# Patient Record
Sex: FEMALE | Race: WHITE | Employment: OTHER | URBAN - METROPOLITAN AREA
[De-identification: names, ages, dates, MRNs, and addresses within clinical notes are randomized per-mention and may not be internally consistent; named-entity substitution may affect disease eponyms.]

---

## 2017-01-09 ENCOUNTER — OFFICE VISIT (OUTPATIENT)
Dept: OTOLARYNGOLOGY | Facility: CLINIC | Age: 72
End: 2017-01-09

## 2017-01-09 VITALS
DIASTOLIC BLOOD PRESSURE: 72 MMHG | HEIGHT: 62 IN | SYSTOLIC BLOOD PRESSURE: 130 MMHG | TEMPERATURE: 98 F | RESPIRATION RATE: 18 BRPM | WEIGHT: 160 LBS | HEART RATE: 68 BPM | BODY MASS INDEX: 29.44 KG/M2

## 2017-01-09 DIAGNOSIS — H69.83 EUSTACHIAN TUBE DYSFUNCTION, BILATERAL: Primary | ICD-10-CM

## 2017-01-09 PROCEDURE — G0463 HOSPITAL OUTPT CLINIC VISIT: HCPCS | Performed by: OTOLARYNGOLOGY

## 2017-01-09 PROCEDURE — 99213 OFFICE O/P EST LOW 20 MIN: CPT | Performed by: OTOLARYNGOLOGY

## 2017-01-09 RX ORDER — ROSUVASTATIN CALCIUM 10 MG/1
10 TABLET, COATED ORAL NIGHTLY
COMMUNITY
Start: 2015-12-17 | End: 2020-07-16

## 2017-01-09 RX ORDER — OLMESARTAN MEDOXOMIL AND HYDROCHLOROTHIAZIDE 40/12.5 40; 12.5 MG/1; MG/1
TABLET ORAL
COMMUNITY
Start: 2015-12-17 | End: 2019-03-09

## 2017-01-09 RX ORDER — BUSPIRONE HYDROCHLORIDE 15 MG/1
TABLET ORAL
COMMUNITY
Start: 2015-12-17 | End: 2019-03-09

## 2017-01-09 RX ORDER — METHYLPREDNISOLONE 4 MG/1
TABLET ORAL
Qty: 1 PACKAGE | Refills: 0 | Status: SHIPPED | OUTPATIENT
Start: 2017-01-09 | End: 2017-04-05 | Stop reason: ALTCHOICE

## 2017-01-09 RX ORDER — FLUOXETINE 20 MG/1
20 TABLET, FILM COATED ORAL DAILY
COMMUNITY
Start: 2015-12-17 | End: 2020-08-17

## 2017-01-09 RX ORDER — LORAZEPAM 0.5 MG/1
0.5 TABLET ORAL 2 TIMES DAILY PRN
COMMUNITY
Start: 2015-08-11 | End: 2020-07-16 | Stop reason: ALTCHOICE

## 2017-01-09 RX ORDER — GABAPENTIN 100 MG/1
200 CAPSULE ORAL EVERY MORNING
Refills: 3 | COMMUNITY
Start: 2017-01-02 | End: 2020-07-16

## 2017-01-09 RX ORDER — GABAPENTIN 100 MG/1
CAPSULE ORAL
COMMUNITY
Start: 2016-05-23 | End: 2017-04-05

## 2017-01-09 NOTE — PROGRESS NOTES
Kristen Gann is a 73year old female.  Patient presents with:  Ear Problem: c/o Ruptured eardrum on R ear, states both ears feel clogged now [id number]        HISTORY OF PRESENT ILLNESS     1/9/2017Here for evaluation of right worse than left ear pressu Negative Blurred vision and vision changes. Respiratory Negative Dyspnea and wheezing. Cardio Negative Chest pain, irregular heartbeat   GI Negative Abdominal pain and diarrhea. Endocrine Negative Cold intolerance and heat intolerance.    Neuro Negati Take 1 tablet (20 mg total) by mouth 2 (two) times daily. , Disp: 60 tablet, Rfl: 1    ASSESSMENT AND PLAN  1. Eustachian tube dysfunction, bilateral  I discussed the pathophysiology of eustachian tube dysfunction with the patient.  Treatment methods were di

## 2017-02-01 ENCOUNTER — OFFICE VISIT (OUTPATIENT)
Dept: AUDIOLOGY | Facility: CLINIC | Age: 72
End: 2017-02-01

## 2017-02-01 ENCOUNTER — OFFICE VISIT (OUTPATIENT)
Dept: OTOLARYNGOLOGY | Facility: CLINIC | Age: 72
End: 2017-02-01

## 2017-02-01 VITALS
WEIGHT: 160 LBS | BODY MASS INDEX: 29.44 KG/M2 | TEMPERATURE: 98 F | HEIGHT: 62 IN | DIASTOLIC BLOOD PRESSURE: 80 MMHG | SYSTOLIC BLOOD PRESSURE: 130 MMHG

## 2017-02-01 DIAGNOSIS — H65.31 CHRONIC MUCOID OTITIS MEDIA OF RIGHT EAR: Primary | ICD-10-CM

## 2017-02-01 DIAGNOSIS — H69.91 EUSTACHIAN TUBE DISORDER, RIGHT: Primary | ICD-10-CM

## 2017-02-01 PROCEDURE — 69433 CREATE EARDRUM OPENING: CPT | Performed by: OTOLARYNGOLOGY

## 2017-02-01 PROCEDURE — 92567 TYMPANOMETRY: CPT | Performed by: AUDIOLOGIST

## 2017-02-01 PROCEDURE — 99213 OFFICE O/P EST LOW 20 MIN: CPT | Performed by: OTOLARYNGOLOGY

## 2017-02-01 PROCEDURE — 92557 COMPREHENSIVE HEARING TEST: CPT | Performed by: AUDIOLOGIST

## 2017-02-01 RX ORDER — FLUOXETINE HYDROCHLORIDE 20 MG/1
CAPSULE ORAL
Refills: 3 | COMMUNITY
Start: 2017-01-20 | End: 2017-04-05

## 2017-02-01 NOTE — PROGRESS NOTES
AUDIOGRAM     Destiney English was referred for testing by Maddi Thorne. 3/15/1945  [id number]        Otoscopic inspection: right ear no cerumen; left ear no cerumen.        Tests/Procedures  Patient was tested via  standard insert earphones and a mike

## 2017-02-01 NOTE — PROGRESS NOTES
Leanna Massey is a 73year old female. Patient presents with:   Follow - Up: 1 month follow up- bilateral eustachian tube dysfunction- per pt is got better and after a while it got worse again at her right ear        HISTORY OF PRESENT ILLNESS     1/9 History    OTHER SURGICAL HISTORY      Comment desensitization         REVIEW OF SYSTEMS    System Neg/Pos Details   Constitutional Negative fever, weight loss. ENMT Negative Headaches vertigo    Eyes Negative Blurred vision and vision changes.    Laurent Andrea •  gabapentin 100 MG Oral Cap, , Disp: , Rfl:   •  LORazepam 0.5 MG Oral Tab, , Disp: , Rfl:   •  gabapentin 100 MG Oral Cap, , Disp: , Rfl: 3  •  famoTIDine 20 MG Oral Tab, Take 1 tablet (20 mg total) by mouth 2 (two) times daily. , Disp: 60 tablet, Rfl:

## 2017-04-05 ENCOUNTER — OFFICE VISIT (OUTPATIENT)
Dept: OTOLARYNGOLOGY | Facility: CLINIC | Age: 72
End: 2017-04-05

## 2017-04-05 VITALS
SYSTOLIC BLOOD PRESSURE: 136 MMHG | TEMPERATURE: 97 F | WEIGHT: 158 LBS | HEIGHT: 62 IN | DIASTOLIC BLOOD PRESSURE: 81 MMHG | BODY MASS INDEX: 29.08 KG/M2

## 2017-04-05 DIAGNOSIS — H92.03 REFERRED OTALGIA, BILATERAL: Primary | ICD-10-CM

## 2017-04-05 PROCEDURE — 99213 OFFICE O/P EST LOW 20 MIN: CPT | Performed by: OTOLARYNGOLOGY

## 2017-04-05 PROCEDURE — G0463 HOSPITAL OUTPT CLINIC VISIT: HCPCS | Performed by: OTOLARYNGOLOGY

## 2017-04-05 RX ORDER — CHLORDIAZEPOXIDE HYDROCHLORIDE AND CLIDINIUM BROMIDE 5; 2.5 MG/1; MG/1
CAPSULE ORAL
Refills: 3 | COMMUNITY
Start: 2017-03-06 | End: 2019-03-09

## 2017-04-05 RX ORDER — FOLIC ACID 1 MG/1
1 TABLET ORAL DAILY
Refills: 3 | COMMUNITY
Start: 2017-03-18 | End: 2020-07-16 | Stop reason: ALTCHOICE

## 2017-04-05 RX ORDER — PROMETHAZINE HYDROCHLORIDE AND CODEINE PHOSPHATE 6.25; 1 MG/5ML; MG/5ML
SYRUP ORAL
Refills: 0 | COMMUNITY
Start: 2017-02-12 | End: 2019-03-09 | Stop reason: ALTCHOICE

## 2017-04-05 RX ORDER — DOXYCYCLINE HYCLATE 100 MG
TABLET ORAL
Refills: 0 | COMMUNITY
Start: 2017-02-08 | End: 2017-04-05 | Stop reason: ALTCHOICE

## 2017-04-05 NOTE — PROGRESS NOTES
Rishi Newton is a 81year old female.  Patient presents with:  Ear Pain: right ear for 1 month, pain comes and goes, congestion in both ears         HISTORY OF PRESENT ILLNESS     1/9/2017Here for evaluation of right worse than left ear pressure and of Onset   • Cancer Father      lung   • Heart Disease Mother      congestive heart failure     Past Medical History   Diagnosis Date   • Breast cancer [facility])    • Migraines    • Essential hypertension    • Vertigo    • Hyperlipidemia          Past Surgical Cap, , Disp: , Rfl: 3  •  folic acid 1 MG Oral Tab, , Disp: , Rfl: 3  •  Olmesartan Medoxomil-HCTZ (BENICAR HCT) 40-12.5 MG Oral Tab, , Disp: , Rfl:   •  BusPIRone HCl 15 MG Oral Tab, , Disp: , Rfl:   •  Rosuvastatin Calcium (CRESTOR) 10 MG Oral Tab, , Dis

## 2017-06-07 ENCOUNTER — HOSPITAL ENCOUNTER (OUTPATIENT)
Age: 72
Discharge: HOME | End: 2017-06-07
Attending: EMERGENCY MEDICINE
Payer: MEDICARE

## 2017-06-07 VITALS
BODY MASS INDEX: 29.63 KG/M2 | DIASTOLIC BLOOD PRESSURE: 72 MMHG | SYSTOLIC BLOOD PRESSURE: 127 MMHG | WEIGHT: 161 LBS | OXYGEN SATURATION: 100 % | TEMPERATURE: 98 F | RESPIRATION RATE: 18 BRPM | HEIGHT: 62 IN | HEART RATE: 70 BPM

## 2017-06-07 DIAGNOSIS — J02.0 ACUTE STREPTOCOCCAL PHARYNGITIS: Primary | ICD-10-CM

## 2017-06-07 DIAGNOSIS — J20.8 ACUTE VIRAL BRONCHITIS: ICD-10-CM

## 2017-06-07 PROCEDURE — 99214 OFFICE O/P EST MOD 30 MIN: CPT

## 2017-06-07 PROCEDURE — 99213 OFFICE O/P EST LOW 20 MIN: CPT

## 2017-06-07 PROCEDURE — 87430 STREP A AG IA: CPT

## 2017-06-07 RX ORDER — ALBUTEROL SULFATE 90 UG/1
2 AEROSOL, METERED RESPIRATORY (INHALATION) EVERY 4 HOURS PRN
Qty: 1 INHALER | Refills: 0 | Status: SHIPPED | OUTPATIENT
Start: 2017-06-07 | End: 2017-07-07

## 2017-06-07 RX ORDER — FLUOXETINE HYDROCHLORIDE 20 MG/1
CAPSULE ORAL
COMMUNITY
Start: 2017-04-18 | End: 2018-02-14

## 2017-06-07 RX ORDER — AZITHROMYCIN 500 MG/1
500 TABLET, FILM COATED ORAL DAILY
Qty: 5 TABLET | Refills: 0 | Status: SHIPPED | OUTPATIENT
Start: 2017-06-07 | End: 2017-06-12

## 2017-06-07 NOTE — ED PROVIDER NOTES
Patient Seen in: [facility]    History   Patient presents with:  Sore Throat  Cough/URI    Stated Complaint: SORE THROAT, COUGH, SOB    HPI    The patient is a 79-year-old female with a history of hypertension who present Standard drinks or equivalent per week       Comment: occasionally      Review of Systems    Positive for stated complaint: SORE THROAT, COUGH, SOB  Other systems are as noted in HPI. Constitutional and vital signs reviewed.       All other systems reviewe Discharge Medication List    START taking these medications    azithromycin 500 MG Oral Tab  Take 1 tablet (500 mg total) by mouth daily.   Qty: 5 tablet Refills: 0    Albuterol Sulfate  (90 Base) MCG/ACT Inhalation Aero Soln  Inhale 2 puffs into the

## 2017-06-07 NOTE — ED INITIAL ASSESSMENT (HPI)
PATIENT STATES SHE HAS BEEN ILL WITH A DRY COUGH AND SORE THROAT X 1 WEEK. PATIENT FEELS AS SHE IS WHEEZING. REPORTS HISTORY OF SEASONAL ASTHMA.  + SWEATS AND BODY ACHES.  + \"LOW GRADE\" TEMPERATURE AT HOME.

## 2018-02-14 ENCOUNTER — OFFICE VISIT (OUTPATIENT)
Dept: OTOLARYNGOLOGY | Facility: CLINIC | Age: 73
End: 2018-02-14

## 2018-02-14 VITALS
SYSTOLIC BLOOD PRESSURE: 120 MMHG | WEIGHT: 154 LBS | BODY MASS INDEX: 28.34 KG/M2 | TEMPERATURE: 98 F | DIASTOLIC BLOOD PRESSURE: 78 MMHG | HEIGHT: 62 IN

## 2018-02-14 DIAGNOSIS — L73.9 NASAL FOLLICULITIS: ICD-10-CM

## 2018-02-14 DIAGNOSIS — H61.23 BILATERAL IMPACTED CERUMEN: Primary | ICD-10-CM

## 2018-02-14 PROCEDURE — 99213 OFFICE O/P EST LOW 20 MIN: CPT | Performed by: OTOLARYNGOLOGY

## 2018-02-14 PROCEDURE — 69210 REMOVE IMPACTED EAR WAX UNI: CPT | Performed by: OTOLARYNGOLOGY

## 2018-02-14 RX ORDER — METHYLPREDNISOLONE 4 MG/1
TABLET ORAL
Qty: 1 PACKAGE | Refills: 0 | Status: SHIPPED | OUTPATIENT
Start: 2018-02-14 | End: 2019-02-27 | Stop reason: ALTCHOICE

## 2018-02-14 RX ORDER — SULFAMETHOXAZOLE AND TRIMETHOPRIM 800; 160 MG/1; MG/1
1 TABLET ORAL 2 TIMES DAILY
Qty: 14 TABLET | Refills: 1 | Status: SHIPPED | OUTPATIENT
Start: 2018-02-14 | End: 2018-02-21

## 2018-02-14 NOTE — PROGRESS NOTES
Renee Bai is a 81year old female.  Patient presents with:  Ear Problem: pt reports right ear has tube, trouble with pain, feels clogged, just getting over a sinus infection  Nose Problem: pt reports has a sinus infection and sore in right nostril Occupational History  None on file     Social History Main Topics   Smoking status: Former Smoker     Smokeless tobacco: Never Used    Alcohol use Yes  0.0 oz/week     Comment: occasionally    Drug use: No    Sexual activity: Not on file     Other Topi mucosa    Neurological Normal Memory - Normal. Cranial nerves - Cranial nerves II through XII grossly intact. Neck Exam Normal  normal to inspection and palpation    Psychiatric Normal   Appropriate mood and affect.    Lymph Detail Normal  no lymphaenopat is oral antibiotics , warm compresses, debridement of the area with peroxide followed by antibiotic ointment bid. If any worse in 24-48 hours follow up. Also added steroids as she is having significant facial pressure and ear pressure.  The risks of steroi

## 2018-03-14 ENCOUNTER — OFFICE VISIT (OUTPATIENT)
Dept: OTOLARYNGOLOGY | Facility: CLINIC | Age: 73
End: 2018-03-14

## 2018-03-14 VITALS
WEIGHT: 157 LBS | BODY MASS INDEX: 28.89 KG/M2 | TEMPERATURE: 98 F | SYSTOLIC BLOOD PRESSURE: 120 MMHG | HEIGHT: 62 IN | DIASTOLIC BLOOD PRESSURE: 70 MMHG

## 2018-03-14 DIAGNOSIS — J01.00 SUBACUTE MAXILLARY SINUSITIS: Primary | ICD-10-CM

## 2018-03-14 DIAGNOSIS — Z91.09 ENVIRONMENTAL ALLERGIES: ICD-10-CM

## 2018-03-14 PROCEDURE — 99214 OFFICE O/P EST MOD 30 MIN: CPT | Performed by: OTOLARYNGOLOGY

## 2018-03-14 PROCEDURE — G0463 HOSPITAL OUTPT CLINIC VISIT: HCPCS | Performed by: OTOLARYNGOLOGY

## 2018-03-14 RX ORDER — DOXYCYCLINE HYCLATE 100 MG
TABLET ORAL
COMMUNITY
Start: 2018-02-08 | End: 2019-03-09

## 2018-03-14 RX ORDER — AZITHROMYCIN 250 MG/1
TABLET, FILM COATED ORAL
Qty: 1 PACKAGE | Refills: 0 | Status: SHIPPED | OUTPATIENT
Start: 2018-03-14 | End: 2018-07-18

## 2018-03-14 RX ORDER — DIETHYLPROPION HYDROCHLORIDE 75 MG/1
TABLET ORAL
Refills: 0 | COMMUNITY
Start: 2018-03-08 | End: 2019-03-09

## 2018-03-14 RX ORDER — CYCLOBENZAPRINE HCL 10 MG
TABLET ORAL
COMMUNITY
Start: 2018-03-08 | End: 2019-03-09

## 2018-03-14 RX ORDER — LOSARTAN POTASSIUM AND HYDROCHLOROTHIAZIDE 12.5; 1 MG/1; MG/1
1 TABLET ORAL DAILY
COMMUNITY
Start: 2018-01-30 | End: 2020-07-16

## 2018-03-14 NOTE — PROGRESS NOTES
Judy Pitts is a 81year old female. Patient presents with:   Follow - Up: 1 month follow up- acute sinusitis- per pt she got better, now c/o throat pain, ear pressure        HISTORY OF PRESENT ILLNESS     1/9/2017Here for evaluation of right worse she is currently not on a daily antihistamines although she notes that she does have history of chronic allergies    Social History  Social History   Marital status:   Spouse name: N/A    Years of education: N/A  Number of children: N/A     Occupati bilaterally    Nasopharynx abNormal External nose - Normal.  . Congested nasal cavities are noted crusting and discolored mucus isnoted in the right nares she has a very inflamed mucosa    Neurological Normal Memory - Normal. Cranial nerves - Cranial nerve recommend qhs generic allergy meds otc   2. Acute sinusitis    Discussed the pathophysiology of and management of sinusitis . I reccomend adequate hydration, inhaled steam or saline, and a course of antibiotics.  If underlying allergies contribute to these

## 2018-05-21 ENCOUNTER — HOSPITAL ENCOUNTER (OUTPATIENT)
Dept: ULTRASOUND IMAGING | Facility: HOSPITAL | Age: 73
Discharge: HOME | End: 2018-05-21
Attending: INTERNAL MEDICINE
Payer: MEDICARE

## 2018-05-21 ENCOUNTER — PRIOR ORIGINAL RECORDS (OUTPATIENT)
Dept: OTHER | Age: 73
End: 2018-05-21

## 2018-05-21 ENCOUNTER — LAB ENCOUNTER (OUTPATIENT)
Dept: LAB | Facility: HOSPITAL | Age: 73
End: 2018-05-21
Attending: INTERNAL MEDICINE
Payer: MEDICARE

## 2018-05-21 DIAGNOSIS — E04.1 NONTOXIC UNINODULAR GOITER: Primary | ICD-10-CM

## 2018-05-21 DIAGNOSIS — E04.1 THYROID NODULE: ICD-10-CM

## 2018-05-21 PROCEDURE — 36415 COLL VENOUS BLD VENIPUNCTURE: CPT

## 2018-05-21 PROCEDURE — 84439 ASSAY OF FREE THYROXINE: CPT

## 2018-05-21 PROCEDURE — 84443 ASSAY THYROID STIM HORMONE: CPT

## 2018-05-21 PROCEDURE — 84481 FREE ASSAY (FT-3): CPT

## 2018-05-21 PROCEDURE — 76536 US EXAM OF HEAD AND NECK: CPT | Performed by: INTERNAL MEDICINE

## 2018-07-18 ENCOUNTER — HOSPITAL ENCOUNTER (OUTPATIENT)
Age: 73
Discharge: HOME | End: 2018-07-18
Attending: FAMILY MEDICINE
Payer: MEDICARE

## 2018-07-18 VITALS
RESPIRATION RATE: 20 BRPM | WEIGHT: 153 LBS | HEART RATE: 68 BPM | TEMPERATURE: 98 F | BODY MASS INDEX: 28.16 KG/M2 | HEIGHT: 62 IN | OXYGEN SATURATION: 99 % | SYSTOLIC BLOOD PRESSURE: 140 MMHG | DIASTOLIC BLOOD PRESSURE: 71 MMHG

## 2018-07-18 DIAGNOSIS — R09.81 HEAD CONGESTION: ICD-10-CM

## 2018-07-18 DIAGNOSIS — R05.9 COUGH: Primary | ICD-10-CM

## 2018-07-18 PROCEDURE — 99214 OFFICE O/P EST MOD 30 MIN: CPT

## 2018-07-18 PROCEDURE — 99213 OFFICE O/P EST LOW 20 MIN: CPT

## 2018-07-18 RX ORDER — AZITHROMYCIN 250 MG/1
TABLET, FILM COATED ORAL
Qty: 1 PACKAGE | Refills: 0 | Status: SHIPPED | OUTPATIENT
Start: 2018-07-18 | End: 2018-07-23

## 2018-07-18 NOTE — ED NOTES
Patient presents with c/o uri symptoms since Saturday. Cough, headache, sinus congestion, chest congestion and bilateral ear ache. Afebrile.

## 2018-07-18 NOTE — ED INITIAL ASSESSMENT (HPI)
Patient present with c/o productive cough, chest congestion, headache and bilateral ear ache since Saturday

## 2018-07-18 NOTE — ED PROVIDER NOTES
Patient presents with:  Cough/URI      HPI:     Isabel Guajardo is a 72year old female who presents with for chief complaint of head congestion, postnasal drip, chest congestion, cough with the intermittent chest tightness with cough. X 2 days.     Pa Fundi benign. Ears: normal TM's and external ear canals both ears  Nose: no discharge, no sinus tenderness. No nasal flaring  Throat: Normal oropharynx  Neck: no adenopathy  RESPIRATORY:   Lungs: clear to auscultation bilaterally.  No chest wall retraction

## 2018-08-30 ENCOUNTER — MYAURORA ACCOUNT LINK (OUTPATIENT)
Dept: OTHER | Age: 73
End: 2018-08-30

## 2018-08-30 ENCOUNTER — PRIOR ORIGINAL RECORDS (OUTPATIENT)
Dept: OTHER | Age: 73
End: 2018-08-30

## 2018-09-13 ENCOUNTER — PRIOR ORIGINAL RECORDS (OUTPATIENT)
Dept: OTHER | Age: 73
End: 2018-09-13

## 2018-09-13 ENCOUNTER — LAB ENCOUNTER (OUTPATIENT)
Dept: LAB | Facility: HOSPITAL | Age: 73
End: 2018-09-13
Attending: INTERNAL MEDICINE
Payer: MEDICARE

## 2018-09-13 DIAGNOSIS — R00.2 PALPITATIONS: Primary | ICD-10-CM

## 2018-09-13 DIAGNOSIS — I10 ESSENTIAL HYPERTENSION, MALIGNANT: ICD-10-CM

## 2018-09-13 DIAGNOSIS — R07.9 CHEST PAIN, UNSPECIFIED: ICD-10-CM

## 2018-09-13 LAB
ALBUMIN SERPL BCP-MCNC: 4 G/DL (ref 3.5–4.8)
ALBUMIN/GLOB SERPL: 1.4 {RATIO} (ref 1–2)
ALP SERPL-CCNC: 65 U/L (ref 32–100)
ALT SERPL-CCNC: 24 U/L (ref 14–54)
ANION GAP SERPL CALC-SCNC: 7 MMOL/L (ref 0–18)
AST SERPL-CCNC: 25 U/L (ref 15–41)
BILIRUB SERPL-MCNC: 0.4 MG/DL (ref 0.3–1.2)
BUN SERPL-MCNC: 17 MG/DL (ref 8–20)
BUN/CREAT SERPL: 27.4 (ref 10–20)
CALCIUM SERPL-MCNC: 9.1 MG/DL (ref 8.5–10.5)
CHLORIDE SERPL-SCNC: 104 MMOL/L (ref 95–110)
CO2 SERPL-SCNC: 27 MMOL/L (ref 22–32)
CREAT SERPL-MCNC: 0.62 MG/DL (ref 0.5–1.5)
ERYTHROCYTE [DISTWIDTH] IN BLOOD BY AUTOMATED COUNT: 13.8 % (ref 11–15)
GLOBULIN PLAS-MCNC: 2.9 G/DL (ref 2.5–3.7)
GLUCOSE SERPL-MCNC: 94 MG/DL (ref 70–99)
HCT VFR BLD AUTO: 39.8 % (ref 35–48)
HGB BLD-MCNC: 13.4 G/DL (ref 12–16)
MCH RBC QN AUTO: 33.5 PG (ref 27–32)
MCHC RBC AUTO-ENTMCNC: 33.6 G/DL (ref 32–37)
MCV RBC AUTO: 99.7 FL (ref 80–100)
OSMOLALITY UR CALC.SUM OF ELEC: 287 MOSM/KG (ref 275–295)
PATIENT FASTING: YES
PLATELET # BLD AUTO: 249 K/UL (ref 140–400)
PMV BLD AUTO: 9.1 FL (ref 7.4–10.3)
POTASSIUM SERPL-SCNC: 3.8 MMOL/L (ref 3.3–5.1)
PROT SERPL-MCNC: 6.9 G/DL (ref 5.9–8.4)
RBC # BLD AUTO: 4 M/UL (ref 3.7–5.4)
SODIUM SERPL-SCNC: 138 MMOL/L (ref 136–144)
WBC # BLD AUTO: 4.6 K/UL (ref 4–11)

## 2018-09-13 PROCEDURE — 36415 COLL VENOUS BLD VENIPUNCTURE: CPT

## 2018-09-13 PROCEDURE — 85027 COMPLETE CBC AUTOMATED: CPT

## 2018-09-13 PROCEDURE — 80053 COMPREHEN METABOLIC PANEL: CPT

## 2018-09-14 ENCOUNTER — HOSPITAL ENCOUNTER (OUTPATIENT)
Dept: CV DIAGNOSTICS | Facility: HOSPITAL | Age: 73
Discharge: HOME | End: 2018-09-14
Attending: INTERNAL MEDICINE
Payer: MEDICARE

## 2018-09-14 DIAGNOSIS — R07.9 CHEST PAIN, UNSPECIFIED: ICD-10-CM

## 2018-09-14 DIAGNOSIS — R00.2 AWARENESS OF HEART BEAT: ICD-10-CM

## 2018-09-14 PROCEDURE — 93018 CV STRESS TEST I&R ONLY: CPT | Performed by: INTERNAL MEDICINE

## 2018-09-14 PROCEDURE — 93350 STRESS TTE ONLY: CPT | Performed by: INTERNAL MEDICINE

## 2018-09-14 PROCEDURE — 93017 CV STRESS TEST TRACING ONLY: CPT | Performed by: INTERNAL MEDICINE

## 2018-09-14 PROCEDURE — 93016 CV STRESS TEST SUPVJ ONLY: CPT | Performed by: INTERNAL MEDICINE

## 2018-09-17 ENCOUNTER — PRIOR ORIGINAL RECORDS (OUTPATIENT)
Dept: OTHER | Age: 73
End: 2018-09-17

## 2018-09-17 LAB
ALBUMIN: 4 G/DL
ALKALINE PHOSPHATATE(ALK PHOS): 65 IU/L
BILIRUBIN TOTAL: 0.4 MG/DL
BUN: 17 MG/DL
CALCIUM: 9.1 MG/DL
CHLORIDE: 104 MEQ/L
CREATININE, SERUM: 0.62 MG/DL
FREE T4: 0.95 MG/DL
GLOBULIN: 2.9 G/DL
GLUCOSE: 94 MG/DL
HEMATOCRIT: 39.8 %
HEMOGLOBIN: 13.4 G/DL
PLATELETS: 249 K/UL
POTASSIUM, SERUM: 3.8 MEQ/L
PROTEIN, TOTAL: 6.9 G/DL
RED BLOOD COUNT: 4 X 10-6/U
SGOT (AST): 25 IU/L
SGPT (ALT): 24 IU/L
SODIUM: 138 MEQ/L
THYROID STIMULATING HORMONE: 0.9 MLU/L
WHITE BLOOD COUNT: 4.6 X 10-3/U

## 2019-01-01 ENCOUNTER — EXTERNAL RECORD (OUTPATIENT)
Dept: CARDIOLOGY | Age: 74
End: 2019-01-01

## 2019-01-11 ENCOUNTER — HOSPITAL ENCOUNTER (OUTPATIENT)
Dept: CT IMAGING | Facility: HOSPITAL | Age: 74
Discharge: HOME | End: 2019-01-11
Attending: ORTHOPAEDIC SURGERY
Payer: MEDICARE

## 2019-01-11 DIAGNOSIS — M17.9 OSTEOARTHRITIS OF KNEE, UNSPECIFIED: ICD-10-CM

## 2019-01-11 PROCEDURE — 73700 CT LOWER EXTREMITY W/O DYE: CPT | Performed by: ORTHOPAEDIC SURGERY

## 2019-02-19 ENCOUNTER — APPOINTMENT (OUTPATIENT)
Dept: LAB | Facility: HOSPITAL | Age: 74
End: 2019-02-19
Attending: ORTHOPAEDIC SURGERY
Payer: MEDICARE

## 2019-02-19 DIAGNOSIS — M25.562 LEFT KNEE PAIN: ICD-10-CM

## 2019-02-19 DIAGNOSIS — Z01.818 PRE-OP TESTING: ICD-10-CM

## 2019-02-19 DIAGNOSIS — M17.9 OSTEOARTHRITIS OF KNEE, UNSPECIFIED: Primary | ICD-10-CM

## 2019-02-19 DIAGNOSIS — M17.9 OSTEOARTHRITIS OF KNEE, UNSPECIFIED: ICD-10-CM

## 2019-02-19 LAB
ALBUMIN SERPL-MCNC: 3.5 G/DL (ref 3.4–5)
ALBUMIN/GLOB SERPL: 1 {RATIO} (ref 1–2)
ALP LIVER SERPL-CCNC: 77 U/L (ref 55–142)
ALT SERPL-CCNC: 24 U/L (ref 13–56)
ANION GAP SERPL CALC-SCNC: 4 MMOL/L (ref 0–18)
ANTIBODY SCREEN: NEGATIVE
APTT PPP: 27.9 SECONDS (ref 23.2–35.3)
AST SERPL-CCNC: 13 U/L (ref 15–37)
BASOPHILS # BLD AUTO: 0.04 X10(3) UL (ref 0–0.2)
BASOPHILS NFR BLD AUTO: 0.9 %
BILIRUB SERPL-MCNC: 0.4 MG/DL (ref 0.1–2)
BILIRUB UR QL: NEGATIVE
BUN BLD-MCNC: 15 MG/DL (ref 7–18)
BUN/CREAT SERPL: 23.1 (ref 10–20)
CALCIUM BLD-MCNC: 8.9 MG/DL (ref 8.5–10.1)
CHLORIDE SERPL-SCNC: 105 MMOL/L (ref 98–107)
CO2 SERPL-SCNC: 30 MMOL/L (ref 21–32)
COLOR UR: YELLOW
CREAT BLD-MCNC: 0.65 MG/DL (ref 0.55–1.02)
CRP SERPL-MCNC: <0.29 MG/DL (ref ?–0.3)
DEPRECATED RDW RBC AUTO: 49.4 FL (ref 35.1–46.3)
EOSINOPHIL # BLD AUTO: 0.2 X10(3) UL (ref 0–0.7)
EOSINOPHIL NFR BLD AUTO: 4.5 %
ERYTHROCYTE [DISTWIDTH] IN BLOOD BY AUTOMATED COUNT: 13.9 % (ref 11–15)
ERYTHROCYTE [SEDIMENTATION RATE] IN BLOOD: 11 MM/HR (ref 0–30)
GLOBULIN PLAS-MCNC: 3.5 G/DL (ref 2.8–4.4)
GLUCOSE BLD-MCNC: 88 MG/DL (ref 70–99)
GLUCOSE UR-MCNC: NEGATIVE MG/DL
HCT VFR BLD AUTO: 37 % (ref 35–48)
HGB BLD-MCNC: 12.2 G/DL (ref 12–16)
HGB UR QL STRIP.AUTO: NEGATIVE
IMM GRANULOCYTES # BLD AUTO: 0.01 X10(3) UL (ref 0–1)
IMM GRANULOCYTES NFR BLD: 0.2 %
INR BLD: 1 (ref 0.9–1.2)
KETONES UR-MCNC: NEGATIVE MG/DL
LEUKOCYTE ESTERASE UR QL STRIP.AUTO: NEGATIVE
LYMPHOCYTES # BLD AUTO: 1.72 X10(3) UL (ref 1–4)
LYMPHOCYTES NFR BLD AUTO: 38.7 %
M PROTEIN MFR SERPL ELPH: 7 G/DL (ref 6.4–8.2)
MCH RBC QN AUTO: 32.2 PG (ref 26–34)
MCHC RBC AUTO-ENTMCNC: 33 G/DL (ref 31–37)
MCV RBC AUTO: 97.6 FL (ref 80–100)
MONOCYTES # BLD AUTO: 0.52 X10(3) UL (ref 0.1–1)
MONOCYTES NFR BLD AUTO: 11.7 %
MRSA DNA SPEC QL NAA+PROBE: NEGATIVE
NEUTROPHILS # BLD AUTO: 1.96 X10 (3) UL (ref 1.5–7.7)
NEUTROPHILS # BLD AUTO: 1.96 X10(3) UL (ref 1.5–7.7)
NEUTROPHILS NFR BLD AUTO: 44 %
NITRITE UR QL STRIP.AUTO: NEGATIVE
OSMOLALITY SERPL CALC.SUM OF ELEC: 288 MOSM/KG (ref 275–295)
PH UR: 6 [PH] (ref 5–8)
PLATELET # BLD AUTO: 234 10(3)UL (ref 150–450)
POTASSIUM SERPL-SCNC: 4 MMOL/L (ref 3.5–5.1)
PROT UR-MCNC: NEGATIVE MG/DL
PROTHROMBIN TIME: 12.9 SECONDS (ref 11.8–14.5)
RBC # BLD AUTO: 3.79 X10(6)UL (ref 3.8–5.3)
RBC #/AREA URNS AUTO: <1 /HPF
RH BLOOD TYPE: POSITIVE
SODIUM SERPL-SCNC: 139 MMOL/L (ref 136–145)
SP GR UR STRIP: 1.01 (ref 1–1.03)
UROBILINOGEN UR STRIP-ACNC: <2
VIT C UR-MCNC: NEGATIVE MG/DL
WBC # BLD AUTO: 4.5 X10(3) UL (ref 4–11)
WBC #/AREA URNS AUTO: <1 /HPF

## 2019-02-19 PROCEDURE — 87086 URINE CULTURE/COLONY COUNT: CPT

## 2019-02-19 PROCEDURE — 93005 ELECTROCARDIOGRAM TRACING: CPT

## 2019-02-19 PROCEDURE — 80053 COMPREHEN METABOLIC PANEL: CPT

## 2019-02-19 PROCEDURE — 85652 RBC SED RATE AUTOMATED: CPT

## 2019-02-19 PROCEDURE — 86901 BLOOD TYPING SEROLOGIC RH(D): CPT

## 2019-02-19 PROCEDURE — 87641 MR-STAPH DNA AMP PROBE: CPT

## 2019-02-19 PROCEDURE — 85610 PROTHROMBIN TIME: CPT

## 2019-02-19 PROCEDURE — 85730 THROMBOPLASTIN TIME PARTIAL: CPT

## 2019-02-19 PROCEDURE — 86850 RBC ANTIBODY SCREEN: CPT

## 2019-02-19 PROCEDURE — 85025 COMPLETE CBC W/AUTO DIFF WBC: CPT

## 2019-02-19 PROCEDURE — 86140 C-REACTIVE PROTEIN: CPT

## 2019-02-19 PROCEDURE — 81003 URINALYSIS AUTO W/O SCOPE: CPT

## 2019-02-19 PROCEDURE — 86900 BLOOD TYPING SEROLOGIC ABO: CPT

## 2019-02-19 PROCEDURE — 93010 ELECTROCARDIOGRAM REPORT: CPT | Performed by: ORTHOPAEDIC SURGERY

## 2019-02-19 PROCEDURE — 36415 COLL VENOUS BLD VENIPUNCTURE: CPT

## 2019-02-27 ENCOUNTER — HOSPITAL ENCOUNTER (OUTPATIENT)
Age: 74
Discharge: HOME | End: 2019-02-27
Attending: EMERGENCY MEDICINE
Payer: MEDICARE

## 2019-02-27 VITALS
BODY MASS INDEX: 29.26 KG/M2 | RESPIRATION RATE: 18 BRPM | DIASTOLIC BLOOD PRESSURE: 68 MMHG | SYSTOLIC BLOOD PRESSURE: 134 MMHG | OXYGEN SATURATION: 98 % | HEIGHT: 62 IN | HEART RATE: 73 BPM | WEIGHT: 159 LBS | TEMPERATURE: 98 F

## 2019-02-27 DIAGNOSIS — J40 BRONCHITIS: Primary | ICD-10-CM

## 2019-02-27 PROCEDURE — 99213 OFFICE O/P EST LOW 20 MIN: CPT

## 2019-02-27 PROCEDURE — 99214 OFFICE O/P EST MOD 30 MIN: CPT

## 2019-02-27 RX ORDER — AZITHROMYCIN 250 MG/1
TABLET, FILM COATED ORAL
Qty: 1 PACKAGE | Refills: 0 | Status: SHIPPED | OUTPATIENT
Start: 2019-02-27 | End: 2019-03-09 | Stop reason: ALTCHOICE

## 2019-02-27 RX ORDER — FLUTICASONE PROPIONATE 50 MCG
2 SPRAY, SUSPENSION (ML) NASAL DAILY
Qty: 16 G | Refills: 0 | Status: SHIPPED | OUTPATIENT
Start: 2019-02-27 | End: 2019-03-09 | Stop reason: ALTCHOICE

## 2019-02-27 RX ORDER — ALBUTEROL SULFATE 90 UG/1
2 AEROSOL, METERED RESPIRATORY (INHALATION) EVERY 4 HOURS PRN
Qty: 1 INHALER | Refills: 0 | Status: SHIPPED | OUTPATIENT
Start: 2019-02-27 | End: 2019-03-29

## 2019-02-27 RX ORDER — BENZONATATE 100 MG/1
100 CAPSULE ORAL 3 TIMES DAILY PRN
Qty: 30 CAPSULE | Refills: 0 | Status: SHIPPED | OUTPATIENT
Start: 2019-02-27 | End: 2019-03-09 | Stop reason: ALTCHOICE

## 2019-02-27 NOTE — ED INITIAL ASSESSMENT (HPI)
PATIENT ARRIVED AMBULATORY TO ROOM C/O SYMPTOMS X4 WEEKS. +NASAL CONGESTION. +BILATERAL EAR PAIN. +SINUS PAIN/PRESSURE. SLIGHT COUGH. NO FEVERS. NO N/V/D. EASY NON LABORED RESPIRATIONS.

## 2019-02-27 NOTE — ED PROVIDER NOTES
Patient Seen in: [facility]    History   Patient presents with:  Sinus Problem    Stated Complaint: Sinus    HPI    Patient here with cough, congestion for 4 weeks. No travel, no known sick contacts.   Patient denies sig History    Tobacco Use      Smoking status: Former Smoker      Smokeless tobacco: Never Used    Alcohol use:  Yes      Alcohol/week: 0.0 oz      Comment: occasionally    Drug use: No      Review of Systems    Positive for stated complaint: Sinus  Other syst 12:34 PM    START taking these medications    Albuterol Sulfate  (90 Base) MCG/ACT Inhalation Aero Soln  Inhale 2 puffs into the lungs every 4 (four) hours as needed for Wheezing., Normal, Disp-1 Inhaler, R-0    azithromycin (ZITHROMAX Z-MICHAEL) 250 MG

## 2019-02-28 VITALS
DIASTOLIC BLOOD PRESSURE: 72 MMHG | SYSTOLIC BLOOD PRESSURE: 136 MMHG | BODY MASS INDEX: 28.71 KG/M2 | HEART RATE: 74 BPM | WEIGHT: 156 LBS | HEIGHT: 62 IN

## 2019-03-08 ENCOUNTER — TELEPHONE (OUTPATIENT)
Dept: CARDIOLOGY | Age: 74
End: 2019-03-08

## 2019-03-09 RX ORDER — METOCLOPRAMIDE 10 MG/1
10 TABLET ORAL ONCE
Status: CANCELLED | OUTPATIENT
Start: 2019-03-09 | End: 2019-03-09

## 2019-03-09 RX ORDER — BUTALBITAL, ASPIRIN, AND CAFFEINE 50; 325; 40 MG/1; MG/1; MG/1
1 CAPSULE ORAL EVERY 6 HOURS PRN
COMMUNITY
End: 2019-08-10

## 2019-03-09 RX ORDER — BUSPIRONE HYDROCHLORIDE 10 MG/1
10 TABLET ORAL 3 TIMES DAILY
COMMUNITY
End: 2020-07-16 | Stop reason: DRUGHIGH

## 2019-03-09 RX ORDER — GABAPENTIN 100 MG/1
300 CAPSULE ORAL NIGHTLY
COMMUNITY
End: 2020-07-16

## 2019-03-09 RX ORDER — GABAPENTIN 100 MG/1
200 CAPSULE ORAL 3 TIMES DAILY
COMMUNITY

## 2019-03-18 ENCOUNTER — HOSPITAL ENCOUNTER (INPATIENT)
Facility: HOSPITAL | Age: 74
LOS: 3 days | Discharge: HOME HEALTH | DRG: 470 | End: 2019-03-21
Attending: ORTHOPAEDIC SURGERY | Admitting: ORTHOPAEDIC SURGERY
Payer: MEDICARE

## 2019-03-18 ENCOUNTER — ANESTHESIA EVENT (OUTPATIENT)
Dept: SURGERY | Facility: HOSPITAL | Age: 74
DRG: 470 | End: 2019-03-18
Payer: MEDICARE

## 2019-03-18 ENCOUNTER — APPOINTMENT (OUTPATIENT)
Dept: GENERAL RADIOLOGY | Facility: HOSPITAL | Age: 74
DRG: 470 | End: 2019-03-18
Attending: ORTHOPAEDIC SURGERY
Payer: MEDICARE

## 2019-03-18 ENCOUNTER — ANESTHESIA (OUTPATIENT)
Dept: SURGERY | Facility: HOSPITAL | Age: 74
DRG: 470 | End: 2019-03-18
Payer: MEDICARE

## 2019-03-18 PROBLEM — M17.10 ARTHRITIS OF KNEE, DEGENERATIVE: Status: ACTIVE | Noted: 2019-03-18

## 2019-03-18 PROBLEM — M17.9 ARTHRITIS OF KNEE, DEGENERATIVE: Status: ACTIVE | Noted: 2019-03-18

## 2019-03-18 PROCEDURE — 0SRD0J9 REPLACEMENT OF LEFT KNEE JOINT WITH SYNTHETIC SUBSTITUTE, CEMENTED, OPEN APPROACH: ICD-10-PCS | Performed by: ORTHOPAEDIC SURGERY

## 2019-03-18 PROCEDURE — 88311 DECALCIFY TISSUE: CPT | Performed by: ORTHOPAEDIC SURGERY

## 2019-03-18 PROCEDURE — 97110 THERAPEUTIC EXERCISES: CPT

## 2019-03-18 PROCEDURE — 97162 PT EVAL MOD COMPLEX 30 MIN: CPT

## 2019-03-18 PROCEDURE — 88305 TISSUE EXAM BY PATHOLOGIST: CPT | Performed by: ORTHOPAEDIC SURGERY

## 2019-03-18 PROCEDURE — 73560 X-RAY EXAM OF KNEE 1 OR 2: CPT | Performed by: ORTHOPAEDIC SURGERY

## 2019-03-18 DEVICE — P.F.C. SIGMA TIBIAL INSERT FIXED BEARING CURVED 3 (CVD) 10MM GVF
Type: IMPLANTABLE DEVICE | Site: KNEE | Status: FUNCTIONAL
Brand: P.F.C. SIGMA

## 2019-03-18 DEVICE — SIGMA LCS COMPLETE TIBIAL TRAY ROTATING PLATFORM M.B.T. KEEL POROCOAT SIZE 2.5 CEMENTLESS
Type: IMPLANTABLE DEVICE | Site: KNEE | Status: FUNCTIONAL
Brand: SIGMA LCS COMPLETE POROCOAT

## 2019-03-18 DEVICE — TRUMATCH PIN GUIDE SET FOR USE WITH: LEFT
Type: IMPLANTABLE DEVICE | Site: KNEE | Status: FUNCTIONAL
Brand: TRUMATCH

## 2019-03-18 DEVICE — P.F.C. SIGMA OVAL DOME PATELLA 3-PEG 38MM CEMENTED
Type: IMPLANTABLE DEVICE | Site: KNEE | Status: FUNCTIONAL
Brand: P.F.C. SIGMA

## 2019-03-18 RX ORDER — ACETAMINOPHEN 500 MG
1000 TABLET ORAL ONCE
Status: DISCONTINUED | OUTPATIENT
Start: 2019-03-18 | End: 2019-03-18

## 2019-03-18 RX ORDER — CYCLOBENZAPRINE HCL 10 MG
10 TABLET ORAL EVERY 8 HOURS PRN
Status: DISCONTINUED | OUTPATIENT
Start: 2019-03-18 | End: 2019-03-21

## 2019-03-18 RX ORDER — DIPHENHYDRAMINE HYDROCHLORIDE 50 MG/ML
12.5 INJECTION INTRAMUSCULAR; INTRAVENOUS EVERY 4 HOURS PRN
Status: DISCONTINUED | OUTPATIENT
Start: 2019-03-18 | End: 2019-03-21

## 2019-03-18 RX ORDER — SCOLOPAMINE TRANSDERMAL SYSTEM 1 MG/1
1 PATCH, EXTENDED RELEASE TRANSDERMAL ONCE
Status: COMPLETED | OUTPATIENT
Start: 2019-03-18 | End: 2019-03-21

## 2019-03-18 RX ORDER — HYDROCODONE BITARTRATE AND ACETAMINOPHEN 5; 325 MG/1; MG/1
2 TABLET ORAL AS NEEDED
Status: DISCONTINUED | OUTPATIENT
Start: 2019-03-18 | End: 2019-03-18 | Stop reason: HOSPADM

## 2019-03-18 RX ORDER — ACETAMINOPHEN 500 MG
1000 TABLET ORAL ONCE
Status: COMPLETED | OUTPATIENT
Start: 2019-03-18 | End: 2019-03-18

## 2019-03-18 RX ORDER — FAMOTIDINE 20 MG/1
20 TABLET ORAL ONCE
Status: DISCONTINUED | OUTPATIENT
Start: 2019-03-18 | End: 2019-03-18 | Stop reason: HOSPADM

## 2019-03-18 RX ORDER — HYDROCODONE BITARTRATE AND ACETAMINOPHEN 7.5; 325 MG/1; MG/1
2 TABLET ORAL EVERY 4 HOURS PRN
Status: DISCONTINUED | OUTPATIENT
Start: 2019-03-18 | End: 2019-03-21

## 2019-03-18 RX ORDER — GABAPENTIN 600 MG/1
600 TABLET ORAL ONCE
Status: COMPLETED | OUTPATIENT
Start: 2019-03-18 | End: 2019-03-18

## 2019-03-18 RX ORDER — LIDOCAINE HYDROCHLORIDE 40 MG/ML
SOLUTION TOPICAL AS NEEDED
Status: DISCONTINUED | OUTPATIENT
Start: 2019-03-18 | End: 2019-03-18 | Stop reason: SURG

## 2019-03-18 RX ORDER — LIDOCAINE HYDROCHLORIDE 10 MG/ML
INJECTION, SOLUTION EPIDURAL; INFILTRATION; INTRACAUDAL; PERINEURAL AS NEEDED
Status: DISCONTINUED | OUTPATIENT
Start: 2019-03-18 | End: 2019-03-18 | Stop reason: SURG

## 2019-03-18 RX ORDER — ALBUTEROL SULFATE 90 UG/1
2 AEROSOL, METERED RESPIRATORY (INHALATION) EVERY 4 HOURS PRN
Status: DISCONTINUED | OUTPATIENT
Start: 2019-03-18 | End: 2019-03-21

## 2019-03-18 RX ORDER — HYDROCODONE BITARTRATE AND ACETAMINOPHEN 7.5; 325 MG/1; MG/1
1 TABLET ORAL EVERY 4 HOURS PRN
Status: DISCONTINUED | OUTPATIENT
Start: 2019-03-18 | End: 2019-03-21

## 2019-03-18 RX ORDER — ECHINACEA PURPUREA EXTRACT 125 MG
1 TABLET ORAL 3 TIMES DAILY
Status: DISCONTINUED | OUTPATIENT
Start: 2019-03-18 | End: 2019-03-21

## 2019-03-18 RX ORDER — ROCURONIUM BROMIDE 10 MG/ML
INJECTION, SOLUTION INTRAVENOUS AS NEEDED
Status: DISCONTINUED | OUTPATIENT
Start: 2019-03-18 | End: 2019-03-18 | Stop reason: SURG

## 2019-03-18 RX ORDER — SODIUM CHLORIDE, SODIUM LACTATE, POTASSIUM CHLORIDE, CALCIUM CHLORIDE 600; 310; 30; 20 MG/100ML; MG/100ML; MG/100ML; MG/100ML
INJECTION, SOLUTION INTRAVENOUS CONTINUOUS
Status: DISCONTINUED | OUTPATIENT
Start: 2019-03-18 | End: 2019-03-21

## 2019-03-18 RX ORDER — FAMOTIDINE 20 MG/1
20 TABLET ORAL 2 TIMES DAILY
Status: DISCONTINUED | OUTPATIENT
Start: 2019-03-18 | End: 2019-03-18

## 2019-03-18 RX ORDER — METOCLOPRAMIDE HYDROCHLORIDE 5 MG/ML
10 INJECTION INTRAMUSCULAR; INTRAVENOUS EVERY 6 HOURS PRN
Status: DISPENSED | OUTPATIENT
Start: 2019-03-18 | End: 2019-03-20

## 2019-03-18 RX ORDER — NEOSTIGMINE METHYLSULFATE 0.5 MG/ML
INJECTION INTRAVENOUS AS NEEDED
Status: DISCONTINUED | OUTPATIENT
Start: 2019-03-18 | End: 2019-03-18 | Stop reason: SURG

## 2019-03-18 RX ORDER — MORPHINE SULFATE 2 MG/ML
2 INJECTION, SOLUTION INTRAMUSCULAR; INTRAVENOUS EVERY 2 HOUR PRN
Status: DISCONTINUED | OUTPATIENT
Start: 2019-03-18 | End: 2019-03-21

## 2019-03-18 RX ORDER — MORPHINE SULFATE 10 MG/ML
6 INJECTION, SOLUTION INTRAMUSCULAR; INTRAVENOUS EVERY 10 MIN PRN
Status: DISCONTINUED | OUTPATIENT
Start: 2019-03-18 | End: 2019-03-18 | Stop reason: HOSPADM

## 2019-03-18 RX ORDER — FAMOTIDINE 10 MG/ML
20 INJECTION, SOLUTION INTRAVENOUS 2 TIMES DAILY
Status: DISCONTINUED | OUTPATIENT
Start: 2019-03-18 | End: 2019-03-18

## 2019-03-18 RX ORDER — MORPHINE SULFATE 4 MG/ML
4 INJECTION, SOLUTION INTRAMUSCULAR; INTRAVENOUS EVERY 2 HOUR PRN
Status: DISCONTINUED | OUTPATIENT
Start: 2019-03-18 | End: 2019-03-21

## 2019-03-18 RX ORDER — ONDANSETRON 2 MG/ML
4 INJECTION INTRAMUSCULAR; INTRAVENOUS ONCE AS NEEDED
Status: DISCONTINUED | OUTPATIENT
Start: 2019-03-18 | End: 2019-03-18 | Stop reason: HOSPADM

## 2019-03-18 RX ORDER — SODIUM CHLORIDE, SODIUM LACTATE, POTASSIUM CHLORIDE, CALCIUM CHLORIDE 600; 310; 30; 20 MG/100ML; MG/100ML; MG/100ML; MG/100ML
INJECTION, SOLUTION INTRAVENOUS CONTINUOUS
Status: DISCONTINUED | OUTPATIENT
Start: 2019-03-18 | End: 2019-03-18 | Stop reason: HOSPADM

## 2019-03-18 RX ORDER — MORPHINE SULFATE 2 MG/ML
1 INJECTION, SOLUTION INTRAMUSCULAR; INTRAVENOUS EVERY 2 HOUR PRN
Status: DISCONTINUED | OUTPATIENT
Start: 2019-03-18 | End: 2019-03-21

## 2019-03-18 RX ORDER — ONDANSETRON 2 MG/ML
4 INJECTION INTRAMUSCULAR; INTRAVENOUS EVERY 4 HOURS PRN
Status: DISCONTINUED | OUTPATIENT
Start: 2019-03-18 | End: 2019-03-21

## 2019-03-18 RX ORDER — EPHEDRINE SULFATE 50 MG/ML
INJECTION, SOLUTION INTRAVENOUS AS NEEDED
Status: DISCONTINUED | OUTPATIENT
Start: 2019-03-18 | End: 2019-03-18 | Stop reason: SURG

## 2019-03-18 RX ORDER — BUSPIRONE HYDROCHLORIDE 5 MG/1
10 TABLET ORAL 3 TIMES DAILY
Status: DISCONTINUED | OUTPATIENT
Start: 2019-03-18 | End: 2019-03-21

## 2019-03-18 RX ORDER — BISACODYL 10 MG
10 SUPPOSITORY, RECTAL RECTAL
Status: DISCONTINUED | OUTPATIENT
Start: 2019-03-18 | End: 2019-03-21

## 2019-03-18 RX ORDER — DOCUSATE SODIUM 100 MG/1
100 CAPSULE, LIQUID FILLED ORAL 2 TIMES DAILY
Status: DISCONTINUED | OUTPATIENT
Start: 2019-03-18 | End: 2019-03-21

## 2019-03-18 RX ORDER — FLUOXETINE 10 MG/1
20 TABLET, FILM COATED ORAL DAILY
Status: DISCONTINUED | OUTPATIENT
Start: 2019-03-18 | End: 2019-03-19

## 2019-03-18 RX ORDER — LORAZEPAM 0.5 MG/1
0.5 TABLET ORAL 2 TIMES DAILY PRN
Status: DISCONTINUED | OUTPATIENT
Start: 2019-03-18 | End: 2019-03-21

## 2019-03-18 RX ORDER — GABAPENTIN 300 MG/1
300 CAPSULE ORAL NIGHTLY
Status: DISCONTINUED | OUTPATIENT
Start: 2019-03-18 | End: 2019-03-21

## 2019-03-18 RX ORDER — PANTOPRAZOLE SODIUM 20 MG/1
20 TABLET, DELAYED RELEASE ORAL EVERY EVENING
Status: DISCONTINUED | OUTPATIENT
Start: 2019-03-18 | End: 2019-03-21

## 2019-03-18 RX ORDER — POLYETHYLENE GLYCOL 3350 17 G/17G
17 POWDER, FOR SOLUTION ORAL DAILY PRN
Status: DISCONTINUED | OUTPATIENT
Start: 2019-03-18 | End: 2019-03-21

## 2019-03-18 RX ORDER — MORPHINE SULFATE 2 MG/ML
2 INJECTION, SOLUTION INTRAMUSCULAR; INTRAVENOUS EVERY 10 MIN PRN
Status: DISCONTINUED | OUTPATIENT
Start: 2019-03-18 | End: 2019-03-18 | Stop reason: HOSPADM

## 2019-03-18 RX ORDER — VANCOMYCIN HYDROCHLORIDE 1 G/20ML
INJECTION, POWDER, LYOPHILIZED, FOR SOLUTION INTRAVENOUS AS NEEDED
Status: DISCONTINUED | OUTPATIENT
Start: 2019-03-18 | End: 2019-03-18 | Stop reason: HOSPADM

## 2019-03-18 RX ORDER — DEXAMETHASONE SODIUM PHOSPHATE 4 MG/ML
VIAL (ML) INJECTION AS NEEDED
Status: DISCONTINUED | OUTPATIENT
Start: 2019-03-18 | End: 2019-03-18 | Stop reason: SURG

## 2019-03-18 RX ORDER — NALOXONE HYDROCHLORIDE 0.4 MG/ML
80 INJECTION, SOLUTION INTRAMUSCULAR; INTRAVENOUS; SUBCUTANEOUS AS NEEDED
Status: DISCONTINUED | OUTPATIENT
Start: 2019-03-18 | End: 2019-03-18 | Stop reason: HOSPADM

## 2019-03-18 RX ORDER — ONDANSETRON 2 MG/ML
INJECTION INTRAMUSCULAR; INTRAVENOUS AS NEEDED
Status: DISCONTINUED | OUTPATIENT
Start: 2019-03-18 | End: 2019-03-18 | Stop reason: SURG

## 2019-03-18 RX ORDER — HYDROCODONE BITARTRATE AND ACETAMINOPHEN 5; 325 MG/1; MG/1
1 TABLET ORAL AS NEEDED
Status: DISCONTINUED | OUTPATIENT
Start: 2019-03-18 | End: 2019-03-18 | Stop reason: HOSPADM

## 2019-03-18 RX ORDER — FOLIC ACID 1 MG/1
1 TABLET ORAL DAILY
Status: DISCONTINUED | OUTPATIENT
Start: 2019-03-18 | End: 2019-03-21

## 2019-03-18 RX ORDER — HALOPERIDOL 5 MG/ML
0.25 INJECTION INTRAMUSCULAR ONCE AS NEEDED
Status: DISCONTINUED | OUTPATIENT
Start: 2019-03-18 | End: 2019-03-18 | Stop reason: HOSPADM

## 2019-03-18 RX ORDER — ACETAMINOPHEN 325 MG/1
650 TABLET ORAL EVERY 4 HOURS PRN
Status: DISCONTINUED | OUTPATIENT
Start: 2019-03-18 | End: 2019-03-21

## 2019-03-18 RX ORDER — BUPIVACAINE HYDROCHLORIDE AND EPINEPHRINE 2.5; 5 MG/ML; UG/ML
INJECTION, SOLUTION INFILTRATION; PERINEURAL AS NEEDED
Status: DISCONTINUED | OUTPATIENT
Start: 2019-03-18 | End: 2019-03-18 | Stop reason: HOSPADM

## 2019-03-18 RX ORDER — MORPHINE SULFATE 4 MG/ML
4 INJECTION, SOLUTION INTRAMUSCULAR; INTRAVENOUS EVERY 10 MIN PRN
Status: DISCONTINUED | OUTPATIENT
Start: 2019-03-18 | End: 2019-03-18 | Stop reason: HOSPADM

## 2019-03-18 RX ORDER — SODIUM PHOSPHATE, DIBASIC AND SODIUM PHOSPHATE, MONOBASIC 7; 19 G/133ML; G/133ML
1 ENEMA RECTAL ONCE AS NEEDED
Status: DISCONTINUED | OUTPATIENT
Start: 2019-03-18 | End: 2019-03-21

## 2019-03-18 RX ORDER — DIPHENHYDRAMINE HYDROCHLORIDE 50 MG/ML
25 INJECTION INTRAMUSCULAR; INTRAVENOUS ONCE AS NEEDED
Status: ACTIVE | OUTPATIENT
Start: 2019-03-18 | End: 2019-03-18

## 2019-03-18 RX ORDER — GABAPENTIN 100 MG/1
200 CAPSULE ORAL EVERY MORNING
Status: DISCONTINUED | OUTPATIENT
Start: 2019-03-18 | End: 2019-03-21

## 2019-03-18 RX ORDER — SODIUM CHLORIDE 9 MG/ML
INJECTION, SOLUTION INTRAVENOUS CONTINUOUS
Status: DISCONTINUED | OUTPATIENT
Start: 2019-03-18 | End: 2019-03-21

## 2019-03-18 RX ORDER — GABAPENTIN 100 MG/1
100 CAPSULE ORAL
Status: DISCONTINUED | OUTPATIENT
Start: 2019-03-18 | End: 2019-03-21

## 2019-03-18 RX ORDER — ROSUVASTATIN CALCIUM 10 MG/1
10 TABLET, COATED ORAL NIGHTLY
Status: DISCONTINUED | OUTPATIENT
Start: 2019-03-18 | End: 2019-03-21

## 2019-03-18 RX ORDER — SODIUM CHLORIDE 0.9 % (FLUSH) 0.9 %
10 SYRINGE (ML) INJECTION AS NEEDED
Status: DISCONTINUED | OUTPATIENT
Start: 2019-03-18 | End: 2019-03-21

## 2019-03-18 RX ORDER — DIPHENHYDRAMINE HCL 25 MG
25 CAPSULE ORAL EVERY 4 HOURS PRN
Status: DISCONTINUED | OUTPATIENT
Start: 2019-03-18 | End: 2019-03-21

## 2019-03-18 RX ORDER — GLYCOPYRROLATE 0.2 MG/ML
INJECTION INTRAMUSCULAR; INTRAVENOUS AS NEEDED
Status: DISCONTINUED | OUTPATIENT
Start: 2019-03-18 | End: 2019-03-18 | Stop reason: SURG

## 2019-03-18 RX ADMIN — NEOSTIGMINE METHYLSULFATE 4 MG: 0.5 INJECTION INTRAVENOUS at 12:22:00

## 2019-03-18 RX ADMIN — LIDOCAINE HYDROCHLORIDE 4 ML: 40 SOLUTION TOPICAL at 11:07:00

## 2019-03-18 RX ADMIN — EPHEDRINE SULFATE 10 MG: 50 INJECTION, SOLUTION INTRAVENOUS at 11:12:00

## 2019-03-18 RX ADMIN — GLYCOPYRROLATE 0.8 MG: 0.2 INJECTION INTRAMUSCULAR; INTRAVENOUS at 12:22:00

## 2019-03-18 RX ADMIN — SODIUM CHLORIDE, SODIUM LACTATE, POTASSIUM CHLORIDE, CALCIUM CHLORIDE: 600; 310; 30; 20 INJECTION, SOLUTION INTRAVENOUS at 12:33:00

## 2019-03-18 RX ADMIN — SODIUM CHLORIDE, SODIUM LACTATE, POTASSIUM CHLORIDE, CALCIUM CHLORIDE: 600; 310; 30; 20 INJECTION, SOLUTION INTRAVENOUS at 10:58:00

## 2019-03-18 RX ADMIN — DEXAMETHASONE SODIUM PHOSPHATE 4 MG: 4 MG/ML VIAL (ML) INJECTION at 11:15:00

## 2019-03-18 RX ADMIN — LIDOCAINE HYDROCHLORIDE 50 MG: 10 INJECTION, SOLUTION EPIDURAL; INFILTRATION; INTRACAUDAL; PERINEURAL at 11:07:00

## 2019-03-18 RX ADMIN — ONDANSETRON 4 MG: 2 INJECTION INTRAMUSCULAR; INTRAVENOUS at 11:15:00

## 2019-03-18 RX ADMIN — ROCURONIUM BROMIDE 30 MG: 10 INJECTION, SOLUTION INTRAVENOUS at 11:07:00

## 2019-03-18 NOTE — PHYSICAL THERAPY NOTE
PHYSICAL THERAPY KNEE EVALUATION - INPATIENT       Room Number: 427/427-A  Evaluation Date: 3/18/2019  Type of Evaluation: Initial  Physician Order: PT Eval and Treat    Presenting Problem: L TKA  Reason for Therapy: Mobility Dysfunction and Discharge Plan admission: Per Dr. Damon Ser operative report:  \"The patient is a 70-year-old female with a history of left knee pain, difficulty walking.  The patient had tricompartmental arthritis, varus deformity, and had difficulty walking.  I discussed with the patient retired from [email or web address] work.     PHYSICAL THERAPY EXAMINATION     OBJECTIVE  Precautions: Limb alert - left  Fall Risk: Standard fall risk    WEIGHT BEARING RESTRICTION  Weight Bearing Restriction: L lower extremity           L Lower Extremity: Weight Bear GOALS    Goals to be met by: 4/1/19  Patient Goal Patient's self-stated goal is: to go home   Goal #1 Patient is able to demonstrate supine - sit EOB @ level: modified independent     Goal #1   Current Status    Goal #2 Patient is able to demonstrate trans

## 2019-03-18 NOTE — ANESTHESIA PREPROCEDURE EVALUATION
Anesthesia PreOp Note    HPI:     Marine Chavarria is a 69year old female who presents for preoperative consultation requested by: Ramonita Figueroa MD    Date of Surgery: 3/18/2019    Procedure(s):  KNEE TOTAL REPLACEMENT  Indication: left knee osteoarthri the lungs every 4 (four) hours as needed for Wheezing. Disp: 1 Inhaler Rfl: 0 Past Week at Unknown time   Saline (AYR NASAL MIST ALLERGY/SINUS) 2.65 % Nasal Solution 1 spray by Nasal route 3 (three) times daily.  Disp: 50 mL Rfl: 0 Past Month at Unknown felisa ITCHING  Penicillins             ANAPHYLAXIS  Meloxicam               OTHER (SEE COMMENTS)    Comment:Stomach ulcer  Procainamide            OTHER (SEE COMMENTS)    Comment:Throat swelling  Other                   ITCHING    Comment:dionte Cloud Asked        Blood Transfusions: Not Asked        Caffeine Concern: Not Asked          coffee        Occupational Exposure: Not Asked        Hobby Hazards: Not Asked        Sleep Concern: Not Asked        Stress Concern: Not Asked        Weight Concern: No plan, major risks and alternatives with the patient and answered all questions. The patient desires to proceed with surgery and anesthesia as planned.    Informed Consent Plan and Risks Discussed With:  Patient  Discussed plan with:  CRNA      I have inform

## 2019-03-18 NOTE — PLAN OF CARE
MUSCULOSKELETAL - ADULT    • Return mobility to safest level of function Progressing    • Maintain proper alignment of affected body part Progressing        Patient Centered Care    • Patient preferences are identified and integrated in the patient's plan

## 2019-03-18 NOTE — PROGRESS NOTES
Pharmacy note: Duplicate Proton Pump Inhibitor with Histamine 2 blocker. Judy Pitts is a 69year old female who has been prescribed both Famotidine and Protonix.   Pepcid was discontinued per P&T approved protocol for duplicate therapy in adult

## 2019-03-18 NOTE — PROGRESS NOTES
Therapeutic interchange from  Esomeprazole 20 mg  to pantoprazole 20mg per P&T approved protocol.      Cyndi Srinivasan, PharmD

## 2019-03-18 NOTE — ANESTHESIA PROCEDURE NOTES
ANESTHESIA INTUBATION  Urgency: elective      General Information and Staff    Patient location during procedure: OR  Anesthesiologist: Adis Pinto DO  Resident/CRNA: Rose Al CRNA  Performed: CRNA     Indications and Patient Condition  Indicatio

## 2019-03-18 NOTE — OPERATIVE REPORT
DATE OF SURGERY: 03/18/2019  Operative Note  Surgeon: Екатерина Garber MD  Anesthesia Type:  General  Pre-Op Diagnosis:     (1) Unilateral primary osteoarthritis, left knee  Post-Op Diagnosis:     (1) Unilateral primary osteoarthritis, left knee     Procedure Pe throughout the knee. We then exposed the distal femur, placed our custom cutting guide, drilled it and pinned it in place and then resected the distal femur, sizing it to a 4.   We then placed the four-in-one cutting guide and made our anterior cut, poster Loss:  25

## 2019-03-18 NOTE — ANESTHESIA POSTPROCEDURE EVALUATION
Patient: Vic Mendoza    Procedure Summary     Date:  03/18/19 Room / Location:  [address] [place] [address] MAIN OR 12 / [address] [place] [address] MAIN OR    Anesthesia Start:  7659 Anesthesia Stop:  5936    Procedure:  KNEE TOTAL REPLACEMENT (Left Knee) Diagnosis:  (left knee osteoarthritis)

## 2019-03-18 NOTE — INTERVAL H&P NOTE
Pre-op Diagnosis: left knee osteoarthritis    The above referenced H&P was reviewed by Mele Hyatt MD on 3/18/2019, the patient was examined and no significant changes have occurred in the patient's condition since the H&P was performed.   I discussed with

## 2019-03-19 LAB
ANION GAP SERPL CALC-SCNC: 8 MMOL/L (ref 0–18)
BUN BLD-MCNC: 13 MG/DL (ref 7–18)
BUN/CREAT SERPL: 23.6 (ref 10–20)
CALCIUM BLD-MCNC: 8 MG/DL (ref 8.5–10.1)
CHLORIDE SERPL-SCNC: 105 MMOL/L (ref 98–107)
CO2 SERPL-SCNC: 24 MMOL/L (ref 21–32)
CREAT BLD-MCNC: 0.55 MG/DL (ref 0.55–1.02)
DEPRECATED RDW RBC AUTO: 48.5 FL (ref 35.1–46.3)
ERYTHROCYTE [DISTWIDTH] IN BLOOD BY AUTOMATED COUNT: 13.7 % (ref 11–15)
GLUCOSE BLD-MCNC: 105 MG/DL (ref 70–99)
HCT VFR BLD AUTO: 31.2 % (ref 35–48)
HGB BLD-MCNC: 10.4 G/DL (ref 12–16)
MCH RBC QN AUTO: 32 PG (ref 26–34)
MCHC RBC AUTO-ENTMCNC: 33.3 G/DL (ref 31–37)
MCV RBC AUTO: 96 FL (ref 80–100)
OSMOLALITY SERPL CALC.SUM OF ELEC: 284 MOSM/KG (ref 275–295)
PLATELET # BLD AUTO: 192 10(3)UL (ref 150–450)
POTASSIUM SERPL-SCNC: 3.8 MMOL/L (ref 3.5–5.1)
RBC # BLD AUTO: 3.25 X10(6)UL (ref 3.8–5.3)
SODIUM SERPL-SCNC: 137 MMOL/L (ref 136–145)
WBC # BLD AUTO: 9.6 X10(3) UL (ref 4–11)

## 2019-03-19 PROCEDURE — 97110 THERAPEUTIC EXERCISES: CPT

## 2019-03-19 PROCEDURE — 97116 GAIT TRAINING THERAPY: CPT

## 2019-03-19 PROCEDURE — 51701 INSERT BLADDER CATHETER: CPT

## 2019-03-19 PROCEDURE — 80048 BASIC METABOLIC PNL TOTAL CA: CPT | Performed by: ORTHOPAEDIC SURGERY

## 2019-03-19 PROCEDURE — 97165 OT EVAL LOW COMPLEX 30 MIN: CPT

## 2019-03-19 PROCEDURE — 85027 COMPLETE CBC AUTOMATED: CPT | Performed by: ORTHOPAEDIC SURGERY

## 2019-03-19 PROCEDURE — 97535 SELF CARE MNGMENT TRAINING: CPT

## 2019-03-19 RX ORDER — FLUOXETINE HYDROCHLORIDE 20 MG/1
20 CAPSULE ORAL DAILY
Status: DISCONTINUED | OUTPATIENT
Start: 2019-03-19 | End: 2019-03-21

## 2019-03-19 NOTE — PHYSICAL THERAPY NOTE
PHYSICAL THERAPY KNEE TREATMENT NOTE - INPATIENT     Room Number: 427/427-A             Presenting Problem: L TKA    Problem List  Active Problems:    Arthritis of knee, degenerative      PHYSICAL THERAPY ASSESSMENT     Patient was see BID today for therap down on and standing up from a chair with arms (e.g., wheelchair, bedside commode, etc.): A Little   -   Moving from lying on back to sitting on the side of the bed?: A Little   How much help from another person does the patient currently need. ..   -   Mov independent    Goal #3   Current Status 100 ft with RW with Min A   Goal #4 Patient will negotiate 6 stairs/one curb w/ assistive device and supervision   Goal #4   Current Status NT   Goal #5  AROM 0 degrees extension to 95 degrees flexion     Goal #5   C

## 2019-03-19 NOTE — OCCUPATIONAL THERAPY NOTE
OCCUPATIONAL THERAPY EVALUATION - INPATIENT      Room Number: 427/427-A  Evaluation Date: 3/19/2019  Type of Evaluation: Initial  Presenting Problem: L TKA, WBAT    Physician Order: IP Consult to Occupational Therapy  Reason for Therapy: ADL/IADL Dysfuncti (postoperative nausea and vomiting)    • Psoriatic arthritis (HCC)    • Vertigo    • Visual impairment     glasses       Past Surgical History  Past Surgical History:   Procedure Laterality Date   • BREAST RECONSTRUC W TISS EXPANDR Left 1995   • DILATION/C grooming such as brushing teeth?: A Little  -   Eating meals?: None    AM-PAC Score:  Score: 21  Approx Degree of Impairment: 32.79%  Standardized Score (AM-PAC Scale): 44.27  CMS Modifier (G-Code): CJ    FUNCTIONAL TRANSFER ASSESSMENT  Supine to Sit : Sup

## 2019-03-19 NOTE — PROGRESS NOTES
[facility]    Progress Note    Cirilo Garcia Patient Status:  Inpatient    3/15/1945 MRN [id number]   Location [facility] 4W/SW/SE Attending Angie Tran MD   Hosp Day # 1 PCP Wendy Diggs MD       Subjective:   Storm Lind 3/18/2019  CONCLUSION: * Total knee arthroplasty is identified. * The prostheses are well seated in the femur, tibia and patella. * No obvious complication. Dictated by (CST): Candi Vazquez MD on 3/18/2019 at 15:47     Approved by (CST):  Nola Irizarry,

## 2019-03-19 NOTE — H&P
[facility]    PATIENT'S NAME: Geraldine Lock   ATTENDING PHYSICIAN: Diane Rodas MD   PATIENT ACCOUNT#:   [id number]    LOCATION:  [address] [place] [address] RECORD #:   [id number]       YOB: 1945  ADMISSION DATE:       03/18 No shortness of breath, no abdominal pain. No nausea, vomiting, or diarrhea. No headaches, no cough, no sore throats, no dysuria, no rashes. PHYSICAL EXAMINATION:    GENERAL:  A 22-year-old white female, alert, in mild discomfort.   VITAL SIGNS:  Blo

## 2019-03-19 NOTE — CM/SW NOTE
SW met with the patient at bedside. The patient lives with her spouse in 2 level home with 2 steps to get in and 6 steps inside . The patient responds being independent prior to admission with all ADL's, ambulation and driving.  Patient denies use of any DM

## 2019-03-19 NOTE — CM/SW NOTE
Per 's office, patient has to be referred to JANES Farrell.     Ref sent to AT [address] [facility]    Isiah Speaker, [address] [place] [address] Ave., [phone]

## 2019-03-19 NOTE — PROGRESS NOTES
[address] [facility] Patient Status:  Inpatient    3/15/1945 MRN [id number]   Location [facility] 4W/SW/SE Attending Lisbet Hernández MD   [facility] Day # 1 PCP Christiano Dodson MD     Subjective:  Post-Operative Day: 1 Status Post left PRN   0.9%  NaCl infusion  Intravenous Continuous   Cyclobenzaprine HCl (cyclobenzaprine) tab 10 mg 10 mg Oral Q8H PRN   docusate sodium (COLACE) cap 100 mg 100 mg Oral BID   PEG 3350 (MIRALAX) powder packet 17 g 17 g Oral Daily PRN   magnesium hydroxide ( planning with return to clinic in two weeks  - Patient has home meds for pain and DVT prophylaxis  - Patient has prescriptions for pain and DVT prophylaxis meds in chart  - WBAT  - PT/OT  - Mobilize  - Norco PRN for pain inpatient and at home/rehab  [facility]

## 2019-03-20 LAB
DEPRECATED RDW RBC AUTO: 46 FL (ref 35.1–46.3)
ERYTHROCYTE [DISTWIDTH] IN BLOOD BY AUTOMATED COUNT: 13.2 % (ref 11–15)
HCT VFR BLD AUTO: 31.2 % (ref 35–48)
HGB BLD-MCNC: 10.5 G/DL (ref 12–16)
MCH RBC QN AUTO: 31.9 PG (ref 26–34)
MCHC RBC AUTO-ENTMCNC: 33.7 G/DL (ref 31–37)
MCV RBC AUTO: 94.8 FL (ref 80–100)
PLATELET # BLD AUTO: 172 10(3)UL (ref 150–450)
RBC # BLD AUTO: 3.29 X10(6)UL (ref 3.8–5.3)
WBC # BLD AUTO: 8.6 X10(3) UL (ref 4–11)

## 2019-03-20 PROCEDURE — 97110 THERAPEUTIC EXERCISES: CPT

## 2019-03-20 PROCEDURE — 97116 GAIT TRAINING THERAPY: CPT

## 2019-03-20 PROCEDURE — 85027 COMPLETE CBC AUTOMATED: CPT | Performed by: ORTHOPAEDIC SURGERY

## 2019-03-20 NOTE — PROGRESS NOTES
[facility]  Progress Note    Vic Mendoza Patient Status:  Inpatient    3/15/1945 MRN [id number]   Location [facility] 4W/SW/SE Attending Nicolás Chester MD   Hosp Day # 2 PCP Juliet Jain MD     Subjective:  Patient seen in follow u cyanosis or edema. Peripheral pulses are 2+. Neurologic: Alert and oriented, normal affect. Skin: Warm and dry. Laboratory/Data:  Diagnostics:     EKG 2/19/19  Sinus Rhythm  - Negative precordial T-waves.   NORMAL    Labs:  Lab Results   Component Va mg Oral QPM   Normal Saline Flush 0.9 % injection 10 mL 10 mL Intravenous PRN   0.9%  NaCl infusion  Intravenous Continuous   Cyclobenzaprine HCl (cyclobenzaprine) tab 10 mg 10 mg Oral Q8H PRN   docusate sodium (COLACE) cap 100 mg 100 mg Oral BID

## 2019-03-20 NOTE — PROGRESS NOTES
[address] [facility] Patient Status:  Inpatient    3/15/1945 MRN [id number]   Location [facility] 4W/SW/SE Attending Sandra Jones MD   Hosp Day # 2 PCP Aidan Poon MD     Subjective:  Post-Operative Day: 2 Status Post left Cyclobenzaprine HCl (cyclobenzaprine) tab 10 mg 10 mg Oral Q8H PRN   docusate sodium (COLACE) cap 100 mg 100 mg Oral BID   PEG 3350 (MIRALAX) powder packet 17 g 17 g Oral Daily PRN   magnesium hydroxide (MILK OF MAGNESIA) 400 MG/5ML suspension 30 mL 30 m return to clinic in two weeks  - Patient has home meds for pain and DVT prophylaxis  - Patient has prescriptions for pain and DVT prophylaxis meds in chart  - WBAT  - PT/OT  - Mobilize  - Norco PRN for pain inpatient and at home/rehab  - Eliquis for inpati

## 2019-03-20 NOTE — PHYSICAL THERAPY NOTE
PHYSICAL THERAPY KNEE TREATMENT NOTE - INPATIENT     Room Number: 427/427-A             Presenting Problem: L TKA    Problem List  Active Problems:    Arthritis of knee, degenerative      PHYSICAL THERAPY ASSESSMENT     Patient was see BID today for therap over in bed (including adjusting bedclothes, sheets and blankets)?: A Little   -   Sitting down on and standing up from a chair with arms (e.g., wheelchair, bedside commode, etc.): A Little   -   Moving from lying on back to sitting on the side of the bed? to ambulate 300 feet with assistive device at assistance level: modified independent    Goal #3   Current Status 250 ft with RW with CGA   Goal #4 Patient will negotiate 6 stairs/one curb w/ assistive device and supervision   Goal #4   Current Status NT

## 2019-03-20 NOTE — PROGRESS NOTES
[facility]    Progress Note    Noemi Mckeon Patient Status:  Inpatient    3/15/1945 MRN [id number]   Location [facility] 4W/SW/SE Attending Antonio Calderon MD   Hosp Day # 2 PCP Suzanne Suggs MD       Subjective:   Raz Qureshi Knee (1 Or 2 Views), Left (cpt=73560)    Result Date: 3/18/2019  CONCLUSION: * Total knee arthroplasty is identified. * The prostheses are well seated in the femur, tibia and patella. * No obvious complication. Dictated by (CST):  Tina Garcia MD on

## 2019-03-21 VITALS
TEMPERATURE: 99 F | HEART RATE: 95 BPM | WEIGHT: 161 LBS | DIASTOLIC BLOOD PRESSURE: 60 MMHG | OXYGEN SATURATION: 94 % | RESPIRATION RATE: 16 BRPM | SYSTOLIC BLOOD PRESSURE: 109 MMHG | BODY MASS INDEX: 29.63 KG/M2 | HEIGHT: 62 IN

## 2019-03-21 LAB
DEPRECATED RDW RBC AUTO: 46.6 FL (ref 35.1–46.3)
ERYTHROCYTE [DISTWIDTH] IN BLOOD BY AUTOMATED COUNT: 13.2 % (ref 11–15)
HCT VFR BLD AUTO: 30.2 % (ref 35–48)
HGB BLD-MCNC: 10.1 G/DL (ref 12–16)
MCH RBC QN AUTO: 32 PG (ref 26–34)
MCHC RBC AUTO-ENTMCNC: 33.4 G/DL (ref 31–37)
MCV RBC AUTO: 95.6 FL (ref 80–100)
PLATELET # BLD AUTO: 169 10(3)UL (ref 150–450)
RBC # BLD AUTO: 3.16 X10(6)UL (ref 3.8–5.3)
WBC # BLD AUTO: 7.6 X10(3) UL (ref 4–11)

## 2019-03-21 PROCEDURE — 97116 GAIT TRAINING THERAPY: CPT

## 2019-03-21 PROCEDURE — 85027 COMPLETE CBC AUTOMATED: CPT | Performed by: ORTHOPAEDIC SURGERY

## 2019-03-21 PROCEDURE — 97110 THERAPEUTIC EXERCISES: CPT

## 2019-03-21 NOTE — PROGRESS NOTES
03/21/19 [facility]   Clinical Encounter Type   Visited With Patient   Routine Visit Introduction   Referral From Nurse   Referral To    Patient Spiritual Encounters   Spiritual Assessment Completed Yes   Spiritual Needs Pt is Lety Tan, has a .

## 2019-03-21 NOTE — CM/SW NOTE
Pt discharged 3.21.19, no longer needs rehab at [facility]. SW confirmed with [facility] that patient is  discharged and that she needs to be seen by [facility]. [facility] orders entered.     [facility] [phone]      Irena Wolf LMSW., [phone]

## 2019-03-21 NOTE — PHYSICAL THERAPY NOTE
PHYSICAL THERAPY KNEE TREATMENT NOTE - INPATIENT     Room Number: 427/427-A             Presenting Problem: L TKA    Problem List  Active Problems:    Arthritis of knee, degenerative      PHYSICAL THERAPY ASSESSMENT     Patient was see BID today for therap INPATIENT SHORT FORM - BASIC MOBILITY  How much difficulty does the patient currently have. ..  -   Turning over in bed (including adjusting bedclothes, sheets and blankets)?: A Little   -   Sitting down on and standing up from a chair with arms (e.g., whee demonstrate transfers Sit to/from Stand at assistance level: modified independent     Goal #2  Current Status CGA   Goal #3 Patient is able to ambulate 300 feet with assistive device at assistance level: modified independent    Goal #3   Current Status 250

## 2019-03-21 NOTE — PROGRESS NOTES
[facility]    Progress Note    Jovanna Cottrell Patient Status:  Inpatient    3/15/1945 MRN [id number]   Location [facility] 4W/SW/SE Attending Yesenia Suarez MD   Hosp Day # 3 PCP Maria Teresa Lind MD       Subjective:   Sage Wolfe 10.1 (L) 03/21/2019    HCT 30.2 (L) 03/21/2019    .0 03/21/2019    CREATSERUM 0.55 03/19/2019    BUN 13 03/19/2019     03/19/2019    K 3.8 03/19/2019     03/19/2019    CO2 24.0 03/19/2019     (H) 03/19/2019    CA 8.0 (L) 03/19/201

## 2019-03-21 NOTE — PROGRESS NOTES
Patient chart reviewed and she is doing well. We will sign off. Please do not hesitate to call with any questions.

## 2019-03-21 NOTE — PROGRESS NOTES
[address] [facility] Patient Status:  Inpatient    3/15/1945 MRN [id number]   Location [facility] 4W/SW/SE Attending Elisha Dakins, MD   Hosp Day # 3 PCP Kaylin Bassett MD     Subjective:  Post-Operative Day: 3 Status Post left 7-19 GM/118ML enema 133 mL 1 enema Rectal Once PRN   ondansetron HCl (ZOFRAN) injection 4 mg 4 mg Intravenous Q4H PRN   diphenhydrAMINE (BENADRYL) cap/tab 25 mg 25 mg Oral Q4H PRN   Or      diphenhydrAMINE HCl (BENADRYL) injection 12.5 mg 12.5 mg Intraveno

## 2019-03-22 NOTE — DISCHARGE SUMMARY
[facility]    PATIENT'S NAME: Madelaine Otoole   ATTENDING PHYSICIAN: Marylu Knight.  Ray Silva MD   PATIENT ACCOUNT#:   [id number]    LOCATION:  [address] [place] [address] RECORD #:   [id number]       YOB: 1945  ADMISSION DATE:       03/18 coronary artery disease and has had a stroke and  of COPD at the age of [de-identified]. Her father  of lung CA, also at the age of [de-identified]. SOCIAL HISTORY:  She quit smoking 20 years ago. She rarely, if ever, drinks.     REVIEW OF SYSTEMS:  She complained of bi moderate pain; Norco 7.5/325 q.6 h. p.r.n. severe pain; and Eliquis 2.5 mg b.i.d. for 3 weeks.     Dictated By Ilana Doll MD  d: 03/21/2019 10:01:42  t: 03/22/2019 10:54:29  [facility] [id number]  HWY/

## 2019-05-20 ENCOUNTER — HOSPITAL ENCOUNTER (OUTPATIENT)
Dept: CT IMAGING | Facility: HOSPITAL | Age: 74
Discharge: HOME | End: 2019-05-20
Attending: ORTHOPAEDIC SURGERY
Payer: MEDICARE

## 2019-05-20 DIAGNOSIS — M25.561 RIGHT KNEE PAIN: ICD-10-CM

## 2019-05-20 PROCEDURE — 73700 CT LOWER EXTREMITY W/O DYE: CPT | Performed by: ORTHOPAEDIC SURGERY

## 2019-07-22 ENCOUNTER — LAB ENCOUNTER (OUTPATIENT)
Dept: LAB | Facility: HOSPITAL | Age: 74
End: 2019-07-22
Attending: ORTHOPAEDIC SURGERY
Payer: MEDICARE

## 2019-07-22 DIAGNOSIS — M25.561 RIGHT KNEE PAIN: Primary | ICD-10-CM

## 2019-07-22 DIAGNOSIS — E03.9 HYPOTHYROID: ICD-10-CM

## 2019-07-22 LAB
ALBUMIN SERPL-MCNC: 3.6 G/DL (ref 3.4–5)
ALBUMIN/GLOB SERPL: 1.1 {RATIO} (ref 1–2)
ALP LIVER SERPL-CCNC: 83 U/L (ref 55–142)
ALT SERPL-CCNC: 21 U/L (ref 13–56)
ANION GAP SERPL CALC-SCNC: 5 MMOL/L (ref 0–18)
ANTIBODY SCREEN: NEGATIVE
AST SERPL-CCNC: 14 U/L (ref 15–37)
BACTERIA UR QL AUTO: NEGATIVE /HPF
BASOPHILS # BLD AUTO: 0.03 X10(3) UL (ref 0–0.2)
BASOPHILS NFR BLD AUTO: 0.7 %
BILIRUB SERPL-MCNC: 0.3 MG/DL (ref 0.1–2)
BILIRUB UR QL: NEGATIVE
BUN BLD-MCNC: 18 MG/DL (ref 7–18)
BUN/CREAT SERPL: 26.1 (ref 10–20)
CALCIUM BLD-MCNC: 9.2 MG/DL (ref 8.5–10.1)
CHLORIDE SERPL-SCNC: 107 MMOL/L (ref 98–112)
CLARITY UR: CLEAR
CO2 SERPL-SCNC: 29 MMOL/L (ref 21–32)
COLOR UR: YELLOW
CREAT BLD-MCNC: 0.69 MG/DL (ref 0.55–1.02)
CRP SERPL-MCNC: <0.29 MG/DL (ref ?–0.3)
DEPRECATED RDW RBC AUTO: 47 FL (ref 35.1–46.3)
EOSINOPHIL # BLD AUTO: 0.14 X10(3) UL (ref 0–0.7)
EOSINOPHIL NFR BLD AUTO: 3.2 %
ERYTHROCYTE [DISTWIDTH] IN BLOOD BY AUTOMATED COUNT: 13.8 % (ref 11–15)
ERYTHROCYTE [SEDIMENTATION RATE] IN BLOOD: 12 MM/HR (ref 0–30)
GLOBULIN PLAS-MCNC: 3.3 G/DL (ref 2.8–4.4)
GLUCOSE BLD-MCNC: 95 MG/DL (ref 70–99)
GLUCOSE UR-MCNC: NEGATIVE MG/DL
HCT VFR BLD AUTO: 36.3 % (ref 35–48)
HGB BLD-MCNC: 12.1 G/DL (ref 12–16)
HGB UR QL STRIP.AUTO: NEGATIVE
IMM GRANULOCYTES # BLD AUTO: 0.01 X10(3) UL (ref 0–1)
IMM GRANULOCYTES NFR BLD: 0.2 %
KETONES UR-MCNC: NEGATIVE MG/DL
LYMPHOCYTES # BLD AUTO: 1.34 X10(3) UL (ref 1–4)
LYMPHOCYTES NFR BLD AUTO: 30.5 %
M PROTEIN MFR SERPL ELPH: 6.9 G/DL (ref 6.4–8.2)
MCH RBC QN AUTO: 30.8 PG (ref 26–34)
MCHC RBC AUTO-ENTMCNC: 33.3 G/DL (ref 31–37)
MCV RBC AUTO: 92.4 FL (ref 80–100)
MONOCYTES # BLD AUTO: 0.42 X10(3) UL (ref 0.1–1)
MONOCYTES NFR BLD AUTO: 9.6 %
MRSA DNA SPEC QL NAA+PROBE: NEGATIVE
NEUTROPHILS # BLD AUTO: 2.45 X10 (3) UL (ref 1.5–7.7)
NEUTROPHILS # BLD AUTO: 2.45 X10(3) UL (ref 1.5–7.7)
NEUTROPHILS NFR BLD AUTO: 55.8 %
NITRITE UR QL STRIP.AUTO: NEGATIVE
OSMOLALITY SERPL CALC.SUM OF ELEC: 294 MOSM/KG (ref 275–295)
PATIENT FASTING: YES
PH UR: 7 [PH] (ref 5–8)
PLATELET # BLD AUTO: 218 10(3)UL (ref 150–450)
POTASSIUM SERPL-SCNC: 4 MMOL/L (ref 3.5–5.1)
PROT UR-MCNC: NEGATIVE MG/DL
RBC # BLD AUTO: 3.93 X10(6)UL (ref 3.8–5.3)
RBC #/AREA URNS AUTO: 1 /HPF
RH BLOOD TYPE: POSITIVE
SODIUM SERPL-SCNC: 141 MMOL/L (ref 136–145)
SP GR UR STRIP: 1.01 (ref 1–1.03)
TSI SER-ACNC: 1.17 MIU/ML (ref 0.36–3.74)
UROBILINOGEN UR STRIP-ACNC: <2
VIT C UR-MCNC: NEGATIVE MG/DL
WBC # BLD AUTO: 4.4 X10(3) UL (ref 4–11)
WBC #/AREA URNS AUTO: 2 /HPF

## 2019-07-22 PROCEDURE — 93010 ELECTROCARDIOGRAM REPORT: CPT | Performed by: ORTHOPAEDIC SURGERY

## 2019-07-22 PROCEDURE — 86901 BLOOD TYPING SEROLOGIC RH(D): CPT

## 2019-07-22 PROCEDURE — 87641 MR-STAPH DNA AMP PROBE: CPT

## 2019-07-22 PROCEDURE — 86140 C-REACTIVE PROTEIN: CPT

## 2019-07-22 PROCEDURE — 84443 ASSAY THYROID STIM HORMONE: CPT

## 2019-07-22 PROCEDURE — 36415 COLL VENOUS BLD VENIPUNCTURE: CPT

## 2019-07-22 PROCEDURE — 86850 RBC ANTIBODY SCREEN: CPT

## 2019-07-22 PROCEDURE — 86900 BLOOD TYPING SEROLOGIC ABO: CPT

## 2019-07-22 PROCEDURE — 81001 URINALYSIS AUTO W/SCOPE: CPT

## 2019-07-22 PROCEDURE — 85652 RBC SED RATE AUTOMATED: CPT

## 2019-07-22 PROCEDURE — 85025 COMPLETE CBC W/AUTO DIFF WBC: CPT

## 2019-07-22 PROCEDURE — 93005 ELECTROCARDIOGRAM TRACING: CPT

## 2019-07-22 PROCEDURE — 80053 COMPREHEN METABOLIC PANEL: CPT

## 2019-08-02 RX ORDER — LOSARTAN POTASSIUM AND HYDROCHLOROTHIAZIDE 12.5; 1 MG/1; MG/1
TABLET ORAL
COMMUNITY
Start: 2018-08-30 | End: 2020-07-06 | Stop reason: SDUPTHER

## 2019-08-02 RX ORDER — ROSUVASTATIN CALCIUM 10 MG/1
10 TABLET, COATED ORAL
COMMUNITY
Start: 2018-08-30 | End: 2020-07-06 | Stop reason: SDUPTHER

## 2019-08-02 RX ORDER — LORAZEPAM 0.5 MG/1
0.5 TABLET ORAL
COMMUNITY
Start: 2018-08-30

## 2019-08-02 RX ORDER — FLUOXETINE HYDROCHLORIDE 20 MG/1
20 CAPSULE ORAL
COMMUNITY
Start: 2018-08-30

## 2019-08-02 RX ORDER — GABAPENTIN 100 MG/1
100 CAPSULE ORAL
COMMUNITY
Start: 2018-08-30 | End: 2019-08-05 | Stop reason: DRUGHIGH

## 2019-08-02 RX ORDER — BUSPIRONE HYDROCHLORIDE 15 MG/1
15 TABLET ORAL
COMMUNITY
Start: 2018-08-30

## 2019-08-02 RX ORDER — FOLIC ACID 1 MG/1
1 TABLET ORAL
COMMUNITY
Start: 2018-08-30

## 2019-08-02 RX ORDER — BUTALBITAL, ASPIRIN, AND CAFFEINE 325; 50; 40 MG/1; MG/1; MG/1
CAPSULE ORAL
COMMUNITY
Start: 2018-08-30

## 2019-08-05 ENCOUNTER — OFFICE VISIT (OUTPATIENT)
Dept: CARDIOLOGY | Age: 74
End: 2019-08-05

## 2019-08-05 VITALS
HEART RATE: 62 BPM | WEIGHT: 162 LBS | DIASTOLIC BLOOD PRESSURE: 62 MMHG | BODY MASS INDEX: 29.81 KG/M2 | SYSTOLIC BLOOD PRESSURE: 124 MMHG | HEIGHT: 62 IN

## 2019-08-05 DIAGNOSIS — I10 ESSENTIAL HYPERTENSION: Primary | ICD-10-CM

## 2019-08-05 DIAGNOSIS — R07.2 PRECORDIAL PAIN: ICD-10-CM

## 2019-08-05 DIAGNOSIS — E78.00 PURE HYPERCHOLESTEROLEMIA: ICD-10-CM

## 2019-08-05 PROCEDURE — 99215 OFFICE O/P EST HI 40 MIN: CPT | Performed by: INTERNAL MEDICINE

## 2019-08-05 RX ORDER — GABAPENTIN 100 MG/1
100 CAPSULE ORAL 3 TIMES DAILY
COMMUNITY

## 2019-08-05 SDOH — HEALTH STABILITY: PHYSICAL HEALTH: ON AVERAGE, HOW MANY DAYS PER WEEK DO YOU ENGAGE IN MODERATE TO STRENUOUS EXERCISE (LIKE A BRISK WALK)?: 7 DAYS

## 2019-08-05 SDOH — HEALTH STABILITY: PHYSICAL HEALTH: ON AVERAGE, HOW MANY MINUTES DO YOU ENGAGE IN EXERCISE AT THIS LEVEL?: 60 MIN

## 2019-08-05 ASSESSMENT — ENCOUNTER SYMPTOMS
ALLERGIC/IMMUNOLOGIC COMMENTS: NO NEW FOOD ALLERGIES
HEMOPTYSIS: 0
WEIGHT LOSS: 0
BRUISES/BLEEDS EASILY: 0
FEVER: 0
HEMATOCHEZIA: 0
CHILLS: 0
SUSPICIOUS LESIONS: 0
COUGH: 0
WEIGHT GAIN: 0

## 2019-08-10 RX ORDER — METOCLOPRAMIDE 10 MG/1
10 TABLET ORAL ONCE
Status: DISCONTINUED | OUTPATIENT
Start: 2019-08-10 | End: 2019-08-10

## 2019-08-19 ENCOUNTER — APPOINTMENT (OUTPATIENT)
Dept: GENERAL RADIOLOGY | Facility: HOSPITAL | Age: 74
DRG: 470 | End: 2019-08-19
Attending: ORTHOPAEDIC SURGERY
Payer: MEDICARE

## 2019-08-19 ENCOUNTER — ANESTHESIA (OUTPATIENT)
Dept: SURGERY | Facility: HOSPITAL | Age: 74
DRG: 470 | End: 2019-08-19
Payer: MEDICARE

## 2019-08-19 ENCOUNTER — ANESTHESIA EVENT (OUTPATIENT)
Dept: SURGERY | Facility: HOSPITAL | Age: 74
DRG: 470 | End: 2019-08-19
Payer: MEDICARE

## 2019-08-19 ENCOUNTER — HOSPITAL ENCOUNTER (INPATIENT)
Facility: HOSPITAL | Age: 74
LOS: 3 days | Discharge: HOME HEALTH | DRG: 470 | End: 2019-08-22
Attending: ORTHOPAEDIC SURGERY | Admitting: ORTHOPAEDIC SURGERY
Payer: MEDICARE

## 2019-08-19 DIAGNOSIS — M17.11 OSTEOARTHRITIS OF RIGHT KNEE: Primary | ICD-10-CM

## 2019-08-19 PROBLEM — I10 ESSENTIAL HYPERTENSION: Chronic | Status: ACTIVE | Noted: 2019-08-19

## 2019-08-19 PROBLEM — M17.9 ARTHRITIS OF KNEE, DEGENERATIVE: Status: RESOLVED | Noted: 2019-03-18 | Resolved: 2019-08-19

## 2019-08-19 PROBLEM — E78.5 HYPERLIPIDEMIA: Chronic | Status: ACTIVE | Noted: 2019-08-19

## 2019-08-19 PROBLEM — M17.10 ARTHRITIS OF KNEE, DEGENERATIVE: Status: RESOLVED | Noted: 2019-03-18 | Resolved: 2019-08-19

## 2019-08-19 PROCEDURE — 0SRC0J9 REPLACEMENT OF RIGHT KNEE JOINT WITH SYNTHETIC SUBSTITUTE, CEMENTED, OPEN APPROACH: ICD-10-PCS | Performed by: ORTHOPAEDIC SURGERY

## 2019-08-19 PROCEDURE — 73560 X-RAY EXAM OF KNEE 1 OR 2: CPT | Performed by: ORTHOPAEDIC SURGERY

## 2019-08-19 PROCEDURE — 99232 SBSQ HOSP IP/OBS MODERATE 35: CPT | Performed by: HOSPITALIST

## 2019-08-19 DEVICE — P.F.C. SIGMA OVAL DOME PATELLA 3-PEG 41MM CEMENTED
Type: IMPLANTABLE DEVICE | Site: KNEE | Status: FUNCTIONAL
Brand: P.F.C. SIGMA

## 2019-08-19 DEVICE — P.F.C. SIGMA TIBIAL INSERT FIXED BEARING CURVED 3 (CVD) 12.5MM GVF
Type: IMPLANTABLE DEVICE | Site: KNEE | Status: FUNCTIONAL
Brand: P.F.C. SIGMA

## 2019-08-19 DEVICE — TOTAL KNEE SIGMA RP POROUS: Type: IMPLANTABLE DEVICE | Status: FUNCTIONAL

## 2019-08-19 DEVICE — SIGMA LCS COMPLETE TIBIAL TRAY ROTATING PLATFORM M.B.T. KEEL POROCOAT SIZE 2 CEMENTLESS
Type: IMPLANTABLE DEVICE | Site: KNEE | Status: FUNCTIONAL
Brand: SIGMA LCS COMPLETE POROCOAT

## 2019-08-19 RX ORDER — SODIUM CHLORIDE, SODIUM LACTATE, POTASSIUM CHLORIDE, CALCIUM CHLORIDE 600; 310; 30; 20 MG/100ML; MG/100ML; MG/100ML; MG/100ML
INJECTION, SOLUTION INTRAVENOUS CONTINUOUS
Status: DISCONTINUED | OUTPATIENT
Start: 2019-08-19 | End: 2019-08-22

## 2019-08-19 RX ORDER — HYDROCODONE BITARTRATE AND ACETAMINOPHEN 7.5; 325 MG/1; MG/1
2 TABLET ORAL EVERY 4 HOURS PRN
Status: DISCONTINUED | OUTPATIENT
Start: 2019-08-19 | End: 2019-08-22

## 2019-08-19 RX ORDER — DEXAMETHASONE SODIUM PHOSPHATE 4 MG/ML
VIAL (ML) INJECTION AS NEEDED
Status: DISCONTINUED | OUTPATIENT
Start: 2019-08-19 | End: 2019-08-19 | Stop reason: SURG

## 2019-08-19 RX ORDER — FAMOTIDINE 10 MG/ML
20 INJECTION, SOLUTION INTRAVENOUS 2 TIMES DAILY
Status: DISCONTINUED | OUTPATIENT
Start: 2019-08-19 | End: 2019-08-19

## 2019-08-19 RX ORDER — BUPIVACAINE HYDROCHLORIDE AND EPINEPHRINE 2.5; 5 MG/ML; UG/ML
INJECTION, SOLUTION INFILTRATION; PERINEURAL AS NEEDED
Status: DISCONTINUED | OUTPATIENT
Start: 2019-08-19 | End: 2019-08-19 | Stop reason: HOSPADM

## 2019-08-19 RX ORDER — ACETAMINOPHEN 325 MG/1
650 TABLET ORAL EVERY 4 HOURS PRN
Status: DISCONTINUED | OUTPATIENT
Start: 2019-08-19 | End: 2019-08-22

## 2019-08-19 RX ORDER — NEOSTIGMINE METHYLSULFATE 0.5 MG/ML
INJECTION INTRAVENOUS AS NEEDED
Status: DISCONTINUED | OUTPATIENT
Start: 2019-08-19 | End: 2019-08-19 | Stop reason: SURG

## 2019-08-19 RX ORDER — GABAPENTIN 100 MG/1
100 CAPSULE ORAL
Status: DISCONTINUED | OUTPATIENT
Start: 2019-08-19 | End: 2019-08-22

## 2019-08-19 RX ORDER — MAGNESIUM HYDROXIDE 1200 MG/15ML
LIQUID ORAL CONTINUOUS PRN
Status: COMPLETED | OUTPATIENT
Start: 2019-08-19 | End: 2019-08-19

## 2019-08-19 RX ORDER — GLYCOPYRROLATE 0.2 MG/ML
INJECTION INTRAMUSCULAR; INTRAVENOUS AS NEEDED
Status: DISCONTINUED | OUTPATIENT
Start: 2019-08-19 | End: 2019-08-19 | Stop reason: SURG

## 2019-08-19 RX ORDER — GABAPENTIN 300 MG/1
300 CAPSULE ORAL NIGHTLY
Status: DISCONTINUED | OUTPATIENT
Start: 2019-08-19 | End: 2019-08-22

## 2019-08-19 RX ORDER — HYDROCODONE BITARTRATE AND ACETAMINOPHEN 5; 325 MG/1; MG/1
2 TABLET ORAL AS NEEDED
Status: DISCONTINUED | OUTPATIENT
Start: 2019-08-19 | End: 2019-08-19 | Stop reason: HOSPADM

## 2019-08-19 RX ORDER — PROCHLORPERAZINE EDISYLATE 5 MG/ML
5 INJECTION INTRAMUSCULAR; INTRAVENOUS ONCE AS NEEDED
Status: DISCONTINUED | OUTPATIENT
Start: 2019-08-19 | End: 2019-08-19 | Stop reason: HOSPADM

## 2019-08-19 RX ORDER — GABAPENTIN 100 MG/1
200 CAPSULE ORAL EVERY MORNING
Status: DISCONTINUED | OUTPATIENT
Start: 2019-08-20 | End: 2019-08-22

## 2019-08-19 RX ORDER — MIDAZOLAM HYDROCHLORIDE 1 MG/ML
INJECTION INTRAMUSCULAR; INTRAVENOUS AS NEEDED
Status: DISCONTINUED | OUTPATIENT
Start: 2019-08-19 | End: 2019-08-19 | Stop reason: SURG

## 2019-08-19 RX ORDER — ACETAMINOPHEN 500 MG
1000 TABLET ORAL ONCE
Status: COMPLETED | OUTPATIENT
Start: 2019-08-19 | End: 2019-08-19

## 2019-08-19 RX ORDER — DIPHENHYDRAMINE HYDROCHLORIDE 50 MG/ML
12.5 INJECTION INTRAMUSCULAR; INTRAVENOUS EVERY 4 HOURS PRN
Status: DISCONTINUED | OUTPATIENT
Start: 2019-08-19 | End: 2019-08-22

## 2019-08-19 RX ORDER — ROCURONIUM BROMIDE 10 MG/ML
INJECTION, SOLUTION INTRAVENOUS AS NEEDED
Status: DISCONTINUED | OUTPATIENT
Start: 2019-08-19 | End: 2019-08-19 | Stop reason: SURG

## 2019-08-19 RX ORDER — ROSUVASTATIN CALCIUM 10 MG/1
10 TABLET, COATED ORAL NIGHTLY
Status: DISCONTINUED | OUTPATIENT
Start: 2019-08-19 | End: 2019-08-22

## 2019-08-19 RX ORDER — SCOLOPAMINE TRANSDERMAL SYSTEM 1 MG/1
1 PATCH, EXTENDED RELEASE TRANSDERMAL ONCE
Status: DISCONTINUED | OUTPATIENT
Start: 2019-08-19 | End: 2019-08-22

## 2019-08-19 RX ORDER — PROCHLORPERAZINE EDISYLATE 5 MG/ML
10 INJECTION INTRAMUSCULAR; INTRAVENOUS EVERY 6 HOURS PRN
Status: ACTIVE | OUTPATIENT
Start: 2019-08-19 | End: 2019-08-21

## 2019-08-19 RX ORDER — ONDANSETRON 2 MG/ML
4 INJECTION INTRAMUSCULAR; INTRAVENOUS ONCE AS NEEDED
Status: DISCONTINUED | OUTPATIENT
Start: 2019-08-19 | End: 2019-08-19 | Stop reason: HOSPADM

## 2019-08-19 RX ORDER — LIDOCAINE HYDROCHLORIDE 10 MG/ML
INJECTION, SOLUTION EPIDURAL; INFILTRATION; INTRACAUDAL; PERINEURAL AS NEEDED
Status: DISCONTINUED | OUTPATIENT
Start: 2019-08-19 | End: 2019-08-19 | Stop reason: SURG

## 2019-08-19 RX ORDER — PANTOPRAZOLE SODIUM 20 MG/1
20 TABLET, DELAYED RELEASE ORAL EVERY EVENING
Status: DISCONTINUED | OUTPATIENT
Start: 2019-08-19 | End: 2019-08-22

## 2019-08-19 RX ORDER — ONDANSETRON 2 MG/ML
INJECTION INTRAMUSCULAR; INTRAVENOUS AS NEEDED
Status: DISCONTINUED | OUTPATIENT
Start: 2019-08-19 | End: 2019-08-19 | Stop reason: SURG

## 2019-08-19 RX ORDER — FAMOTIDINE 20 MG/1
20 TABLET ORAL ONCE
Status: COMPLETED | OUTPATIENT
Start: 2019-08-19 | End: 2019-08-19

## 2019-08-19 RX ORDER — MORPHINE SULFATE 4 MG/ML
4 INJECTION, SOLUTION INTRAMUSCULAR; INTRAVENOUS EVERY 10 MIN PRN
Status: DISCONTINUED | OUTPATIENT
Start: 2019-08-19 | End: 2019-08-19 | Stop reason: HOSPADM

## 2019-08-19 RX ORDER — SODIUM CHLORIDE 0.9 % (FLUSH) 0.9 %
10 SYRINGE (ML) INJECTION AS NEEDED
Status: DISCONTINUED | OUTPATIENT
Start: 2019-08-19 | End: 2019-08-22

## 2019-08-19 RX ORDER — SODIUM CHLORIDE 9 MG/ML
INJECTION, SOLUTION INTRAVENOUS CONTINUOUS
Status: DISCONTINUED | OUTPATIENT
Start: 2019-08-19 | End: 2019-08-22

## 2019-08-19 RX ORDER — HALOPERIDOL 5 MG/ML
0.25 INJECTION INTRAMUSCULAR ONCE AS NEEDED
Status: DISCONTINUED | OUTPATIENT
Start: 2019-08-19 | End: 2019-08-19 | Stop reason: HOSPADM

## 2019-08-19 RX ORDER — ONDANSETRON 2 MG/ML
4 INJECTION INTRAMUSCULAR; INTRAVENOUS EVERY 4 HOURS PRN
Status: DISCONTINUED | OUTPATIENT
Start: 2019-08-19 | End: 2019-08-22

## 2019-08-19 RX ORDER — DOCUSATE SODIUM 100 MG/1
100 CAPSULE, LIQUID FILLED ORAL 2 TIMES DAILY
Status: DISCONTINUED | OUTPATIENT
Start: 2019-08-19 | End: 2019-08-22

## 2019-08-19 RX ORDER — FLUOXETINE HYDROCHLORIDE 20 MG/1
20 CAPSULE ORAL DAILY
Status: DISCONTINUED | OUTPATIENT
Start: 2019-08-20 | End: 2019-08-22

## 2019-08-19 RX ORDER — BUSPIRONE HYDROCHLORIDE 5 MG/1
10 TABLET ORAL 3 TIMES DAILY
Status: DISCONTINUED | OUTPATIENT
Start: 2019-08-19 | End: 2019-08-22

## 2019-08-19 RX ORDER — HYDROCODONE BITARTRATE AND ACETAMINOPHEN 7.5; 325 MG/1; MG/1
1 TABLET ORAL EVERY 4 HOURS PRN
Status: DISCONTINUED | OUTPATIENT
Start: 2019-08-19 | End: 2019-08-22

## 2019-08-19 RX ORDER — GABAPENTIN 600 MG/1
600 TABLET ORAL ONCE
Status: COMPLETED | OUTPATIENT
Start: 2019-08-19 | End: 2019-08-19

## 2019-08-19 RX ORDER — NALOXONE HYDROCHLORIDE 0.4 MG/ML
80 INJECTION, SOLUTION INTRAMUSCULAR; INTRAVENOUS; SUBCUTANEOUS AS NEEDED
Status: DISCONTINUED | OUTPATIENT
Start: 2019-08-19 | End: 2019-08-19 | Stop reason: HOSPADM

## 2019-08-19 RX ORDER — MORPHINE SULFATE 2 MG/ML
2 INJECTION, SOLUTION INTRAMUSCULAR; INTRAVENOUS EVERY 10 MIN PRN
Status: DISCONTINUED | OUTPATIENT
Start: 2019-08-19 | End: 2019-08-19 | Stop reason: HOSPADM

## 2019-08-19 RX ORDER — DIPHENHYDRAMINE HCL 25 MG
25 CAPSULE ORAL EVERY 4 HOURS PRN
Status: DISCONTINUED | OUTPATIENT
Start: 2019-08-19 | End: 2019-08-22

## 2019-08-19 RX ORDER — HYDROMORPHONE HYDROCHLORIDE 1 MG/ML
0.2 INJECTION, SOLUTION INTRAMUSCULAR; INTRAVENOUS; SUBCUTANEOUS EVERY 5 MIN PRN
Status: DISCONTINUED | OUTPATIENT
Start: 2019-08-19 | End: 2019-08-19 | Stop reason: HOSPADM

## 2019-08-19 RX ORDER — FOLIC ACID 1 MG/1
1 TABLET ORAL DAILY
Status: DISCONTINUED | OUTPATIENT
Start: 2019-08-20 | End: 2019-08-22

## 2019-08-19 RX ORDER — SODIUM CHLORIDE, SODIUM LACTATE, POTASSIUM CHLORIDE, CALCIUM CHLORIDE 600; 310; 30; 20 MG/100ML; MG/100ML; MG/100ML; MG/100ML
INJECTION, SOLUTION INTRAVENOUS CONTINUOUS
Status: DISCONTINUED | OUTPATIENT
Start: 2019-08-19 | End: 2019-08-19 | Stop reason: HOSPADM

## 2019-08-19 RX ORDER — DIPHENHYDRAMINE HYDROCHLORIDE 50 MG/ML
25 INJECTION INTRAMUSCULAR; INTRAVENOUS ONCE AS NEEDED
Status: ACTIVE | OUTPATIENT
Start: 2019-08-19 | End: 2019-08-19

## 2019-08-19 RX ORDER — HYDROMORPHONE HYDROCHLORIDE 1 MG/ML
0.6 INJECTION, SOLUTION INTRAMUSCULAR; INTRAVENOUS; SUBCUTANEOUS EVERY 5 MIN PRN
Status: DISCONTINUED | OUTPATIENT
Start: 2019-08-19 | End: 2019-08-19 | Stop reason: HOSPADM

## 2019-08-19 RX ORDER — HYDROCODONE BITARTRATE AND ACETAMINOPHEN 5; 325 MG/1; MG/1
1 TABLET ORAL AS NEEDED
Status: DISCONTINUED | OUTPATIENT
Start: 2019-08-19 | End: 2019-08-19 | Stop reason: HOSPADM

## 2019-08-19 RX ORDER — BISACODYL 10 MG
10 SUPPOSITORY, RECTAL RECTAL
Status: DISCONTINUED | OUTPATIENT
Start: 2019-08-19 | End: 2019-08-22

## 2019-08-19 RX ORDER — MORPHINE SULFATE 2 MG/ML
2 INJECTION, SOLUTION INTRAMUSCULAR; INTRAVENOUS EVERY 2 HOUR PRN
Status: DISCONTINUED | OUTPATIENT
Start: 2019-08-19 | End: 2019-08-22

## 2019-08-19 RX ORDER — MORPHINE SULFATE 2 MG/ML
1 INJECTION, SOLUTION INTRAMUSCULAR; INTRAVENOUS EVERY 2 HOUR PRN
Status: DISCONTINUED | OUTPATIENT
Start: 2019-08-19 | End: 2019-08-22

## 2019-08-19 RX ORDER — MORPHINE SULFATE 10 MG/ML
6 INJECTION, SOLUTION INTRAMUSCULAR; INTRAVENOUS EVERY 10 MIN PRN
Status: DISCONTINUED | OUTPATIENT
Start: 2019-08-19 | End: 2019-08-19 | Stop reason: HOSPADM

## 2019-08-19 RX ORDER — CYCLOBENZAPRINE HCL 10 MG
10 TABLET ORAL EVERY 8 HOURS PRN
Status: DISCONTINUED | OUTPATIENT
Start: 2019-08-19 | End: 2019-08-22

## 2019-08-19 RX ORDER — FAMOTIDINE 20 MG/1
20 TABLET ORAL 2 TIMES DAILY
Status: DISCONTINUED | OUTPATIENT
Start: 2019-08-19 | End: 2019-08-19

## 2019-08-19 RX ORDER — LORAZEPAM 0.5 MG/1
0.5 TABLET ORAL 2 TIMES DAILY PRN
Status: DISCONTINUED | OUTPATIENT
Start: 2019-08-19 | End: 2019-08-22

## 2019-08-19 RX ORDER — POLYETHYLENE GLYCOL 3350 17 G/17G
17 POWDER, FOR SOLUTION ORAL DAILY PRN
Status: DISCONTINUED | OUTPATIENT
Start: 2019-08-19 | End: 2019-08-22

## 2019-08-19 RX ORDER — HYDROMORPHONE HYDROCHLORIDE 1 MG/ML
0.4 INJECTION, SOLUTION INTRAMUSCULAR; INTRAVENOUS; SUBCUTANEOUS EVERY 5 MIN PRN
Status: DISCONTINUED | OUTPATIENT
Start: 2019-08-19 | End: 2019-08-19 | Stop reason: HOSPADM

## 2019-08-19 RX ORDER — MORPHINE SULFATE 4 MG/ML
4 INJECTION, SOLUTION INTRAMUSCULAR; INTRAVENOUS EVERY 2 HOUR PRN
Status: DISCONTINUED | OUTPATIENT
Start: 2019-08-19 | End: 2019-08-22

## 2019-08-19 RX ORDER — ACETAMINOPHEN 500 MG
1000 TABLET ORAL ONCE
Status: DISCONTINUED | OUTPATIENT
Start: 2019-08-19 | End: 2019-08-19 | Stop reason: HOSPADM

## 2019-08-19 RX ORDER — VANCOMYCIN HYDROCHLORIDE 1 G/20ML
INJECTION, POWDER, LYOPHILIZED, FOR SOLUTION INTRAVENOUS AS NEEDED
Status: DISCONTINUED | OUTPATIENT
Start: 2019-08-19 | End: 2019-08-19 | Stop reason: HOSPADM

## 2019-08-19 RX ORDER — SODIUM PHOSPHATE, DIBASIC AND SODIUM PHOSPHATE, MONOBASIC 7; 19 G/133ML; G/133ML
1 ENEMA RECTAL ONCE AS NEEDED
Status: DISCONTINUED | OUTPATIENT
Start: 2019-08-19 | End: 2019-08-22

## 2019-08-19 RX ORDER — METOCLOPRAMIDE HYDROCHLORIDE 5 MG/ML
10 INJECTION INTRAMUSCULAR; INTRAVENOUS EVERY 6 HOURS PRN
Status: ACTIVE | OUTPATIENT
Start: 2019-08-19 | End: 2019-08-21

## 2019-08-19 RX ADMIN — SODIUM CHLORIDE, SODIUM LACTATE, POTASSIUM CHLORIDE, CALCIUM CHLORIDE: 600; 310; 30; 20 INJECTION, SOLUTION INTRAVENOUS at 09:07:00

## 2019-08-19 RX ADMIN — ROCURONIUM BROMIDE 30 MG: 10 INJECTION, SOLUTION INTRAVENOUS at 09:14:00

## 2019-08-19 RX ADMIN — DEXAMETHASONE SODIUM PHOSPHATE 4 MG: 4 MG/ML VIAL (ML) INJECTION at 09:26:00

## 2019-08-19 RX ADMIN — NEOSTIGMINE METHYLSULFATE 4 MG: 0.5 INJECTION INTRAVENOUS at 10:30:00

## 2019-08-19 RX ADMIN — GLYCOPYRROLATE 0.2 MG: 0.2 INJECTION INTRAMUSCULAR; INTRAVENOUS at 09:40:00

## 2019-08-19 RX ADMIN — ONDANSETRON 4 MG: 2 INJECTION INTRAMUSCULAR; INTRAVENOUS at 09:26:00

## 2019-08-19 RX ADMIN — LIDOCAINE HYDROCHLORIDE 50 MG: 10 INJECTION, SOLUTION EPIDURAL; INFILTRATION; INTRACAUDAL; PERINEURAL at 09:14:00

## 2019-08-19 RX ADMIN — MIDAZOLAM HYDROCHLORIDE 2 MG: 1 INJECTION INTRAMUSCULAR; INTRAVENOUS at 09:07:00

## 2019-08-19 RX ADMIN — SODIUM CHLORIDE, SODIUM LACTATE, POTASSIUM CHLORIDE, CALCIUM CHLORIDE: 600; 310; 30; 20 INJECTION, SOLUTION INTRAVENOUS at 10:50:00

## 2019-08-19 RX ADMIN — GLYCOPYRROLATE 0.8 MG: 0.2 INJECTION INTRAMUSCULAR; INTRAVENOUS at 10:30:00

## 2019-08-19 NOTE — ANESTHESIA PREPROCEDURE EVALUATION
Anesthesia PreOp Note    HPI:     Shannon Leung is a 69year old female who presents for preoperative consultation requested by: Claudio Vyas MD    Date of Surgery: 8/19/2019    Procedure(s):  KNEE TOTAL REPLACEMENT  Indication: right knee osteoarthr 8/18/2019 at Unknown time   Losartan Potassium-HCTZ 100-12.5 MG Oral Tab Take 1 tablet by mouth daily. Disp:  Rfl:  8/18/2019 at Unknown time   folic acid 1 MG Oral Tab Take 1 mg by mouth daily.    Disp:  Rfl: 3 8/18/2019 at Unknown time   Rosuvastatin Ca (Other) Mother         COPD     Social History    Socioeconomic History      Marital status:       Spouse name: Not on file      Number of children: Not on file      Years of education: Not on file      Highest education level: Not on file    Brijesh Not Asked        Self-Exams: Not Asked    Social History Narrative      Not on file      Available pre-op labs reviewed.   Lab Results   Component Value Date    WBC 4.4 07/22/2019    RBC 3.93 07/22/2019    HGB 12.1 07/22/2019    HCT 36.3 07/22/2019    MCV 9 and any alternative forms of anesthetic management. All of the patient's questions were answered to the best of my ability. The patient desires the anesthetic management as planned.   SEBASTIAN TAPIA  8/19/2019 8:54 AM

## 2019-08-19 NOTE — INTERVAL H&P NOTE
Pre-op Diagnosis: right knee osteoarthritis    The above referenced H&P was reviewed by Noris Matthews MD on 8/19/2019, the patient was examined and no significant changes have occurred in the patient's condition since the H&P was performed.   I discussed with

## 2019-08-19 NOTE — PHYSICAL THERAPY NOTE
PHYSICAL THERAPY KNEE EVALUATION - INPATIENT       Room Number: 405/405-A  Evaluation Date: 8/19/2019  Type of Evaluation: Initial  Physician Order: PT Eval and Treat    Presenting Problem: right knee OA , pt is s/p right TKA .  pt is WBAT right LE Post sx increasing symptoms, poor tolerance and requesting need to lay down after side step activity . Pt with min assist for sit to stand . Pt able to take 2-3 side steps with RW min assist with mild buckle of right knee noted due to weakness post sx .  Pt does benefit from continued IP PT services to address these deficits in preparation for discharge. DISCHARGE RECOMMENDATIONS  PT Discharge Recommendations: 24 hour care/supervision;Home;Home with home health PT    PLAN  PT Treatment Plan: Bed mobility; Body m Performed by Zachary Ba MD at [address] [place] [address] MAIN OR   • MASTECTOMY LEFT Left 1995   • TOTAL KNEE REPLACEMENT      left   • TOTAL KNEE REPLACEMENT Right 08/19/2019       HOME SITUATION  Type of Home: House   Home Layout: Two level  Stairs to Enter : 2(2 stoop steps sats 98 %    Sitting at EOB with symptoms BP  122/83   End of session with symptoms supine in bed  /68 O2 sats 92-93 % returned to O2  HR 70              AM-PAC '6-Clicks' INPATIENT SHORT FORM - BASIC MOBILITY  How much difficulty does the patient cu Goal #4   Current Status    Goal #5  AROM 0 degrees extension to 95 degrees flexion     Goal #5   Current Status    Goal #6 Patient independently performs home exercise program for ROM/strengthening per the instructions provided in preparation for discha

## 2019-08-19 NOTE — PROGRESS NOTES
[facility] Progress Note:  Automatic Substitution    The order for Nexium 20 mg QPM has been changed to Protonix 20 mg QPM per the automatic substitution policy approved by the P&T committee.   Thank you,  Robert Julio, PharmD

## 2019-08-19 NOTE — PROGRESS NOTES
[facility]    Progress Note    Renee Bhumika Patient Status:  Surgery Admit Vernon Dillon    3/15/1945 MRN [id number]   Location [address] UNIT Attending Betsy Levy MD   [facility] Day # 0 PCP Emy Villatoro MD Results:     Lab Results   Component Value Date    WBC 4.4 07/22/2019    HGB 12.1 07/22/2019    HCT 36.3 07/22/2019    .0 07/22/2019    CREATSERUM 0.69 07/22/2019    BUN 18 07/22/2019     07/22/2019    K 4.0 07/22/2019     07/22/2019

## 2019-08-19 NOTE — ANESTHESIA POSTPROCEDURE EVALUATION
Patient: Pb Sanders    Procedure Summary     Date:  08/19/19 Room / Location:  [address] [place] [address] MAIN OR 11 / [address] [facility] MAIN OR    Anesthesia Start:  1507 Anesthesia Stop:  5726    Procedure:  KNEE TOTAL REPLACEMENT (Right Knee) Diagnosis:  (RIGHT KNEE OSTEOARTHRITIS

## 2019-08-19 NOTE — PROGRESS NOTES
Pharmacy note: Duplicate Proton Pump Inhibitor with Histamine 2 blocker. Jovanna Cottrell is a 69year old female who has been prescribed both Famotidine and Protonix.   Pepcid was discontinued per P&T approved protocol for duplicate therapy in adult

## 2019-08-19 NOTE — CONSULTS
[facility]    Report of Consultation    Pb Sanders Patient Status:  Inpatient    3/15/1945 MRN [id number]   Location [facility] 4W/SW/SE Attending Patrick Tinajero MD   Hosp Day # 0 PCP Paula Velasquez MD     Date of Admission Used    Alcohol use: Not Currently      Alcohol/week: 0.0 standard drinks    Drug use: No           Current Medications:    Current Facility-Administered Medications:  lactated ringers infusion  Intravenous Continuous   Normal Saline Flush 0.9 % injection mg 10 mg Oral TID   [START ON 8/20/2019] FLUoxetine HCl (PROZAC) cap 20 mg 20 mg Oral Daily   [START ON 8/55/6864] folic acid (FOLVITE) tab 1 mg 1 mg Oral Daily   [START ON 8/20/2019] gabapentin (NEURONTIN) cap 200 mg 200 mg Oral QAM   gabapentin (Becky Rodriguezs 157.5 cm (5' 2\"), weight 161 lb (73 kg), SpO2 99 %. Intake/Output:   Last 3 shifts: No intake/output data recorded.    This shift: I/O this shift:  In: 1345 [I.V.:1225; IV PIGGYBACK:120]  Out: 20 [Blood:20]     Vent Settings:      Hemodynamic parameters ( 02/19/2019    T4F 0.95 05/21/2018    TSH 1.170 07/22/2019    ESRML 12 07/22/2019    CRP <0.29 07/22/2019         Imaging:  Xr Knee (1 Or 2 Views), Right (cpt=73560)    Result Date: 8/19/2019  CONCLUSION: Right knee arthroplasty     Dictated by (CST): Allie Owens,

## 2019-08-19 NOTE — INTERVAL H&P NOTE
Pre-op Diagnosis: right knee osteoarthritis    The above referenced H&P was reviewed by Lea Crum MD on 8/19/2019, the patient was examined and no significant changes have occurred in the patient's condition since the H&P was performed.   I discussed with

## 2019-08-19 NOTE — OPERATIVE REPORT
DATE OF SURGERY: 08/19/2019  Operative Note  Surgeon: Noris Matthews MD  Anesthesia Type:  General  Pre-Op Diagnosis:     (1) Unilateral primary osteoarthritis, right knee  Post-Op Diagnosis:     (1) Unilateral primary osteoarthritis, right knee     Procedure We opened up the right knee and noted severe arthritis throughout the knee. We then exposed the distal femur, placed our custom cutting guide, drilled it and pinned it in place and then resected the distal femur, sizing it to a 3.   We then placed the fou

## 2019-08-19 NOTE — PLAN OF CARE
Problem: PAIN - ADULT  Goal: Verbalizes/displays adequate comfort level or patient's stated pain goal  Description  INTERVENTIONS:  - Encourage pt to monitor pain and request assistance  - Assess pain using appropriate pain scale  - Administer analgesics w/pt and caregiver  - Include patient/family/discharge partner in discharge planning  - Arrange for needed discharge resources and transportation as appropriate  - Identify discharge learning needs (meds, wound care, etc)  - Arrange for interpreters to ass

## 2019-08-20 PROBLEM — M17.11 OSTEOARTHRITIS OF RIGHT KNEE: Status: ACTIVE | Noted: 2019-03-18

## 2019-08-20 LAB
ANION GAP SERPL CALC-SCNC: 6 MMOL/L (ref 0–18)
BUN BLD-MCNC: 12 MG/DL (ref 7–18)
BUN/CREAT SERPL: 18.8 (ref 10–20)
CALCIUM BLD-MCNC: 7.9 MG/DL (ref 8.5–10.1)
CHLORIDE SERPL-SCNC: 107 MMOL/L (ref 98–112)
CO2 SERPL-SCNC: 25 MMOL/L (ref 21–32)
CREAT BLD-MCNC: 0.64 MG/DL (ref 0.55–1.02)
DEPRECATED RDW RBC AUTO: 49.6 FL (ref 35.1–46.3)
ERYTHROCYTE [DISTWIDTH] IN BLOOD BY AUTOMATED COUNT: 14.6 % (ref 11–15)
GLUCOSE BLD-MCNC: 121 MG/DL (ref 70–99)
HCT VFR BLD AUTO: 28.9 % (ref 35–48)
HGB BLD-MCNC: 9.5 G/DL (ref 12–16)
MCH RBC QN AUTO: 30.9 PG (ref 26–34)
MCHC RBC AUTO-ENTMCNC: 32.9 G/DL (ref 31–37)
MCV RBC AUTO: 94.1 FL (ref 80–100)
OSMOLALITY SERPL CALC.SUM OF ELEC: 287 MOSM/KG (ref 275–295)
PLATELET # BLD AUTO: 182 10(3)UL (ref 150–450)
POTASSIUM SERPL-SCNC: 3.9 MMOL/L (ref 3.5–5.1)
RBC # BLD AUTO: 3.07 X10(6)UL (ref 3.8–5.3)
SODIUM SERPL-SCNC: 138 MMOL/L (ref 136–145)
WBC # BLD AUTO: 10.1 X10(3) UL (ref 4–11)

## 2019-08-20 PROCEDURE — 99232 SBSQ HOSP IP/OBS MODERATE 35: CPT | Performed by: HOSPITALIST

## 2019-08-20 RX ORDER — ASPIRIN 325 MG
325 TABLET, DELAYED RELEASE (ENTERIC COATED) ORAL 2 TIMES DAILY
Qty: 70 TABLET | Refills: 0 | Status: SHIPPED | COMMUNITY
Start: 2019-08-20 | End: 2020-07-16

## 2019-08-20 RX ORDER — HYDROCODONE BITARTRATE AND ACETAMINOPHEN 10; 325 MG/1; MG/1
1 TABLET ORAL EVERY 6 HOURS PRN
Refills: 0 | Status: SHIPPED | COMMUNITY
Start: 2019-08-20 | End: 2020-07-16

## 2019-08-20 NOTE — CM/SW NOTE
SW met with the patient at bedside. The patient lives with spouse in 2 level home with 2 steps to enter and 8 inside. The patient responds being independent prior to admission with all ADL's, ambulation and driving. Patient denies use of any DME.      Adam

## 2019-08-20 NOTE — PROGRESS NOTES
Cardiology progress note    24 h VS stable.     Current Medications:    Current Facility-Administered Medications:  lactated ringers infusion  Intravenous Continuous   Normal Saline Flush 0.9 % injection 10 mL 10 mL Intravenous PRN   sodium chloride 0.9% IV 300 mg 300 mg Oral Nightly   LORazepam (ATIVAN) tab 0.5 mg 0.5 mg Oral BID PRN   losartan (COZAAR) 100 mg, hydrochlorothiazide (MICROZIDE) 12.5 mg for Hyzaar 100/12.5 ([facility] only)  Oral Daily   Rosuvastatin Calcium (CRESTOR) tab 10 mg 10 mg Oral Nightly   Pa 08/20/2019    .0 08/20/2019    CREATSERUM 0.64 08/20/2019    BUN 12 08/20/2019     08/20/2019    K 3.9 08/20/2019     08/20/2019    CO2 25.0 08/20/2019     (H) 08/20/2019    CA 7.9 (L) 08/20/2019    ALB 3.6 07/22/2019    ALKPHO 83

## 2019-08-20 NOTE — PROGRESS NOTES
Patient would like a referral for ob/gyn. Please sign the pending order.    [facility]  Hospitalist Progress Note     [facility] Patient Status:  Inpatient    3/15/1945  69year old [facility] [id number]   Location 405/405-A Attending Artemio Polanco MD   Hosp Day # 1 PCP Jimmy Paiz MD     ASSESSMENT/PLAN last 168 hours.     • docusate sodium  100 mg Oral BID   • apixaban  2.5 mg Oral BID   • busPIRone HCl  10 mg Oral TID   • FLUoxetine HCl  20 mg Oral Daily   • folic acid  1 mg Oral Daily   • gabapentin  200 mg Oral QAM   • gabapentin  100 mg Oral After [facility]

## 2019-08-20 NOTE — OCCUPATIONAL THERAPY NOTE
OCCUPATIONAL THERAPY EVALUATION - INPATIENT      Room Number: 405/405-A  Evaluation Date: 8/20/2019  Type of Evaluation: Initial  Presenting Problem: s/p R TKA    Physician Order: IP Consult to Occupational Therapy  Reason for Therapy: ADL/IADL Dysfunction home.    DISCHARGE RECOMMENDATIONS  OT Discharge Recommendations: 24 hour care/supervision;Home with home health PT/OT(vs SHLOMO pending progress with therapy)  OT Device Recommendations: Raised toilet seat; Shower chair;Reacher(patient owns all recommended eq Right  Drives: Yes  Patient Regularly Uses: None    Stairs in Home: 2 CARLOZ, 8 stairs to bedroom  Assistive Device(s) Used: Palomo Fairchild     Prior Level of Pierce: Prior to admission, patient was independent with all ADL and functional mobility at home.  She ha CGA  Shower Transfer: Not tested  Chair Transfer: CGA    Bedroom Mobility: CGA    BALANCE ASSESSMENT  Static Sitting: Good  Dynamic Sitting: Good  Static Standing: Fair Minus  Dynamic Standing: Poor +    FUNCTIONAL ADL ASSESSMENT  Grooming: CGA  Feeding: I

## 2019-08-20 NOTE — PHYSICAL THERAPY NOTE
PHYSICAL THERAPY KNEE TREATMENT NOTE - INPATIENT     Room Number: 405/405-A             Presenting Problem: right knee OA , pt is s/p right TKA . pt is WBAT right LE Post sx . pt with left knee TKA march of 2019 .       Problem List  Principal Problem:    O MOBILITY  How much difficulty does the patient currently have. ..  -   Turning over in bed (including adjusting bedclothes, sheets and blankets)?: A Lot   -   Sitting down on and standing up from a chair with arms (e.g., wheelchair, bedside commode, etc.): demonstrate transfers Sit to/from Stand at assistance level: modified independent     Goal #2  Current Status Min A   Goal #3 Assess ambulation pending tolerance for activity :    Patient is able to ambulate 300 feet with assistive device at assistance lev

## 2019-08-20 NOTE — PLAN OF CARE
Comments: Pt is alert, calls appropriately for assistance. VSS. Sats well on R/A. Eliquis to begin in AM. Had to straight cath x1 for urinary retention- will continue to monitor. OOBx1/A to R/C, WBAT. McIntyre 7.5 for pain management.  Zofran admin x1 for naus patient's stated pain goal  Description  INTERVENTIONS:  - Encourage pt to monitor pain and request assistance  - Assess pain using appropriate pain scale  - Administer analgesics based on type and severity of pain and evaluate response  - Implement non-ph Arrange for interpreters to assist at discharge as needed  - Consider post-discharge preferences of patient/family/discharge partner  - Complete POLST form as appropriate  - Assess patient's ability to be responsible for managing their own health  - Refer

## 2019-08-20 NOTE — PROGRESS NOTES
[address] Patient Status:  Inpatient    3/15/1945 MRN [id number]   Location [facility] 4W/SW/SE Attending Brooke Carson MD   [facility] Day # 1 PCP Laura Reyes MD     Subjective:  Post-Operative Day: 1 Status Post right Or      HYDROcodone-acetaminophen (NORCO) 7.5-325 MG per tab 1 tablet 1 tablet Oral Q4H PRN   Or      HYDROcodone-acetaminophen (NORCO) 7.5-325 MG per tab 2 tablet 2 tablet Oral Q4H PRN   morphINE sulfate (PF) 2 MG/ML injection 1 mg 1 mg Intravenous Q2H It must remain dry until seen in clinic in two weeks       LOS: 1 day

## 2019-08-20 NOTE — PLAN OF CARE
Problem: Patient Centered Care  Goal: Patient preferences are identified and integrated in the patient's plan of care  Description  Interventions:  - What would you like us to know as we care for you? None particularly.  I live with my  in our own pain scale  - Administer analgesics based on type and severity of pain and evaluate response  - Implement non-pharmacological measures as appropriate and evaluate response  - Consider cultural and social influences on pain and pain management  - Manage/all POLST form as appropriate  - Assess patient's ability to be responsible for managing their own health  - Refer to Case Management Department for coordinating discharge planning if the patient needs post-hospital services based on physician/LIP order or com safe patient handling equipment as needed  - Ensure adequate protection for wounds/incisions during mobilization  - Obtain PT/OT consults as needed  - Advance activity as appropriate  - Communicate ordered activity level and limitations with patient/family

## 2019-08-21 LAB
BACTERIA UR QL AUTO: NEGATIVE /HPF
BILIRUB UR QL: NEGATIVE
CLARITY UR: CLEAR
COLOR UR: YELLOW
DEPRECATED RDW RBC AUTO: 48.5 FL (ref 35.1–46.3)
ERYTHROCYTE [DISTWIDTH] IN BLOOD BY AUTOMATED COUNT: 14.4 % (ref 11–15)
GLUCOSE UR-MCNC: NEGATIVE MG/DL
HCT VFR BLD AUTO: 27.9 % (ref 35–48)
HGB BLD-MCNC: 9.3 G/DL (ref 12–16)
KETONES UR-MCNC: NEGATIVE MG/DL
LEUKOCYTE ESTERASE UR QL STRIP.AUTO: NEGATIVE
MCH RBC QN AUTO: 31 PG (ref 26–34)
MCHC RBC AUTO-ENTMCNC: 33.3 G/DL (ref 31–37)
MCV RBC AUTO: 93 FL (ref 80–100)
NITRITE UR QL STRIP.AUTO: NEGATIVE
PH UR: 6 [PH] (ref 5–8)
PLATELET # BLD AUTO: 168 10(3)UL (ref 150–450)
PROT UR-MCNC: NEGATIVE MG/DL
RBC # BLD AUTO: 3 X10(6)UL (ref 3.8–5.3)
RBC #/AREA URNS AUTO: 16 /HPF
SP GR UR STRIP: 1.01 (ref 1–1.03)
UROBILINOGEN UR STRIP-ACNC: <2
VIT C UR-MCNC: NEGATIVE MG/DL
WBC # BLD AUTO: 7.5 X10(3) UL (ref 4–11)
WBC #/AREA URNS AUTO: 1 /HPF

## 2019-08-21 PROCEDURE — 99232 SBSQ HOSP IP/OBS MODERATE 35: CPT | Performed by: HOSPITALIST

## 2019-08-21 NOTE — OCCUPATIONAL THERAPY NOTE
OCCUPATIONAL THERAPY TREATMENT NOTE - INPATIENT    Room Number: 405/405-A         Presenting Problem: s/p R TKA     Problem List  Principal Problem:    Osteoarthritis of right knee  Active Problems:    Essential hypertension    Hyperlipidemia      OCCUPATI promotion     ACTIVITY TOLERANCE                         O2 SATURATIONS                ACTIVITIES OF DAILY LIVING ASSESSMENT  AM-PAC ‘6-Clicks’ Inpatient Daily Activity Short Form  How much help from another person does the patient currently need…  -   Put 8/27/19  Frequency: 3-5x/week

## 2019-08-21 NOTE — PHYSICAL THERAPY NOTE
PHYSICAL THERAPY KNEE TREATMENT NOTE - INPATIENT     Room Number: 405/405-A             Presenting Problem: right knee OA , pt is s/p right TKA . pt is WBAT right LE Post sx . pt with left knee TKA march of 2019 .       Problem List  Principal Problem:    O Sitting down on and standing up from a chair with arms (e.g., wheelchair, bedside commode, etc.): A Little   -   Moving from lying on back to sitting on the side of the bed?: A Little   How much help from another person does the patient currently need. .. activity :    Patient is able to ambulate 300 feet with assistive device at assistance level: modified independent    Goal #3   Current Status 250 ft with RW with cGA   Goal #4 Assess stairs when appropriate : P atient will negotiate  8  stairs/one curb w/

## 2019-08-21 NOTE — PROGRESS NOTES
[address] [facility] Patient Status:  Inpatient    3/15/1945 MRN [id number]   Location [facility] 4W/SW/SE Attending Sandra Jones MD   [facility] Day # 2 PCP Aidan Poon MD     Subjective:  Post-Operative Day: 2 Status Post right tablet 1 tablet Oral Q4H PRN   Or      HYDROcodone-acetaminophen (NORCO) 7.5-325 MG per tab 2 tablet 2 tablet Oral Q4H PRN   morphINE sulfate (PF) 2 MG/ML injection 1 mg 1 mg Intravenous Q2H PRN   Or      morphINE sulfate (PF) 2 MG/ML injection 2 mg 2 mg I

## 2019-08-22 VITALS
TEMPERATURE: 99 F | HEART RATE: 65 BPM | RESPIRATION RATE: 16 BRPM | HEIGHT: 62 IN | BODY MASS INDEX: 29.63 KG/M2 | OXYGEN SATURATION: 95 % | DIASTOLIC BLOOD PRESSURE: 63 MMHG | WEIGHT: 161 LBS | SYSTOLIC BLOOD PRESSURE: 132 MMHG

## 2019-08-22 LAB
DEPRECATED RDW RBC AUTO: 47.7 FL (ref 35.1–46.3)
ERYTHROCYTE [DISTWIDTH] IN BLOOD BY AUTOMATED COUNT: 14 % (ref 11–15)
HCT VFR BLD AUTO: 27.7 % (ref 35–48)
HGB BLD-MCNC: 9.4 G/DL (ref 12–16)
MCH RBC QN AUTO: 31.9 PG (ref 26–34)
MCHC RBC AUTO-ENTMCNC: 33.9 G/DL (ref 31–37)
MCV RBC AUTO: 93.9 FL (ref 80–100)
PLATELET # BLD AUTO: 166 10(3)UL (ref 150–450)
RBC # BLD AUTO: 2.95 X10(6)UL (ref 3.8–5.3)
WBC # BLD AUTO: 7.8 X10(3) UL (ref 4–11)

## 2019-08-22 PROCEDURE — 99238 HOSP IP/OBS DSCHRG MGMT 30/<: CPT | Performed by: HOSPITALIST

## 2019-08-22 NOTE — PHYSICAL THERAPY NOTE
PHYSICAL THERAPY KNEE TREATMENT NOTE - INPATIENT     Room Number: 405/405-A             Presenting Problem: right knee OA , pt is s/p right TKA . pt is WBAT right LE Post sx . pt with left knee TKA march of 2019 .       Problem List  Principal Problem:    O bedclothes, sheets and blankets)?: A Little   -   Sitting down on and standing up from a chair with arms (e.g., wheelchair, bedside commode, etc.): A Little   -   Moving from lying on back to sitting on the side of the bed?: A Little   How much help from a tolerance for activity :    Patient is able to ambulate 300 feet with assistive device at assistance level: modified independent    Goal #3   Current Status 200 ft with RW with SBA   Goal #4 Assess stairs when appropriate : ANNALISE saavedra will negotiate  8  stai

## 2019-08-22 NOTE — CM/SW NOTE
EDDY confirmed with [facility] that patient is getting discharged today, 8.22.19. [facility].     [address]      [place], [address] [facility]

## 2019-08-22 NOTE — PLAN OF CARE
Problem: PAIN - ADULT  Goal: Verbalizes/displays adequate comfort level or patient's stated pain goal  Description  INTERVENTIONS:  - Encourage pt to monitor pain and request assistance  - Assess pain using appropriate pain scale  - Administer analgesics appropriate  - Identify discharge learning needs (meds, wound care, etc)  - Arrange for interpreters to assist at discharge as needed  - Consider post-discharge preferences of patient/family/discharge partner  - Complete POLST form as appropriate  - Assess safest level of function  Description  INTERVENTIONS:  - Assess patient stability and activity tolerance for standing, transferring and ambulating w/ or w/o assistive devices  - Assist with transfers and ambulation using safe patient handling equipment as

## 2019-08-22 NOTE — PLAN OF CARE
Problem: PAIN - ADULT  Goal: Verbalizes/displays adequate comfort level or patient's stated pain goal  Description  INTERVENTIONS:  - Encourage pt to monitor pain and request assistance  - Assess pain using appropriate pain scale  - Administer analgesics resources and transportation as appropriate  - Identify discharge learning needs (meds, wound care, etc)  - Arrange for interpreters to assist at discharge as needed  - Consider post-discharge preferences of patient/family/discharge partner  - Complete WENCESLAO Discharge     Problem: MUSCULOSKELETAL - ADULT  Goal: Return mobility to safest level of function  Description  INTERVENTIONS:  - Assess patient stability and activity tolerance for standing, transferring and ambulating w/ or w/o assistive devices  - [employer]

## 2019-08-22 NOTE — PROGRESS NOTES
[facility]  Hospitalist Progress Note     Noemi Mckeon Patient Status:  Inpatient    3/15/1945  69year old [facility] [id number]   Location 405/405-A Attending Nickolas Smith MD   Hosp Day # 2 PCP Suzanne Suggs MD     ASSESSMENT/PLAN Recent Labs   Lab 08/20/19  0507   *   BUN 12   CREATSERUM 0.64   GFRAA 103   GFRNAA 89   CA 7.9*      K 3.9      CO2 25.0     No results for input(s): PT, INR, PTT in the last 168 hours.     • docusate sodium  100 mg Oral BID   • api

## 2019-10-01 NOTE — DISCHARGE SUMMARY
[facility] HOSPITALIST  DISCHARGE SUMMARY     Cirilo Garcia Patient Status:  Inpatient    3/15/1945 MRN [id number]   Location [facility] 4W/SW/SE Attending No att. providers found   [facility] Day # 3 PCP Wendy Diggs MD     Date of Admission:  Take 100 mg by mouth After lunch. Refills:  0     gabapentin 100 MG Caps  Commonly known as:  NEURONTIN      Take 300 mg by mouth nightly. Refills:  0     gabapentin 100 MG Caps  Commonly known as:  NEURONTIN      Take 200 mg by mouth every morning.

## 2019-12-27 ENCOUNTER — OFFICE VISIT (OUTPATIENT)
Dept: OTOLARYNGOLOGY | Facility: CLINIC | Age: 74
End: 2019-12-27
Payer: MEDICARE

## 2019-12-27 VITALS
BODY MASS INDEX: 27.42 KG/M2 | HEIGHT: 62 IN | WEIGHT: 149 LBS | TEMPERATURE: 97 F | SYSTOLIC BLOOD PRESSURE: 138 MMHG | DIASTOLIC BLOOD PRESSURE: 78 MMHG

## 2019-12-27 DIAGNOSIS — J01.91 ACUTE RECURRENT SINUSITIS, UNSPECIFIED LOCATION: Primary | ICD-10-CM

## 2019-12-27 PROCEDURE — 99213 OFFICE O/P EST LOW 20 MIN: CPT | Performed by: OTOLARYNGOLOGY

## 2019-12-27 PROCEDURE — G0463 HOSPITAL OUTPT CLINIC VISIT: HCPCS | Performed by: OTOLARYNGOLOGY

## 2019-12-27 RX ORDER — PREDNISONE 20 MG/1
TABLET ORAL
Qty: 9 TABLET | Refills: 0 | Status: SHIPPED | OUTPATIENT
Start: 2019-12-27 | End: 2020-07-16

## 2019-12-27 RX ORDER — CLINDAMYCIN HYDROCHLORIDE 300 MG/1
300 CAPSULE ORAL 3 TIMES DAILY
Qty: 21 CAPSULE | Refills: 0 | Status: SHIPPED | OUTPATIENT
Start: 2019-12-27 | End: 2020-01-03

## 2019-12-27 NOTE — PROGRESS NOTES
Gris Payton is a 69year old female.  Patient presents with:  Sinus Problem: pt presents with sinus congestion, headaches, sore throat for 1 month   Ear Problem: bilateral ear pain, clogged        HISTORY OF PRESENT ILLNESS     1/9/2017Here for eval because of all the phlegm she is currently not on a daily antihistamines although she notes that she does have history of chronic allergies  12/27/2019  1 month history of very plugged ears a lot of drainage facial pressure pain congestion  Social History Not Asked        Stress Concern: Not Asked        Weight Concern: Not Asked        Special Diet: Not Asked        Back Care: Not Asked        Exercise: Not Asked        Bike Helmet: Not Asked        Seat Belt: Not Asked        Self-Exams: Not Asked    Soci Negative Frequent skin infections, pigment change and rash. Hema/Lymph Negative Easy bleeding and easy bruising.      PHYSICAL EXAM    /78 (BP Location: Left arm, Patient Position: Sitting, Cuff Size: adult)   Temp 97 °F (36.1 °C) (Tympanic)   Ht 5' 10 MG Oral Tab, Take 10 mg by mouth 3 (three) times daily. , Disp: , Rfl:   •  Esomeprazole Magnesium (NEXIUM 24HR OR), Take 20 mg by mouth every evening., Disp: , Rfl:   •  Losartan Potassium-HCTZ 100-12.5 MG Oral Tab, Take 1 tablet by mouth daily.   , Disp

## 2020-01-20 ENCOUNTER — APPOINTMENT (OUTPATIENT)
Dept: LAB | Age: 75
End: 2020-01-20
Attending: INTERNAL MEDICINE
Payer: MEDICARE

## 2020-01-20 PROCEDURE — 87493 C DIFF AMPLIFIED PROBE: CPT | Performed by: INTERNAL MEDICINE

## 2020-01-21 ENCOUNTER — APPOINTMENT (OUTPATIENT)
Dept: LAB | Age: 75
End: 2020-01-21
Attending: INTERNAL MEDICINE
Payer: MEDICARE

## 2020-01-21 DIAGNOSIS — R19.7 DIARRHEA, UNSPECIFIED TYPE: ICD-10-CM

## 2020-01-21 PROCEDURE — 87507 IADNA-DNA/RNA PROBE TQ 12-25: CPT

## 2020-01-22 LAB
ADENOVIRUS F 40/41 PCR: NEGATIVE
ASTROVIRUS PCR: NEGATIVE
C CAYETANENSIS DNA SPEC QL NAA+PROBE: NEGATIVE
CAMPY SP DNA.DIARRHEA STL QL NAA+PROBE: NEGATIVE
CRYPTOSP DNA SPEC QL NAA+PROBE: NEGATIVE
EAEC PAA PLAS AGGR+AATA ST NAA+NON-PRB: NEGATIVE
EC STX1+STX2 + H7 FLIC SPEC NAA+PROBE: NEGATIVE
ENTAMOEBA HISTOLYTICA PCR: NEGATIVE
EPEC EAE GENE STL QL NAA+NON-PROBE: NEGATIVE
ETEC LTA+ST1A+ST1B TOX ST NAA+NON-PROBE: NEGATIVE
GIARDIA LAMBLIA PCR: NEGATIVE
NOROVIRUS GI/GII PCR: NEGATIVE
P SHIGELLOIDES DNA STL QL NAA+PROBE: NEGATIVE
ROTAVIRUS A PCR: NEGATIVE
SALMONELLA DNA SPEC QL NAA+PROBE: NEGATIVE
SAPOVIRUS PCR: NEGATIVE
SHIGELLA SP+EIEC IPAH ST NAA+NON-PROBE: NEGATIVE
V CHOLERAE DNA SPEC QL NAA+PROBE: NEGATIVE
VIBRIO DNA SPEC NAA+PROBE: NEGATIVE
YERSINIA DNA SPEC NAA+PROBE: NEGATIVE

## 2020-02-03 PROBLEM — A04.72 C. DIFFICILE DIARRHEA: Status: ACTIVE | Noted: 2020-02-03

## 2020-02-15 ENCOUNTER — LAB ENCOUNTER (OUTPATIENT)
Dept: LAB | Age: 75
End: 2020-02-15
Attending: INTERNAL MEDICINE
Payer: MEDICARE

## 2020-02-15 DIAGNOSIS — Z86.19 HISTORY OF CLOSTRIDIOIDES DIFFICILE INFECTION: ICD-10-CM

## 2020-02-15 DIAGNOSIS — R19.7 DIARRHEA OF PRESUMED INFECTIOUS ORIGIN: ICD-10-CM

## 2020-02-15 PROCEDURE — 87493 C DIFF AMPLIFIED PROBE: CPT

## 2020-02-16 LAB — C DIFF TOX B STL QL: POSITIVE

## 2020-02-16 NOTE — PROGRESS NOTES
C.Diff PCR again positive. Given good response to Vancomycin last month will trial again as this would be first reoccurrence and guidelines suggest repeat therapy. Instructed to follow up with Dr. Sylvie Gaytan in the next few weeks.   If has second reoccurre

## 2020-07-06 ENCOUNTER — TELEPHONE (OUTPATIENT)
Dept: CARDIOLOGY | Age: 75
End: 2020-07-06

## 2020-07-06 RX ORDER — HYDROCHLOROTHIAZIDE 12.5 MG/1
12.5 TABLET ORAL DAILY
Qty: 30 TABLET | Refills: 0 | Status: SHIPPED | OUTPATIENT
Start: 2020-07-06

## 2020-07-06 RX ORDER — ROSUVASTATIN CALCIUM 10 MG/1
10 TABLET, COATED ORAL DAILY
Qty: 30 TABLET | Refills: 0 | Status: SHIPPED | OUTPATIENT
Start: 2020-07-06 | End: 2020-07-06 | Stop reason: SDUPTHER

## 2020-07-06 RX ORDER — ROSUVASTATIN CALCIUM 10 MG/1
10 TABLET, COATED ORAL DAILY
Qty: 30 TABLET | Refills: 0 | Status: SHIPPED | OUTPATIENT
Start: 2020-07-06

## 2020-07-06 RX ORDER — LOSARTAN POTASSIUM 100 MG/1
100 TABLET ORAL DAILY
Qty: 30 TABLET | Refills: 0 | Status: SHIPPED | OUTPATIENT
Start: 2020-07-06

## 2020-07-06 RX ORDER — LOSARTAN POTASSIUM AND HYDROCHLOROTHIAZIDE 12.5; 1 MG/1; MG/1
1 TABLET ORAL DAILY
Qty: 30 TABLET | Refills: 0 | Status: SHIPPED | OUTPATIENT
Start: 2020-07-06 | End: 2020-07-06 | Stop reason: DRUGHIGH

## 2020-07-16 ENCOUNTER — OFFICE VISIT (OUTPATIENT)
Dept: FAMILY MEDICINE CLINIC | Facility: CLINIC | Age: 75
End: 2020-07-16
Payer: MEDICARE

## 2020-07-16 VITALS
BODY MASS INDEX: 29.63 KG/M2 | SYSTOLIC BLOOD PRESSURE: 115 MMHG | HEART RATE: 71 BPM | DIASTOLIC BLOOD PRESSURE: 79 MMHG | TEMPERATURE: 98 F | HEIGHT: 62 IN | WEIGHT: 161 LBS

## 2020-07-16 DIAGNOSIS — E78.5 HYPERLIPIDEMIA, UNSPECIFIED HYPERLIPIDEMIA TYPE: ICD-10-CM

## 2020-07-16 DIAGNOSIS — A04.72 C. DIFFICILE DIARRHEA: ICD-10-CM

## 2020-07-16 DIAGNOSIS — Z96.643 HISTORY OF BILATERAL HIP REPLACEMENTS: ICD-10-CM

## 2020-07-16 DIAGNOSIS — I10 ESSENTIAL HYPERTENSION: Primary | ICD-10-CM

## 2020-07-16 DIAGNOSIS — M06.9 RHEUMATOID ARTHRITIS INVOLVING MULTIPLE SITES, UNSPECIFIED RHEUMATOID FACTOR PRESENCE: ICD-10-CM

## 2020-07-16 DIAGNOSIS — Z85.3 HISTORY OF LEFT BREAST CANCER: ICD-10-CM

## 2020-07-16 DIAGNOSIS — H65.92 MIDDLE EAR EFFUSION, LEFT: ICD-10-CM

## 2020-07-16 DIAGNOSIS — M15.9 PRIMARY OSTEOARTHRITIS INVOLVING MULTIPLE JOINTS: ICD-10-CM

## 2020-07-16 PROBLEM — K57.30 DIVERTICULOSIS OF COLON: Status: ACTIVE | Noted: 2020-07-16

## 2020-07-16 PROBLEM — M15.0 PRIMARY OSTEOARTHRITIS INVOLVING MULTIPLE JOINTS: Status: ACTIVE | Noted: 2020-07-16

## 2020-07-16 PROCEDURE — G0463 HOSPITAL OUTPT CLINIC VISIT: HCPCS | Performed by: FAMILY MEDICINE

## 2020-07-16 PROCEDURE — 99203 OFFICE O/P NEW LOW 30 MIN: CPT | Performed by: FAMILY MEDICINE

## 2020-07-16 RX ORDER — ROSUVASTATIN CALCIUM 10 MG/1
10 TABLET, COATED ORAL NIGHTLY
Qty: 90 TABLET | Refills: 0 | Status: SHIPPED | OUTPATIENT
Start: 2020-07-16 | End: 2021-01-03

## 2020-07-16 RX ORDER — HYDROCHLOROTHIAZIDE 12.5 MG/1
TABLET ORAL
COMMUNITY
Start: 2020-07-06 | End: 2020-07-16

## 2020-07-16 RX ORDER — LOSARTAN POTASSIUM 100 MG/1
100 TABLET ORAL DAILY
Qty: 90 TABLET | Refills: 0 | Status: SHIPPED | OUTPATIENT
Start: 2020-07-16 | End: 2020-11-01

## 2020-07-16 RX ORDER — BUSPIRONE HYDROCHLORIDE 30 MG/1
TABLET ORAL
COMMUNITY
Start: 2020-05-13 | End: 2020-08-17

## 2020-07-16 RX ORDER — LOSARTAN POTASSIUM 100 MG/1
TABLET ORAL
COMMUNITY
Start: 2020-07-06 | End: 2020-07-16

## 2020-07-16 RX ORDER — HYDROCHLOROTHIAZIDE 12.5 MG/1
12.5 TABLET ORAL EVERY MORNING
Qty: 90 TABLET | Refills: 0 | Status: SHIPPED | OUTPATIENT
Start: 2020-07-16 | End: 2020-11-01

## 2020-07-16 RX ORDER — FLUTICASONE PROPIONATE 50 MCG
2 SPRAY, SUSPENSION (ML) NASAL NIGHTLY
Qty: 1 BOTTLE | Refills: 1 | Status: SHIPPED | OUTPATIENT
Start: 2020-07-16 | End: 2021-07-11

## 2020-07-16 RX ORDER — LORATADINE 10 MG/1
10 TABLET ORAL DAILY
Qty: 30 TABLET | Refills: 0 | Status: SHIPPED | OUTPATIENT
Start: 2020-07-16 | End: 2020-08-11

## 2020-07-16 NOTE — PROGRESS NOTES
HPI:    Patient ID: Luisana Dillard is a 70year old female. HPI  Patient presents with: Other: feels ears clogged X 2 weeks     Patient in the clinic and establishing care. She brings in all her medication bottles.   She states she has been having nightly. 90 tablet 0   • losartan 100 MG Oral Tab Take 1 tablet (100 mg total) by mouth daily. 90 tablet 0   • hydrochlorothiazide 12.5 MG Oral Tab Take 1 tablet (12.5 mg total) by mouth every morning.  90 tablet 0   • loratadine 10 MG Oral Tab Take 1 table involving multiple sites, unspecified rheumatoid factor presence (hcc)  Middle ear effusion, left  Primary osteoarthritis involving multiple joints  History of bilateral hip replacements  History of left breast cancer    1.  Essential hypertension  Continue MG Oral Tab 90 tablet 0     Sig: Take 1 tablet (12.5 mg total) by mouth every morning. • loratadine 10 MG Oral Tab 30 tablet 0     Sig: Take 1 tablet (10 mg total) by mouth daily.    • Fluticasone Propionate 50 MCG/ACT Nasal Suspension 1 Bottle 1     Sig:

## 2020-07-20 ENCOUNTER — LAB ENCOUNTER (OUTPATIENT)
Dept: LAB | Age: 75
End: 2020-07-20
Attending: FAMILY MEDICINE
Payer: MEDICARE

## 2020-07-20 DIAGNOSIS — E78.5 HYPERLIPIDEMIA, UNSPECIFIED HYPERLIPIDEMIA TYPE: ICD-10-CM

## 2020-07-20 DIAGNOSIS — I10 ESSENTIAL HYPERTENSION: ICD-10-CM

## 2020-07-20 DIAGNOSIS — M06.9 RHEUMATOID ARTHRITIS INVOLVING MULTIPLE SITES, UNSPECIFIED RHEUMATOID FACTOR PRESENCE: ICD-10-CM

## 2020-07-20 LAB
ALBUMIN SERPL-MCNC: 3.8 G/DL (ref 3.4–5)
ALBUMIN/GLOB SERPL: 1.1 {RATIO} (ref 1–2)
ALP LIVER SERPL-CCNC: 90 U/L (ref 55–142)
ALT SERPL-CCNC: 29 U/L (ref 13–56)
ANION GAP SERPL CALC-SCNC: 4 MMOL/L (ref 0–18)
AST SERPL-CCNC: 20 U/L (ref 15–37)
BASOPHILS # BLD AUTO: 0.04 X10(3) UL (ref 0–0.2)
BASOPHILS NFR BLD AUTO: 0.8 %
BILIRUB SERPL-MCNC: 0.4 MG/DL (ref 0.1–2)
BUN BLD-MCNC: 15 MG/DL (ref 7–18)
BUN/CREAT SERPL: 19.5 (ref 10–20)
CALCIUM BLD-MCNC: 9.3 MG/DL (ref 8.5–10.1)
CHLORIDE SERPL-SCNC: 106 MMOL/L (ref 98–112)
CHOLEST SMN-MCNC: 174 MG/DL (ref ?–200)
CO2 SERPL-SCNC: 31 MMOL/L (ref 21–32)
CREAT BLD-MCNC: 0.77 MG/DL (ref 0.55–1.02)
CRP SERPL-MCNC: <0.29 MG/DL (ref ?–0.3)
DEPRECATED RDW RBC AUTO: 49.4 FL (ref 35.1–46.3)
EOSINOPHIL # BLD AUTO: 0.18 X10(3) UL (ref 0–0.7)
EOSINOPHIL NFR BLD AUTO: 3.7 %
ERYTHROCYTE [DISTWIDTH] IN BLOOD BY AUTOMATED COUNT: 13.9 % (ref 11–15)
GLOBULIN PLAS-MCNC: 3.4 G/DL (ref 2.8–4.4)
GLUCOSE BLD-MCNC: 83 MG/DL (ref 70–99)
HCT VFR BLD AUTO: 38.5 % (ref 35–48)
HDLC SERPL-MCNC: 78 MG/DL (ref 40–59)
HGB BLD-MCNC: 13.1 G/DL (ref 12–16)
IMM GRANULOCYTES # BLD AUTO: 0.01 X10(3) UL (ref 0–1)
IMM GRANULOCYTES NFR BLD: 0.2 %
LDLC SERPL CALC-MCNC: 70 MG/DL (ref ?–100)
LYMPHOCYTES # BLD AUTO: 2.06 X10(3) UL (ref 1–4)
LYMPHOCYTES NFR BLD AUTO: 42.4 %
M PROTEIN MFR SERPL ELPH: 7.2 G/DL (ref 6.4–8.2)
MCH RBC QN AUTO: 32.8 PG (ref 26–34)
MCHC RBC AUTO-ENTMCNC: 34 G/DL (ref 31–37)
MCV RBC AUTO: 96.5 FL (ref 80–100)
MONOCYTES # BLD AUTO: 0.39 X10(3) UL (ref 0.1–1)
MONOCYTES NFR BLD AUTO: 8 %
NEUTROPHILS # BLD AUTO: 2.18 X10 (3) UL (ref 1.5–7.7)
NEUTROPHILS # BLD AUTO: 2.18 X10(3) UL (ref 1.5–7.7)
NEUTROPHILS NFR BLD AUTO: 44.9 %
NONHDLC SERPL-MCNC: 96 MG/DL (ref ?–130)
OSMOLALITY SERPL CALC.SUM OF ELEC: 292 MOSM/KG (ref 275–295)
PATIENT FASTING Y/N/NP: YES
PATIENT FASTING Y/N/NP: YES
PLATELET # BLD AUTO: 214 10(3)UL (ref 150–450)
POTASSIUM SERPL-SCNC: 4.5 MMOL/L (ref 3.5–5.1)
RBC # BLD AUTO: 3.99 X10(6)UL (ref 3.8–5.3)
SODIUM SERPL-SCNC: 141 MMOL/L (ref 136–145)
TRIGL SERPL-MCNC: 132 MG/DL (ref 30–149)
VLDLC SERPL CALC-MCNC: 26 MG/DL (ref 0–30)
WBC # BLD AUTO: 4.9 X10(3) UL (ref 4–11)

## 2020-07-20 PROCEDURE — 85025 COMPLETE CBC W/AUTO DIFF WBC: CPT

## 2020-07-20 PROCEDURE — 80061 LIPID PANEL: CPT

## 2020-07-20 PROCEDURE — 36415 COLL VENOUS BLD VENIPUNCTURE: CPT

## 2020-07-20 PROCEDURE — 86140 C-REACTIVE PROTEIN: CPT

## 2020-07-20 PROCEDURE — 80053 COMPREHEN METABOLIC PANEL: CPT

## 2020-08-11 DIAGNOSIS — H65.92 MIDDLE EAR EFFUSION, LEFT: ICD-10-CM

## 2020-08-11 RX ORDER — LORATADINE 10 MG/1
TABLET ORAL
Qty: 30 TABLET | Refills: 0 | Status: SHIPPED | OUTPATIENT
Start: 2020-08-11 | End: 2020-08-17

## 2020-08-17 ENCOUNTER — TELEPHONE (OUTPATIENT)
Dept: FAMILY MEDICINE CLINIC | Facility: CLINIC | Age: 75
End: 2020-08-17

## 2020-08-17 NOTE — PROGRESS NOTES
HPI:    Patient ID: Owen Guillory is a 70year old female. HPI  Patient presents with: Follow - Up: 1 month follow up    Patient seen a month ago for fluid in left ear. She was prescribed loratadine and Flonase and told to follow-up in 1 month. (three) times daily. • Esomeprazole Magnesium (NEXIUM 24HR OR) Take 20 mg by mouth every evening.        Allergies:  Blueberry               ANAPHYLAXIS  Cephalexin              HIVES, ITCHING  Latex                   ITCHING  Penicillins loratadine 10 MG Oral Tab; Take 1 tablet (10 mg total) by mouth daily. Dispense: 30 tablet; Refill: 3    6. Hyperlipidemia, unspecified hyperlipidemia type      7.  Essential hypertension  Stable   Continue present management       No orders of the defined

## 2020-08-17 NOTE — TELEPHONE ENCOUNTER
Pharmacy requesting to know if this is the correct form Dr. Freya Rob meant to send to the pharmacy. States insurance only covers capsule form and patient has been taking medication on a capsule form in the past. Pharmacy requesting clarification.      FLUoxetin

## 2020-10-03 ENCOUNTER — HOSPITAL ENCOUNTER (OUTPATIENT)
Age: 75
Discharge: HOME | End: 2020-10-03
Attending: EMERGENCY MEDICINE
Payer: MEDICARE

## 2020-10-03 VITALS
OXYGEN SATURATION: 99 % | BODY MASS INDEX: 29.81 KG/M2 | DIASTOLIC BLOOD PRESSURE: 82 MMHG | TEMPERATURE: 98 F | SYSTOLIC BLOOD PRESSURE: 152 MMHG | HEART RATE: 99 BPM | WEIGHT: 162 LBS | RESPIRATION RATE: 18 BRPM | HEIGHT: 62 IN

## 2020-10-03 DIAGNOSIS — Z20.822 ENCOUNTER FOR SCREENING LABORATORY TESTING FOR COVID-19 VIRUS: Primary | ICD-10-CM

## 2020-10-03 PROCEDURE — 99213 OFFICE O/P EST LOW 20 MIN: CPT | Performed by: EMERGENCY MEDICINE

## 2020-10-03 PROCEDURE — 87880 STREP A ASSAY W/OPTIC: CPT | Performed by: EMERGENCY MEDICINE

## 2020-10-03 RX ORDER — ECHINACEA PURPUREA EXTRACT 125 MG
2 TABLET ORAL AS NEEDED
Qty: 60 ML | Refills: 0 | Status: SHIPPED | OUTPATIENT
Start: 2020-10-03 | End: 2020-10-08

## 2020-10-03 NOTE — ED INITIAL ASSESSMENT (HPI)
Pt c/o sore throat x1 week. States hx of allergies. No cough. No fever. States taking allergy meds prescribed by pmd.  States  with fever, requesting covid test.  Awake/alert. Breathing easy and even without distress. Speech clear.  Skin warm, d

## 2020-10-03 NOTE — ED PROVIDER NOTES
Patient Seen in: [facility] Immediate Care In [address] [place] [address]    History   Patient presents with:  Testing  Sore Throat    Stated Complaint: COVID testing, sore throat and nasal congestion    HPI    Patient here with cough, congestion for 3 days.   No tra puffs into the lungs every 4 (four) hours as needed for Wheezing.   loratadine 10 MG Oral Tab,  Take 1 tablet (10 mg total) by mouth daily. Rosuvastatin Calcium (CRESTOR) 10 MG Oral Tab,  Take 1 tablet (10 mg total) by mouth nightly.    losartan 100 MG Or normocephalic, atraumatic  EYES: sclera non icteric bilateral, conjunctiva clear  EARS:tm's clear bilateral  NOSE: nasal turbinates boggy  NECK: supple, no adenopathy, no thyromegaly  THROAT: pnd noted, post phaynx injected,  LUNGS: no accessory use, incre

## 2020-10-31 DIAGNOSIS — I10 ESSENTIAL HYPERTENSION: ICD-10-CM

## 2020-11-01 RX ORDER — HYDROCHLOROTHIAZIDE 12.5 MG/1
12.5 TABLET ORAL DAILY
Qty: 90 TABLET | Refills: 1 | Status: SHIPPED | OUTPATIENT
Start: 2020-11-01 | End: 2021-02-16

## 2020-11-01 RX ORDER — LOSARTAN POTASSIUM 100 MG/1
100 TABLET ORAL DAILY
Qty: 90 TABLET | Refills: 1 | Status: SHIPPED | OUTPATIENT
Start: 2020-11-01 | End: 2021-02-16

## 2020-12-08 DIAGNOSIS — H65.92 MIDDLE EAR EFFUSION, LEFT: ICD-10-CM

## 2020-12-08 RX ORDER — LORATADINE 10 MG/1
10 TABLET ORAL DAILY
Qty: 30 TABLET | Refills: 3 | Status: SHIPPED | OUTPATIENT
Start: 2020-12-08 | End: 2021-03-31

## 2021-01-03 DIAGNOSIS — E78.5 HYPERLIPIDEMIA, UNSPECIFIED HYPERLIPIDEMIA TYPE: ICD-10-CM

## 2021-01-03 RX ORDER — ROSUVASTATIN CALCIUM 10 MG/1
10 TABLET, COATED ORAL NIGHTLY
Qty: 90 TABLET | Refills: 2 | Status: SHIPPED | OUTPATIENT
Start: 2021-01-03 | End: 2022-01-31

## 2021-02-16 ENCOUNTER — OFFICE VISIT (OUTPATIENT)
Dept: FAMILY MEDICINE CLINIC | Facility: CLINIC | Age: 76
End: 2021-02-16
Payer: MEDICARE

## 2021-02-16 VITALS
HEIGHT: 62 IN | HEART RATE: 72 BPM | TEMPERATURE: 97 F | BODY MASS INDEX: 31.83 KG/M2 | DIASTOLIC BLOOD PRESSURE: 82 MMHG | OXYGEN SATURATION: 98 % | SYSTOLIC BLOOD PRESSURE: 134 MMHG | WEIGHT: 173 LBS

## 2021-02-16 DIAGNOSIS — Z23 NEED FOR VACCINATION: ICD-10-CM

## 2021-02-16 DIAGNOSIS — E78.5 HYPERLIPIDEMIA, UNSPECIFIED HYPERLIPIDEMIA TYPE: Chronic | ICD-10-CM

## 2021-02-16 DIAGNOSIS — M79.10 MYALGIA: ICD-10-CM

## 2021-02-16 DIAGNOSIS — Z78.0 POSTMENOPAUSAL: ICD-10-CM

## 2021-02-16 DIAGNOSIS — M06.9 RHEUMATOID ARTHRITIS INVOLVING MULTIPLE SITES, UNSPECIFIED WHETHER RHEUMATOID FACTOR PRESENT (HCC): ICD-10-CM

## 2021-02-16 DIAGNOSIS — R52 GENERALIZED BODY ACHES: ICD-10-CM

## 2021-02-16 DIAGNOSIS — K57.30 DIVERTICULOSIS OF COLON: ICD-10-CM

## 2021-02-16 DIAGNOSIS — I10 ESSENTIAL HYPERTENSION: Chronic | ICD-10-CM

## 2021-02-16 DIAGNOSIS — Z00.00 ENCOUNTER FOR ANNUAL HEALTH EXAMINATION: Primary | ICD-10-CM

## 2021-02-16 DIAGNOSIS — F41.1 GENERALIZED ANXIETY DISORDER: ICD-10-CM

## 2021-02-16 DIAGNOSIS — Z96.643 HISTORY OF BILATERAL HIP REPLACEMENTS: ICD-10-CM

## 2021-02-16 DIAGNOSIS — Z11.59 NEED FOR HEPATITIS C SCREENING TEST: ICD-10-CM

## 2021-02-16 DIAGNOSIS — Z12.31 ENCOUNTER FOR SCREENING MAMMOGRAM FOR BREAST CANCER: ICD-10-CM

## 2021-02-16 DIAGNOSIS — M15.9 PRIMARY OSTEOARTHRITIS INVOLVING MULTIPLE JOINTS: ICD-10-CM

## 2021-02-16 DIAGNOSIS — Z85.3 HISTORY OF LEFT BREAST CANCER: ICD-10-CM

## 2021-02-16 PROBLEM — H65.92 MIDDLE EAR EFFUSION, LEFT: Status: RESOLVED | Noted: 2020-07-16 | Resolved: 2021-02-16

## 2021-02-16 PROBLEM — M17.11 OSTEOARTHRITIS OF RIGHT KNEE: Status: RESOLVED | Noted: 2019-03-18 | Resolved: 2021-02-16

## 2021-02-16 PROBLEM — D12.6 BENIGN NEOPLASM OF COLON: Status: ACTIVE | Noted: 2021-02-16

## 2021-02-16 PROBLEM — A04.72 C. DIFFICILE DIARRHEA: Status: RESOLVED | Noted: 2020-02-03 | Resolved: 2021-02-16

## 2021-02-16 PROCEDURE — G0439 PPPS, SUBSEQ VISIT: HCPCS | Performed by: FAMILY MEDICINE

## 2021-02-16 PROCEDURE — 90670 PCV13 VACCINE IM: CPT | Performed by: FAMILY MEDICINE

## 2021-02-16 PROCEDURE — G0009 ADMIN PNEUMOCOCCAL VACCINE: HCPCS | Performed by: FAMILY MEDICINE

## 2021-02-16 RX ORDER — FLUOXETINE HYDROCHLORIDE 20 MG/1
20 CAPSULE ORAL DAILY
COMMUNITY
Start: 2021-02-07 | End: 2021-02-16

## 2021-02-16 RX ORDER — HYDROCHLOROTHIAZIDE 12.5 MG/1
12.5 TABLET ORAL DAILY
Qty: 90 TABLET | Refills: 1 | Status: SHIPPED | OUTPATIENT
Start: 2021-02-16 | End: 2021-10-08

## 2021-02-16 RX ORDER — LOSARTAN POTASSIUM 100 MG/1
100 TABLET ORAL DAILY
Qty: 90 TABLET | Refills: 1 | Status: SHIPPED | OUTPATIENT
Start: 2021-02-16 | End: 2021-10-08

## 2021-02-16 NOTE — PROGRESS NOTES
HPI:   Owen Guillory is a 70year old female who presents for a Medicare Subsequent Annual Wellness visit (Pt already had Initial Annual Wellness).     Doing well except sister in hospice  Sees gyne, scheduled appointment but needs mammo  Was seeing Care Team: Patient Care Team:  Kristine Penn MD as PCP - General (Family Medicine)  Reggie Arthur MD (Internal Medicine)  Alice Gr MD (GASTROENTEROLOGY)    Patient Active Problem List:     Essential hypertension     Hyperlipidemia     Rheumatoid needed for Wheezing. •  Fluticasone Propionate 50 MCG/ACT Nasal Suspension, 2 sprays by Each Nare route nightly. •  gabapentin 100 MG Oral Cap, Take 200 mg by mouth 3 (three) times daily.       •  Esomeprazole Magnesium (NEXIUM 24HR OR), Take 20 mg by over the telephone: Sometimes I have trouble following the conversations when two or more people are talking at the same time: No   I have trouble understanding things on the TV: No I have to strain to understand conversations: No   I have to worry about m no curvature, ROM normal, no CVA tenderness   Lungs:   Clear to auscultation bilaterally, respirations unlabored   Heart:  Regular rate and rhythm, S1 and S2 normal, no murmur, rub, or gallop   Abdomen:   Soft, non-tender, bowel sounds active all four quad bilateral hip replacements    History of left breast cancer  -     [facility] 2D+3D SCREENING RIGHT (CPT=[id number]); Future    Diverticulosis of colon    Essential hypertension  -     losartan 100 MG Oral Tab;  Take 1 tablet (100 mg total) by mouth daily indicates understanding of these issues and agrees to the plan. Reinforced healthy diet, lifestyle, and exercise. Prescription medication ordered. Imaging studies ordered. Lab work ordered. Consult ordered. Continue with current treatment plan.     Re Every 3 yrs age 21-68 or Pap+HPV every 5 yrs age 33-67, age 72 and older at high risk There are no preventive care reminders to display for this patient.  Update Health Maintenance if applicable    Chlamydia  Annually if high risk No results found for: [facility] found.                 Template: RIGOBERTO VIVEROS MEDICARE ANNUAL ASSESSMENT FEMALE [95222]

## 2021-02-16 NOTE — PATIENT INSTRUCTIONS
Destiney English's SCREENING SCHEDULE   Tests on this list are recommended by your physician but may not be covered, or covered at this frequency, by your insurer. Please check with your insurance carrier before scheduling to verify coverage.    PREVEN often if abnormal Colonoscopy due on 01/12/2021 Update [facility] if applicable    Flex Sigmoidoscopy Screen  Covered every 5 years No results found for this or any previous visit. No flowsheet data found.      Fecal Occult Blood   Covered Annually (Pneumovax)  Covered Once after 65 No orders found for this or any previous visit. Please get once after your 65th birthday    Hepatitis B for Moderate/High Risk       No orders found for this or any previous visit.  Medium/high risk factors:   End-stage re

## 2021-02-22 ENCOUNTER — HOSPITAL ENCOUNTER (OUTPATIENT)
Dept: BONE DENSITY | Facility: HOSPITAL | Age: 76
Discharge: HOME | End: 2021-02-22
Attending: FAMILY MEDICINE
Payer: MEDICARE

## 2021-02-22 DIAGNOSIS — Z78.0 POSTMENOPAUSAL: ICD-10-CM

## 2021-02-22 PROCEDURE — 77080 DXA BONE DENSITY AXIAL: CPT | Performed by: FAMILY MEDICINE

## 2021-02-23 ENCOUNTER — LAB ENCOUNTER (OUTPATIENT)
Dept: LAB | Age: 76
End: 2021-02-23
Attending: FAMILY MEDICINE
Payer: MEDICARE

## 2021-02-23 DIAGNOSIS — Z11.59 NEED FOR HEPATITIS C SCREENING TEST: ICD-10-CM

## 2021-02-23 DIAGNOSIS — M79.10 MYALGIA: ICD-10-CM

## 2021-02-23 DIAGNOSIS — E78.5 HYPERLIPIDEMIA, UNSPECIFIED HYPERLIPIDEMIA TYPE: Chronic | ICD-10-CM

## 2021-02-23 LAB
ALBUMIN SERPL-MCNC: 3.9 G/DL (ref 3.4–5)
ALBUMIN/GLOB SERPL: 1.2 {RATIO} (ref 1–2)
ALP LIVER SERPL-CCNC: 95 U/L
ALT SERPL-CCNC: 26 U/L
ANION GAP SERPL CALC-SCNC: 8 MMOL/L (ref 0–18)
AST SERPL-CCNC: 19 U/L (ref 15–37)
BASOPHILS # BLD AUTO: 0.07 X10(3) UL (ref 0–0.2)
BASOPHILS NFR BLD AUTO: 1.3 %
BILIRUB SERPL-MCNC: 0.4 MG/DL (ref 0.1–2)
BUN BLD-MCNC: 18 MG/DL (ref 7–18)
BUN/CREAT SERPL: 24.3 (ref 10–20)
CALCIUM BLD-MCNC: 9.6 MG/DL (ref 8.5–10.1)
CHLORIDE SERPL-SCNC: 106 MMOL/L (ref 98–112)
CHOLEST SMN-MCNC: 195 MG/DL (ref ?–200)
CO2 SERPL-SCNC: 26 MMOL/L (ref 21–32)
CREAT BLD-MCNC: 0.74 MG/DL
DEPRECATED RDW RBC AUTO: 49.3 FL (ref 35.1–46.3)
EOSINOPHIL # BLD AUTO: 0.25 X10(3) UL (ref 0–0.7)
EOSINOPHIL NFR BLD AUTO: 4.7 %
ERYTHROCYTE [DISTWIDTH] IN BLOOD BY AUTOMATED COUNT: 13.3 % (ref 11–15)
GLOBULIN PLAS-MCNC: 3.3 G/DL (ref 2.8–4.4)
GLUCOSE BLD-MCNC: 94 MG/DL (ref 70–99)
HCT VFR BLD AUTO: 39.8 %
HCV AB SERPL QL IA: NONREACTIVE
HDLC SERPL-MCNC: 71 MG/DL (ref 40–59)
HGB BLD-MCNC: 13 G/DL
IMM GRANULOCYTES # BLD AUTO: 0.01 X10(3) UL (ref 0–1)
IMM GRANULOCYTES NFR BLD: 0.2 %
LDLC SERPL CALC-MCNC: 92 MG/DL (ref ?–100)
LYMPHOCYTES # BLD AUTO: 1.74 X10(3) UL (ref 1–4)
LYMPHOCYTES NFR BLD AUTO: 33 %
M PROTEIN MFR SERPL ELPH: 7.2 G/DL (ref 6.4–8.2)
MCH RBC QN AUTO: 32.7 PG (ref 26–34)
MCHC RBC AUTO-ENTMCNC: 32.7 G/DL (ref 31–37)
MCV RBC AUTO: 100 FL
MONOCYTES # BLD AUTO: 0.38 X10(3) UL (ref 0.1–1)
MONOCYTES NFR BLD AUTO: 7.2 %
NEUTROPHILS # BLD AUTO: 2.82 X10 (3) UL (ref 1.5–7.7)
NEUTROPHILS # BLD AUTO: 2.82 X10(3) UL (ref 1.5–7.7)
NEUTROPHILS NFR BLD AUTO: 53.6 %
NONHDLC SERPL-MCNC: 124 MG/DL (ref ?–130)
OSMOLALITY SERPL CALC.SUM OF ELEC: 292 MOSM/KG (ref 275–295)
PATIENT FASTING Y/N/NP: YES
PATIENT FASTING Y/N/NP: YES
PLATELET # BLD AUTO: 278 10(3)UL (ref 150–450)
POTASSIUM SERPL-SCNC: 4.1 MMOL/L (ref 3.5–5.1)
RBC # BLD AUTO: 3.98 X10(6)UL
SODIUM SERPL-SCNC: 140 MMOL/L (ref 136–145)
TRIGL SERPL-MCNC: 158 MG/DL (ref 30–149)
TSI SER-ACNC: 1.25 MIU/ML (ref 0.36–3.74)
VIT B12 SERPL-MCNC: >2000 PG/ML (ref 193–986)
VLDLC SERPL CALC-MCNC: 32 MG/DL (ref 0–30)
WBC # BLD AUTO: 5.3 X10(3) UL (ref 4–11)

## 2021-02-23 PROCEDURE — 82607 VITAMIN B-12: CPT

## 2021-02-23 PROCEDURE — 36415 COLL VENOUS BLD VENIPUNCTURE: CPT

## 2021-02-23 PROCEDURE — 80061 LIPID PANEL: CPT

## 2021-02-23 PROCEDURE — 80053 COMPREHEN METABOLIC PANEL: CPT

## 2021-02-23 PROCEDURE — 86803 HEPATITIS C AB TEST: CPT

## 2021-02-23 PROCEDURE — 85025 COMPLETE CBC W/AUTO DIFF WBC: CPT

## 2021-02-23 PROCEDURE — 84443 ASSAY THYROID STIM HORMONE: CPT

## 2021-03-12 DIAGNOSIS — Z23 NEED FOR VACCINATION: ICD-10-CM

## 2021-03-15 ENCOUNTER — IMMUNIZATION (OUTPATIENT)
Dept: LAB | Age: 76
End: 2021-03-15
Attending: HOSPITALIST
Payer: MEDICARE

## 2021-03-15 DIAGNOSIS — Z23 NEED FOR VACCINATION: Primary | ICD-10-CM

## 2021-03-15 PROCEDURE — 0001A SARSCOV2 VAC 30MCG/0.3ML IM: CPT

## 2021-03-28 DIAGNOSIS — H65.92 MIDDLE EAR EFFUSION, LEFT: ICD-10-CM

## 2021-03-31 RX ORDER — LORATADINE 10 MG/1
10 TABLET ORAL DAILY
Qty: 30 TABLET | Refills: 0 | Status: SHIPPED | OUTPATIENT
Start: 2021-03-31

## 2021-04-05 ENCOUNTER — IMMUNIZATION (OUTPATIENT)
Dept: LAB | Age: 76
End: 2021-04-05
Attending: HOSPITALIST
Payer: MEDICARE

## 2021-04-05 DIAGNOSIS — Z23 NEED FOR VACCINATION: Primary | ICD-10-CM

## 2021-04-05 PROCEDURE — 0002A SARSCOV2 VAC 30MCG/0.3ML IM: CPT

## 2021-04-07 ENCOUNTER — HOSPITAL ENCOUNTER (OUTPATIENT)
Dept: MAMMOGRAPHY | Facility: HOSPITAL | Age: 76
Discharge: HOME | End: 2021-04-07
Attending: FAMILY MEDICINE
Payer: MEDICARE

## 2021-04-07 DIAGNOSIS — Z85.3 HISTORY OF LEFT BREAST CANCER: ICD-10-CM

## 2021-04-07 DIAGNOSIS — Z12.31 ENCOUNTER FOR SCREENING MAMMOGRAM FOR BREAST CANCER: ICD-10-CM

## 2021-04-07 PROCEDURE — 77067 SCR MAMMO BI INCL CAD: CPT | Performed by: FAMILY MEDICINE

## 2021-04-07 PROCEDURE — 77063 BREAST TOMOSYNTHESIS BI: CPT | Performed by: FAMILY MEDICINE

## 2021-06-10 ENCOUNTER — TELEPHONE (OUTPATIENT)
Dept: FAMILY MEDICINE CLINIC | Facility: CLINIC | Age: 76
End: 2021-06-10

## 2021-06-10 NOTE — TELEPHONE ENCOUNTER
Spoke with Tim from Dr. Shy Chance she stated pt is scheduled for a a infectious consult for next week. I did advise her last labs are from February 2021 I do not see recent cultures.

## 2021-06-10 NOTE — TELEPHONE ENCOUNTER
Tim from Dr. Edil Ward office is requesting office notes and and lab and culture results faxed over. Call with any questions.     Fax # [phone]

## 2021-07-20 DIAGNOSIS — Z71.89 GRIEF COUNSELING: ICD-10-CM

## 2021-07-21 RX ORDER — DIAZEPAM 2 MG/1
2 TABLET ORAL NIGHTLY PRN
Qty: 10 TABLET | Refills: 0 | OUTPATIENT
Start: 2021-07-21

## 2021-08-01 DIAGNOSIS — R23.2 HOT FLASHES: ICD-10-CM

## 2021-08-01 DIAGNOSIS — F41.1 GENERALIZED ANXIETY DISORDER: ICD-10-CM

## 2021-08-02 RX ORDER — FLUOXETINE HYDROCHLORIDE 20 MG/1
CAPSULE ORAL
Qty: 90 CAPSULE | Refills: 0 | Status: SHIPPED | OUTPATIENT
Start: 2021-08-02 | End: 2021-10-26

## 2021-08-02 RX ORDER — BUSPIRONE HYDROCHLORIDE 30 MG/1
TABLET ORAL
Qty: 90 TABLET | Refills: 0 | Status: SHIPPED | OUTPATIENT
Start: 2021-08-02 | End: 2021-11-17

## 2021-08-19 ENCOUNTER — LAB ENCOUNTER (OUTPATIENT)
Dept: LAB | Age: 76
End: 2021-08-19
Attending: FAMILY MEDICINE
Payer: MEDICARE

## 2021-08-19 ENCOUNTER — OFFICE VISIT (OUTPATIENT)
Dept: FAMILY MEDICINE CLINIC | Facility: CLINIC | Age: 76
End: 2021-08-19
Payer: MEDICARE

## 2021-08-19 VITALS
DIASTOLIC BLOOD PRESSURE: 74 MMHG | SYSTOLIC BLOOD PRESSURE: 108 MMHG | BODY MASS INDEX: 30.36 KG/M2 | TEMPERATURE: 96 F | HEART RATE: 73 BPM | WEIGHT: 165 LBS | HEIGHT: 62 IN

## 2021-08-19 DIAGNOSIS — R51.9 LEFT-SIDED HEADACHE: Primary | ICD-10-CM

## 2021-08-19 DIAGNOSIS — R42 DIZZINESS: ICD-10-CM

## 2021-08-19 DIAGNOSIS — R51.9 LEFT-SIDED HEADACHE: ICD-10-CM

## 2021-08-19 DIAGNOSIS — Z86.69 HISTORY OF MIGRAINE HEADACHES: ICD-10-CM

## 2021-08-19 DIAGNOSIS — I10 ESSENTIAL HYPERTENSION: ICD-10-CM

## 2021-08-19 LAB
ALBUMIN SERPL-MCNC: 4.1 G/DL (ref 3.4–5)
ALBUMIN/GLOB SERPL: 1.1 {RATIO} (ref 1–2)
ALP LIVER SERPL-CCNC: 95 U/L
ALT SERPL-CCNC: 34 U/L
ANION GAP SERPL CALC-SCNC: 6 MMOL/L (ref 0–18)
AST SERPL-CCNC: 26 U/L (ref 15–37)
BASOPHILS # BLD AUTO: 0.04 X10(3) UL (ref 0–0.2)
BASOPHILS NFR BLD AUTO: 0.6 %
BILIRUB SERPL-MCNC: 0.5 MG/DL (ref 0.1–2)
BUN BLD-MCNC: 21 MG/DL (ref 7–18)
BUN/CREAT SERPL: 26.3 (ref 10–20)
CALCIUM BLD-MCNC: 9.3 MG/DL (ref 8.5–10.1)
CHLORIDE SERPL-SCNC: 103 MMOL/L (ref 98–112)
CO2 SERPL-SCNC: 27 MMOL/L (ref 21–32)
CREAT BLD-MCNC: 0.8 MG/DL
DEPRECATED RDW RBC AUTO: 48.3 FL (ref 35.1–46.3)
EOSINOPHIL # BLD AUTO: 0.08 X10(3) UL (ref 0–0.7)
EOSINOPHIL NFR BLD AUTO: 1.2 %
ERYTHROCYTE [DISTWIDTH] IN BLOOD BY AUTOMATED COUNT: 13.5 % (ref 11–15)
ERYTHROCYTE [SEDIMENTATION RATE] IN BLOOD: 9 MM/HR
GLOBULIN PLAS-MCNC: 3.7 G/DL (ref 2.8–4.4)
GLUCOSE BLD-MCNC: 93 MG/DL (ref 70–99)
HCT VFR BLD AUTO: 38.9 %
HGB BLD-MCNC: 13.1 G/DL
IMM GRANULOCYTES # BLD AUTO: 0.01 X10(3) UL (ref 0–1)
IMM GRANULOCYTES NFR BLD: 0.2 %
LYMPHOCYTES # BLD AUTO: 1.73 X10(3) UL (ref 1–4)
LYMPHOCYTES NFR BLD AUTO: 27 %
M PROTEIN MFR SERPL ELPH: 7.8 G/DL (ref 6.4–8.2)
MCH RBC QN AUTO: 32.3 PG (ref 26–34)
MCHC RBC AUTO-ENTMCNC: 33.7 G/DL (ref 31–37)
MCV RBC AUTO: 96 FL
MONOCYTES # BLD AUTO: 0.5 X10(3) UL (ref 0.1–1)
MONOCYTES NFR BLD AUTO: 7.8 %
NEUTROPHILS # BLD AUTO: 4.05 X10 (3) UL (ref 1.5–7.7)
NEUTROPHILS # BLD AUTO: 4.05 X10(3) UL (ref 1.5–7.7)
NEUTROPHILS NFR BLD AUTO: 63.2 %
OSMOLALITY SERPL CALC.SUM OF ELEC: 285 MOSM/KG (ref 275–295)
PATIENT FASTING Y/N/NP: NO
PLATELET # BLD AUTO: 269 10(3)UL (ref 150–450)
POTASSIUM SERPL-SCNC: 4.5 MMOL/L (ref 3.5–5.1)
RBC # BLD AUTO: 4.05 X10(6)UL
SODIUM SERPL-SCNC: 136 MMOL/L (ref 136–145)
WBC # BLD AUTO: 6.4 X10(3) UL (ref 4–11)

## 2021-08-19 PROCEDURE — 85652 RBC SED RATE AUTOMATED: CPT

## 2021-08-19 PROCEDURE — 93005 ELECTROCARDIOGRAM TRACING: CPT

## 2021-08-19 PROCEDURE — 80053 COMPREHEN METABOLIC PANEL: CPT

## 2021-08-19 PROCEDURE — 36415 COLL VENOUS BLD VENIPUNCTURE: CPT

## 2021-08-19 PROCEDURE — 99214 OFFICE O/P EST MOD 30 MIN: CPT | Performed by: FAMILY MEDICINE

## 2021-08-19 PROCEDURE — 93010 ELECTROCARDIOGRAM REPORT: CPT | Performed by: FAMILY MEDICINE

## 2021-08-19 PROCEDURE — 85025 COMPLETE CBC W/AUTO DIFF WBC: CPT

## 2021-08-19 RX ORDER — SUMATRIPTAN 25 MG/1
25 TABLET, FILM COATED ORAL EVERY 2 HOUR PRN
Qty: 9 TABLET | Refills: 1 | Status: SHIPPED | OUTPATIENT
Start: 2021-08-19 | End: 2022-02-07

## 2021-08-19 NOTE — PROGRESS NOTES
HPI:    Patient ID: Raysa Morejon is a 78year old female.     HPI  Patient presents with:  Headache: has a family hystory of migraines headaches are getting worst feels like vomitting when she's on the light X 2 months    Dizziness: X 1 month would l sister passed away in May       /74   Pulse 73   Temp (!) 95.6 °F (35.3 °C) (Temporal)   Ht 5' 2\" (1.575 m)   Wt 165 lb (74.8 kg)   BMI 30.18 kg/m²          Current Outpatient Medications   Medication Sig Dispense Refill   • SUMAtriptan Succinate (I vomitting when she's on the light X 2 months    Dizziness: X 1 month would like to have ears checked      Physical Exam  Constitutional:       Appearance: She is well-developed. HENT:      Head: Normocephalic and atraumatic.       Right Ear: Tympanic memb fluids  'trial on imitrex  Follow up 2 weeks or sooner if headaches worsening  Check labs   - CBC WITH DIFFERENTIAL WITH PLATELET; Future  - COMP METABOLIC PANEL (14); Future  - SED RATE, MELISSA (AUTOMATED);  Future  - SUMAtriptan Succinate (IMITREX) 25

## 2021-09-02 ENCOUNTER — OFFICE VISIT (OUTPATIENT)
Dept: FAMILY MEDICINE CLINIC | Facility: CLINIC | Age: 76
End: 2021-09-02
Payer: MEDICARE

## 2021-09-02 VITALS
BODY MASS INDEX: 32.2 KG/M2 | DIASTOLIC BLOOD PRESSURE: 75 MMHG | SYSTOLIC BLOOD PRESSURE: 122 MMHG | WEIGHT: 175 LBS | HEART RATE: 66 BPM | TEMPERATURE: 97 F | HEIGHT: 62 IN

## 2021-09-02 DIAGNOSIS — L74.9 SWEATING ABNORMALITY: ICD-10-CM

## 2021-09-02 DIAGNOSIS — F41.1 GENERALIZED ANXIETY DISORDER: ICD-10-CM

## 2021-09-02 DIAGNOSIS — R42 DIZZINESS: Primary | ICD-10-CM

## 2021-09-02 PROCEDURE — 99214 OFFICE O/P EST MOD 30 MIN: CPT | Performed by: FAMILY MEDICINE

## 2021-09-02 NOTE — PROGRESS NOTES
HPI:    Patient ID: Cirilo Garcia is a 78year old female. HPI  Patient presents with:   Follow - Up: on dizziness and headaches   Anxiety    Seen 8/19/2021 for visit     Component      Latest Ref Rng & Units 8/19/2021   WBC      4.0 - 11.0 x10(3) 1.1   Patient Fasting?        No   SED RATE      0 - 30 mm/Hr 9     Patient feels much better  Anxiety better  No headaches  imitrex helped  Took it once  No visual changes    Has history of ongoing sweating for years  Has seen endocrinology for it, no diag total) by mouth daily. 90 tablet 1   • Rosuvastatin Calcium 10 MG Oral Tab Take 1 tablet (10 mg total) by mouth nightly.  90 tablet 2   • Albuterol Sulfate HFA (VENTOLIN HFA) 108 (90 Base) MCG/ACT Inhalation Aero Soln Inhale 2 puffs into the lungs every 4 ( delusional. Thought content does not include homicidal or suicidal ideation. Thought content does not include homicidal or suicidal plan. Cognition and Memory: Memory is not impaired.  She does not exhibit impaired recent memory or impaired remote m

## 2021-09-28 ENCOUNTER — TELEPHONE (OUTPATIENT)
Dept: HEMATOLOGY/ONCOLOGY | Facility: HOSPITAL | Age: 76
End: 2021-09-28

## 2021-09-28 NOTE — TELEPHONE ENCOUNTER
Patient was referred by her PCP Dr Sergey Valentino for profuse sweating and overall not feeling well. Patient had breast cancer in back in 1995. Reached out to Giovanna Garcia, Dr Silvia Sellers needs the pathology report from her prior doctor. Spoke with patient and she doesn't remember doctor's name but will research and call back with doctor's name. Patient will also work on getting pathology report sent over.  Patient expressed concern over waiting for an appointment, reached back out to Karthikeyan Shannon and ok to schedule an appointment with Dr Silvia Sellers, need to find out what hospital she was treated at and Karthikeyan Shannon will have our Cancer Registry try to get her information

## 2021-09-28 NOTE — TELEPHONE ENCOUNTER
Patient called back her surgeon was Dr Nima Gonzalez was in [facility] in [address] now is located in [facility], I called Dr Rosana Becker office at [phone] and they do not have any medical records from her surgery, they only keep records back 7-10 years

## 2021-10-08 DIAGNOSIS — I10 ESSENTIAL HYPERTENSION: Chronic | ICD-10-CM

## 2021-10-08 RX ORDER — HYDROCHLOROTHIAZIDE 12.5 MG/1
12.5 TABLET ORAL DAILY
Qty: 90 TABLET | Refills: 1 | Status: SHIPPED | OUTPATIENT
Start: 2021-10-08 | End: 2022-02-23

## 2021-10-08 RX ORDER — LOSARTAN POTASSIUM 100 MG/1
100 TABLET ORAL DAILY
Qty: 90 TABLET | Refills: 1 | Status: SHIPPED | OUTPATIENT
Start: 2021-10-08 | End: 2022-02-23

## 2021-10-08 NOTE — TELEPHONE ENCOUNTER
Refill passed per Jessi Mendieta protocol. Requested Prescriptions   Pending Prescriptions Disp Refills    LOSARTAN 100 MG Oral Tab [Pharmacy Med Name: Losartan Potassium 100 Mg Tab Camb] 90 tablet 0     Sig: TAKE ONE TABLET BY MOUTH ONE TIME DAILY        Hypertensive Medications Protocol Passed - 10/8/2021  1:33 AM        Passed - CMP or BMP in past 12 months        Passed - Appointment in past 6 or next 3 months        Passed - GFR Non- > 50     Lab Results   Component Value Date    GFRNAA 73 08/19/2021                    HYDROCHLOROTHIAZIDE 12.5 MG Oral Tab [Pharmacy Med Name: Hydrochlorothiazide 12.5 Mg Tab Acco] 90 tablet 0     Sig: TAKE ONE TABLET BY MOUTH ONE TIME DAILY        Hypertensive Medications Protocol Passed - 10/8/2021  1:33 AM        Passed - CMP or BMP in past 12 months        Passed - Appointment in past 6 or next 3 months        Passed - GFR Non- > 50     Lab Results   Component Value Date    [facility]                       Future Appointments         Provider Department Appt Notes    In 2 weeks Celina Oneil 19 Hematology Oncology Consult, referred by Dr Pearl Waggoner, had breast cancer back in 1995, patient [facility] to [facility] in [facility], patient's surgeon was Dr Milan Saleh [phone], spoke with office to try to get medical records but they no longer have her medical records, they only keep 7-10 years. Reconstruction surgeon was Dr Fabian Sampson in [facility] [phone]. Patient is currently having profuse sweating and overall not feeling well.  Cancer Registry is working on obtaining records also            Recent Outpatient Visits              1 month ago Brooklyn Mancera MD    Office Visit    1 month ago Left-sided headache    Rebecca Jimenez MD    Office Visit    7 months ago Encounter for annual health examination Jenni Gates MD    Office Visit    1 year ago Generalized anxiety disorder    Jenni Gates MD    Office Visit    1 year ago Essential hypertension    Carmel Beltran MD    Office Visit

## 2021-10-22 ENCOUNTER — APPOINTMENT (OUTPATIENT)
Dept: HEMATOLOGY/ONCOLOGY | Facility: HOSPITAL | Age: 76
End: 2021-10-22
Attending: INTERNAL MEDICINE
Payer: MEDICARE

## 2021-10-22 VITALS
HEART RATE: 79 BPM | HEIGHT: 62 IN | BODY MASS INDEX: 31.65 KG/M2 | RESPIRATION RATE: 16 BRPM | DIASTOLIC BLOOD PRESSURE: 75 MMHG | SYSTOLIC BLOOD PRESSURE: 149 MMHG | TEMPERATURE: 98 F | OXYGEN SATURATION: 98 % | WEIGHT: 172 LBS

## 2021-10-22 DIAGNOSIS — N95.1 HOT FLASHES DUE TO MENOPAUSE: ICD-10-CM

## 2021-10-22 DIAGNOSIS — Z86.000 HISTORY OF DUCTAL CARCINOMA IN SITU (DCIS) OF BREAST: Primary | ICD-10-CM

## 2021-10-22 PROCEDURE — 99204 OFFICE O/P NEW MOD 45 MIN: CPT | Performed by: INTERNAL MEDICINE

## 2021-10-22 NOTE — CONSULTS
KRISSY Garcia is a 78year old female here at the request of Carolina Rebolledo MD for evaluation of History of ductal carcinoma in situ (dcis) of breast  (primary encounter diagnosis)    Patient has a prior history of diagnosis of extensive DCI worked but was taken off due to Wuiper". States sweating worse since these were stopped a year ago. She takes prime ines oil and black cohash. States that she is having issues with sleep due to the sweating.        She is on gabapentin for art tablet (10 mg total) by mouth nightly. 90 tablet 2   • Albuterol Sulfate HFA (VENTOLIN HFA) 108 (90 Base) MCG/ACT Inhalation Aero Soln Inhale 2 puffs into the lungs every 4 (four) hours as needed for Wheezing.  1 Inhaler 0   • gabapentin 100 MG Oral Cap Amandeep Friend Years since quittin.6      Smokeless tobacco: Never Used    Vaping Use      Vaping Use: Never used    Alcohol use: Not Currently      Alcohol/week: 0.0 standard drinks    Drug use: No      Family History   Problem Relation Age of Onset   • Cancer [de-identified] PCP.    Patient has hot flashes which are causing her to have diaphoresis. She does not have alissa night sweats.   Discussed with the patient that given her previous diagnosis of DCIS, she is not a candidate for hormone replacement therapy to treat the hot 0. 6 1.3   Immature Granulocyte %      % 0.2 0.2   Glucose      70 - 99 mg/dL 93 94   Sodium      136 - 145 mmol/L 136 140   Potassium      3.5 - 5.1 mmol/L 4.5 4.1   Chloride      98 - 112 mmol/L 103 106   Carbon Dioxide, Total      21.0 - 32.0 mmol/L 27. 0 screening mammogram is recommended. BI-RADS CATEGORY:     DIAGNOSTIC CATEGORY 2--BENIGN FINDING:       RECOMMENDATIONS:   ROUTINE MAMMOGRAM AND CLINICAL EVALUATION IN 12 MONTHS.                       Your patient's answers to the health and family hist

## 2021-10-26 DIAGNOSIS — F41.1 GENERALIZED ANXIETY DISORDER: ICD-10-CM

## 2021-10-26 DIAGNOSIS — R23.2 HOT FLASHES: ICD-10-CM

## 2021-10-26 RX ORDER — FLUOXETINE HYDROCHLORIDE 20 MG/1
40 CAPSULE ORAL DAILY
Qty: 180 CAPSULE | Refills: 1 | Status: SHIPPED | OUTPATIENT
Start: 2021-10-26 | End: 2021-11-18 | Stop reason: ALTCHOICE

## 2021-10-26 NOTE — TELEPHONE ENCOUNTER
Patient calling back,states that she is taking Fluoxetine 20 mg -2 capsules a day (total 40 mg ). She is ok if provider will order fluoxetine  40 mg for 1 capsule OR  20 mg and she will just take 2 capsules , whichever provider prefers.        DR Rebolledo=see

## 2021-10-26 NOTE — TELEPHONE ENCOUNTER
Left message to call back for Dr. Nugent Schools question below--sent [employer] message of same      Patient had appt 10/22/2021 with Dr. Adams Julien documented patient takes BID    10/22/21 1059 Taking [address], [place]    10/26/21 1057 Reorder Kesha, Asm

## 2021-10-26 NOTE — TELEPHONE ENCOUNTER
Refill passed per [facility]3], [facility] protocol, but notes show patient taking BID    Please send, with refills, if appropriate    Requested Prescriptions   Pending Prescriptions Disp Refills    FLUOXETINE 20 MG Oral Cap [Pharmacy Med Name: Fluoxetine Hydrochlo

## 2021-11-17 DIAGNOSIS — F41.1 GENERALIZED ANXIETY DISORDER: ICD-10-CM

## 2021-11-17 RX ORDER — BUSPIRONE HYDROCHLORIDE 30 MG/1
TABLET ORAL
Qty: 90 TABLET | Refills: 1 | Status: SHIPPED | OUTPATIENT
Start: 2021-11-17

## 2021-11-18 ENCOUNTER — OFFICE VISIT (OUTPATIENT)
Dept: FAMILY MEDICINE CLINIC | Facility: CLINIC | Age: 76
End: 2021-11-18
Payer: MEDICARE

## 2021-11-18 VITALS
DIASTOLIC BLOOD PRESSURE: 81 MMHG | SYSTOLIC BLOOD PRESSURE: 124 MMHG | TEMPERATURE: 97 F | WEIGHT: 178 LBS | BODY MASS INDEX: 32.76 KG/M2 | HEIGHT: 62 IN | HEART RATE: 72 BPM

## 2021-11-18 DIAGNOSIS — R23.2 HOT FLASHES: Primary | ICD-10-CM

## 2021-11-18 DIAGNOSIS — R61 SWEATING PROFUSELY: ICD-10-CM

## 2021-11-18 DIAGNOSIS — F41.1 GENERALIZED ANXIETY DISORDER: ICD-10-CM

## 2021-11-18 DIAGNOSIS — E78.5 HYPERLIPIDEMIA, UNSPECIFIED HYPERLIPIDEMIA TYPE: ICD-10-CM

## 2021-11-18 DIAGNOSIS — Z86.000 HISTORY OF DUCTAL CARCINOMA IN SITU (DCIS) OF BREAST: ICD-10-CM

## 2021-11-18 DIAGNOSIS — I10 ESSENTIAL HYPERTENSION: ICD-10-CM

## 2021-11-18 PROCEDURE — 99214 OFFICE O/P EST MOD 30 MIN: CPT | Performed by: FAMILY MEDICINE

## 2021-11-18 RX ORDER — VENLAFAXINE HYDROCHLORIDE 150 MG/1
150 CAPSULE, EXTENDED RELEASE ORAL DAILY
Qty: 90 CAPSULE | Refills: 0 | Status: SHIPPED | OUTPATIENT
Start: 2021-11-18 | End: 2022-11-13

## 2021-11-18 NOTE — PROGRESS NOTES
HPI:    Patient ID: Vic Mendoza is a 78year old female. HPI  Patient presents with:  Medication Follow-Up    Patient has seen oncology recently. She has a history of ductal carcinoma. Patient had complained of excessive sweating.   She stated daily. 90 tablet 1   • hydrochlorothiazide 12.5 MG Oral Tab Take 1 tablet (12.5 mg total) by mouth daily. 90 tablet 1   • SUMAtriptan Succinate (IMITREX) 25 MG Oral Tab Take 1 tablet (25 mg total) by mouth every 2 (two) hours as needed for Migraine.  Use at rash.   Neurological:      Mental Status: She is alert and oriented to person, place, and time. Psychiatric:         Mood and Affect: Mood is anxious.                 ASSESSMENT/PLAN:   Hot flashes  (primary encounter diagnosis)  Sweating profusely  Histo

## 2021-11-22 ENCOUNTER — PATIENT OUTREACH (OUTPATIENT)
Dept: CASE MANAGEMENT | Age: 76
End: 2021-11-22

## 2021-11-22 NOTE — PROGRESS NOTES
Left message for patient regarding Chronic Care Management program.     [address] Management   [facility]   Phone: [phone]

## 2021-11-23 ENCOUNTER — LAB ENCOUNTER (OUTPATIENT)
Dept: LAB | Age: 76
End: 2021-11-23
Attending: FAMILY MEDICINE
Payer: MEDICARE

## 2021-11-23 DIAGNOSIS — E78.5 HYPERLIPIDEMIA, UNSPECIFIED HYPERLIPIDEMIA TYPE: ICD-10-CM

## 2021-11-23 PROCEDURE — 80053 COMPREHEN METABOLIC PANEL: CPT

## 2021-11-23 PROCEDURE — 80061 LIPID PANEL: CPT

## 2021-11-23 PROCEDURE — 36415 COLL VENOUS BLD VENIPUNCTURE: CPT

## 2021-12-01 ENCOUNTER — PATIENT OUTREACH (OUTPATIENT)
Dept: CASE MANAGEMENT | Age: 76
End: 2021-12-01

## 2021-12-01 DIAGNOSIS — M06.9 RHEUMATOID ARTHRITIS INVOLVING MULTIPLE SITES, UNSPECIFIED WHETHER RHEUMATOID FACTOR PRESENT (HCC): ICD-10-CM

## 2021-12-01 DIAGNOSIS — I10 ESSENTIAL HYPERTENSION: Chronic | ICD-10-CM

## 2021-12-01 DIAGNOSIS — F41.1 GENERALIZED ANXIETY DISORDER: ICD-10-CM

## 2021-12-01 DIAGNOSIS — E78.5 HYPERLIPIDEMIA, UNSPECIFIED HYPERLIPIDEMIA TYPE: Chronic | ICD-10-CM

## 2021-12-01 DIAGNOSIS — M15.9 PRIMARY OSTEOARTHRITIS INVOLVING MULTIPLE JOINTS: ICD-10-CM

## 2021-12-01 NOTE — PROGRESS NOTES
Assessment:    I want to know a little about you. • What do you do for fun/hobbies? 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 Any day or time. Next month I will follow up and we will discuss your health, health habits and goals to set for moving forward. Follow up:   You can follow up with me any time

## 2021-12-31 PROCEDURE — 99490 CHRNC CARE MGMT STAFF 1ST 20: CPT

## 2022-01-03 ENCOUNTER — PATIENT OUTREACH (OUTPATIENT)
Dept: CASE MANAGEMENT | Age: 77
End: 2022-01-03

## 2022-01-03 ENCOUNTER — TELEPHONE (OUTPATIENT)
Dept: FAMILY MEDICINE CLINIC | Facility: CLINIC | Age: 77
End: 2022-01-03

## 2022-01-03 DIAGNOSIS — I10 ESSENTIAL HYPERTENSION: Primary | ICD-10-CM

## 2022-01-03 DIAGNOSIS — E78.5 HYPERLIPIDEMIA, UNSPECIFIED HYPERLIPIDEMIA TYPE: Chronic | ICD-10-CM

## 2022-01-03 DIAGNOSIS — F41.1 GENERALIZED ANXIETY DISORDER: ICD-10-CM

## 2022-01-03 DIAGNOSIS — E78.5 HYPERLIPIDEMIA, UNSPECIFIED HYPERLIPIDEMIA TYPE: ICD-10-CM

## 2022-01-03 DIAGNOSIS — Z86.69 HISTORY OF MIGRAINE HEADACHES: ICD-10-CM

## 2022-01-03 DIAGNOSIS — M06.9 RHEUMATOID ARTHRITIS INVOLVING MULTIPLE SITES, UNSPECIFIED WHETHER RHEUMATOID FACTOR PRESENT (HCC): ICD-10-CM

## 2022-01-03 DIAGNOSIS — I10 ESSENTIAL HYPERTENSION: Chronic | ICD-10-CM

## 2022-01-03 NOTE — PROGRESS NOTES
1/3/2022  Spoke to Rachel for CCM.       Updates to patient care team/ comments: UTD  Patient reported changes in medications: None  Med Adherence  Comment: Taking as directed    Health Maintenance: Reviewed with patient  Zoster Vaccines(1 of 2) Never don Feb 2022. Assited patient with scheduling an appointment, message routed to PCP Dr. Jose Weston asking if patient will be due for any blood test.    Reinforced healthy diet, lifestyle, and exercise.     Future Appointments   Date Time Provider Evangelina Chambers

## 2022-01-18 ENCOUNTER — OFFICE VISIT (OUTPATIENT)
Dept: FAMILY MEDICINE CLINIC | Facility: CLINIC | Age: 77
End: 2022-01-18
Payer: MEDICARE

## 2022-01-18 VITALS
SYSTOLIC BLOOD PRESSURE: 128 MMHG | BODY MASS INDEX: 30.91 KG/M2 | WEIGHT: 168 LBS | HEIGHT: 62 IN | TEMPERATURE: 96 F | HEART RATE: 69 BPM | DIASTOLIC BLOOD PRESSURE: 83 MMHG

## 2022-01-18 DIAGNOSIS — R10.84 GENERALIZED ABDOMINAL PAIN: Primary | ICD-10-CM

## 2022-01-18 DIAGNOSIS — R12 HEARTBURN: ICD-10-CM

## 2022-01-18 PROCEDURE — 99214 OFFICE O/P EST MOD 30 MIN: CPT | Performed by: FAMILY MEDICINE

## 2022-01-18 RX ORDER — FAMOTIDINE 20 MG/1
20 TABLET ORAL NIGHTLY PRN
Qty: 30 TABLET | Refills: 0 | Status: SHIPPED | OUTPATIENT
Start: 2022-01-18

## 2022-01-18 NOTE — PROGRESS NOTES
HPI:    Patient ID: Mague Murry is a 78year old female.     HPI  Patient presents with:  Stomach Pain: feels stomach cramps in lower stomach and heart burns X 2 weeks also had vommitt but not every day and diarrhea for one day      Patient mention SUMAtriptan Succinate (IMITREX) 25 MG Oral Tab Take 1 tablet (25 mg total) by mouth every 2 (two) hours as needed for Migraine.  Use at onset; repeat once after 2 HRS-ONLY 2 IN 24 HR MAX 9 tablet 1   • LORATADINE 10 MG Oral Tab Take 1 tablet (10 mg total) b urinary symptoms. If any worsening to follow-up. Consider labs and imaging at that time. 2. Heartburn  Can add Pepcid once daily. Continue Nexium. If worsening may need to see GI. Reviewed diet. - famotidine (PEPCID) 20 MG Oral Tab;  Take 1 tablet

## 2022-01-31 DIAGNOSIS — E78.5 HYPERLIPIDEMIA, UNSPECIFIED HYPERLIPIDEMIA TYPE: ICD-10-CM

## 2022-01-31 PROCEDURE — 99490 CHRNC CARE MGMT STAFF 1ST 20: CPT

## 2022-01-31 RX ORDER — ROSUVASTATIN CALCIUM 10 MG/1
10 TABLET, COATED ORAL NIGHTLY
Qty: 90 TABLET | Refills: 1 | Status: SHIPPED | OUTPATIENT
Start: 2022-01-31 | End: 2022-07-18

## 2022-02-01 NOTE — TELEPHONE ENCOUNTER
Refill passed per [employer] protocol. Requested Prescriptions   Pending Prescriptions Disp Refills    ROSUVASTATIN 10 MG Oral Tab [Pharmacy Med Name: Rosuvastatin Calcium 10 Mg Tab Nort] 90 tablet 0     Sig: Take 1 tablet (10 mg total) by mouth nightly.         Cholesterol Medication Protocol Passed - 1/31/2022  7:49 PM        Passed - ALT in past 12 months        Passed - LDL in past 12 months        Passed - Last ALT < 80       Lab Results   Component Value Date    ALT 28 11/23/2021             Passed - Last LDL < 130     Lab Results   Component Value Date    LDL 90 11/23/2021               Passed - Appointment in past 15 or next 3 months               Future Appointments         Provider Department Appt Notes    In 3 weeks Chen Rebolledo MD [employer], [place], [address] [facility]             Recent Outpatient Visits              1 week ago Generalized abdominal pain    Solomon Torres MD    Office Visit    2 months ago [employer], [address] [place], Chen Chandra MD    Office Visit    3 months ago History of ductal carcinoma in situ (DCIS) of breast    [facility] Hematology Oncology Ann Moody MD    Office Visit    5 months ago Teri Robins MD    Office Visit    5 months ago Left-sided headache    Solomon Torres MD    Office Visit

## 2022-02-03 ENCOUNTER — PATIENT OUTREACH (OUTPATIENT)
Dept: CASE MANAGEMENT | Age: 77
End: 2022-02-03

## 2022-02-03 NOTE — PROGRESS NOTES
Contacting patient to follow up on CCM for the month. Left message to call back.      Total time - 2 min  Total Monthly time- 2 min

## 2022-02-07 RX ORDER — SUMATRIPTAN 25 MG/1
25 TABLET, FILM COATED ORAL EVERY 2 HOUR PRN
Qty: 9 TABLET | Refills: 0 | Status: SHIPPED | OUTPATIENT
Start: 2022-02-07

## 2022-02-08 RX ORDER — VENLAFAXINE HYDROCHLORIDE 150 MG/1
CAPSULE, EXTENDED RELEASE ORAL
Qty: 90 CAPSULE | Refills: 0 | Status: SHIPPED | OUTPATIENT
Start: 2022-02-08 | End: 2022-02-23

## 2022-02-08 NOTE — TELEPHONE ENCOUNTER
Please review. Protocol failed / No protocol. Requested Prescriptions   Pending Prescriptions Disp Refills    VENLAFAXINE 150 MG Oral Capsule SR 24 Hr [Pharmacy Med Name: Venlafaxine Hydrochloride Er 24hr 150 Mg Cap Nort] 90 capsule 0     Sig: TAKE ONE CAPSULE BY MOUTH ONE TIME DAILY        Psychiatric Non-Scheduled (Anti-Anxiety) Passed - 2/7/2022  4:44 PM        Passed - Appointment in last 6 or next 3 months          Signed Prescriptions Disp Refills    SUMATRIPTAN 25 MG Oral Tab 9 tablet 0     Sig: Take 1 tablet (25 mg total) by mouth every 2 (two) hours as needed for Migraine.  Use at onset; repeat once after 2 HRS-ONLY 2 IN 24 HR MAX        Neurology Medications Passed - 2/7/2022  4:44 PM        Passed - Appointment in the past 6 or next 3 months             Recent Outpatient Visits              2 weeks ago Generalized abdominal pain    Audelia Davis MD    Office Visit    2 months ago [employer], [address], Deana Graf MD    Office Visit    3 months ago History of ductal carcinoma in situ (DCIS) of breast    [facility] Hematology Oncology Maricel Rivera MD    Office Visit    5 months ago Giovani Self MD    Office Visit    5 months ago Left-sided headache    Audelia Davis MD    Office Visit           Future Appointments         Provider Department Appt Notes    In 2 weeks Nikos Booth MD [place] [email or web address] [place], [facility], [facility] [address]

## 2022-02-08 NOTE — TELEPHONE ENCOUNTER
Refill passed per [facility] protocol. Requested Prescriptions   Pending Prescriptions Disp Refills    SUMATRIPTAN 25 MG Oral Tab [Pharmacy Med Name: Sumatriptan Succinate 25 Mg Tab Nort] 9 tablet 0     Sig: Take 1 tablet (25 mg total) by mouth every 2 (two) hours as needed for Migraine.  Use at onset; repeat once after 2 HRS-ONLY 2 IN 24 HR MAX        Neurology Medications Passed - 2/7/2022  4:44 PM        Passed - Appointment in the past 6 or next 3 months           VENLAFAXINE 150 MG Oral Capsule SR 24 Hr [Pharmacy Med Name: Venlafaxine Hydrochloride Er 24hr 150 Mg Cap Nort] 90 capsule 0     Sig: TAKE ONE CAPSULE BY MOUTH ONE TIME DAILY        Psychiatric Non-Scheduled (Anti-Anxiety) Passed - 2/7/2022  4:44 PM        Passed - Appointment in last 6 or next 3 months             Recent Outpatient Visits              2 weeks ago Generalized abdominal pain    Toro hCi MD    Office Visit    2 months ago [employer], [address], Leyla Maria MD    Office Visit    3 months ago History of ductal carcinoma in situ (DCIS) of breast    [facility] Hematology Oncology Eduin Kearney MD    Office Visit    5 months ago Minor Saavedra MD    Office Visit    5 months ago Left-sided headache    Toro Chi MD    Office Visit           Future Appointments         Provider Department Appt Notes    In 2 weeks Jo Mckeon MD [employer], [facility], [address] [facility]

## 2022-02-23 ENCOUNTER — LAB ENCOUNTER (OUTPATIENT)
Dept: LAB | Age: 77
End: 2022-02-23
Attending: FAMILY MEDICINE
Payer: MEDICARE

## 2022-02-23 ENCOUNTER — OFFICE VISIT (OUTPATIENT)
Dept: FAMILY MEDICINE CLINIC | Facility: CLINIC | Age: 77
End: 2022-02-23
Payer: MEDICARE

## 2022-02-23 VITALS
TEMPERATURE: 97 F | DIASTOLIC BLOOD PRESSURE: 77 MMHG | WEIGHT: 167 LBS | BODY MASS INDEX: 30.73 KG/M2 | SYSTOLIC BLOOD PRESSURE: 119 MMHG | HEART RATE: 71 BPM | HEIGHT: 62 IN

## 2022-02-23 DIAGNOSIS — M15.9 PRIMARY OSTEOARTHRITIS INVOLVING MULTIPLE JOINTS: ICD-10-CM

## 2022-02-23 DIAGNOSIS — K57.30 DIVERTICULOSIS OF COLON: ICD-10-CM

## 2022-02-23 DIAGNOSIS — R73.9 HYPERGLYCEMIA: ICD-10-CM

## 2022-02-23 DIAGNOSIS — Z86.19 HISTORY OF CLOSTRIDIOIDES DIFFICILE COLITIS: ICD-10-CM

## 2022-02-23 DIAGNOSIS — Z86.69 HISTORY OF MIGRAINE HEADACHES: ICD-10-CM

## 2022-02-23 DIAGNOSIS — Z00.00 ENCOUNTER FOR ANNUAL HEALTH EXAMINATION: Primary | ICD-10-CM

## 2022-02-23 DIAGNOSIS — Z86.000 HISTORY OF DUCTAL CARCINOMA IN SITU (DCIS) OF BREAST: ICD-10-CM

## 2022-02-23 DIAGNOSIS — I10 ESSENTIAL HYPERTENSION: ICD-10-CM

## 2022-02-23 DIAGNOSIS — F41.1 GENERALIZED ANXIETY DISORDER: ICD-10-CM

## 2022-02-23 DIAGNOSIS — E04.2 MULTINODULAR GOITER: ICD-10-CM

## 2022-02-23 DIAGNOSIS — Z23 NEED FOR SHINGLES VACCINE: ICD-10-CM

## 2022-02-23 DIAGNOSIS — Z96.643 HISTORY OF BILATERAL HIP REPLACEMENTS: ICD-10-CM

## 2022-02-23 DIAGNOSIS — R23.3 PETECHIAL RASH: ICD-10-CM

## 2022-02-23 DIAGNOSIS — R23.2 HOT FLASHES: ICD-10-CM

## 2022-02-23 DIAGNOSIS — Z01.419 ENCOUNTER FOR GYNECOLOGICAL EXAMINATION: ICD-10-CM

## 2022-02-23 DIAGNOSIS — E78.5 HYPERLIPIDEMIA, UNSPECIFIED HYPERLIPIDEMIA TYPE: ICD-10-CM

## 2022-02-23 DIAGNOSIS — M06.9 RHEUMATOID ARTHRITIS INVOLVING MULTIPLE SITES, UNSPECIFIED WHETHER RHEUMATOID FACTOR PRESENT (HCC): ICD-10-CM

## 2022-02-23 PROBLEM — D12.6 BENIGN NEOPLASM OF COLON: Status: RESOLVED | Noted: 2021-02-16 | Resolved: 2022-02-23

## 2022-02-23 LAB
ALBUMIN SERPL-MCNC: 4.2 G/DL (ref 3.4–5)
ALBUMIN/GLOB SERPL: 1.4 {RATIO} (ref 1–2)
ALP LIVER SERPL-CCNC: 84 U/L
ALT SERPL-CCNC: 30 U/L
ANION GAP SERPL CALC-SCNC: 6 MMOL/L (ref 0–18)
AST SERPL-CCNC: 24 U/L (ref 15–37)
BASOPHILS # BLD AUTO: 0.05 X10(3) UL (ref 0–0.2)
BASOPHILS NFR BLD AUTO: 0.8 %
BILIRUB SERPL-MCNC: 0.3 MG/DL (ref 0.1–2)
BUN BLD-MCNC: 20 MG/DL (ref 7–18)
BUN/CREAT SERPL: 23.8 (ref 10–20)
CALCIUM BLD-MCNC: 9.7 MG/DL (ref 8.5–10.1)
CHLORIDE SERPL-SCNC: 100 MMOL/L (ref 98–112)
CO2 SERPL-SCNC: 29 MMOL/L (ref 21–32)
CREAT BLD-MCNC: 0.84 MG/DL
DEPRECATED RDW RBC AUTO: 51 FL (ref 35.1–46.3)
EOSINOPHIL # BLD AUTO: 0.14 X10(3) UL (ref 0–0.7)
EOSINOPHIL NFR BLD AUTO: 2.2 %
ERYTHROCYTE [DISTWIDTH] IN BLOOD BY AUTOMATED COUNT: 13.8 % (ref 11–15)
EST. AVERAGE GLUCOSE BLD GHB EST-MCNC: 108 MG/DL (ref 68–126)
FASTING STATUS PATIENT QL REPORTED: NO
GLOBULIN PLAS-MCNC: 3 G/DL (ref 2.8–4.4)
GLUCOSE BLD-MCNC: 92 MG/DL (ref 70–99)
HBA1C MFR BLD: 5.4 % (ref ?–5.7)
HCT VFR BLD AUTO: 39.2 %
HGB BLD-MCNC: 13.1 G/DL
IMM GRANULOCYTES # BLD AUTO: 0.02 X10(3) UL (ref 0–1)
IMM GRANULOCYTES NFR BLD: 0.3 %
LYMPHOCYTES # BLD AUTO: 1.39 X10(3) UL (ref 1–4)
LYMPHOCYTES NFR BLD AUTO: 22.1 %
MCH RBC QN AUTO: 33.3 PG (ref 26–34)
MCHC RBC AUTO-ENTMCNC: 33.4 G/DL (ref 31–37)
MONOCYTES # BLD AUTO: 0.5 X10(3) UL (ref 0.1–1)
MONOCYTES NFR BLD AUTO: 8 %
NEUTROPHILS # BLD AUTO: 4.18 X10 (3) UL (ref 1.5–7.7)
NEUTROPHILS # BLD AUTO: 4.18 X10(3) UL (ref 1.5–7.7)
NEUTROPHILS NFR BLD AUTO: 66.6 %
OSMOLALITY SERPL CALC.SUM OF ELEC: 282 MOSM/KG (ref 275–295)
PLATELET # BLD AUTO: 265 10(3)UL (ref 150–450)
POTASSIUM SERPL-SCNC: 4.4 MMOL/L (ref 3.5–5.1)
RBC # BLD AUTO: 3.93 X10(6)UL
SODIUM SERPL-SCNC: 135 MMOL/L (ref 136–145)
WBC # BLD AUTO: 6.3 X10(3) UL (ref 4–11)

## 2022-02-23 PROCEDURE — 80053 COMPREHEN METABOLIC PANEL: CPT

## 2022-02-23 PROCEDURE — 85025 COMPLETE CBC W/AUTO DIFF WBC: CPT

## 2022-02-23 PROCEDURE — 36415 COLL VENOUS BLD VENIPUNCTURE: CPT

## 2022-02-23 PROCEDURE — G0439 PPPS, SUBSEQ VISIT: HCPCS | Performed by: FAMILY MEDICINE

## 2022-02-23 PROCEDURE — 83036 HEMOGLOBIN GLYCOSYLATED A1C: CPT

## 2022-02-23 RX ORDER — LOSARTAN POTASSIUM 100 MG/1
100 TABLET ORAL DAILY
Qty: 90 TABLET | Refills: 3 | Status: SHIPPED | OUTPATIENT
Start: 2022-02-23

## 2022-02-23 RX ORDER — VENLAFAXINE HYDROCHLORIDE 150 MG/1
150 CAPSULE, EXTENDED RELEASE ORAL DAILY
Qty: 90 CAPSULE | Refills: 3 | Status: SHIPPED | OUTPATIENT
Start: 2022-02-23

## 2022-02-23 RX ORDER — HYDROCHLOROTHIAZIDE 12.5 MG/1
12.5 TABLET ORAL DAILY
Qty: 90 TABLET | Refills: 3 | Status: SHIPPED | OUTPATIENT
Start: 2022-02-23

## 2022-02-28 PROCEDURE — 99490 CHRNC CARE MGMT STAFF 1ST 20: CPT

## 2022-03-02 ENCOUNTER — PATIENT OUTREACH (OUTPATIENT)
Dept: CASE MANAGEMENT | Age: 77
End: 2022-03-02

## 2022-03-23 ENCOUNTER — OFFICE VISIT (OUTPATIENT)
Dept: RHEUMATOLOGY | Facility: CLINIC | Age: 77
End: 2022-03-23
Payer: MEDICARE

## 2022-03-23 VITALS
SYSTOLIC BLOOD PRESSURE: 119 MMHG | DIASTOLIC BLOOD PRESSURE: 77 MMHG | HEIGHT: 62 IN | RESPIRATION RATE: 16 BRPM | BODY MASS INDEX: 30.55 KG/M2 | HEART RATE: 65 BPM | WEIGHT: 166 LBS

## 2022-03-23 DIAGNOSIS — M41.9 SCOLIOSIS OF THORACOLUMBAR SPINE, UNSPECIFIED SCOLIOSIS TYPE: ICD-10-CM

## 2022-03-23 DIAGNOSIS — M15.9 PRIMARY OSTEOARTHRITIS INVOLVING MULTIPLE JOINTS: Primary | ICD-10-CM

## 2022-03-23 PROCEDURE — 99204 OFFICE O/P NEW MOD 45 MIN: CPT | Performed by: INTERNAL MEDICINE

## 2022-03-23 RX ORDER — BACLOFEN 10 MG/1
TABLET ORAL
Qty: 60 TABLET | Refills: 1 | Status: SHIPPED | OUTPATIENT
Start: 2022-03-23

## 2022-03-23 RX ORDER — SENNOSIDES 8.6 MG
650 CAPSULE ORAL EVERY 8 HOURS PRN
COMMUNITY

## 2022-03-23 NOTE — PATIENT INSTRUCTIONS
1. Cont. Tylenol as needed   2. Try baclofen 10mg 20mg at night for muscle cramps - watch for drowsiness   3. cont. gabapentin 200mg in am , 100mg in afternoon, and 300mg in pm   4. Cont. Stretches and exercises  5. Cont. Massages   6. Return to clinic in 6 months.

## 2022-03-29 ENCOUNTER — TELEPHONE (OUTPATIENT)
Dept: RHEUMATOLOGY | Facility: CLINIC | Age: 77
End: 2022-03-29

## 2022-03-29 NOTE — TELEPHONE ENCOUNTER
Fartun Salas from [facility] rheumatology specialist confirming if office received the  Release form that was [facility] yesterday. Requesting call back. Please advise.

## 2022-03-31 PROCEDURE — 99490 CHRNC CARE MGMT STAFF 1ST 20: CPT

## 2022-04-04 ENCOUNTER — TELEPHONE (OUTPATIENT)
Dept: FAMILY MEDICINE CLINIC | Facility: CLINIC | Age: 77
End: 2022-04-04

## 2022-04-04 ENCOUNTER — PATIENT OUTREACH (OUTPATIENT)
Dept: CASE MANAGEMENT | Age: 77
End: 2022-04-04

## 2022-04-27 ENCOUNTER — HOSPITAL ENCOUNTER (OUTPATIENT)
Dept: MAMMOGRAPHY | Facility: HOSPITAL | Age: 77
Discharge: HOME | End: 2022-04-27
Attending: FAMILY MEDICINE
Payer: MEDICARE

## 2022-04-27 DIAGNOSIS — Z12.31 BREAST CANCER SCREENING BY MAMMOGRAM: ICD-10-CM

## 2022-04-27 PROCEDURE — 77063 BREAST TOMOSYNTHESIS BI: CPT | Performed by: FAMILY MEDICINE

## 2022-04-27 PROCEDURE — 77067 SCR MAMMO BI INCL CAD: CPT | Performed by: FAMILY MEDICINE

## 2022-05-02 ENCOUNTER — PATIENT OUTREACH (OUTPATIENT)
Dept: CASE MANAGEMENT | Age: 77
End: 2022-05-02

## 2022-05-12 ENCOUNTER — OFFICE VISIT (OUTPATIENT)
Dept: OBGYN CLINIC | Facility: CLINIC | Age: 77
End: 2022-05-12
Payer: MEDICARE

## 2022-05-12 VITALS — DIASTOLIC BLOOD PRESSURE: 86 MMHG | SYSTOLIC BLOOD PRESSURE: 143 MMHG | HEART RATE: 72 BPM

## 2022-05-12 DIAGNOSIS — Z01.419 ENCOUNTER FOR GYNECOLOGICAL EXAMINATION WITHOUT ABNORMAL FINDING: Primary | ICD-10-CM

## 2022-05-12 PROCEDURE — G0101 CA SCREEN;PELVIC/BREAST EXAM: HCPCS | Performed by: OBSTETRICS & GYNECOLOGY

## 2022-05-12 RX ORDER — BUSPIRONE HYDROCHLORIDE 30 MG/1
TABLET ORAL
Qty: 90 TABLET | Refills: 0 | Status: SHIPPED | OUTPATIENT
Start: 2022-05-12

## 2022-05-12 NOTE — TELEPHONE ENCOUNTER
Please review. Protocol failed / No protocol.    Requested Prescriptions   Pending Prescriptions Disp Refills    BUSPIRONE HCL 30 MG Oral Tab [Pharmacy Med Name: Buspirone Hydrochloride 30 Mg Tab Adva] 90 tablet 0     Sig: TAKE ONE TABLET BY MOUTH ONE TIME DAILY        There is no refill protocol information for this order          Recent Outpatient Visits              Today     [facility] Braeden Ayers [facility]    Office Visit    1 month ago Primary osteoarthritis involving multiple joints    [facility] Kathy Horan [facility]    Office Visit    2 months ago Encounter for [facility], [address] He[address] Dare, MD    Office Visit    3 months ago Generalized abdominal pain    Alejandro Ruth MD    Office Visit    5 months ago [employer], [address] Heber Dare, MD    Office Visit

## 2022-06-01 ENCOUNTER — PATIENT OUTREACH (OUTPATIENT)
Dept: CASE MANAGEMENT | Age: 77
End: 2022-06-01

## 2022-06-01 DIAGNOSIS — M06.9 RHEUMATOID ARTHRITIS INVOLVING MULTIPLE SITES, UNSPECIFIED WHETHER RHEUMATOID FACTOR PRESENT (HCC): ICD-10-CM

## 2022-06-01 DIAGNOSIS — I10 ESSENTIAL HYPERTENSION: Primary | ICD-10-CM

## 2022-06-01 DIAGNOSIS — E78.5 HYPERLIPIDEMIA, UNSPECIFIED HYPERLIPIDEMIA TYPE: ICD-10-CM

## 2022-06-01 DIAGNOSIS — M15.9 PRIMARY OSTEOARTHRITIS INVOLVING MULTIPLE JOINTS: ICD-10-CM

## 2022-06-01 DIAGNOSIS — F41.1 GENERALIZED ANXIETY DISORDER: ICD-10-CM

## 2022-06-15 DIAGNOSIS — Z86.69 HISTORY OF MIGRAINE HEADACHES: ICD-10-CM

## 2022-06-15 DIAGNOSIS — R51.9 LEFT-SIDED HEADACHE: ICD-10-CM

## 2022-06-15 RX ORDER — SUMATRIPTAN 25 MG/1
TABLET, FILM COATED ORAL
Qty: 9 TABLET | Refills: 0 | Status: SHIPPED | OUTPATIENT
Start: 2022-06-15

## 2022-06-18 DIAGNOSIS — R06.2 WHEEZING: ICD-10-CM

## 2022-06-20 RX ORDER — ALBUTEROL SULFATE 90 UG/1
2 AEROSOL, METERED RESPIRATORY (INHALATION) EVERY 4 HOURS PRN
Qty: 1 EACH | Refills: 0 | Status: SHIPPED | OUTPATIENT
Start: 2022-06-20 | End: 2023-08-23

## 2022-07-06 ENCOUNTER — PATIENT OUTREACH (OUTPATIENT)
Dept: CASE MANAGEMENT | Age: 77
End: 2022-07-06

## 2022-07-06 DIAGNOSIS — M15.9 PRIMARY OSTEOARTHRITIS INVOLVING MULTIPLE JOINTS: ICD-10-CM

## 2022-07-06 DIAGNOSIS — I10 ESSENTIAL HYPERTENSION: Primary | ICD-10-CM

## 2022-07-06 DIAGNOSIS — F41.1 GENERALIZED ANXIETY DISORDER: ICD-10-CM

## 2022-07-06 DIAGNOSIS — M06.9 RHEUMATOID ARTHRITIS INVOLVING MULTIPLE SITES, UNSPECIFIED WHETHER RHEUMATOID FACTOR PRESENT (HCC): ICD-10-CM

## 2022-07-06 DIAGNOSIS — E78.5 HYPERLIPIDEMIA, UNSPECIFIED HYPERLIPIDEMIA TYPE: ICD-10-CM

## 2022-07-17 DIAGNOSIS — E78.5 HYPERLIPIDEMIA, UNSPECIFIED HYPERLIPIDEMIA TYPE: ICD-10-CM

## 2022-07-18 RX ORDER — ROSUVASTATIN CALCIUM 10 MG/1
10 TABLET, COATED ORAL NIGHTLY
Qty: 90 TABLET | Refills: 0 | Status: SHIPPED | OUTPATIENT
Start: 2022-07-18

## 2022-08-07 DIAGNOSIS — Z86.69 HISTORY OF MIGRAINE HEADACHES: ICD-10-CM

## 2022-08-07 DIAGNOSIS — R51.9 LEFT-SIDED HEADACHE: ICD-10-CM

## 2022-08-08 RX ORDER — SUMATRIPTAN 25 MG/1
TABLET, FILM COATED ORAL
Qty: 9 TABLET | Refills: 0 | Status: SHIPPED | OUTPATIENT
Start: 2022-08-08

## 2022-08-09 ENCOUNTER — PATIENT OUTREACH (OUTPATIENT)
Dept: CASE MANAGEMENT | Age: 77
End: 2022-08-09

## 2022-08-09 DIAGNOSIS — I10 ESSENTIAL HYPERTENSION: Primary | ICD-10-CM

## 2022-08-09 DIAGNOSIS — F41.1 GENERALIZED ANXIETY DISORDER: ICD-10-CM

## 2022-08-09 DIAGNOSIS — M06.9 RHEUMATOID ARTHRITIS INVOLVING MULTIPLE SITES, UNSPECIFIED WHETHER RHEUMATOID FACTOR PRESENT (HCC): ICD-10-CM

## 2022-08-09 DIAGNOSIS — M15.9 PRIMARY OSTEOARTHRITIS INVOLVING MULTIPLE JOINTS: ICD-10-CM

## 2022-08-09 DIAGNOSIS — E78.5 HYPERLIPIDEMIA, UNSPECIFIED HYPERLIPIDEMIA TYPE: ICD-10-CM

## 2022-08-09 DIAGNOSIS — Z86.69 HISTORY OF MIGRAINE HEADACHES: ICD-10-CM

## 2022-08-09 RX ORDER — BUSPIRONE HYDROCHLORIDE 30 MG/1
30 TABLET ORAL DAILY
Qty: 90 TABLET | Refills: 0 | Status: SHIPPED | OUTPATIENT
Start: 2022-08-09

## 2022-08-30 DIAGNOSIS — H65.92 MIDDLE EAR EFFUSION, LEFT: ICD-10-CM

## 2022-08-30 RX ORDER — LORATADINE 10 MG/1
10 TABLET ORAL DAILY
Qty: 90 TABLET | Refills: 1 | Status: SHIPPED | OUTPATIENT
Start: 2022-08-30

## 2022-08-30 NOTE — TELEPHONE ENCOUNTER
Refill passed per [place] [employer] [place], [facility] protocol.      Requested Prescriptions   Pending Prescriptions Disp Refills    LORATADINE 10 MG Oral Tab [Pharmacy Med Name: Loratadine 10 Mg Tab Nort] 30 tablet 0     Sig: Take 1 tablet (10 mg total) by mouth daily        Allergy Medication Protocol Passed - 8/30/2022  9:26 AM        Passed - In person appointment or virtual visit in the past 12 mos or appointment in next 3 mos       Recent Outpatient Visits              3 months ago Encounter for gynecological examination without abnormal finding    [facility] for Shabbir Wesley MD    Office Visit    5 months ago Primary osteoarthritis involving multiple joints    [facility] for Christina Salinas MD    Office Visit    6 months ago Encounter for [facility] [place], Kevin Cookey, MD    Office Visit    7 months ago Generalized abdominal pain    Sesar Lerner MD    Office Visit    9 months ago [employer], [address] Kevin Cookey, MD    Office Visit                             Recent Outpatient Visits              3 months ago Encounter for gynecological examination without abnormal finding    [facility] for Shabbir Wesley MD    Office Visit    5 months ago Primary osteoarthritis involving multiple joints    [facility] for Christina Salinas MD    Office Visit    6 months ago Encounter for [facility] [place], Kevin Cookey, MD    Office Visit    7 months ago Generalized abdominal pain    Sesar Lerner MD    Office Visit    9 months ago [employer], [address] Kevin Cookey, MD    Office Visit

## 2022-09-14 ENCOUNTER — PATIENT OUTREACH (OUTPATIENT)
Dept: CASE MANAGEMENT | Age: 77
End: 2022-09-14

## 2022-09-14 DIAGNOSIS — E78.5 HYPERLIPIDEMIA, UNSPECIFIED HYPERLIPIDEMIA TYPE: ICD-10-CM

## 2022-09-14 DIAGNOSIS — I10 ESSENTIAL HYPERTENSION: Primary | ICD-10-CM

## 2022-09-14 DIAGNOSIS — M06.9 RHEUMATOID ARTHRITIS INVOLVING MULTIPLE SITES, UNSPECIFIED WHETHER RHEUMATOID FACTOR PRESENT (HCC): ICD-10-CM

## 2022-09-14 DIAGNOSIS — M15.9 PRIMARY OSTEOARTHRITIS INVOLVING MULTIPLE JOINTS: ICD-10-CM

## 2022-09-14 DIAGNOSIS — F41.1 GENERALIZED ANXIETY DISORDER: ICD-10-CM

## 2022-10-10 ENCOUNTER — PATIENT OUTREACH (OUTPATIENT)
Dept: CASE MANAGEMENT | Age: 77
End: 2022-10-10

## 2022-10-10 DIAGNOSIS — F41.1 GENERALIZED ANXIETY DISORDER: ICD-10-CM

## 2022-10-10 DIAGNOSIS — M15.9 PRIMARY OSTEOARTHRITIS INVOLVING MULTIPLE JOINTS: ICD-10-CM

## 2022-10-10 DIAGNOSIS — E78.5 HYPERLIPIDEMIA, UNSPECIFIED HYPERLIPIDEMIA TYPE: ICD-10-CM

## 2022-10-10 DIAGNOSIS — I10 ESSENTIAL HYPERTENSION: Primary | ICD-10-CM

## 2022-10-10 DIAGNOSIS — M06.9 RHEUMATOID ARTHRITIS INVOLVING MULTIPLE SITES, UNSPECIFIED WHETHER RHEUMATOID FACTOR PRESENT (HCC): ICD-10-CM

## 2022-11-08 ENCOUNTER — PATIENT OUTREACH (OUTPATIENT)
Dept: CASE MANAGEMENT | Age: 77
End: 2022-11-08

## 2022-11-09 DIAGNOSIS — R51.9 LEFT-SIDED HEADACHE: ICD-10-CM

## 2022-11-09 DIAGNOSIS — Z86.69 HISTORY OF MIGRAINE HEADACHES: ICD-10-CM

## 2022-11-10 RX ORDER — SUMATRIPTAN 25 MG/1
TABLET, FILM COATED ORAL
Qty: 9 TABLET | Refills: 0 | Status: SHIPPED | OUTPATIENT
Start: 2022-11-10

## 2022-11-10 NOTE — TELEPHONE ENCOUNTER
Please review; protocol failed/no protocol. Requested Prescriptions   Pending Prescriptions Disp Refills    SUMATRIPTAN 25 MG Oral Tab [Pharmacy Med Name: Sumatriptan Succinate 25 Mg Tab Nort] 9 tablet 0     Sig: Take 1 tablet (25 mg total) by mouth every 2 (two) hours as needed for Migraine. Use at onset; repeat once after 2 Hours -ONLY 2 IN 24 HR MAX.        Neurology Medications Failed - 11/9/2022  8:55 PM        Failed - In person appointment or virtual visit in the past 6 mos or appointment in next 3 mos     Recent Outpatient Visits              6 months ago Encounter for gynecological examination without abnormal finding    [facility], [address], Bernadette Gomez MD    Office Visit    7 months ago Primary osteoarthritis involving multiple joints    [facility] for Michelle Gregg MD    Office Visit    8 months ago Encounter for [facility], Archie Lopez Lavena Shi, MD    Office Visit    9 months ago Generalized abdominal pain    Mitzi Long MD    Office Visit    11 months ago [employer], Archie Lopez Lavena Shi, MD    Office Visit                            Recent Outpatient Visits              6 months ago Encounter for gynecological examination without abnormal finding    [facility] for Osmel Meier MD    Office Visit    7 months ago Primary osteoarthritis involving multiple joints    [facility] Michelle Gregg [facility]    Office Visit    8 months ago Encounter for [facility], Archie Lopez Lavena Shi, MD    Office Visit    9 months ago Generalized abdominal pain    Mitzi Long MD    Office Visit    11 months ago [employer] flashes    Arian Ovalle MD    Office Visit

## 2022-11-16 ENCOUNTER — PATIENT OUTREACH (OUTPATIENT)
Dept: CASE MANAGEMENT | Age: 77
End: 2022-11-16

## 2022-11-16 DIAGNOSIS — F41.1 GENERALIZED ANXIETY DISORDER: ICD-10-CM

## 2022-11-16 DIAGNOSIS — E78.5 HYPERLIPIDEMIA, UNSPECIFIED HYPERLIPIDEMIA TYPE: ICD-10-CM

## 2022-11-16 DIAGNOSIS — M06.9 RHEUMATOID ARTHRITIS INVOLVING MULTIPLE SITES, UNSPECIFIED WHETHER RHEUMATOID FACTOR PRESENT (HCC): ICD-10-CM

## 2022-11-16 DIAGNOSIS — I10 ESSENTIAL HYPERTENSION: Primary | ICD-10-CM

## 2022-11-16 DIAGNOSIS — M15.9 PRIMARY OSTEOARTHRITIS INVOLVING MULTIPLE JOINTS: ICD-10-CM

## 2022-11-16 NOTE — PROGRESS NOTES
Contacting patient to follow up on CCM for the month. Left message to call back.      Total time - 2 min  Total Monthly time- 4 min

## 2022-11-17 ENCOUNTER — TELEPHONE (OUTPATIENT)
Dept: FAMILY MEDICINE CLINIC | Facility: CLINIC | Age: 77
End: 2022-11-17

## 2022-11-17 DIAGNOSIS — E78.5 HYPERLIPIDEMIA, UNSPECIFIED HYPERLIPIDEMIA TYPE: ICD-10-CM

## 2022-11-17 DIAGNOSIS — I10 ESSENTIAL HYPERTENSION: Primary | ICD-10-CM

## 2022-11-17 NOTE — TELEPHONE ENCOUNTER
Dr. Zafar Sauer, patient is schedule for an appointment on 11/29/2022. Patient is requesting blood test order for appointment. Please advise.

## 2022-11-20 DIAGNOSIS — F41.1 GENERALIZED ANXIETY DISORDER: ICD-10-CM

## 2022-11-21 RX ORDER — BUSPIRONE HYDROCHLORIDE 30 MG/1
TABLET ORAL
Qty: 90 TABLET | Refills: 0 | Status: SHIPPED | OUTPATIENT
Start: 2022-11-21

## 2022-11-21 NOTE — TELEPHONE ENCOUNTER
Please review. Protocol failed / No Protocol.     Requested Prescriptions   Pending Prescriptions Disp Refills    BUSPIRONE HCL 30 MG Oral Tab [Pharmacy Med Name: Buspirone Hydrochloride 30 Mg Tab Adva] 90 tablet 0     Sig: TAKE ONE TABLET BY MOUTH ONE TIME DAILY       There is no refill protocol information for this order

## 2022-11-23 ENCOUNTER — LAB ENCOUNTER (OUTPATIENT)
Dept: LAB | Age: 77
End: 2022-11-23
Attending: FAMILY MEDICINE
Payer: MEDICARE

## 2022-11-23 DIAGNOSIS — I10 ESSENTIAL HYPERTENSION: ICD-10-CM

## 2022-11-23 DIAGNOSIS — E78.5 HYPERLIPIDEMIA, UNSPECIFIED HYPERLIPIDEMIA TYPE: ICD-10-CM

## 2022-11-23 LAB
ALBUMIN SERPL-MCNC: 4 G/DL (ref 3.4–5)
ALBUMIN/GLOB SERPL: 1.3 {RATIO} (ref 1–2)
ALP LIVER SERPL-CCNC: 110 U/L
ALT SERPL-CCNC: 25 U/L
ANION GAP SERPL CALC-SCNC: 6 MMOL/L (ref 0–18)
AST SERPL-CCNC: 22 U/L (ref 15–37)
BILIRUB SERPL-MCNC: 0.3 MG/DL (ref 0.1–2)
BUN BLD-MCNC: 21 MG/DL (ref 7–18)
BUN/CREAT SERPL: 27.3 (ref 10–20)
CALCIUM BLD-MCNC: 10 MG/DL (ref 8.5–10.1)
CHLORIDE SERPL-SCNC: 104 MMOL/L (ref 98–112)
CHOLEST SERPL-MCNC: 193 MG/DL (ref ?–200)
CO2 SERPL-SCNC: 28 MMOL/L (ref 21–32)
CREAT BLD-MCNC: 0.77 MG/DL
FASTING PATIENT LIPID ANSWER: YES
FASTING STATUS PATIENT QL REPORTED: YES
GFR SERPLBLD BASED ON 1.73 SQ M-ARVRAT: 80 ML/MIN/1.73M2 (ref 60–?)
GLOBULIN PLAS-MCNC: 3.2 G/DL (ref 2.8–4.4)
GLUCOSE BLD-MCNC: 89 MG/DL (ref 70–99)
HDLC SERPL-MCNC: 72 MG/DL (ref 40–59)
LDLC SERPL CALC-MCNC: 86 MG/DL (ref ?–100)
NONHDLC SERPL-MCNC: 121 MG/DL (ref ?–130)
OSMOLALITY SERPL CALC.SUM OF ELEC: 288 MOSM/KG (ref 275–295)
POTASSIUM SERPL-SCNC: 4.4 MMOL/L (ref 3.5–5.1)
PROT SERPL-MCNC: 7.2 G/DL (ref 6.4–8.2)
SODIUM SERPL-SCNC: 138 MMOL/L (ref 136–145)
TRIGL SERPL-MCNC: 214 MG/DL (ref 30–149)
VLDLC SERPL CALC-MCNC: 34 MG/DL (ref 0–30)

## 2022-11-23 PROCEDURE — 80053 COMPREHEN METABOLIC PANEL: CPT

## 2022-11-23 PROCEDURE — 80061 LIPID PANEL: CPT

## 2022-11-23 PROCEDURE — 36415 COLL VENOUS BLD VENIPUNCTURE: CPT

## 2022-11-29 ENCOUNTER — OFFICE VISIT (OUTPATIENT)
Dept: FAMILY MEDICINE CLINIC | Facility: CLINIC | Age: 77
End: 2022-11-29
Payer: MEDICARE

## 2022-11-29 VITALS
HEIGHT: 62 IN | HEART RATE: 72 BPM | SYSTOLIC BLOOD PRESSURE: 125 MMHG | WEIGHT: 173 LBS | TEMPERATURE: 98 F | BODY MASS INDEX: 31.83 KG/M2 | DIASTOLIC BLOOD PRESSURE: 78 MMHG

## 2022-11-29 DIAGNOSIS — R09.81 NASAL CONGESTION: Primary | ICD-10-CM

## 2022-11-29 DIAGNOSIS — E78.00 HYPERCHOLESTEREMIA: ICD-10-CM

## 2022-11-29 DIAGNOSIS — J34.89 SINUS PRESSURE: ICD-10-CM

## 2022-11-29 DIAGNOSIS — H65.01 RIGHT ACUTE SEROUS OTITIS MEDIA, RECURRENCE NOT SPECIFIED: ICD-10-CM

## 2022-11-29 DIAGNOSIS — R42 DIZZINESS: ICD-10-CM

## 2022-11-29 DIAGNOSIS — Z86.19 HISTORY OF CLOSTRIDIOIDES DIFFICILE COLITIS: ICD-10-CM

## 2022-11-29 PROCEDURE — 99214 OFFICE O/P EST MOD 30 MIN: CPT | Performed by: FAMILY MEDICINE

## 2022-11-29 PROCEDURE — 1125F AMNT PAIN NOTED PAIN PRSNT: CPT | Performed by: FAMILY MEDICINE

## 2022-11-29 RX ORDER — VANCOMYCIN HYDROCHLORIDE 125 MG/1
125 CAPSULE ORAL 4 TIMES DAILY
Qty: 40 CAPSULE | Refills: 0 | Status: SHIPPED | OUTPATIENT
Start: 2022-11-29 | End: 2022-12-09

## 2022-11-29 RX ORDER — AZITHROMYCIN 250 MG/1
TABLET, FILM COATED ORAL
Qty: 6 TABLET | Refills: 0 | Status: SHIPPED | OUTPATIENT
Start: 2022-11-29 | End: 2022-12-04

## 2022-12-15 ENCOUNTER — PATIENT OUTREACH (OUTPATIENT)
Dept: CASE MANAGEMENT | Age: 77
End: 2022-12-15

## 2022-12-19 ENCOUNTER — HOSPITAL ENCOUNTER (OUTPATIENT)
Dept: CT IMAGING | Age: 77
Discharge: HOME | End: 2022-12-19
Attending: FAMILY MEDICINE
Payer: MEDICARE

## 2022-12-19 ENCOUNTER — OFFICE VISIT (OUTPATIENT)
Dept: FAMILY MEDICINE CLINIC | Facility: CLINIC | Age: 77
End: 2022-12-19
Payer: MEDICARE

## 2022-12-19 VITALS
OXYGEN SATURATION: 98 % | SYSTOLIC BLOOD PRESSURE: 123 MMHG | DIASTOLIC BLOOD PRESSURE: 80 MMHG | WEIGHT: 175 LBS | BODY MASS INDEX: 32.2 KG/M2 | HEIGHT: 62 IN | HEART RATE: 75 BPM

## 2022-12-19 DIAGNOSIS — H53.2 DOUBLE VISION: ICD-10-CM

## 2022-12-19 DIAGNOSIS — S09.90XD TRAUMATIC INJURY OF HEAD, SUBSEQUENT ENCOUNTER: ICD-10-CM

## 2022-12-19 DIAGNOSIS — R51.9 PRESSURE IN HEAD: ICD-10-CM

## 2022-12-19 DIAGNOSIS — S09.90XD TRAUMATIC INJURY OF HEAD, SUBSEQUENT ENCOUNTER: Primary | ICD-10-CM

## 2022-12-19 PROCEDURE — 1126F AMNT PAIN NOTED NONE PRSNT: CPT | Performed by: FAMILY MEDICINE

## 2022-12-19 PROCEDURE — 99214 OFFICE O/P EST MOD 30 MIN: CPT | Performed by: FAMILY MEDICINE

## 2022-12-19 PROCEDURE — 70450 CT HEAD/BRAIN W/O DYE: CPT | Performed by: FAMILY MEDICINE

## 2022-12-21 ENCOUNTER — PATIENT OUTREACH (OUTPATIENT)
Dept: CASE MANAGEMENT | Age: 77
End: 2022-12-21

## 2022-12-21 DIAGNOSIS — F41.1 GENERALIZED ANXIETY DISORDER: ICD-10-CM

## 2022-12-21 DIAGNOSIS — M15.9 PRIMARY OSTEOARTHRITIS INVOLVING MULTIPLE JOINTS: ICD-10-CM

## 2022-12-21 DIAGNOSIS — E78.5 HYPERLIPIDEMIA, UNSPECIFIED HYPERLIPIDEMIA TYPE: ICD-10-CM

## 2022-12-21 DIAGNOSIS — I10 ESSENTIAL HYPERTENSION: Primary | ICD-10-CM

## 2022-12-21 DIAGNOSIS — Z86.19 HISTORY OF CLOSTRIDIOIDES DIFFICILE COLITIS: ICD-10-CM

## 2022-12-21 DIAGNOSIS — M06.9 RHEUMATOID ARTHRITIS INVOLVING MULTIPLE SITES, UNSPECIFIED WHETHER RHEUMATOID FACTOR PRESENT (HCC): ICD-10-CM

## 2022-12-28 DIAGNOSIS — I77.9 CAROTID ARTERY DISORDER (HCC): Primary | ICD-10-CM

## 2022-12-30 DIAGNOSIS — Z86.69 HISTORY OF MIGRAINE HEADACHES: ICD-10-CM

## 2022-12-30 DIAGNOSIS — R51.9 LEFT-SIDED HEADACHE: ICD-10-CM

## 2022-12-31 RX ORDER — SUMATRIPTAN 25 MG/1
25 TABLET, FILM COATED ORAL EVERY 2 HOUR PRN
Qty: 9 TABLET | Refills: 0 | Status: SHIPPED | OUTPATIENT
Start: 2022-12-31

## 2022-12-31 NOTE — TELEPHONE ENCOUNTER
Refill passed per [address] protocol. Requested Prescriptions   Pending Prescriptions Disp Refills    SUMATRIPTAN 25 MG Oral Tab [Pharmacy Med Name: Sumatriptan Succinate 25 Mg Tab Nort] 9 tablet 0     Sig: Take 1 tablet (25 mg total) by mouth every 2 (two) hours as needed for Migraine. Use at onset; repeat once after 2 Hours -ONLY 2 IN 24 HR MAX.        Neurology Medications Passed - 12/30/2022  7:26 PM        Passed - In person appointment or virtual visit in the past 6 mos or appointment in next 3 mos     Recent Outpatient Visits              1 week ago Traumatic injury of head, subsequent encounter    Ara Dao MD    Office Visit    1 month ago Nasal congestion    Ara Dao MD    Office Visit    7 months ago Encounter for gynecological examination without abnormal finding    [facility] for Clive Lombardo MD    Office Visit    9 months ago Primary osteoarthritis involving multiple joints    [facility] for Jcarlos Osborne MD    Office Visit    10 months ago Encounter for [facility] [address] [place] [address] Archie Brwon [facility] MD [place]    Office Visit          Future Appointments         Provider Department Appt Notes    In 2 months Lisha Bunch MD [address] [place] Blakely Visits              1 week ago Traumatic injury of head, subsequent encounter    Ara Dao MD    Office Visit    1 month ago Nasal congestion    Ara Dao MD    Office Visit    7 months ago Encounter for gynecological examination without abnormal finding    [facility] for Clive Lombardo MD    Office Visit    9 months ago Primary osteoarthritis involving multiple joints    [facility] for Anastasia Raphael MD    Office Visit    10 months ago Encounter for [facility] [place], Colbert Horn, MD    Office Visit          Future Appointments         Provider Department Appt Notes    In 2 months Magda Hernandez MD [address] by Dr[address] Tacho Suárez

## 2023-01-16 RX ORDER — BACLOFEN 10 MG/1
TABLET ORAL
Qty: 60 TABLET | Refills: 2 | Status: SHIPPED | OUTPATIENT
Start: 2023-01-16

## 2023-01-16 NOTE — TELEPHONE ENCOUNTER
Disp Refills Start End    baclofen 10 MG Oral Tab 60 tablet 1 3/23/2022       LOV: 3/23/22  Future Appointments   Date Time Provider Evangelina Zuletai   3/2/2023  9:00 AM MD Renee Sanches [facility]     Instructions    1. Cont. Tylenol as needed   2. Try baclofen 10mg 20mg at night for muscle cramps - watch for drowsiness   3. cont. gabapentin 200mg in am , 100mg in afternoon, and 300mg in pm   4. Cont. Stretches and exercises  5. Cont. Massages   6. Return to clinic in 6 months.

## 2023-01-23 ENCOUNTER — PATIENT OUTREACH (OUTPATIENT)
Dept: CASE MANAGEMENT | Age: 78
End: 2023-01-23

## 2023-01-23 DIAGNOSIS — F41.1 GENERALIZED ANXIETY DISORDER: ICD-10-CM

## 2023-01-23 DIAGNOSIS — E78.5 HYPERLIPIDEMIA, UNSPECIFIED HYPERLIPIDEMIA TYPE: ICD-10-CM

## 2023-01-23 DIAGNOSIS — M15.9 PRIMARY OSTEOARTHRITIS INVOLVING MULTIPLE JOINTS: ICD-10-CM

## 2023-01-23 DIAGNOSIS — I10 ESSENTIAL HYPERTENSION: Primary | ICD-10-CM

## 2023-01-23 DIAGNOSIS — M06.9 RHEUMATOID ARTHRITIS INVOLVING MULTIPLE SITES, UNSPECIFIED WHETHER RHEUMATOID FACTOR PRESENT (HCC): ICD-10-CM

## 2023-01-26 ENCOUNTER — OFFICE VISIT (OUTPATIENT)
Dept: SURGERY | Facility: CLINIC | Age: 78
End: 2023-01-26
Payer: MEDICARE

## 2023-01-26 ENCOUNTER — TELEPHONE (OUTPATIENT)
Dept: SURGERY | Facility: CLINIC | Age: 78
End: 2023-01-26

## 2023-01-26 VITALS
HEART RATE: 60 BPM | SYSTOLIC BLOOD PRESSURE: 112 MMHG | DIASTOLIC BLOOD PRESSURE: 70 MMHG | HEIGHT: 62 IN | BODY MASS INDEX: 31.65 KG/M2 | WEIGHT: 172 LBS

## 2023-01-26 DIAGNOSIS — I67.1 INTRACRANIAL ANEURYSM: Primary | ICD-10-CM

## 2023-01-26 PROCEDURE — 99204 OFFICE O/P NEW MOD 45 MIN: CPT | Performed by: RADIOLOGY

## 2023-01-26 RX ORDER — CHOLECALCIFEROL (VITAMIN D3) 10(400)/ML
DROPS ORAL
COMMUNITY

## 2023-01-26 NOTE — PROGRESS NOTES
Patient here for evaluation and office visit with Dr. Jodi Gregorio after having been referred by vascular surgery. Patient informed she has ICA. Patient taking losartan and rosuvastatin. Patient completed CTA in 1/12/23.       Review of Systems:    Hand Dominance: right  General: no symptoms reported-some fatigue  Neuro: no symptoms reported   Head: headache-2 or more per month, food allergies  Musculoskeletal: joint pain-hands, scoliosis  Cardiovascular: no symptoms reported  Gastrointestinal: no symptoms reported  Genitourinary: no symptoms reported  Respiratory: no symptoms reported  Eyes: no symptoms reported  Skin: no symptoms reported  Mouth & throat: no symptoms reported  Neck: stiffness, and shoulder pain  Nose: no symptoms reported  Psychiatric: anxiety

## 2023-01-27 ENCOUNTER — TELEPHONE (OUTPATIENT)
Dept: SURGERY | Facility: CLINIC | Age: 78
End: 2023-01-27

## 2023-02-01 ENCOUNTER — OFFICE VISIT (OUTPATIENT)
Dept: RHEUMATOLOGY | Facility: CLINIC | Age: 78
End: 2023-02-01

## 2023-02-01 VITALS
BODY MASS INDEX: 31.65 KG/M2 | RESPIRATION RATE: 16 BRPM | HEIGHT: 62 IN | SYSTOLIC BLOOD PRESSURE: 143 MMHG | HEART RATE: 72 BPM | WEIGHT: 172 LBS | DIASTOLIC BLOOD PRESSURE: 74 MMHG

## 2023-02-01 DIAGNOSIS — M65.341 TRIGGER RING FINGER OF RIGHT HAND: ICD-10-CM

## 2023-02-01 DIAGNOSIS — M65.312 TRIGGER FINGER OF LEFT THUMB: ICD-10-CM

## 2023-02-01 DIAGNOSIS — M15.9 PRIMARY OSTEOARTHRITIS INVOLVING MULTIPLE JOINTS: Primary | ICD-10-CM

## 2023-02-01 DIAGNOSIS — M41.9 SCOLIOSIS OF THORACOLUMBAR SPINE, UNSPECIFIED SCOLIOSIS TYPE: ICD-10-CM

## 2023-02-01 DIAGNOSIS — M65.322 TRIGGER INDEX FINGER OF LEFT HAND: ICD-10-CM

## 2023-02-01 PROCEDURE — 99214 OFFICE O/P EST MOD 30 MIN: CPT | Performed by: INTERNAL MEDICINE

## 2023-02-01 PROCEDURE — 20550 NJX 1 TENDON SHEATH/LIGAMENT: CPT | Performed by: INTERNAL MEDICINE

## 2023-02-01 PROCEDURE — 1125F AMNT PAIN NOTED PAIN PRSNT: CPT | Performed by: INTERNAL MEDICINE

## 2023-02-01 RX ORDER — METHYLPREDNISOLONE ACETATE 80 MG/ML
20 INJECTION, SUSPENSION INTRA-ARTICULAR; INTRALESIONAL; INTRAMUSCULAR; SOFT TISSUE ONCE
Status: COMPLETED | OUTPATIENT
Start: 2023-02-01 | End: 2023-02-01

## 2023-02-01 RX ORDER — METHYLPREDNISOLONE ACETATE 80 MG/ML
20 INJECTION, SUSPENSION INTRA-ARTICULAR; INTRALESIONAL; INTRAMUSCULAR; SOFT TISSUE
Status: COMPLETED | OUTPATIENT
Start: 2023-02-01 | End: 2023-02-01

## 2023-02-01 RX ADMIN — METHYLPREDNISOLONE ACETATE 20 MG: 80 INJECTION, SUSPENSION INTRA-ARTICULAR; INTRALESIONAL; INTRAMUSCULAR; SOFT TISSUE at 10:50:00

## 2023-02-01 RX ADMIN — METHYLPREDNISOLONE ACETATE 20 MG: 80 INJECTION, SUSPENSION INTRA-ARTICULAR; INTRALESIONAL; INTRAMUSCULAR; SOFT TISSUE at 10:45:00

## 2023-02-01 RX ADMIN — METHYLPREDNISOLONE ACETATE 20 MG: 80 INJECTION, SUSPENSION INTRA-ARTICULAR; INTRALESIONAL; INTRAMUSCULAR; SOFT TISSUE at 10:40:00

## 2023-02-01 NOTE — PATIENT INSTRUCTIONS
1. Cont. Tylenol as needed   2. Cont. baclofen 10mg at night as needed. for muscle cramps - w  3. cont. gabapentin 200mg in am , 100mg in afternoon, and 300mg in pm   4. Rest left thumb , left 2nd finger and right 4th finger x 3 days   5. Cont. Massages   6.  Return to clinic in FEb/ 20 at 9 am

## 2023-02-01 NOTE — PROCEDURES
With  consent, I injected the patient's left 1st ,left 2nd and right 4th  Finger each  with 0.25ml lidocaine  1% and 0.25ml depo 80. It was under sterile technique using iodine and alcohol swabs and ethyl chloride was used as an anaesthetic spray. Pt.  tolerated it well.

## 2023-02-10 ENCOUNTER — TELEPHONE (OUTPATIENT)
Dept: FAMILY MEDICINE CLINIC | Facility: CLINIC | Age: 78
End: 2023-02-10

## 2023-02-10 NOTE — TELEPHONE ENCOUNTER
Patient scheduled her pre-op visit on 3-13-23 with Dr Angel Lees at 8:30am.  Just making sure this is early enough before 3-27-23 surgery. This was first available appt. Patient is thankful for Dr Angel Lees, said she saved her life finding this aneurysm.

## 2023-02-16 ENCOUNTER — PATIENT OUTREACH (OUTPATIENT)
Dept: CASE MANAGEMENT | Age: 78
End: 2023-02-16

## 2023-02-16 DIAGNOSIS — M15.9 PRIMARY OSTEOARTHRITIS INVOLVING MULTIPLE JOINTS: ICD-10-CM

## 2023-02-16 DIAGNOSIS — E78.5 HYPERLIPIDEMIA, UNSPECIFIED HYPERLIPIDEMIA TYPE: ICD-10-CM

## 2023-02-16 DIAGNOSIS — F41.1 GENERALIZED ANXIETY DISORDER: ICD-10-CM

## 2023-02-16 DIAGNOSIS — I10 ESSENTIAL HYPERTENSION: Primary | ICD-10-CM

## 2023-02-16 DIAGNOSIS — M06.9 RHEUMATOID ARTHRITIS INVOLVING MULTIPLE SITES, UNSPECIFIED WHETHER RHEUMATOID FACTOR PRESENT (HCC): ICD-10-CM

## 2023-02-16 DIAGNOSIS — Z86.69 HISTORY OF MIGRAINE HEADACHES: ICD-10-CM

## 2023-02-17 DIAGNOSIS — F41.1 GENERALIZED ANXIETY DISORDER: ICD-10-CM

## 2023-02-19 NOTE — TELEPHONE ENCOUNTER
Please review. Protocol failed / No Protocol.     Requested Prescriptions   Pending Prescriptions Disp Refills    BUSPIRONE HCL 30 MG Oral Tab [Pharmacy Med Name: Buspirone Hydrochloride 30 Mg Tab Epic] 90 tablet 0     Sig: TAKE ONE TABLET BY MOUTH ONE TIME DAILY       There is no refill protocol information for this order

## 2023-02-20 ENCOUNTER — OFFICE VISIT (OUTPATIENT)
Dept: RHEUMATOLOGY | Facility: CLINIC | Age: 78
End: 2023-02-20

## 2023-02-20 VITALS
DIASTOLIC BLOOD PRESSURE: 93 MMHG | WEIGHT: 172 LBS | BODY MASS INDEX: 31.65 KG/M2 | RESPIRATION RATE: 16 BRPM | HEART RATE: 73 BPM | HEIGHT: 62 IN | SYSTOLIC BLOOD PRESSURE: 144 MMHG

## 2023-02-20 DIAGNOSIS — M19.011 PRIMARY OSTEOARTHRITIS OF BOTH SHOULDERS: Primary | ICD-10-CM

## 2023-02-20 DIAGNOSIS — M41.9 SCOLIOSIS OF THORACOLUMBAR SPINE, UNSPECIFIED SCOLIOSIS TYPE: ICD-10-CM

## 2023-02-20 DIAGNOSIS — M15.9 PRIMARY OSTEOARTHRITIS INVOLVING MULTIPLE JOINTS: ICD-10-CM

## 2023-02-20 DIAGNOSIS — M79.10 MYALGIA: ICD-10-CM

## 2023-02-20 DIAGNOSIS — M19.012 PRIMARY OSTEOARTHRITIS OF BOTH SHOULDERS: Primary | ICD-10-CM

## 2023-02-20 PROCEDURE — 20610 DRAIN/INJ JOINT/BURSA W/O US: CPT | Performed by: INTERNAL MEDICINE

## 2023-02-20 PROCEDURE — 1125F AMNT PAIN NOTED PAIN PRSNT: CPT | Performed by: INTERNAL MEDICINE

## 2023-02-20 PROCEDURE — 99214 OFFICE O/P EST MOD 30 MIN: CPT | Performed by: INTERNAL MEDICINE

## 2023-02-20 RX ORDER — TRIAMCINOLONE ACETONIDE 40 MG/ML
40 INJECTION, SUSPENSION INTRA-ARTICULAR; INTRAMUSCULAR
Status: COMPLETED | OUTPATIENT
Start: 2023-02-20 | End: 2023-02-20

## 2023-02-20 RX ORDER — GABAPENTIN 100 MG/1
CAPSULE ORAL
Qty: 540 CAPSULE | Refills: 3 | Status: SHIPPED | OUTPATIENT
Start: 2023-02-20

## 2023-02-20 RX ADMIN — TRIAMCINOLONE ACETONIDE 40 MG: 40 INJECTION, SUSPENSION INTRA-ARTICULAR; INTRAMUSCULAR at 09:24:00

## 2023-02-20 RX ADMIN — TRIAMCINOLONE ACETONIDE 40 MG: 40 INJECTION, SUSPENSION INTRA-ARTICULAR; INTRAMUSCULAR at 09:18:00

## 2023-02-20 NOTE — PATIENT INSTRUCTIONS
1. Cont. Tylenol as needed   2. Cont. baclofen 10mg at night as needed. for muscle cramps - w  3. cont. gabapentin 200mg in am , 100mg in afternoon, and 300mg in pm   4. Rest both shoulders x 3 days each  5. Cont. Massages when appropriate after carotid surgery   6.  Return to clinic in 6-12 months

## 2023-02-20 NOTE — PROCEDURES
With paitent's consent, I injected pt's bilat knees eachwith 1ml lidocaine 1 % and 1 ml kenalog 40. It was done under sterile technique using iodine and alcohol swabs and ethyl chloride was used as an anaesthetic spray. Pt.  tolerated it well.

## 2023-02-21 DIAGNOSIS — R51.9 LEFT-SIDED HEADACHE: ICD-10-CM

## 2023-02-21 DIAGNOSIS — Z86.69 HISTORY OF MIGRAINE HEADACHES: ICD-10-CM

## 2023-02-21 RX ORDER — BUSPIRONE HYDROCHLORIDE 30 MG/1
TABLET ORAL
Qty: 90 TABLET | Refills: 0 | Status: SHIPPED | OUTPATIENT
Start: 2023-02-21

## 2023-02-21 RX ORDER — SUMATRIPTAN 25 MG/1
25 TABLET, FILM COATED ORAL EVERY 2 HOUR PRN
Qty: 9 TABLET | Refills: 0 | Status: SHIPPED | OUTPATIENT
Start: 2023-02-21

## 2023-02-21 NOTE — TELEPHONE ENCOUNTER
Refill passed per [employer] [employer], [facility] protocol. Requested Prescriptions   Pending Prescriptions Disp Refills    SUMATRIPTAN 25 MG Oral Tab [Pharmacy Med Name: Sumatriptan Succinate 25 Mg Tab Nort] 9 tablet 0     Sig: Take 1 tablet (25 mg total) by mouth every 2 (two) hours as needed for Migraine.        Neurology Medications Passed - 2/21/2023  1:33 PM        Passed - In person appointment or virtual visit in the past 6 mos or appointment in next 3 mos     Recent Outpatient Visits              Yesterday Primary osteoarthritis involving multiple joints    [address]    Office Visit    2 weeks ago Primary osteoarthritis involving multiple joints    [address]    Office Visit    3 weeks ago Intracranial aneurysm    Manohar Resendiz MD    Office Visit    2 months ago Traumatic injury of head, subsequent encounter    Netta Dixon MD    Office Visit    2 months ago Nasal congestion    Netta Dixon MD    Office Visit          Future Appointments         Provider Department Appt Notes    In 2 weeks Ny Ackerman MD [facility], [address] [place] [address] is 3-27 on internal carotid aneurysm  (policy informed)    In 1 month C[address]                     Recent Outpatient Visits              Yesterday Primary osteoarthritis involving multiple joints    [address]    Office Visit    2 weeks ago Primary osteoarthritis involving multiple joints    [address]    Office Visit    3 weeks ago Intracranial aneurysm    [employer] Raegan Marks MD    Office Visit    2 months ago Traumatic injury of head, subsequent encounter    Derik Alejandro MD    Office Visit    2 months ago Nasal congestion    Collette Urban, Forrest Ravi MD    Office Visit            Future Appointments         Provider Department Appt Notes    In 2 weeks Diallo Tellez MD [facility], [address] [place] [address] Vicente is 3-27 on internal carotid aneurysm  (policy informed)    In 1 month C/ [address]

## 2023-03-13 ENCOUNTER — HOSPITAL ENCOUNTER (OUTPATIENT)
Dept: GENERAL RADIOLOGY | Facility: HOSPITAL | Age: 78
Discharge: HOME | End: 2023-03-13
Attending: FAMILY MEDICINE
Payer: MEDICARE

## 2023-03-13 ENCOUNTER — LAB ENCOUNTER (OUTPATIENT)
Dept: LAB | Age: 78
End: 2023-03-13
Payer: MEDICARE

## 2023-03-13 ENCOUNTER — OFFICE VISIT (OUTPATIENT)
Dept: FAMILY MEDICINE CLINIC | Facility: CLINIC | Age: 78
End: 2023-03-13

## 2023-03-13 ENCOUNTER — LAB ENCOUNTER (OUTPATIENT)
Dept: LAB | Facility: HOSPITAL | Age: 78
End: 2023-03-13
Payer: MEDICARE

## 2023-03-13 VITALS
TEMPERATURE: 95 F | HEIGHT: 62 IN | HEART RATE: 76 BPM | DIASTOLIC BLOOD PRESSURE: 75 MMHG | SYSTOLIC BLOOD PRESSURE: 108 MMHG | WEIGHT: 179 LBS | BODY MASS INDEX: 32.94 KG/M2

## 2023-03-13 DIAGNOSIS — I67.858 OTHER HEREDITARY CEREBROVASCULAR DISEASE: ICD-10-CM

## 2023-03-13 DIAGNOSIS — I67.89 OTHER CEREBROVASCULAR DISEASE: ICD-10-CM

## 2023-03-13 DIAGNOSIS — I10 ESSENTIAL HYPERTENSION: Chronic | ICD-10-CM

## 2023-03-13 DIAGNOSIS — Z01.818 PREOP EXAMINATION: ICD-10-CM

## 2023-03-13 DIAGNOSIS — I67.1 ANEURYSM OF INTERNAL CAROTID ARTERY: ICD-10-CM

## 2023-03-13 DIAGNOSIS — Z01.818 PREOP EXAMINATION: Primary | ICD-10-CM

## 2023-03-13 DIAGNOSIS — I67.1 INTRACRANIAL ANEURYSM: ICD-10-CM

## 2023-03-13 DIAGNOSIS — R51.9 LEFT-SIDED HEADACHE: ICD-10-CM

## 2023-03-13 LAB
ALBUMIN SERPL-MCNC: 3.9 G/DL (ref 3.4–5)
ALBUMIN/GLOB SERPL: 1 {RATIO} (ref 1–2)
ALP LIVER SERPL-CCNC: 137 U/L
ALT SERPL-CCNC: 31 U/L
ANION GAP SERPL CALC-SCNC: 4 MMOL/L (ref 0–18)
ANTIBODY SCREEN: NEGATIVE
APTT PPP: 26.8 SECONDS (ref 23.3–35.6)
AST SERPL-CCNC: 26 U/L (ref 15–37)
BASOPHILS # BLD AUTO: 0.04 X10(3) UL (ref 0–0.2)
BASOPHILS NFR BLD AUTO: 0.7 %
BILIRUB SERPL-MCNC: 0.4 MG/DL (ref 0.1–2)
BUN BLD-MCNC: 18 MG/DL (ref 7–18)
BUN/CREAT SERPL: 20.7 (ref 10–20)
CALCIUM BLD-MCNC: 9.5 MG/DL (ref 8.5–10.1)
CHLORIDE SERPL-SCNC: 103 MMOL/L (ref 98–112)
CO2 SERPL-SCNC: 29 MMOL/L (ref 21–32)
CREAT BLD-MCNC: 0.87 MG/DL
DEPRECATED RDW RBC AUTO: 49.7 FL (ref 35.1–46.3)
EOSINOPHIL # BLD AUTO: 0.11 X10(3) UL (ref 0–0.7)
EOSINOPHIL NFR BLD AUTO: 1.9 %
ERYTHROCYTE [DISTWIDTH] IN BLOOD BY AUTOMATED COUNT: 13.9 % (ref 11–15)
FASTING STATUS PATIENT QL REPORTED: NO
GFR SERPLBLD BASED ON 1.73 SQ M-ARVRAT: 69 ML/MIN/1.73M2 (ref 60–?)
GLOBULIN PLAS-MCNC: 4 G/DL (ref 2.8–4.4)
GLUCOSE BLD-MCNC: 97 MG/DL (ref 70–99)
HCT VFR BLD AUTO: 39.2 %
HGB BLD-MCNC: 13.1 G/DL
IMM GRANULOCYTES # BLD AUTO: 0.03 X10(3) UL (ref 0–1)
IMM GRANULOCYTES NFR BLD: 0.5 %
INR BLD: 0.93 (ref 0.85–1.16)
LYMPHOCYTES # BLD AUTO: 1.35 X10(3) UL (ref 1–4)
LYMPHOCYTES NFR BLD AUTO: 23.4 %
MCH RBC QN AUTO: 32.6 PG (ref 26–34)
MCHC RBC AUTO-ENTMCNC: 33.4 G/DL (ref 31–37)
MCV RBC AUTO: 97.5 FL
MONOCYTES # BLD AUTO: 0.4 X10(3) UL (ref 0.1–1)
MONOCYTES NFR BLD AUTO: 6.9 %
NEUTROPHILS # BLD AUTO: 3.85 X10 (3) UL (ref 1.5–7.7)
NEUTROPHILS # BLD AUTO: 3.85 X10(3) UL (ref 1.5–7.7)
NEUTROPHILS NFR BLD AUTO: 66.6 %
OSMOLALITY SERPL CALC.SUM OF ELEC: 284 MOSM/KG (ref 275–295)
PLATELET # BLD AUTO: 294 10(3)UL (ref 150–450)
POTASSIUM SERPL-SCNC: 3.7 MMOL/L (ref 3.5–5.1)
PROT SERPL-MCNC: 7.9 G/DL (ref 6.4–8.2)
PROTHROMBIN TIME: 12.4 SECONDS (ref 11.6–14.8)
RBC # BLD AUTO: 4.02 X10(6)UL
RH BLOOD TYPE: POSITIVE
SODIUM SERPL-SCNC: 136 MMOL/L (ref 136–145)
WBC # BLD AUTO: 5.8 X10(3) UL (ref 4–11)

## 2023-03-13 PROCEDURE — 99214 OFFICE O/P EST MOD 30 MIN: CPT | Performed by: FAMILY MEDICINE

## 2023-03-13 PROCEDURE — 85730 THROMBOPLASTIN TIME PARTIAL: CPT

## 2023-03-13 PROCEDURE — 86901 BLOOD TYPING SEROLOGIC RH(D): CPT

## 2023-03-13 PROCEDURE — 36415 COLL VENOUS BLD VENIPUNCTURE: CPT

## 2023-03-13 PROCEDURE — 71046 X-RAY EXAM CHEST 2 VIEWS: CPT | Performed by: FAMILY MEDICINE

## 2023-03-13 PROCEDURE — 85610 PROTHROMBIN TIME: CPT

## 2023-03-13 PROCEDURE — 80053 COMPREHEN METABOLIC PANEL: CPT

## 2023-03-13 PROCEDURE — 86900 BLOOD TYPING SEROLOGIC ABO: CPT

## 2023-03-13 PROCEDURE — 86850 RBC ANTIBODY SCREEN: CPT

## 2023-03-13 PROCEDURE — 85025 COMPLETE CBC W/AUTO DIFF WBC: CPT

## 2023-03-15 ENCOUNTER — PATIENT OUTREACH (OUTPATIENT)
Dept: CASE MANAGEMENT | Age: 78
End: 2023-03-15

## 2023-03-21 ENCOUNTER — TELEPHONE (OUTPATIENT)
Dept: FAMILY MEDICINE CLINIC | Facility: CLINIC | Age: 78
End: 2023-03-21

## 2023-03-21 ENCOUNTER — TELEPHONE (OUTPATIENT)
Dept: SURGERY | Facility: CLINIC | Age: 78
End: 2023-03-21

## 2023-03-21 NOTE — TELEPHONE ENCOUNTER
Noted the patient inquiry listed below. Spoke to PRISCILLA Proctor who informed there is not any medications that the patient should specifically stop taking that is in her current medication list. Requested to remind the patient to take her prescribed Asprin and Plavix that was ordered prior to the procedure. Called patient to inform. Pt states:  - she is taking the aspirin and plavix as prescribed for prior to the procedure. Pt acknowledged and is appreciative of the call returned. Nothing further needed for this encounter.

## 2023-03-21 NOTE — TELEPHONE ENCOUNTER
Spoke to patient re:cardiac clearance. States she was seen at [facility] 3/20/23. Will call office for paperwork.

## 2023-03-21 NOTE — TELEPHONE ENCOUNTER
Spoke with Lucas Leon at [facility] to request cardiac clearance. Per Lucas Leon, she will fax paperwork.

## 2023-03-21 NOTE — TELEPHONE ENCOUNTER
Pt wants to know if she needs to stop taking her medication and vitamins prior to her procedure on 3/27/23.  Pt's best call back number is [phone]

## 2023-03-24 ENCOUNTER — LAB ENCOUNTER (OUTPATIENT)
Dept: LAB | Facility: HOSPITAL | Age: 78
End: 2023-03-24
Payer: MEDICARE

## 2023-03-24 ENCOUNTER — TELEPHONE (OUTPATIENT)
Dept: FAMILY MEDICINE CLINIC | Facility: CLINIC | Age: 78
End: 2023-03-24

## 2023-03-24 ENCOUNTER — TELEPHONE (OUTPATIENT)
Dept: SURGERY | Facility: CLINIC | Age: 78
End: 2023-03-24

## 2023-03-24 DIAGNOSIS — I67.89 OTHER CEREBROVASCULAR DISEASE: ICD-10-CM

## 2023-03-24 DIAGNOSIS — I67.1 INTRACRANIAL ANEURYSM: ICD-10-CM

## 2023-03-24 DIAGNOSIS — I67.1 INTRACRANIAL ANEURYSM: Primary | ICD-10-CM

## 2023-03-24 DIAGNOSIS — I67.858 OTHER HEREDITARY CEREBROVASCULAR DISEASE: ICD-10-CM

## 2023-03-24 LAB
PA ADP PRP-ACNC: 186
SARS-COV-2 RNA RESP QL NAA+PROBE: NOT DETECTED

## 2023-03-24 PROCEDURE — 36415 COLL VENOUS BLD VENIPUNCTURE: CPT

## 2023-03-24 PROCEDURE — 85576 BLOOD PLATELET AGGREGATION: CPT

## 2023-03-24 NOTE — TELEPHONE ENCOUNTER
Recent P2Y12 resulted at 186. Discussed with Dr. Jodi Gregorio. Will discontinue plavix and start Brilinta 90mg BID. She is to also remain on ASA 81mg daily. Will repeat P2Y12 the am of procedure. Orders have been placed for the above. I called and discussed this with the patient. She stated understanding and appreciation for the call.

## 2023-03-24 NOTE — TELEPHONE ENCOUNTER
Noted elevated lab result as mentioned by Filiberto Hamman, APN. Patient to have repeat lab which has been ordered today by provider. Patient to undergo flow diverter stent placement on 3/27/23.

## 2023-03-27 ENCOUNTER — APPOINTMENT (OUTPATIENT)
Dept: CT IMAGING | Facility: HOSPITAL | Age: 78
DRG: 025 | End: 2023-03-27
Attending: RADIOLOGY
Payer: MEDICARE

## 2023-03-27 ENCOUNTER — NURSE ONLY (OUTPATIENT)
Dept: ELECTROPHYSIOLOGY | Facility: HOSPITAL | Age: 78
End: 2023-03-27
Attending: NURSE PRACTITIONER
Payer: MEDICARE

## 2023-03-27 ENCOUNTER — HOSPITAL ENCOUNTER (INPATIENT)
Dept: INTERVENTIONAL RADIOLOGY/VASCULAR | Facility: HOSPITAL | Age: 78
LOS: 3 days | Discharge: NURSING, REHAB OR LONG-TERM CARE | DRG: 025 | End: 2023-03-31
Admitting: FAMILY MEDICINE
Payer: MEDICARE

## 2023-03-27 ENCOUNTER — ANESTHESIA EVENT (OUTPATIENT)
Dept: INTERVENTIONAL RADIOLOGY/VASCULAR | Facility: HOSPITAL | Age: 78
End: 2023-03-27
Payer: MEDICARE

## 2023-03-27 DIAGNOSIS — E78.5 HYPERLIPIDEMIA, UNSPECIFIED HYPERLIPIDEMIA TYPE: ICD-10-CM

## 2023-03-27 DIAGNOSIS — I67.1 INTRACRANIAL ANEURYSM: ICD-10-CM

## 2023-03-27 DIAGNOSIS — I63.9 CEREBROVASCULAR ACCIDENT (CVA), UNSPECIFIED MECHANISM (HCC): Primary | ICD-10-CM

## 2023-03-27 PROBLEM — I10 PRIMARY HYPERTENSION: Status: ACTIVE | Noted: 2019-08-19

## 2023-03-27 PROBLEM — M06.9 RHEUMATOID ARTHRITIS (HCC): Status: ACTIVE | Noted: 2020-07-16

## 2023-03-27 LAB
ALBUMIN SERPL-MCNC: 3.3 G/DL (ref 3.4–5)
ALBUMIN/GLOB SERPL: 0.9 {RATIO} (ref 1–2)
ALP LIVER SERPL-CCNC: 133 U/L
ALT SERPL-CCNC: 33 U/L
ANION GAP SERPL CALC-SCNC: 4 MMOL/L (ref 0–18)
ASPIRIN PLT INHIBITION: 381 ARU (ref 620–672)
AST SERPL-CCNC: 29 U/L (ref 15–37)
BILIRUB SERPL-MCNC: 0.3 MG/DL (ref 0.1–2)
BUN BLD-MCNC: 15 MG/DL (ref 7–18)
CALCIUM BLD-MCNC: 8.5 MG/DL (ref 8.5–10.1)
CHLORIDE SERPL-SCNC: 113 MMOL/L (ref 98–112)
CO2 SERPL-SCNC: 21 MMOL/L (ref 21–32)
CREAT BLD-MCNC: 0.72 MG/DL
ERYTHROCYTE [DISTWIDTH] IN BLOOD BY AUTOMATED COUNT: 14.4 %
GFR SERPLBLD BASED ON 1.73 SQ M-ARVRAT: 87 ML/MIN/1.73M2 (ref 60–?)
GLOBULIN PLAS-MCNC: 3.7 G/DL (ref 2.8–4.4)
GLUCOSE BLD-MCNC: 121 MG/DL (ref 70–99)
HCT VFR BLD AUTO: 39 %
HGB BLD-MCNC: 12.6 G/DL
ISTAT ACTIVATED CLOTTING TIME: 143 SECONDS (ref 74–137)
ISTAT ACTIVATED CLOTTING TIME: 197 SECONDS (ref 74–137)
ISTAT ACTIVATED CLOTTING TIME: 203 SECONDS (ref 74–137)
ISTAT ACTIVATED CLOTTING TIME: 203 SECONDS (ref 74–137)
ISTAT ACTIVATED CLOTTING TIME: 209 SECONDS (ref 74–137)
ISTAT ACTIVATED CLOTTING TIME: 215 SECONDS (ref 74–137)
MCH RBC QN AUTO: 31.9 PG (ref 26–34)
MCHC RBC AUTO-ENTMCNC: 32.3 G/DL (ref 31–37)
MCV RBC AUTO: 98.7 FL
OSMOLALITY SERPL CALC.SUM OF ELEC: 288 MOSM/KG (ref 275–295)
P2Y12 REACTION UNITS: 16 PRU
PLATELET # BLD AUTO: 268 10(3)UL (ref 150–450)
POTASSIUM SERPL-SCNC: 3.8 MMOL/L (ref 3.5–5.1)
PROT SERPL-MCNC: 7 G/DL (ref 6.4–8.2)
RBC # BLD AUTO: 3.95 X10(6)UL
SODIUM SERPL-SCNC: 138 MMOL/L (ref 136–145)
WBC # BLD AUTO: 5.9 X10(3) UL (ref 4–11)

## 2023-03-27 PROCEDURE — 99291 CRITICAL CARE FIRST HOUR: CPT | Performed by: OTHER

## 2023-03-27 PROCEDURE — 70498 CT ANGIOGRAPHY NECK: CPT | Performed by: RADIOLOGY

## 2023-03-27 PROCEDURE — 0042T CT STROKE(DAWN BRAIN)CTA BRAIN/CTA NECK+PERF(CPT=70496/70498/0042T): CPT | Performed by: RADIOLOGY

## 2023-03-27 PROCEDURE — 99223 1ST HOSP IP/OBS HIGH 75: CPT | Performed by: HOSPITALIST

## 2023-03-27 PROCEDURE — B31QYZZ FLUOROSCOPY OF CERVICO-CEREBRAL ARCH USING OTHER CONTRAST: ICD-10-PCS | Performed by: RADIOLOGY

## 2023-03-27 PROCEDURE — 70450 CT HEAD/BRAIN W/O DYE: CPT | Performed by: RADIOLOGY

## 2023-03-27 PROCEDURE — 70496 CT ANGIOGRAPHY HEAD: CPT | Performed by: RADIOLOGY

## 2023-03-27 PROCEDURE — 03VG3HZ RESTRICTION OF INTRACRANIAL ARTERY WITH INTRALUMINAL DEVICE, FLOW DIVERTER, PERCUTANEOUS APPROACH: ICD-10-PCS | Performed by: RADIOLOGY

## 2023-03-27 PROCEDURE — 03VG3DZ RESTRICTION OF INTRACRANIAL ARTERY WITH INTRALUMINAL DEVICE, PERCUTANEOUS APPROACH: ICD-10-PCS | Performed by: RADIOLOGY

## 2023-03-27 RX ORDER — ROCURONIUM BROMIDE 10 MG/ML
INJECTION, SOLUTION INTRAVENOUS AS NEEDED
Status: DISCONTINUED | OUTPATIENT
Start: 2023-03-27 | End: 2023-03-27 | Stop reason: SURG

## 2023-03-27 RX ORDER — PANTOPRAZOLE SODIUM 20 MG/1
20 TABLET, DELAYED RELEASE ORAL EVERY EVENING
Status: DISCONTINUED | OUTPATIENT
Start: 2023-03-27 | End: 2023-03-31

## 2023-03-27 RX ORDER — ONDANSETRON 2 MG/ML
4 INJECTION INTRAMUSCULAR; INTRAVENOUS ONCE AS NEEDED
Status: ACTIVE | OUTPATIENT
Start: 2023-03-27 | End: 2023-03-27

## 2023-03-27 RX ORDER — SODIUM CHLORIDE, SODIUM LACTATE, POTASSIUM CHLORIDE, CALCIUM CHLORIDE 600; 310; 30; 20 MG/100ML; MG/100ML; MG/100ML; MG/100ML
INJECTION, SOLUTION INTRAVENOUS CONTINUOUS
Status: DISCONTINUED | OUTPATIENT
Start: 2023-03-27 | End: 2023-03-28

## 2023-03-27 RX ORDER — NALOXONE HYDROCHLORIDE 0.4 MG/ML
80 INJECTION, SOLUTION INTRAMUSCULAR; INTRAVENOUS; SUBCUTANEOUS AS NEEDED
Status: ACTIVE | OUTPATIENT
Start: 2023-03-27 | End: 2023-03-27

## 2023-03-27 RX ORDER — ACETAMINOPHEN 325 MG/1
650 TABLET ORAL EVERY 4 HOURS PRN
Status: DISCONTINUED | OUTPATIENT
Start: 2023-03-27 | End: 2023-03-31

## 2023-03-27 RX ORDER — METOCLOPRAMIDE HYDROCHLORIDE 5 MG/ML
10 INJECTION INTRAMUSCULAR; INTRAVENOUS EVERY 8 HOURS PRN
Status: DISCONTINUED | OUTPATIENT
Start: 2023-03-27 | End: 2023-03-31

## 2023-03-27 RX ORDER — LABETALOL HYDROCHLORIDE 5 MG/ML
10 INJECTION, SOLUTION INTRAVENOUS EVERY 10 MIN PRN
Status: COMPLETED | OUTPATIENT
Start: 2023-03-27 | End: 2023-03-28

## 2023-03-27 RX ORDER — CLINDAMYCIN PHOSPHATE 900 MG/50ML
INJECTION INTRAVENOUS AS NEEDED
Status: DISCONTINUED | OUTPATIENT
Start: 2023-03-27 | End: 2023-03-27 | Stop reason: SURG

## 2023-03-27 RX ORDER — DEXAMETHASONE SODIUM PHOSPHATE 4 MG/ML
VIAL (ML) INJECTION AS NEEDED
Status: DISCONTINUED | OUTPATIENT
Start: 2023-03-27 | End: 2023-03-27 | Stop reason: SURG

## 2023-03-27 RX ORDER — PROTAMINE SULFATE 10 MG/ML
INJECTION, SOLUTION INTRAVENOUS AS NEEDED
Status: DISCONTINUED | OUTPATIENT
Start: 2023-03-27 | End: 2023-03-27 | Stop reason: SURG

## 2023-03-27 RX ORDER — ONDANSETRON 2 MG/ML
4 INJECTION INTRAMUSCULAR; INTRAVENOUS EVERY 6 HOURS PRN
Status: DISCONTINUED | OUTPATIENT
Start: 2023-03-27 | End: 2023-03-29

## 2023-03-27 RX ORDER — HEPARIN SODIUM 1000 [USP'U]/ML
INJECTION, SOLUTION INTRAVENOUS; SUBCUTANEOUS
Status: COMPLETED
Start: 2023-03-27 | End: 2023-03-27

## 2023-03-27 RX ORDER — CAFFEINE CITRATE 20 MG/ML
SOLUTION ORAL
Status: COMPLETED
Start: 2023-03-27 | End: 2023-03-27

## 2023-03-27 RX ORDER — IODIXANOL 320 MG/ML
250 INJECTION, SOLUTION INTRAVASCULAR
Status: COMPLETED | OUTPATIENT
Start: 2023-03-27 | End: 2023-03-27

## 2023-03-27 RX ORDER — HYDROCODONE BITARTRATE AND ACETAMINOPHEN 5; 325 MG/1; MG/1
1 TABLET ORAL ONCE AS NEEDED
Status: ACTIVE | OUTPATIENT
Start: 2023-03-27 | End: 2023-03-27

## 2023-03-27 RX ORDER — VERAPAMIL HYDROCHLORIDE 2.5 MG/ML
INJECTION, SOLUTION INTRAVENOUS
Status: COMPLETED
Start: 2023-03-27 | End: 2023-03-27

## 2023-03-27 RX ORDER — HYDROCODONE BITARTRATE AND ACETAMINOPHEN 5; 325 MG/1; MG/1
2 TABLET ORAL ONCE AS NEEDED
Status: ACTIVE | OUTPATIENT
Start: 2023-03-27 | End: 2023-03-27

## 2023-03-27 RX ORDER — CETIRIZINE HYDROCHLORIDE 10 MG/1
10 TABLET ORAL DAILY
Status: DISCONTINUED | OUTPATIENT
Start: 2023-03-27 | End: 2023-03-31

## 2023-03-27 RX ORDER — ACETAMINOPHEN 650 MG/1
650 SUPPOSITORY RECTAL EVERY 4 HOURS PRN
Status: DISCONTINUED | OUTPATIENT
Start: 2023-03-27 | End: 2023-03-31

## 2023-03-27 RX ORDER — LIDOCAINE HYDROCHLORIDE 10 MG/ML
INJECTION, SOLUTION EPIDURAL; INFILTRATION; INTRACAUDAL; PERINEURAL AS NEEDED
Status: DISCONTINUED | OUTPATIENT
Start: 2023-03-27 | End: 2023-03-27 | Stop reason: SURG

## 2023-03-27 RX ORDER — SODIUM CHLORIDE 9 MG/ML
INJECTION, SOLUTION INTRAVENOUS CONTINUOUS PRN
Status: DISCONTINUED | OUTPATIENT
Start: 2023-03-27 | End: 2023-03-27 | Stop reason: SURG

## 2023-03-27 RX ORDER — ASPIRIN 81 MG/1
81 TABLET, CHEWABLE ORAL DAILY
Status: DISCONTINUED | OUTPATIENT
Start: 2023-03-28 | End: 2023-03-31

## 2023-03-27 RX ORDER — MORPHINE SULFATE 4 MG/ML
1 INJECTION, SOLUTION INTRAMUSCULAR; INTRAVENOUS EVERY 2 HOUR PRN
Status: DISCONTINUED | OUTPATIENT
Start: 2023-03-27 | End: 2023-03-28

## 2023-03-27 RX ORDER — MORPHINE SULFATE 4 MG/ML
2 INJECTION, SOLUTION INTRAMUSCULAR; INTRAVENOUS EVERY 2 HOUR PRN
Status: DISCONTINUED | OUTPATIENT
Start: 2023-03-27 | End: 2023-03-28

## 2023-03-27 RX ORDER — LIDOCAINE HYDROCHLORIDE 10 MG/ML
INJECTION, SOLUTION INFILTRATION; PERINEURAL
Status: COMPLETED
Start: 2023-03-27 | End: 2023-03-27

## 2023-03-27 RX ORDER — DEXAMETHASONE SODIUM PHOSPHATE 4 MG/ML
4 VIAL (ML) INJECTION ONCE AS NEEDED
Status: ACTIVE | OUTPATIENT
Start: 2023-03-27 | End: 2023-03-27

## 2023-03-27 RX ORDER — GABAPENTIN 100 MG/1
200 CAPSULE ORAL DAILY
Status: DISCONTINUED | OUTPATIENT
Start: 2023-03-28 | End: 2023-03-31

## 2023-03-27 RX ORDER — HEPARIN SODIUM 5000 [USP'U]/ML
INJECTION, SOLUTION INTRAVENOUS; SUBCUTANEOUS AS NEEDED
Status: DISCONTINUED | OUTPATIENT
Start: 2023-03-27 | End: 2023-03-27 | Stop reason: SURG

## 2023-03-27 RX ORDER — GABAPENTIN 300 MG/1
300 CAPSULE ORAL NIGHTLY
Status: DISCONTINUED | OUTPATIENT
Start: 2023-03-27 | End: 2023-03-31

## 2023-03-27 RX ORDER — GABAPENTIN 100 MG/1
100 CAPSULE ORAL EVERY 24 HOURS
Status: DISCONTINUED | OUTPATIENT
Start: 2023-03-27 | End: 2023-03-31

## 2023-03-27 RX ORDER — ONDANSETRON 2 MG/ML
INJECTION INTRAMUSCULAR; INTRAVENOUS AS NEEDED
Status: DISCONTINUED | OUTPATIENT
Start: 2023-03-27 | End: 2023-03-27 | Stop reason: SURG

## 2023-03-27 RX ORDER — VENLAFAXINE HYDROCHLORIDE 75 MG/1
150 CAPSULE, EXTENDED RELEASE ORAL DAILY
Status: DISCONTINUED | OUTPATIENT
Start: 2023-03-28 | End: 2023-03-31

## 2023-03-27 RX ORDER — HYDRALAZINE HYDROCHLORIDE 20 MG/ML
10 INJECTION INTRAMUSCULAR; INTRAVENOUS EVERY 4 HOURS PRN
Status: DISCONTINUED | OUTPATIENT
Start: 2023-03-27 | End: 2023-03-31

## 2023-03-27 RX ORDER — ROSUVASTATIN CALCIUM 10 MG/1
10 TABLET, COATED ORAL NIGHTLY
Status: DISCONTINUED | OUTPATIENT
Start: 2023-03-27 | End: 2023-03-29

## 2023-03-27 RX ORDER — BUSPIRONE HYDROCHLORIDE 15 MG/1
30 TABLET ORAL DAILY
Status: DISCONTINUED | OUTPATIENT
Start: 2023-03-27 | End: 2023-03-31

## 2023-03-27 RX ORDER — MIDAZOLAM HYDROCHLORIDE 1 MG/ML
1 INJECTION INTRAMUSCULAR; INTRAVENOUS EVERY 5 MIN PRN
Status: ACTIVE | OUTPATIENT
Start: 2023-03-27 | End: 2023-03-27

## 2023-03-27 RX ADMIN — HEPARIN SODIUM 1000 UNITS: 5000 INJECTION, SOLUTION INTRAVENOUS; SUBCUTANEOUS at 09:53:00

## 2023-03-27 RX ADMIN — BUSPIRONE HYDROCHLORIDE 30 MG: 15 TABLET ORAL at 15:38:00

## 2023-03-27 RX ADMIN — ROCURONIUM BROMIDE 50 MG: 10 INJECTION, SOLUTION INTRAVENOUS at 08:18:00

## 2023-03-27 RX ADMIN — PROTAMINE SULFATE 10 MG: 10 INJECTION, SOLUTION INTRAVENOUS at 11:34:00

## 2023-03-27 RX ADMIN — ROCURONIUM BROMIDE 20 MG: 10 INJECTION, SOLUTION INTRAVENOUS at 10:47:00

## 2023-03-27 RX ADMIN — LABETALOL HYDROCHLORIDE 10 MG: 5 INJECTION, SOLUTION INTRAVENOUS at 23:15:00

## 2023-03-27 RX ADMIN — ROCURONIUM BROMIDE 20 MG: 10 INJECTION, SOLUTION INTRAVENOUS at 10:12:00

## 2023-03-27 RX ADMIN — HEPARIN SODIUM 1500 UNITS: 5000 INJECTION, SOLUTION INTRAVENOUS; SUBCUTANEOUS at 10:48:00

## 2023-03-27 RX ADMIN — GABAPENTIN 300 MG: 300 CAPSULE ORAL at 20:16:00

## 2023-03-27 RX ADMIN — LIDOCAINE HYDROCHLORIDE 50 MG: 10 INJECTION, SOLUTION EPIDURAL; INFILTRATION; INTRACAUDAL; PERINEURAL at 08:18:00

## 2023-03-27 RX ADMIN — ROSUVASTATIN CALCIUM 10 MG: 10 TABLET, COATED ORAL at 20:16:00

## 2023-03-27 RX ADMIN — DEXAMETHASONE SODIUM PHOSPHATE 8 MG: 4 MG/ML VIAL (ML) INJECTION at 08:17:00

## 2023-03-27 RX ADMIN — SODIUM CHLORIDE: 9 INJECTION, SOLUTION INTRAVENOUS at 08:05:00

## 2023-03-27 RX ADMIN — HEPARIN SODIUM 4000 UNITS: 5000 INJECTION, SOLUTION INTRAVENOUS; SUBCUTANEOUS at 09:12:00

## 2023-03-27 RX ADMIN — PROTAMINE SULFATE 30 MG: 10 INJECTION, SOLUTION INTRAVENOUS at 11:36:00

## 2023-03-27 RX ADMIN — ONDANSETRON 4 MG: 2 INJECTION INTRAMUSCULAR; INTRAVENOUS at 11:03:00

## 2023-03-27 RX ADMIN — CLINDAMYCIN PHOSPHATE 900 MG: 900 INJECTION INTRAVENOUS at 08:15:00

## 2023-03-27 RX ADMIN — ROCURONIUM BROMIDE 20 MG: 10 INJECTION, SOLUTION INTRAVENOUS at 09:36:00

## 2023-03-27 RX ADMIN — HEPARIN SODIUM 500 UNITS: 5000 INJECTION, SOLUTION INTRAVENOUS; SUBCUTANEOUS at 09:30:00

## 2023-03-27 RX ADMIN — IODIXANOL 90 ML: 320 INJECTION, SOLUTION INTRAVASCULAR at 11:57:00

## 2023-03-27 RX ADMIN — GABAPENTIN 100 MG: 100 CAPSULE ORAL at 15:39:00

## 2023-03-27 RX ADMIN — PANTOPRAZOLE SODIUM 20 MG: 20 TABLET, DELAYED RELEASE ORAL at 19:51:00

## 2023-03-27 RX ADMIN — ACETAMINOPHEN 650 MG: 325 TABLET ORAL at 14:21:00

## 2023-03-27 RX ADMIN — CETIRIZINE HYDROCHLORIDE 10 MG: 10 TABLET ORAL at 15:38:00

## 2023-03-27 RX ADMIN — ROCURONIUM BROMIDE 30 MG: 10 INJECTION, SOLUTION INTRAVENOUS at 09:15:00

## 2023-03-27 RX ADMIN — ROCURONIUM BROMIDE 10 MG: 10 INJECTION, SOLUTION INTRAVENOUS at 11:22:00

## 2023-03-27 NOTE — PROCEDURES
[facility]  Neurointerventional Surgery Operative Report  Alfredo Angeles Patient Status:  Outpatient    3/15/1945 MRN [id number]   Location [address] [facility] Attending Dylan Cornejos, [facility] Day # 0 PCP Nik Rebolledo MD     Procedure: cervicocerebral angiography, coil occlusion of cerebral aneurysm and placement of PED across the aneurysm neck. Physicians: DAYANA Lyons. Technique: Sterile technique, US guidance and fluoroscopic guidance    Anesthesia: Local and General    Procedure Details   Informed consent was obtained from the patient after explaining the procedure, it's rationale and risks including but not limited to infection, bleeding, contrast rxn/nephropathy, vascular injury, stroke, death, ICH and SAH. The patient expressed an understanding of the procedure and its risks and a willingness to undergo the procedure. The patient was brought to the inteventional suite where a standard timeout procedure was performed prior to commencing the procedure. At the termination of the procedure, the devices were removed and hemostasis was achieved using manual compression and angioseal.    Findings:  A full report is to follow. The preliminary findings are :  1. Difficult arch/proximal segment of LICA requiring placement of bee wire for stability. 2. Successful rx of the aneurysm with placement of a single coil, followed by placement of a 4mm PED shield device across the neck of the aneurysm. 3. Continued patency of fetal L PCA. The findings were discussed with: N/A    Estimated Blood Loss:  less than 50 mL         Complications:  None; patient tolerated the procedure well. Disposition: ICU - extubated and stable. Condition: stable    Plan: The patient will be sent directly to the Neurospine ICU.       Africa Duarte MD  3/27/2023

## 2023-03-27 NOTE — ANESTHESIA PROCEDURE NOTES
Airway  Date/Time: 3/27/2023 8:19 AM  Urgency: elective      General Information and Staff    Patient location during procedure: OR  Anesthesiologist: Manuel Jackson MD  Performed: anesthesiologist   Performed by: Manuel Jackson MD  Authorized by: Manuel Jackson MD      Indications and Patient Condition  Indications for airway management: anesthesia  Sedation level: deep  Preoxygenated: yes  Patient position: sniffing  Mask difficulty assessment: 1 - vent by mask    Final Airway Details  Final airway type: endotracheal airway      Successful airway: ETT  Cuffed: yes   Successful intubation technique: direct laryngoscopy  Endotracheal tube insertion site: oral  Blade: Bj  Blade size: #3  ETT size (mm): 7.0    Cormack-Lehane Classification: grade I - full view of glottis  Placement verified by: chest auscultation and capnometry   Cuff volume (mL): 5  Measured from: lips  ETT to lips (cm): 22  Number of attempts at approach: 1  Number of other approaches attempted: 0

## 2023-03-27 NOTE — ANESTHESIA PROCEDURE NOTES
Peripheral IV  Date/Time: 3/27/2023 8:07 AM  Inserted by: Brittany Eaton MD    Placement  Needle size: 18 G  Laterality: left  Location: antecubital  Local anesthetic: none  Site prep: alcohol  Technique: anatomical landmarks  Attempts: 1

## 2023-03-27 NOTE — PLAN OF CARE
Assumed care of patient about 1230 s/p cerebral stenting with CHANO. Neuro checks, pulse checks, and groin checks all assessed and charted in flowsheets per protocol. Patient was slow to respond (AxO x4) and RUE positive for drift and weakness upon arrival but improved throughout the shift (Jackson, CHANO, and hospitalist Mds all made aware) CHANO stated she had those symptoms post op downstairs while still in [facility] lab. RUE still 4/5 but drift isn't as extreme. +2 pedal pulses with good color and sensation intact. Bilateral groins soft with no palpable hematoma with C,D,I dressings. Patient weaned to RA with O2 in the 90's. NSR on tele monitor with SBP WNL. Tolerating meds and meals. Jolly in place with good UO. Tylenol given for 4/10 HA with improvement. Patient laid supine for 2 hrs per order and then slowly sat up with no issues. POC discussed with patient and family at bedside. 1700: RUE changed from 4/5 to 2/5 and patient seems to be taking longer to respond to questions than previous neuro exams. Patient still AxO x4. Jackson notified with orders to check again in 30 min as it could be related to meds given prior. RUE still 2/5 with patient having delayed responses so CHANO Dr. Olga Sandy paged. Dr. Olga Sandy at bedside ordering CT stroke. Dr. Olga Sandy notified of CT stroke results. Per Dr. Olga Sandy, Jackson will be ordering EEG for tonight. Problem: NEUROLOGICAL - ADULT  Goal: Achieves stable or improved neurological status  Description: INTERVENTIONS  - Assess for and report changes in neurological status  - Initiate measures to prevent increased intracranial pressure  - Maintain blood pressure and fluid volume within ordered parameters to optimize cerebral perfusion and minimize risk of hemorrhage  - Monitor temperature, glucose, and sodium.  Initiate appropriate interventions as ordered  Outcome: Progressing     Problem: PAIN - ADULT  Goal: Verbalizes/displays adequate comfort level or patient's stated pain goal  Description: INTERVENTIONS:  - Encourage pt to monitor pain and request assistance  - Assess pain using appropriate pain scale  - Administer analgesics based on type and severity of pain and evaluate response  - Implement non-pharmacological measures as appropriate and evaluate response  - Consider cultural and social influences on pain and pain management  - Manage/alleviate anxiety  - Utilize distraction and/or relaxation techniques  - Monitor for opioid side effects  - Notify MD/LIP if interventions unsuccessful or patient reports new pain  - Anticipate increased pain with activity and pre-medicate as appropriate  Outcome: Progressing

## 2023-03-28 ENCOUNTER — APPOINTMENT (OUTPATIENT)
Dept: MRI IMAGING | Facility: HOSPITAL | Age: 78
DRG: 025 | End: 2023-03-28
Payer: MEDICARE

## 2023-03-28 LAB
ANION GAP SERPL CALC-SCNC: 8 MMOL/L (ref 0–18)
BUN BLD-MCNC: 14 MG/DL (ref 7–18)
CALCIUM BLD-MCNC: 8.6 MG/DL (ref 8.5–10.1)
CHLORIDE SERPL-SCNC: 110 MMOL/L (ref 98–112)
CO2 SERPL-SCNC: 23 MMOL/L (ref 21–32)
CREAT BLD-MCNC: 0.62 MG/DL
ERYTHROCYTE [DISTWIDTH] IN BLOOD BY AUTOMATED COUNT: 14.6 %
GFR SERPLBLD BASED ON 1.73 SQ M-ARVRAT: 92 ML/MIN/1.73M2 (ref 60–?)
GLUCOSE BLD-MCNC: 116 MG/DL (ref 70–99)
HCT VFR BLD AUTO: 34.3 %
HGB BLD-MCNC: 11.6 G/DL
MCH RBC QN AUTO: 32.1 PG (ref 26–34)
MCHC RBC AUTO-ENTMCNC: 33.8 G/DL (ref 31–37)
MCV RBC AUTO: 95 FL
OSMOLALITY SERPL CALC.SUM OF ELEC: 293 MOSM/KG (ref 275–295)
P2Y12 REACTION UNITS: 21 PRU
PLATELET # BLD AUTO: 292 10(3)UL (ref 150–450)
POTASSIUM SERPL-SCNC: 3.3 MMOL/L (ref 3.5–5.1)
RBC # BLD AUTO: 3.61 X10(6)UL
SODIUM SERPL-SCNC: 141 MMOL/L (ref 136–145)
WBC # BLD AUTO: 8.9 X10(3) UL (ref 4–11)

## 2023-03-28 PROCEDURE — 99232 SBSQ HOSP IP/OBS MODERATE 35: CPT

## 2023-03-28 PROCEDURE — 99291 CRITICAL CARE FIRST HOUR: CPT | Performed by: OTHER

## 2023-03-28 PROCEDURE — 70549 MR ANGIOGRAPH NECK W/O&W/DYE: CPT

## 2023-03-28 PROCEDURE — 70553 MRI BRAIN STEM W/O & W/DYE: CPT

## 2023-03-28 PROCEDURE — 70546 MR ANGIOGRAPH HEAD W/O&W/DYE: CPT

## 2023-03-28 PROCEDURE — 99233 SBSQ HOSP IP/OBS HIGH 50: CPT | Performed by: HOSPITALIST

## 2023-03-28 RX ORDER — POTASSIUM CHLORIDE 1.5 G/1.77G
40 POWDER, FOR SOLUTION ORAL ONCE
Status: COMPLETED | OUTPATIENT
Start: 2023-03-28 | End: 2023-03-28

## 2023-03-28 RX ORDER — LOSARTAN POTASSIUM 50 MG/1
50 TABLET ORAL DAILY
Status: DISCONTINUED | OUTPATIENT
Start: 2023-03-28 | End: 2023-03-31

## 2023-03-28 RX ORDER — GADOTERATE MEGLUMINE 376.9 MG/ML
20 INJECTION INTRAVENOUS
Status: COMPLETED | OUTPATIENT
Start: 2023-03-28 | End: 2023-03-28

## 2023-03-28 RX ADMIN — LABETALOL HYDROCHLORIDE 10 MG: 5 INJECTION, SOLUTION INTRAVENOUS at 03:12:00

## 2023-03-28 RX ADMIN — GABAPENTIN 100 MG: 100 CAPSULE ORAL at 15:19:00

## 2023-03-28 RX ADMIN — POTASSIUM CHLORIDE 40 MEQ: 1.5 POWDER, FOR SOLUTION ORAL at 09:49:00

## 2023-03-28 RX ADMIN — GABAPENTIN 200 MG: 100 CAPSULE ORAL at 08:47:00

## 2023-03-28 RX ADMIN — HYDRALAZINE HYDROCHLORIDE 10 MG: 20 INJECTION INTRAMUSCULAR; INTRAVENOUS at 17:01:00

## 2023-03-28 RX ADMIN — LOSARTAN POTASSIUM 50 MG: 50 TABLET ORAL at 18:07:00

## 2023-03-28 RX ADMIN — ROSUVASTATIN CALCIUM 10 MG: 10 TABLET, COATED ORAL at 20:44:00

## 2023-03-28 RX ADMIN — GABAPENTIN 300 MG: 300 CAPSULE ORAL at 20:44:00

## 2023-03-28 RX ADMIN — HYDRALAZINE HYDROCHLORIDE 10 MG: 20 INJECTION INTRAMUSCULAR; INTRAVENOUS at 09:49:00

## 2023-03-28 RX ADMIN — ASPIRIN 81 MG: 81 TABLET, CHEWABLE ORAL at 08:47:00

## 2023-03-28 RX ADMIN — GADOTERATE MEGLUMINE 18 ML: 376.9 INJECTION INTRAVENOUS at 14:55:00

## 2023-03-28 RX ADMIN — PANTOPRAZOLE SODIUM 20 MG: 20 TABLET, DELAYED RELEASE ORAL at 18:07:00

## 2023-03-28 RX ADMIN — CETIRIZINE HYDROCHLORIDE 10 MG: 10 TABLET ORAL at 08:48:00

## 2023-03-28 RX ADMIN — BUSPIRONE HYDROCHLORIDE 30 MG: 15 TABLET ORAL at 08:48:00

## 2023-03-28 RX ADMIN — ACETAMINOPHEN 650 MG: 325 TABLET ORAL at 19:00:00

## 2023-03-28 RX ADMIN — VENLAFAXINE HYDROCHLORIDE 150 MG: 75 CAPSULE, EXTENDED RELEASE ORAL at 08:48:00

## 2023-03-28 RX ADMIN — ACETAMINOPHEN 650 MG: 325 TABLET ORAL at 08:48:00

## 2023-03-28 RX ADMIN — ACETAMINOPHEN 650 MG: 325 TABLET ORAL at 00:08:00

## 2023-03-28 NOTE — PROGRESS NOTES
03/28/23 1700   Clinical Encounter Type   Visited With Patient and family together   Routine Visit Introduction   Continue Visiting No   Patient's Supportive Strategies/Resources Linda, Prayer Group, Family   Family Spiritual Encounters   Family Coping Accepting   Family Participation in Care Consistently   Family Support During Treatment Consistently   Taxonomy   Intended Effects Demonstrate caring and concern   Methods Encourage sharing of feelings; Offer spiritual/[employer] support   Interventions Acknowledge current situation; Acknowledge response to difficult experience; Active listening; Ask guided questions; Ask guided questions about linda;Discuss concerns; Identify supportive relationship(s);[place];Ask questions to bring forth feelings     Discussion:   initiated introductory visit. Pt (Destiney) was sitting up in bed and her  Vasquez Rodrigez) present at bedside. Destiney shared that she had a stroke and that it had been \"scary. \" Her  agreed and reported that he too was worried. They both reported that they were feeling better today. Vic Polanco stated that she was a positive person and expressed determination to regain strength in therapy. Vic Polanco has a good support system and shared that she is part of prayer group. Prayer is an important of her linda life.  prayed with Vic Polanco and Nellie Bhatia. Vic Sherri is aware that chaplains are always available and spiritual care can be requested through RN. Spiritual Care support can be requested via an [facility] consult or ext. [phone].        Amber Tyson M.Div, Chaplain Resident  Ext. [phone]

## 2023-03-28 NOTE — PLAN OF CARE
Pt Aox4, showing right sided weakness in RUE, and delayed speech. Neuros Q1, see flowsheet for more detailed assessment. Stat EEG ordered per Dr Pam Pham and [address] [facility]. EEG setup to run for 24 hour interval. Labetolol given to maintain blood pressure parameters. NSR on monitor. Good urine output in alonzo. Tylenol for pain control. Problem: NEUROLOGICAL - ADULT  Goal: Achieves stable or improved neurological status  Description: INTERVENTIONS  - Assess for and report changes in neurological status  - Initiate measures to prevent increased intracranial pressure  - Maintain blood pressure and fluid volume within ordered parameters to optimize cerebral perfusion and minimize risk of hemorrhage  - Monitor temperature, glucose, and sodium.  Initiate appropriate interventions as ordered  Outcome: Progressing  Goal: Absence of seizures  Description: INTERVENTIONS  - Monitor for seizure activity  - Administer anti-seizure medications as ordered  - Monitor neurological status  Outcome: Progressing  Goal: Remains free of injury related to seizure activity  Description: INTERVENTIONS:  - Maintain airway, patient safety  and administer oxygen as ordered  - Monitor patient for seizure activity, document and report duration and description of seizure to MD/LIP  - If seizure occurs, turn patient to side and suction secretions as needed  - Reorient patient post seizure  - Seizure pads on all 4 side rails  - Instruct patient/family to notify RN of any seizure activity  - Instruct patient/family to call for assistance with activity based on assessment  Outcome: Progressing  Goal: Achieves maximal functionality and self care  Description: INTERVENTIONS  - Monitor swallowing and airway patency with patient fatigue and changes in neurological status  - Encourage and assist patient to increase activity and self care with guidance from PT/OT  - Encourage visually impaired, hearing impaired and aphasic patients to use assistive/communication devices  Outcome: Progressing     Problem: PAIN - ADULT  Goal: Verbalizes/displays adequate comfort level or patient's stated pain goal  Description: INTERVENTIONS:  - Encourage pt to monitor pain and request assistance  - Assess pain using appropriate pain scale  - Administer analgesics based on type and severity of pain and evaluate response  - Implement non-pharmacological measures as appropriate and evaluate response  - Consider cultural and social influences on pain and pain management  - Manage/alleviate anxiety  - Utilize distraction and/or relaxation techniques  - Monitor for opioid side effects  - Notify MD/LIP if interventions unsuccessful or patient reports new pain  - Anticipate increased pain with activity and pre-medicate as appropriate  Outcome: Progressing

## 2023-03-29 ENCOUNTER — APPOINTMENT (OUTPATIENT)
Dept: CT IMAGING | Facility: HOSPITAL | Age: 78
DRG: 025 | End: 2023-03-29
Payer: MEDICARE

## 2023-03-29 ENCOUNTER — PATIENT OUTREACH (OUTPATIENT)
Dept: CASE MANAGEMENT | Age: 78
End: 2023-03-29

## 2023-03-29 LAB
ANION GAP SERPL CALC-SCNC: 6 MMOL/L (ref 0–18)
BASOPHILS # BLD AUTO: 0.02 X10(3) UL (ref 0–0.2)
BASOPHILS NFR BLD AUTO: 0.3 %
BUN BLD-MCNC: 13 MG/DL (ref 7–18)
CALCIUM BLD-MCNC: 8.8 MG/DL (ref 8.5–10.1)
CHLORIDE SERPL-SCNC: 110 MMOL/L (ref 98–112)
CHOLEST SERPL-MCNC: 146 MG/DL (ref ?–200)
CO2 SERPL-SCNC: 23 MMOL/L (ref 21–32)
CREAT BLD-MCNC: 0.57 MG/DL
EOSINOPHIL # BLD AUTO: 0.06 X10(3) UL (ref 0–0.7)
EOSINOPHIL NFR BLD AUTO: 0.8 %
ERYTHROCYTE [DISTWIDTH] IN BLOOD BY AUTOMATED COUNT: 14.8 %
EST. AVERAGE GLUCOSE BLD GHB EST-MCNC: 117 MG/DL (ref 68–126)
GFR SERPLBLD BASED ON 1.73 SQ M-ARVRAT: 94 ML/MIN/1.73M2 (ref 60–?)
GLUCOSE BLD-MCNC: 110 MG/DL (ref 70–99)
HBA1C MFR BLD: 5.7 % (ref ?–5.7)
HCT VFR BLD AUTO: 33.2 %
HDLC SERPL-MCNC: 63 MG/DL (ref 40–59)
HGB BLD-MCNC: 11.1 G/DL
IMM GRANULOCYTES # BLD AUTO: 0.02 X10(3) UL (ref 0–1)
IMM GRANULOCYTES NFR BLD: 0.3 %
LDLC SERPL CALC-MCNC: 65 MG/DL (ref ?–100)
LYMPHOCYTES # BLD AUTO: 1.41 X10(3) UL (ref 1–4)
LYMPHOCYTES NFR BLD AUTO: 18 %
MCH RBC QN AUTO: 32.3 PG (ref 26–34)
MCHC RBC AUTO-ENTMCNC: 33.4 G/DL (ref 31–37)
MCV RBC AUTO: 96.5 FL
MONOCYTES # BLD AUTO: 0.62 X10(3) UL (ref 0.1–1)
MONOCYTES NFR BLD AUTO: 7.9 %
NEUTROPHILS # BLD AUTO: 5.71 X10 (3) UL (ref 1.5–7.7)
NEUTROPHILS # BLD AUTO: 5.71 X10(3) UL (ref 1.5–7.7)
NEUTROPHILS NFR BLD AUTO: 72.7 %
NONHDLC SERPL-MCNC: 83 MG/DL (ref ?–130)
OSMOLALITY SERPL CALC.SUM OF ELEC: 289 MOSM/KG (ref 275–295)
P2Y12 REACTION UNITS: 50 PRU
PLATELET # BLD AUTO: 272 10(3)UL (ref 150–450)
POTASSIUM SERPL-SCNC: 3.8 MMOL/L (ref 3.5–5.1)
POTASSIUM SERPL-SCNC: 3.8 MMOL/L (ref 3.5–5.1)
RBC # BLD AUTO: 3.44 X10(6)UL
SODIUM SERPL-SCNC: 139 MMOL/L (ref 136–145)
TRIGL SERPL-MCNC: 98 MG/DL (ref 30–149)
VLDLC SERPL CALC-MCNC: 15 MG/DL (ref 0–30)
WBC # BLD AUTO: 7.8 X10(3) UL (ref 4–11)

## 2023-03-29 PROCEDURE — 99232 SBSQ HOSP IP/OBS MODERATE 35: CPT | Performed by: HOSPITALIST

## 2023-03-29 PROCEDURE — 99232 SBSQ HOSP IP/OBS MODERATE 35: CPT

## 2023-03-29 PROCEDURE — 70450 CT HEAD/BRAIN W/O DYE: CPT

## 2023-03-29 RX ORDER — ONDANSETRON 2 MG/ML
4 INJECTION INTRAMUSCULAR; INTRAVENOUS EVERY 6 HOURS PRN
Status: DISCONTINUED | OUTPATIENT
Start: 2023-03-29 | End: 2023-03-31

## 2023-03-29 RX ORDER — ATORVASTATIN CALCIUM 40 MG/1
40 TABLET, FILM COATED ORAL NIGHTLY
Status: DISCONTINUED | OUTPATIENT
Start: 2023-03-29 | End: 2023-03-31

## 2023-03-29 RX ADMIN — BUSPIRONE HYDROCHLORIDE 30 MG: 15 TABLET ORAL at 08:46:00

## 2023-03-29 RX ADMIN — ATORVASTATIN CALCIUM 40 MG: 40 TABLET, FILM COATED ORAL at 20:39:00

## 2023-03-29 RX ADMIN — GABAPENTIN 100 MG: 100 CAPSULE ORAL at 14:17:00

## 2023-03-29 RX ADMIN — ASPIRIN 81 MG: 81 TABLET, CHEWABLE ORAL at 08:46:00

## 2023-03-29 RX ADMIN — LOSARTAN POTASSIUM 50 MG: 50 TABLET ORAL at 08:46:00

## 2023-03-29 RX ADMIN — GABAPENTIN 300 MG: 300 CAPSULE ORAL at 20:39:00

## 2023-03-29 RX ADMIN — PANTOPRAZOLE SODIUM 20 MG: 20 TABLET, DELAYED RELEASE ORAL at 17:51:00

## 2023-03-29 RX ADMIN — CETIRIZINE HYDROCHLORIDE 10 MG: 10 TABLET ORAL at 08:46:00

## 2023-03-29 RX ADMIN — ACETAMINOPHEN 650 MG: 325 TABLET ORAL at 16:14:00

## 2023-03-29 RX ADMIN — GABAPENTIN 200 MG: 100 CAPSULE ORAL at 08:46:00

## 2023-03-29 RX ADMIN — VENLAFAXINE HYDROCHLORIDE 150 MG: 75 CAPSULE, EXTENDED RELEASE ORAL at 08:46:00

## 2023-03-29 NOTE — PLAN OF CARE
Pt AxO4  RA  NSR  Pain present; generalized, RUE  -mild,3-4/10; tylenol gives relief  Total assist  -up to chair  -PT c/s  -OT c/s  -SLP c/s    Neurochecks  -deficits noted  NIH daily  CT head completed    -bladder scan noted   =1x straight cath noted  Monitor/manage BG level  Monitor/manage BP level  Pt informed of POC, all questions answered  -pt family informed of POC  -pt family given MRI disk

## 2023-03-29 NOTE — PROGRESS NOTES
Assumed patient care at [phone]  On tele sinus, vitals stable within parameters   Room air and not in any resp distress   Denies pain, neuro checks q 4, no new deficits   Safety precautions in place   Call light is within reach   Will monitor   0600; pt has not been able to void since last straight cath around 2230. Bladder scan shows 650. pt straight cath and obtained 550mls.

## 2023-03-30 PROBLEM — I63.412 CEREBRAL INFARCTION DUE TO EMBOLISM OF LEFT MIDDLE CEREBRAL ARTERY (HCC): Status: ACTIVE | Noted: 2023-03-30

## 2023-03-30 LAB
ANION GAP SERPL CALC-SCNC: 7 MMOL/L (ref 0–18)
BASOPHILS # BLD AUTO: 0.05 X10(3) UL (ref 0–0.2)
BASOPHILS NFR BLD AUTO: 0.4 %
BILIRUB UR QL STRIP.AUTO: NEGATIVE
BUN BLD-MCNC: 17 MG/DL (ref 7–18)
CALCIUM BLD-MCNC: 8.7 MG/DL (ref 8.5–10.1)
CHLORIDE SERPL-SCNC: 105 MMOL/L (ref 98–112)
CO2 SERPL-SCNC: 23 MMOL/L (ref 21–32)
CREAT BLD-MCNC: 0.63 MG/DL
EOSINOPHIL # BLD AUTO: 0.1 X10(3) UL (ref 0–0.7)
EOSINOPHIL NFR BLD AUTO: 0.8 %
ERYTHROCYTE [DISTWIDTH] IN BLOOD BY AUTOMATED COUNT: 14.2 %
ERYTHROCYTE [DISTWIDTH] IN BLOOD BY AUTOMATED COUNT: 14.4 %
GFR SERPLBLD BASED ON 1.73 SQ M-ARVRAT: 92 ML/MIN/1.73M2 (ref 60–?)
GLUCOSE BLD-MCNC: 128 MG/DL (ref 70–99)
GLUCOSE UR STRIP.AUTO-MCNC: NEGATIVE MG/DL
HCT VFR BLD AUTO: 33.3 %
HCT VFR BLD AUTO: 34.6 %
HGB BLD-MCNC: 11.1 G/DL
HGB BLD-MCNC: 11.6 G/DL
IMM GRANULOCYTES # BLD AUTO: 0.06 X10(3) UL (ref 0–1)
IMM GRANULOCYTES NFR BLD: 0.5 %
KETONES UR STRIP.AUTO-MCNC: NEGATIVE MG/DL
LYMPHOCYTES # BLD AUTO: 1.24 X10(3) UL (ref 1–4)
LYMPHOCYTES NFR BLD AUTO: 10 %
MCH RBC QN AUTO: 32 PG (ref 26–34)
MCH RBC QN AUTO: 32.3 PG (ref 26–34)
MCHC RBC AUTO-ENTMCNC: 33.3 G/DL (ref 31–37)
MCHC RBC AUTO-ENTMCNC: 33.5 G/DL (ref 31–37)
MCV RBC AUTO: 95.6 FL
MCV RBC AUTO: 96.8 FL
MONOCYTES # BLD AUTO: 0.74 X10(3) UL (ref 0.1–1)
MONOCYTES NFR BLD AUTO: 6 %
NEUTROPHILS # BLD AUTO: 10.15 X10 (3) UL (ref 1.5–7.7)
NEUTROPHILS # BLD AUTO: 10.15 X10(3) UL (ref 1.5–7.7)
NEUTROPHILS NFR BLD AUTO: 82.3 %
NITRITE UR QL STRIP.AUTO: NEGATIVE
OSMOLALITY SERPL CALC.SUM OF ELEC: 283 MOSM/KG (ref 275–295)
PH UR STRIP.AUTO: 6 [PH] (ref 5–8)
PLATELET # BLD AUTO: 248 10(3)UL (ref 150–450)
PLATELET # BLD AUTO: 267 10(3)UL (ref 150–450)
POTASSIUM SERPL-SCNC: 3.6 MMOL/L (ref 3.5–5.1)
PROT UR STRIP.AUTO-MCNC: 100 MG/DL
RBC # BLD AUTO: 3.44 X10(6)UL
RBC # BLD AUTO: 3.62 X10(6)UL
RBC #/AREA URNS AUTO: >10 /HPF
SODIUM SERPL-SCNC: 135 MMOL/L (ref 136–145)
SP GR UR STRIP.AUTO: 1.01 (ref 1–1.03)
UROBILINOGEN UR STRIP.AUTO-MCNC: <2 MG/DL
WBC # BLD AUTO: 12.3 X10(3) UL (ref 4–11)
WBC # BLD AUTO: 8.8 X10(3) UL (ref 4–11)
WBC #/AREA URNS AUTO: >50 /HPF
WBC CLUMPS UR QL AUTO: PRESENT /HPF

## 2023-03-30 PROCEDURE — 99232 SBSQ HOSP IP/OBS MODERATE 35: CPT | Performed by: HOSPITALIST

## 2023-03-30 PROCEDURE — 99221 1ST HOSP IP/OBS SF/LOW 40: CPT | Performed by: PHYSICIAN ASSISTANT

## 2023-03-30 PROCEDURE — 99233 SBSQ HOSP IP/OBS HIGH 50: CPT | Performed by: OTHER

## 2023-03-30 RX ADMIN — VENLAFAXINE HYDROCHLORIDE 150 MG: 75 CAPSULE, EXTENDED RELEASE ORAL at 08:56:00

## 2023-03-30 RX ADMIN — GABAPENTIN 200 MG: 100 CAPSULE ORAL at 08:55:00

## 2023-03-30 RX ADMIN — BUSPIRONE HYDROCHLORIDE 30 MG: 15 TABLET ORAL at 08:56:00

## 2023-03-30 RX ADMIN — LOSARTAN POTASSIUM 50 MG: 50 TABLET ORAL at 08:56:00

## 2023-03-30 RX ADMIN — ATORVASTATIN CALCIUM 40 MG: 40 TABLET, FILM COATED ORAL at 20:30:00

## 2023-03-30 RX ADMIN — GABAPENTIN 100 MG: 100 CAPSULE ORAL at 14:39:00

## 2023-03-30 RX ADMIN — ASPIRIN 81 MG: 81 TABLET, CHEWABLE ORAL at 08:56:00

## 2023-03-30 RX ADMIN — CETIRIZINE HYDROCHLORIDE 10 MG: 10 TABLET ORAL at 08:56:00

## 2023-03-30 RX ADMIN — GABAPENTIN 300 MG: 300 CAPSULE ORAL at 20:30:00

## 2023-03-30 RX ADMIN — ACETAMINOPHEN 650 MG: 325 TABLET ORAL at 18:33:00

## 2023-03-30 RX ADMIN — PANTOPRAZOLE SODIUM 20 MG: 20 TABLET, DELAYED RELEASE ORAL at 18:28:00

## 2023-03-30 NOTE — PLAN OF CARE
Assumed patient care at 0700  Awake, alert & oriented x4. No new neurological changes  Tele; NSR/ST. HR 100s  Room air  Denies pain  PMR completed  Hematuria; urology consulted.   UA & culture to be sent   Plan to discharge to [facility]

## 2023-03-30 NOTE — PLAN OF CARE
Assumed care at [address].    A&Ox4, RA, NSR on tele  Q4 neuro, no new deficits   No complaints of pain  Voiding per purewick; bloody output Dr Irene Braun aware CBC ordered  Call light within reach    Patient updated on plan of care

## 2023-03-31 ENCOUNTER — APPOINTMENT (OUTPATIENT)
Dept: GENERAL RADIOLOGY | Facility: HOSPITAL | Age: 78
DRG: 025 | End: 2023-03-31
Attending: HOSPITALIST
Payer: MEDICARE

## 2023-03-31 VITALS
RESPIRATION RATE: 20 BRPM | OXYGEN SATURATION: 97 % | HEIGHT: 61 IN | BODY MASS INDEX: 36.3 KG/M2 | WEIGHT: 192.25 LBS | HEART RATE: 87 BPM | DIASTOLIC BLOOD PRESSURE: 80 MMHG | TEMPERATURE: 99 F | SYSTOLIC BLOOD PRESSURE: 154 MMHG

## 2023-03-31 LAB
GLUCOSE BLD-MCNC: 105 MG/DL (ref 70–99)
SARS-COV-2 RNA RESP QL NAA+PROBE: NOT DETECTED
URATE SERPL-MCNC: 4.5 MG/DL

## 2023-03-31 PROCEDURE — 99232 SBSQ HOSP IP/OBS MODERATE 35: CPT | Performed by: HOSPITALIST

## 2023-03-31 PROCEDURE — 99231 SBSQ HOSP IP/OBS SF/LOW 25: CPT | Performed by: PHYSICIAN ASSISTANT

## 2023-03-31 PROCEDURE — 73130 X-RAY EXAM OF HAND: CPT | Performed by: HOSPITALIST

## 2023-03-31 RX ORDER — ROSUVASTATIN CALCIUM 20 MG/1
20 TABLET, COATED ORAL NIGHTLY
Qty: 30 TABLET | Refills: 0 | Status: SHIPPED | OUTPATIENT
Start: 2023-03-31 | End: 2023-04-30

## 2023-03-31 RX ORDER — NITROFURANTOIN 25; 75 MG/1; MG/1
100 CAPSULE ORAL 2 TIMES DAILY
Qty: 10 CAPSULE | Refills: 0 | Status: SHIPPED | OUTPATIENT
Start: 2023-03-31 | End: 2023-04-05

## 2023-03-31 RX ORDER — NITROFURANTOIN 25; 75 MG/1; MG/1
100 CAPSULE ORAL 2 TIMES DAILY
Status: DISCONTINUED | OUTPATIENT
Start: 2023-03-31 | End: 2023-03-31

## 2023-03-31 RX ORDER — GABAPENTIN 100 MG/1
CAPSULE ORAL
Qty: 540 CAPSULE | Refills: 3 | Status: SHIPPED | COMMUNITY
Start: 2023-03-31

## 2023-03-31 RX ORDER — LEVOFLOXACIN 5 MG/ML
500 INJECTION, SOLUTION INTRAVENOUS EVERY 24 HOURS
Status: DISCONTINUED | OUTPATIENT
Start: 2023-03-31 | End: 2023-03-31

## 2023-03-31 RX ADMIN — CETIRIZINE HYDROCHLORIDE 10 MG: 10 TABLET ORAL at 10:08:00

## 2023-03-31 RX ADMIN — LOSARTAN POTASSIUM 50 MG: 50 TABLET ORAL at 10:09:00

## 2023-03-31 RX ADMIN — NITROFURANTOIN 100 MG: 25; 75 CAPSULE ORAL at 10:09:00

## 2023-03-31 RX ADMIN — ACETAMINOPHEN 650 MG: 325 TABLET ORAL at 10:08:00

## 2023-03-31 RX ADMIN — BUSPIRONE HYDROCHLORIDE 30 MG: 15 TABLET ORAL at 10:08:00

## 2023-03-31 RX ADMIN — VENLAFAXINE HYDROCHLORIDE 150 MG: 75 CAPSULE, EXTENDED RELEASE ORAL at 10:09:00

## 2023-03-31 RX ADMIN — GABAPENTIN 200 MG: 100 CAPSULE ORAL at 10:09:00

## 2023-03-31 RX ADMIN — ASPIRIN 81 MG: 81 TABLET, CHEWABLE ORAL at 10:08:00

## 2023-03-31 NOTE — PLAN OF CARE
Received pt at 2000  Pt AOx4, NSR, RA, VSS  Neuro Q4. See flowsheets. UA sent. Urine more maddi w/ red flecks  D/c Planning: MJ once medically cleared. 3/31? Call light within reach. Needs currently met       Problem: NEUROLOGICAL - ADULT  Goal: Achieves stable or improved neurological status  Description: INTERVENTIONS  - Assess for and report changes in neurological status  - Initiate measures to prevent increased intracranial pressure  - Maintain blood pressure and fluid volume within ordered parameters to optimize cerebral perfusion and minimize risk of hemorrhage  - Monitor temperature, glucose, and sodium.  Initiate appropriate interventions as ordered  -PT/OT/SLP  --180  Outcome: Progressing  Goal: Absence of seizures  Description: INTERVENTIONS  - Monitor for seizure activity  - Administer anti-seizure medications as ordered  - Monitor neurological status  Outcome: Progressing  Goal: Remains free of injury related to seizure activity  Description: INTERVENTIONS:  - Maintain airway, patient safety  and administer oxygen as ordered  - Monitor patient for seizure activity, document and report duration and description of seizure to MD/LIP  - If seizure occurs, turn patient to side and suction secretions as needed  - Reorient patient post seizure  - Seizure pads on all 4 side rails  - Instruct patient/family to notify RN of any seizure activity  - Instruct patient/family to call for assistance with activity based on assessment  Outcome: Progressing  Goal: Achieves maximal functionality and self care  Description: INTERVENTIONS  - Monitor swallowing and airway patency with patient fatigue and changes in neurological status  - Encourage and assist patient to increase activity and self care with guidance from PT/OT  - Encourage visually impaired, hearing impaired and aphasic patients to use assistive/communication devices  -PT/OT/SLP  -up to chair 3x a day  Outcome: Progressing     Problem: PAIN - ADULT  Goal: Verbalizes/displays adequate comfort level or patient's stated pain goal  Description: INTERVENTIONS:  - Encourage pt to monitor pain and request assistance  - Assess pain using appropriate pain scale  - Administer analgesics based on type and severity of pain and evaluate response  - Implement non-pharmacological measures as appropriate and evaluate response  - Consider cultural and social influences on pain and pain management  - Manage/alleviate anxiety  - Utilize distraction and/or relaxation techniques  - Monitor for opioid side effects  - Notify MD/LIP if interventions unsuccessful or patient reports new pain  - Anticipate increased pain with activity and pre-medicate as appropriate  Outcome: Progressing

## 2023-03-31 NOTE — PHYSICAL THERAPY NOTE
Attempted to see Pt this PM - RN aware of attempt. Pt occupied with x-ray, and pending dc at 2pm for [facility]. Pt requesting to get cleaned up prior to transfer - appreciative. Will f/u later today if time permits, after all other patients are attempted per tentative schedule.

## 2023-03-31 NOTE — CM/SW NOTE
03/31/23 1000   Discharge disposition   Expected discharge disposition Rehab [address] Provider [facility]   Discharge transportation [facility]   Pt cleared for dc today to [facility]. Covid test needed prior to dc, RN aware. Pt to dc at 2pm via Amb, PCS complete. Pt to dc to rm 2289, RN to call report [phone]  For report. RN, pt/family made aware of dc.      XIOMARA Hanson  Discharge 2011 [facility]

## 2023-04-01 NOTE — PLAN OF CARE
NURSING DISCHARGE NOTE    Cleared for discharge by all consults. Discharge AVS completed and reviewed with patient and spouse. Discussed discharge medications, including purpose, dose, and side effects. Reviewed follow-ups and the importance of maintaining those appointments. Discussed discharge instructions/precautions and when to notify MD vs seek emergency medical care. All questions and concerns addressed appropriately. Pt demonstrates adequate understanding of all instructions. Discharged Rehab facility via Ambulance. Accompanied by Support staff  Belongings Taken by patient/family.

## 2023-04-03 NOTE — DISCHARGE SUMMARY
Discharge Summary     Jonn Card Patient Status:  Inpatient    3/15/1945 MRN [id number]   [place] 7NE-A Attending No att. 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 She was started on DAPT (ASA & plavix) pre operatively; however, her P2Y12 was elevated and she was then transitioned to [address]. Repeat P2Y12 was obtained the am of procedure, which identified therapeutic effect of the medication. After awaking from anesthesia post procedure, her RUE was flaccid. This was initially thought to be caused by some sort of nerve impingement during the procedure, due to the fact that after repositioning the patient was again able to move her RUE with 4/5 strength. However a few hours later, her RUE was again flaccid, and she was slightly delayed in her verbal responses. Stat CT/CTA and perfusion was obtained, which did not identify an LVO and/or perfusion deficits. However, MRI brain identified multiple embolic infarcts in the LMCA distribution. MRA showed patency of stent. DAPT was continued and repeat P2Y12 testing was completed. This resulted at 21 and therefore, Brilinta dosage was decreased and head CT was obtained. No bleeding was noted. While awaiting PT/OT evaluations, she experienced some hematuria for which urology was consulted. Pt had experienced some urinary retention and recurrent straight catheterizations post alonzo removal, and the Urologist attributed this, along with DAPT usage, to her hematuria. This began to spontaneously resolve, and a urine culture was sent.  She was started on antibiotics for + UTI. She was discharged to acute rehab [facility]) with follow up instructions given, including plans for office visit with Dr. Zain Manley in May 2023 with repeat MRI w/wo contrast, and to continue DAPT (ASA 81mg daily & Brilinta 60mg BID). She was also advised to follow up with general neurology for stroke risk factor modification.        PRISCILLA Garcia

## 2023-04-03 NOTE — PROCEDURES
[facility] - ELECTROENCEPHALOGRAM (EEG) REPORT  Patient Name:  Marianna Schroeder   MRN / CSN:  [id number] / [id number]   Date of Birth / Age:  3/15/1945 /  70year old   Encounter Date:  3/27/23 to 3/28/23         METHODS:  Twenty-two electrodes were applied according to the 10-20-electrode placement system on this prolonged audio-video EEG. EKG monitoring, monopolar and bipolar montages are routinely utilized. The record was obtained on a digital system. OBJECT:  This is a 70year old year-old female with episodes of speech difficulty     The EEG was requested to assess for epileptiform activity and change in mental status. State(s) of consciousness: awake, drowsy and asleep    Relevant medications:   No current facility-administered medications on file prior to encounter. gabapentin 100 MG Oral Cap, Take 100 in am, take 300mg nightly, Disp: 540 capsule, Rfl: 3  losartan 100 MG Oral Tab, Take 1 tablet (100 mg total) by mouth daily. , Disp: 90 tablet, Rfl: 3  BUSPIRONE HCL 30 MG Oral Tab, TAKE ONE TABLET BY MOUTH ONE TIME DAILY, Disp: 90 tablet, Rfl: 0  SUMAtriptan 25 MG Oral Tab, Take 1 tablet (25 mg total) by mouth every 2 (two) hours as needed for Migraine. , Disp: 9 tablet, Rfl: 0  aspirin 81 MG Oral Tab EC, Take 1 tablet (81 mg total) by mouth daily. , Disp: 7 tablet, Rfl: 0  Cholecalciferol (VITAMIN D) 10 MCG/ML Oral Liquid, , Disp: , Rfl:   Pediatric Multivitamins-Fl (MULTIVITAMINS/FL OR), , Disp: , Rfl:   loratadine 10 MG Oral Tab, Take 1 tablet (10 mg total) by mouth daily. , Disp: 90 tablet, Rfl: 1  albuterol (VENTOLIN HFA) 108 (90 Base) MCG/ACT Inhalation Aero Soln, Inhale 2 puffs into the lungs every 4 (four) hours as needed for Wheezing., Disp: 1 each, Rfl: 0  Acetaminophen  MG Oral Tab CR, Take 1 tablet (650 mg total) by mouth every 8 (eight) hours as needed for Pain., Disp: , Rfl:   venlafaxine  MG Oral Capsule SR 24 Hr, Take 1 capsule (150 mg total) by mouth daily. , Disp: 90 capsule, Rfl: 3  Esomeprazole Magnesium (NEXIUM 24HR OR), Take 20 mg by mouth every evening., Disp: , Rfl:         FINDINGS:  1) Background: A bilateral, symmetric and reactive posterior-dominant background rhythm of 10.5 to 11.5 Hz with an amplitude of 20-40 microvolts is seen. General background in this state largely consisted of alpha > beta activity. In addition, subtle intermittent rhythmic delta was seen from the left lateral electrodes. This pattern became more pronounced during drowsiness and sleep. Sleep architecture appreciated and background was reactive throughout the recording. Recurrent episodes of speech difficulty reported. No clear electrographic seizures were associated with these events. IMPRESSION:  This was an abnormal prolonged VEEG. Findings are suspicious for a seizure potential from the left lateral electrodes. No definitive seizures captured. However, it should be noted that focal seizures can be difficult to detect on scalp EEG. Case discussed with ICU attending.      Report covers  Start 2127 on 3/27/23  End 1028 am on 3/28/23    SIGNATURES:  Raquel Martinez MD   [facility] Neurology

## 2023-04-04 ENCOUNTER — TELEPHONE (OUTPATIENT)
Dept: SURGERY | Facility: CLINIC | Age: 78
End: 2023-04-04

## 2023-04-04 NOTE — TELEPHONE ENCOUNTER
Meredith Bhatia calling from clinical documentation stating there is a query that needs a response dated 3/29/23.

## 2023-04-06 ENCOUNTER — PATIENT OUTREACH (OUTPATIENT)
Dept: CASE MANAGEMENT | Age: 78
End: 2023-04-06

## 2023-04-14 ENCOUNTER — PATIENT OUTREACH (OUTPATIENT)
Dept: CASE MANAGEMENT | Age: 78
End: 2023-04-14

## 2023-04-18 ENCOUNTER — ORDER TRANSCRIPTION (OUTPATIENT)
Dept: PHYSICAL THERAPY | Facility: HOSPITAL | Age: 78
End: 2023-04-18

## 2023-04-18 DIAGNOSIS — I63.512 ACUTE ISCHEMIC LEFT MCA STROKE (HCC): Primary | ICD-10-CM

## 2023-04-24 ENCOUNTER — OFFICE VISIT (OUTPATIENT)
Dept: PHYSICAL THERAPY | Facility: HOSPITAL | Age: 78
End: 2023-04-24
Attending: PHYSICAL MEDICINE & REHABILITATION
Payer: MEDICARE

## 2023-04-24 ENCOUNTER — TELEPHONE (OUTPATIENT)
Dept: SURGERY | Facility: CLINIC | Age: 78
End: 2023-04-24

## 2023-04-24 DIAGNOSIS — I63.512 ACUTE ISCHEMIC LEFT MCA STROKE (HCC): ICD-10-CM

## 2023-04-24 PROCEDURE — 97161 PT EVAL LOW COMPLEX 20 MIN: CPT

## 2023-04-24 NOTE — TELEPHONE ENCOUNTER
I am not aware of an appropriate way to ensure that she gets 60mg of a 90mg tablet. That would be hard to ensure, and it's imperative that with her stent, her blood levels are in the correct range. I would not trust breaking this medication without knowing how much she is actually getting into her bloodstream. I would advise against this. Please tell her that we apologize, but unfortunately, we can not predict how someone's body will react to the medication. 60mg is the safest dose for her at this time given her most recent bloodwork.

## 2023-04-24 NOTE — TELEPHONE ENCOUNTER
Provider called nursing back. Informed patient does not have access to the medication dose prescribed. Due to recent procedure and increased risk for stroke. Pt to take dose on hand until prescribed dose is available at pharmacy. Called patient and patient  to discuss. Pt  verbalized acknowledgement   Pt  said they were able to  correct dose at a different pharmacy by the house. Advised for patient to restart the 60mg BID as prescribed tonight as soon as the medication is filled. Pt  acknowledged and is appreciative of the call. Call was ended.

## 2023-04-24 NOTE — TELEPHONE ENCOUNTER
Noted the providers feedback listed below. Called provider directly. No answer, LMTCB Nursing to discuss further.

## 2023-04-24 NOTE — TELEPHONE ENCOUNTER
Noted the providers feedback listed below. Called pt to inform of the provider feedback. Pt states:   - she understands that she has to take the 60 mg BID however prior to surgery her and her  had refilled the medication for 90mg tabs. The patient and her  states that she had to pay for those pills out of pocket and was expensive. Pt and pt  are wondering if there is any way the can use the 90 mg tablets because they already spent the money out of pocket for it. They are not content with just discarding the medication as recommended by the provider. Call was ended routed to the provider for review and feedback.

## 2023-04-24 NOTE — TELEPHONE ENCOUNTER
While inpatient, the patient's Brilinta dosage was decreased to 60mg BID. This was reflected on her discharge instructions. Please advise pt to take Brilinta 60mg BID. She should discard the prior prescription. Thank you!

## 2023-04-24 NOTE — TELEPHONE ENCOUNTER
Noted the providers feedback listed below. Called patient to inform:   Verbal release verified  Pt states:   - Margaux Chavez is in stocks tomorrow at the [facility] did not provide pt with any medication to for the pt to resume at home. - pt  is inquiring if the patient can go a day without medication do to unavailability. Called patient pharmacy to inquire if pt medication can be expedited. Pharmacy tech statesshe dies not have the medication in stock to fill for pt.  she will call the other pharmacies in network to fill the prescription today and the patient will have to drive to get it. Pt pharmacy tech states that she will call pt once she finds a location that has the medication in stock. Routed to the provider to be informed.

## 2023-04-24 NOTE — TELEPHONE ENCOUNTER
Pt's spouse calling as has 2 scripts for ticagrelor, has  60 MG Oral Tab and 90 MG, please call to clarify . Spouse states both ordered by Roly Johnson.

## 2023-04-25 ENCOUNTER — ORDER TRANSCRIPTION (OUTPATIENT)
Dept: PHYSICAL THERAPY | Facility: HOSPITAL | Age: 78
End: 2023-04-25

## 2023-04-25 DIAGNOSIS — I63.512 ACUTE ISCHEMIC LEFT MCA STROKE (HCC): Primary | ICD-10-CM

## 2023-04-26 ENCOUNTER — OFFICE VISIT (OUTPATIENT)
Dept: OCCUPATIONAL MEDICINE | Facility: HOSPITAL | Age: 78
End: 2023-04-26
Attending: PHYSICAL MEDICINE & REHABILITATION
Payer: MEDICARE

## 2023-04-26 ENCOUNTER — OFFICE VISIT (OUTPATIENT)
Dept: PHYSICAL THERAPY | Facility: HOSPITAL | Age: 78
End: 2023-04-26
Attending: PHYSICAL MEDICINE & REHABILITATION
Payer: MEDICARE

## 2023-04-26 DIAGNOSIS — I63.512 ACUTE ISCHEMIC LEFT MCA STROKE (HCC): ICD-10-CM

## 2023-04-26 PROCEDURE — 97110 THERAPEUTIC EXERCISES: CPT

## 2023-04-26 PROCEDURE — 97140 MANUAL THERAPY 1/> REGIONS: CPT | Performed by: OCCUPATIONAL THERAPIST

## 2023-04-26 PROCEDURE — 97166 OT EVAL MOD COMPLEX 45 MIN: CPT | Performed by: OCCUPATIONAL THERAPIST

## 2023-04-26 PROCEDURE — 97112 NEUROMUSCULAR REEDUCATION: CPT

## 2023-04-26 NOTE — PATIENT INSTRUCTIONS
Frontback yoga: search for \"Yoga with Octavia Prey G\"     Do these exercises 2 times each day    Sit up tall. Tie green band around thighs. Separate knees and hold position for a couple seconds before relaxing. Do this 20 times. Sit up tall. Lift one leg up and across as if you were going to tie your shoe. Return foot to floor, then repeat. Do this 15 times with each leg. Stand with your bed behind you for safety (backs of legs should not touch the bed). Stand tall and place feet next to each other so that they are touching. Cross arms, close your eyes. Balance for 30 seconds, paying attention to the feeling of your feet on the floor. Stand next to bed for safety (do not lean against bed). Take a step forward with one foot. Cross arms and balance with eyes open for 30 seconds. Then practice with the other foot in front. Sit up tall in a sturdy chair. Cross your arms and stand up. Return to sitting in a controlled manner. Do this 10 times. Stand with feet comfortable distance apart. Interlace hands. Lift arms up as much as able- move your head to follow your hands, then lower. Do this 10 times.

## 2023-04-26 NOTE — PROGRESS NOTES
Diagnosis: Diagnosis: CVA, R hemiparesis   Visit (# authorized): 10 per POC (Medicare)                          Referring Physician: Thea Moss; PCP: Dr Maddison Solomon    Precautions: at risk of falls     Medication changes since last visit?:  No    Subjective: No new information to report. Objective:    Date  4/26          Visit Number  2          NMR x          Ther EX x          Ther Act           Gait Training           CRM            Manual             Additional Treatment Information:    Therapeutic Exercise (15 mins):     6 minute walk test  Distance walked: 515 ft no AD-supervision  Total time walked: 4 mins 53 seconds   Number of rest breaks: 4    Age matched norms:  60-69 yrs:  M: 1876 ft  F: 1765 ft  70-79 yrs:  M: 1729 ft  F: 1545 ft  80-89 yrs:  M: 1368 ft  F: 1286 ft    Seated hip ABD green TB 2-3 sec hold x20  Seated LE diagonals x15- demo with return demo     NMR (25 mins):   Borden Balance Scale     Item Description Score (0 worst-4 best)    ___4___1. Sitting to standing  ___4___2. Standing unsupported   ___4___3. Sitting unsupported   ___4___4. Standing to sitting   ___4___5. Transfers   ___4___6. Standing with eyes closed   ___4___7. Standing with feet together   ___4___8. Reaching forward with outstretched arm   ___4___9. Retrieving object from floor   ___4__10. Turning to look behind   ___4__11. Turning 360 degrees   ___2__12. Placing alternate foot on stool   ___2__13. Standing with one foot in front   ___1__14. Standing on one foot     Total __49__/56 (less than 46/56 = fall risk)    Romberg EC 30 seconds (minimal increase in postural sway)  Step stance EO 30 sec ea foot in front   Standing unsupported, hands interlaced B shoulder flex within range- eyes and head follow x10     Patient Education:  HEP initiated and issued -see pt instructions. Discussed chair yoga- youtube channel name provided. Assessment:   Pt is not considered a fall risk based on BBS.  Fatigues quickly with ambulation during 6MWT requiring rest breaks. Neptali's understanding of material taught today.      Plan: DGI    Charges: 1 TE, 2 NMR       Total Timed Treatment: 40 min  Total Treatment Time: 40 min

## 2023-04-28 ENCOUNTER — PATIENT OUTREACH (OUTPATIENT)
Dept: CASE MANAGEMENT | Age: 78
End: 2023-04-28

## 2023-04-28 DIAGNOSIS — R53.1 RIGHT SIDED WEAKNESS: ICD-10-CM

## 2023-04-28 DIAGNOSIS — E78.5 HYPERLIPIDEMIA, UNSPECIFIED HYPERLIPIDEMIA TYPE: ICD-10-CM

## 2023-04-28 DIAGNOSIS — M06.9 RHEUMATOID ARTHRITIS INVOLVING MULTIPLE SITES, UNSPECIFIED WHETHER RHEUMATOID FACTOR PRESENT (HCC): ICD-10-CM

## 2023-04-28 DIAGNOSIS — R29.898 RUE WEAKNESS: ICD-10-CM

## 2023-04-28 DIAGNOSIS — M15.9 PRIMARY OSTEOARTHRITIS INVOLVING MULTIPLE JOINTS: ICD-10-CM

## 2023-04-28 DIAGNOSIS — I63.412 CEREBRAL INFARCTION DUE TO EMBOLISM OF LEFT MIDDLE CEREBRAL ARTERY (HCC): ICD-10-CM

## 2023-04-28 DIAGNOSIS — F41.1 GENERALIZED ANXIETY DISORDER: ICD-10-CM

## 2023-04-28 DIAGNOSIS — I10 ESSENTIAL HYPERTENSION: Primary | ICD-10-CM

## 2023-04-28 NOTE — PROGRESS NOTES
4/28/2023  Spoke to Rachel for CCM. Updates to patient care team/ comments: Unable to review with patient  Patient reported changes in medications: Unable to review with patient  Med Adherence  Comment: Taking as directed    Health Maintenance:  Unable to review with patient. Annual Physical due on 02/23/2023  Mammogram due on 04/27/2023  Colorectal Cancer Screening due on 02/01/2026  Influenza Vaccine Completed  DEXA Scan Completed  Annual Depression Screening Completed  Fall Risk Screening (Annual) Completed  Pneumococcal Vaccine: 65+ Years Completed  Zoster Vaccines Completed  COVID-19 Vaccine Completed    Patient current concerns: Patient voiced concern with her overall health after undergoing a procedure  due to Intracranial Aneurysm found on MRI which was completed due to injuring her head with the trunk of her car. Patient states the procedure caused her to have a stroke, she was in [facility] for 5 days then transferred to [facility] (OUTPATIENT CAMPUS) in which she was discharged on 04/23/23. Patient is experiencing right side weakness; she is currently receiving assistance from her  to complete ADL tasks. Patient is currently receiving PT, OT, and speech therapy. Patient was seen by a psychologist during her stay in Rehab and cleared from depression symptoms. Patient reports being compliant with all her medications however is not able to provide the names of the medications. Patient voiced her frustration; states she does not want to speak to anyone at this time and disconnected the call. Care Manager Follow Up: One month    Reason For Follow Up: review progress and or barriers towards patients goals. Care managers interventions: Informed patient hospital follow up should be scheduled for PCP. Listened to patient voice concerns and frustrations, provided support. Informed of the importance on reporting any new symptoms to prevent any health complications.     Appointments reviewed with patient.    Future Appointments   Date Time Provider Evangelina Chambers   5/1/2023  6:00 PM Renee Steward, [facility] OT [facility]   5/3/2023  4:30 PM Patricia Vazquez, PT [facility] PT [facility] OF THE [facility]   5/3/2023  5:15 PM Renee Steward, OT [facility] OT [facility]   5/8/2023  4:30 PM Patricia Vazquez, PT [facility] PT [facility]   5/8/2023  5:15 PM Meredith Early, [facility] C[facility] OF THE [facility] OF [facility]   5/10/2023  4:30 PM Sheldon Strauss, PT [facility] PT [facility] THE [facility]   5/10/2023  5:15 PM Renee Steward, [facility] OF THE [facility] OF [facility]   5/12/2023 10:15 AM Ashley Asher [facility] HE[facility] CARE SYSTEM OF [facility]   5/15/2023  4:30 PM Sheldon Strauss, PT [facility] PT [facility] THE [facility]   5/15/2023  5:15 PM Renee Steward, [facility] OF THE [facility] OF [facility]   5/16/2023  8:00 AM Ashley Asher, [facility] ST [facility] THE [facility]   5/16/2023  3:00 PM [facility] MRI RM1 (1.5T) [facility] MRI [facility]   5/17/2023  4:30 PM Patricia Vazquez, PT [facility] PT [facility]   5/17/2023  5:15 PM [facility] OT [facility]   5/22/2023  3:45 PM Patricia Vazquez, PT [facility] PT [facility] THE [facility]   5/24/2023  3:00 PM Sheldon Strauss, PT [facility] OF THE [facility]   5/24/2023  4:15 PM Ashley Asher, [facility] OF THE [facility]   5/30/2023  8:00 AM [place] Lb, [facility]   6/6/2023  2:15 PM Ashleymiguel Asher, [facility]   6/6/2023  3:30 PM Patricia Vazquez, PT [facility]   6/8/2023  3:00 PM Sheldon Strauss, PT [facility]   6/14/2023  2:45 PM Ashleymiguel Asher, [facility]ARE [facility] OF THE [facility]   6/14/2023  3:45 PM Meredith Early, [facility]   6/21/2023 12:45 PM Renee Steward, [facility] OF THE [facility]   6/21/2023  2:00 PM [place] Lb, [facility]   6/28/2023 11:45 AM Ashley Lb, [facility]   6/28/2023 12:45 PM Renee Steward, [facility]   7/3/2023  4:30 PM Renee Steward, [facility]   7/5/2023 11:45 AM Ashley Lb, [facility] OF [facility]   7/5/2023 12:45 PM Renee Steward, OT [facility] CARE [facility] OF [facility]   7/10/2023  2:45 PM Ashley Asher, [facility] OF [facility]   7/10/2023  3:45 PM Meredith Early, OT [facility] OF [facility]   7/12/2023 12:45 PM Meredith Early, OT [facility]   7/17/2023 12:45 PM Meredith Early, OT [facility] SYSTEM [facility] 7/19/2023 11:45 AM Jeanna Kraft SLP [facility]   7/19/2023 12:45 PM Paula Early, OT [facility]   7/24/2023 12:45 PM Paula Early, OT [facility]   7/26/2023 11:45 AM ASIYA [facility] OF [facility]   7/26/2023 12:45 PM Julian Cheung, OT [employer]        Time Spent This Encounter Total: 19 min medical record review, telephone communication, care plan updates where needed, education, goals and action plan recreation/update. Provided acknowledgment and validation to patient's concerns. Monthly Minute Total including today: 23 min  Physical assessment, complete health history, and care plan established by Deana Graf. MD Kesha, need for CCM determined from these assessments and data.

## 2023-05-01 ENCOUNTER — OFFICE VISIT (OUTPATIENT)
Dept: OCCUPATIONAL MEDICINE | Facility: HOSPITAL | Age: 78
End: 2023-05-01
Attending: PHYSICAL MEDICINE & REHABILITATION
Payer: MEDICARE

## 2023-05-01 PROCEDURE — 97112 NEUROMUSCULAR REEDUCATION: CPT | Performed by: OCCUPATIONAL THERAPIST

## 2023-05-01 PROCEDURE — 97140 MANUAL THERAPY 1/> REGIONS: CPT | Performed by: OCCUPATIONAL THERAPIST

## 2023-05-01 PROCEDURE — 97110 THERAPEUTIC EXERCISES: CPT | Performed by: OCCUPATIONAL THERAPIST

## 2023-05-01 NOTE — PROGRESS NOTES
Diagnosis:   Acute ischemic left MCA stroke ([facility] [address] (D07.583)                                                                                                Authorized # of Visits: 12        Next MD visit: unknown  Fall Risk:  mild      Precautions: Falls, Cancer  Medication Changes since last visit?:  No  Subjective:  \"I was able to stir my yogurt and feed myself with my right hand today. \"  Objective: Treatment began with PNF diagonals, follow by shoulder/scapula mobilization and stretching, strengthening and functional activities. See flow sheet for details. Date 04/26/23 05/01/23               Visit # 1  2                                  Evaluation x                Manual                   Review HEP 8 min                  Mobilization right scapula seated in chair    5 min                Posterior capsule stretch    3 min               Ther ex                   Isometric shoulder flex/ext, abduction, IR/ER   x10 each               Ball on wall , isometric with ball shoulder flex/ext, abduction, IR/ER   x10 each                cuff weight bicep    2 # on right, 3# on left x 20                red T band punches    x10, 2 sets                two point pinch, velcro checkers, alternat    x40                                                       HEP instruction                                        Therapeutic Activity                    Functional reaching    placing velcro checkers on lower levels at tree, standing x 30      Scooping sens bin #3, into pan and pouring  5 min               Neuromuscular Re-education                      PNF,D1, D2    x10 each                                   Modalities                                                             Assessment: Patient motivated for therapy, shoulder continues demonstrate weakness, fatigues quickly. Demonstrates impaired motor control during functional activities.  Recommend patient continue with HEP and add folding clothes activities to include bilateral hand use. Goals:       Pt will be independent and compliant with comprehensive HEP to maintain progress achieved in OT  Patient to be assessed for UE function by completing ARAT test and 9 hole peg test by third visit. Patient will demonstrate improved right shoulder flexion to 100 degrees, abduction to 90 degrees for ease in reaching into closet. Patient will demonstrate using fork in right hand to simulate feeding self at SBA. Patient will demonstrate increase in right  strength to at least 25 lbs for ease in carrying bag of groceries. .  Plan: Continue with work on improving right UE motor control and strength for improved functional use of right UE.      Charges:MT,TE,NR    Total Timed Treatment:    45                   Total Treatment Time: 45

## 2023-05-02 ENCOUNTER — APPOINTMENT (OUTPATIENT)
Dept: PHYSICAL THERAPY | Facility: HOSPITAL | Age: 78
End: 2023-05-02
Attending: PHYSICAL MEDICINE & REHABILITATION
Payer: MEDICARE

## 2023-05-03 ENCOUNTER — OFFICE VISIT (OUTPATIENT)
Dept: PHYSICAL THERAPY | Facility: HOSPITAL | Age: 78
End: 2023-05-03
Attending: PHYSICAL MEDICINE & REHABILITATION
Payer: MEDICARE

## 2023-05-03 ENCOUNTER — OFFICE VISIT (OUTPATIENT)
Dept: OCCUPATIONAL MEDICINE | Facility: HOSPITAL | Age: 78
End: 2023-05-03
Attending: PHYSICAL MEDICINE & REHABILITATION
Payer: MEDICARE

## 2023-05-03 PROCEDURE — 97110 THERAPEUTIC EXERCISES: CPT

## 2023-05-03 PROCEDURE — 97112 NEUROMUSCULAR REEDUCATION: CPT

## 2023-05-03 PROCEDURE — 97110 THERAPEUTIC EXERCISES: CPT | Performed by: OCCUPATIONAL THERAPIST

## 2023-05-03 PROCEDURE — 97140 MANUAL THERAPY 1/> REGIONS: CPT | Performed by: OCCUPATIONAL THERAPIST

## 2023-05-03 NOTE — PROGRESS NOTES
Diagnosis: Diagnosis: CVA, R hemiparesis   Visit (# authorized): 10 per POC (Medicare)                          Referring Physician: Raquel Ruiz; PCP: Dr Candy Ruffin    Precautions: at risk of falls     Medication changes since last visit?:  No    Subjective: No new information to report. Objective:    Date  4/26          Visit Number  2          NMR x          Ther EX x          Ther Act           Gait Training           CRM            Manual             Additional Treatment Information:    Therapeutic Exercise (12 mins):  Nu-step level 3 x10 mins, BLE only        NMR (27 mins):   Dynamic Gait Index    1. Gait level surface _2____  2. Change in gait speed __2___  3. Gait with horizontal head turns ___2__  4. Gait with vertical head turns ___2__  5. Gait and pivot turn _2____  6. Step over obstacle _1___  7. Step around obstacles __1___  8. Steps __2___    Total  _14___/24  </= 19/24 = predictive of falls     Ambulation weaving around cones (8)- 4 bouts   Speed ladder    2 feet ea box 4 bouts (2 ea LE lead) -SBA  Step stance 1 UE support: wt shift A<>P, VC's and tactile cues initially -demo with return demo, x25 ea foot in front   Speed ladder 2 feet ea box x2 (1 ea LE lead) -SBA      Patient Education:  A<>P wt shifts added to home program.     Assessment:   Is considered a fall risk based on DGI but more so reflective of slower speed of movement vs postural instability. Improved push off R after wt shift activity with improvement in forward progression of gait. Plan: gait speed    Goals to be met within POC or 90 days:  1. Pt to be independent in HEP  2. Pt will ambulate independently without AD in and out of home. 3. Pt will navigate complex environments, visually scanning without significant alteration of gait.      Charges: 1 TE, 2 NMR       Total Timed Treatment: 39 min  Total Treatment Time: 42 min

## 2023-05-03 NOTE — PROGRESS NOTES
Diagnosis:   Acute ischemic left MCA stroke (HCC) (J94.202)                                                                                                Authorized # of Visits: 12        Next MD visit: unknown  Fall Risk:  mild      Precautions: Falls, Cancer  Medication Changes since last visit?:  No  Subjective:  \"I used to see a chiropracter for my right shoulder before I had my surgery. \"  Objective: Treatment began with gentle shoulder/scapula mobilization and stretching, strengthening and functional activities. See flow sheet for details.   Measurement:  9 hole peg  Right 55 secs, left 28 secs    Date 04/26/23 05/01/23 05/03/23             Visit # 1  2  3                                Evaluation x                Manual                   Review HEP 8 min    gentle PROM of right shoulder in supine 3 min              Mobilization right scapula seated in chair    5 min  5 min in supine              Posterior capsule stretch    3 min  ARAT assessment, 9 hole peg test assessment             Ther ex                   Isometric shoulder flex/ext, abduction, IR/ER   x10 each  place and hold shoulder into flexion in supine x 10             Ball on wall , isometric with ball shoulder flex/ext, abduction, IR/ER   x10 each  place and hold shoulder into abduction in supine x 10              cuff weight bicep    2 # on right, 3# on left x 20  IR/ER PRE in side lying with 2 # wt x 10, 2 sets              red T band punches    x10, 2 sets  x10, 2 sets              two point pinch, velcro checkers, alternat    x40  AAROM shoulder flex/ext in side lying to  cones x 12                                                     HEP instruction                                        Therapeutic Activity                    Functional reaching    placing velcro checkers on lower levels at tree, standing x 30      Scooping sens bin #3, into pan and pouring  5 min               Neuromuscular Re-education PNF,D1, D2    x10 each                                   Modalities                                                             Assessment: Observed instability in right shoulder with PROM, significant shifting of humerus with flexion, crepitus evident. Recommend to patient to consult with orthopedic MD to assess right shoulder. Initiated ARAT, to be completed. Goals:       Pt will be independent and compliant with comprehensive HEP to maintain progress achieved in OT  Patient to be assessed for UE function by completing ARAT test and 9 hole peg test by third visit. .. .(Achieved 9 hole)  Patient will demonstrate improved right shoulder flexion to 100 degrees, abduction to 90 degrees for ease in reaching into closet. Patient will demonstrate using fork in right hand to simulate feeding self at SBA. Patient will demonstrate increase in right  strength to at least 25 lbs for ease in carrying bag of groceries. .  Plan: Continue with work on improving right UE motor control and strength for improved functional use of right UE.      Charges:MT,TE2   Total Timed Treatment:    45                   Total Treatment Time: 45

## 2023-05-04 ENCOUNTER — APPOINTMENT (OUTPATIENT)
Dept: PHYSICAL THERAPY | Facility: HOSPITAL | Age: 78
End: 2023-05-04
Attending: PHYSICAL MEDICINE & REHABILITATION
Payer: MEDICARE

## 2023-05-08 ENCOUNTER — OFFICE VISIT (OUTPATIENT)
Dept: OCCUPATIONAL MEDICINE | Facility: HOSPITAL | Age: 78
End: 2023-05-08
Attending: PHYSICAL MEDICINE & REHABILITATION
Payer: MEDICARE

## 2023-05-08 ENCOUNTER — OFFICE VISIT (OUTPATIENT)
Dept: PHYSICAL THERAPY | Facility: HOSPITAL | Age: 78
End: 2023-05-08
Attending: PHYSICAL MEDICINE & REHABILITATION
Payer: MEDICARE

## 2023-05-08 ENCOUNTER — TELEPHONE (OUTPATIENT)
Dept: SURGERY | Facility: CLINIC | Age: 78
End: 2023-05-08

## 2023-05-08 PROCEDURE — 97112 NEUROMUSCULAR REEDUCATION: CPT

## 2023-05-08 PROCEDURE — 97110 THERAPEUTIC EXERCISES: CPT | Performed by: OCCUPATIONAL THERAPIST

## 2023-05-08 PROCEDURE — 97140 MANUAL THERAPY 1/> REGIONS: CPT | Performed by: OCCUPATIONAL THERAPIST

## 2023-05-08 PROCEDURE — 97110 THERAPEUTIC EXERCISES: CPT

## 2023-05-08 NOTE — PROGRESS NOTES
Diagnosis:   Acute ischemic left MCA stroke ([facility] [address] (C99.336)                                                                                                Authorized # of Visits: 12        Next MD visit: unknown  Fall Risk:  mild      Precautions: Falls, Cancer  Medication Changes since last visit?:  No  Subjective:  \"I was able to use my right hand to put lotion on my face today. \"  Objective: Treatment began with moist heat, gentle shoulder/scapula mobilization and stretching, strengthening and functional activities. See flow sheet for details.   Measurements: ARAT result   53/57      Date 04/26/23 05/01/23 05/03/23 05/08/23           Visit # 1  2  3  4                              Evaluation x                Manual                   Review HEP 8 min    gentle PROM of right shoulder in supine 3 min  gentle PROM of right shoulder in supine 3 min            Mobilization right scapula seated in chair    5 min  5 min in supine  5 min            Posterior capsule stretch    3 min  ARAT assessment, 9 hole peg test assessment             Ther ex                   Isometric shoulder flex/ext, abduction, IR/ER   x10 each  place and hold shoulder into flexion in supine x 10  shoulder shrugs and retract           Ball on wall , isometric with ball shoulder flex/ext, abduction, IR/ER   x10 each  place and hold shoulder into abduction in supine x 10  pulleys shoulder rlx/abd x 10    Bicep/BR  curls 2 # x 10 each            cuff weight bicep    2 # on right, 3# on left x 20  IR/ER PRE in side lying with 2 # wt x 10, 2 sets  IR/ER with 1# in standing x 10    Isometric shoulder flex/ext, abductopm, ER ball on wall x 10 each            red T band punches    x10, 2 sets  x10, 2 sets  three point pinch of green clothes pins  40 velcro checkers            two point pinch, velcro checkers, alternat    x40  AAROM shoulder flex/ext in side lying to  cones x 12  two point pinch , alternate to  40 velcro checkers HEP instruction                                        Therapeutic Activity                    Functional reaching    placing velcro checkers on lower levels at tree, standing x 30      Scooping sens bin #3, into pan and pouring  5 min               Neuromuscular Re-education                      PNF,D1, D2    x10 each                                   Modalities                    Moist heat        3 min                                       Assessment: Continues to demonstrate compensating with trunk for reaching with right hand. .Patient scored 53/47 in ARAT test. Reviewed and issued HEP. Pt voiced understanding and performs HEP correctly without reported pain. Goals:    Pt will be independent and compliant with comprehensive HEP to maintain progress achieved in OT  Patient to be assessed for UE function by completing ARAT test and 9 hole peg test by third visit. .. .(Achieved)  Patient will demonstrate improved right shoulder flexion to 100 degrees, abduction to 90 degrees for ease in reaching into closet. Patient will demonstrate using fork in right hand to simulate feeding self at SBA. Patient will demonstrate increase in right  strength to at least 25 lbs for ease in carrying bag of groceries. .  Plan: Continue with work on improving right UE motor control and strength for improved functional use of right UE.      Charges:MT,TE2   Total Timed Treatment:    45                   Total Treatment Time: 45

## 2023-05-08 NOTE — TELEPHONE ENCOUNTER
Pt seen for gross hematuria during hospitalization. Urine culture was positive. Recommend follow up with urology for UA/PVR check. Please assist with scheduling at [facility]  site.

## 2023-05-08 NOTE — PROGRESS NOTES
Diagnosis: Diagnosis: CVA, R hemiparesis   Visit (# authorized): 10 per POC (Medicare)                          Referring Physician: Flako Chavis; PCP: Dr Zafar Sauer    Precautions: at risk of falls     Medication changes since last visit?:  No    Subjective: Feels as if gait is progressing. Using cane less. Would like to review wt shifts today. Objective:    Date  4/26 5/8         Visit Number  2 3         NMR x x         Ther EX x x         Ther Act           Gait Training           CRM            Manual             Additional Treatment Information:    Therapeutic Exercise (15 mins):  1 lg lap ambulation no AD   Gait speed: 0.63 m/s no AD  Nu-step level 3 x10 mins, BLE only    TKE R only 3-5 sec hold x20     NMR (25 mins): Step stance 1 UE support: wt shift A<>P, VC's cues initially -demo with return demo, multiple bouts ea foot in front   Ambulation weaving around cones (8)- 4 bouts   Speed ladder    2 feet ea box 4 bouts (2 ea LE lead) -supervision    Side stepping ea direction SBA-CGA  Side stepping focus on stance stability R -1/2 length of bar ea direction BUE support-->no UE support   Rockerboard A<>P UE support to obtain position then statically holding -->taps SBA with CGA for balance checks     Patient Education:  A<>P wt shifts reviewed today. Assessment:   SSWS below what is considered normal for community ambulators. Smoothness of gait is improving overall. Plan: in future sessions: rockerboard R<>L, obstacle negotiation, step ups, shuttle.       Charges: 1 TE, 2 NMR       Total Timed Treatment: 40 min  Total Treatment Time: 40 min

## 2023-05-10 ENCOUNTER — OFFICE VISIT (OUTPATIENT)
Dept: PHYSICAL THERAPY | Facility: HOSPITAL | Age: 78
End: 2023-05-10
Attending: PHYSICAL MEDICINE & REHABILITATION
Payer: MEDICARE

## 2023-05-10 ENCOUNTER — OFFICE VISIT (OUTPATIENT)
Dept: OCCUPATIONAL MEDICINE | Facility: HOSPITAL | Age: 78
End: 2023-05-10
Attending: PHYSICAL MEDICINE & REHABILITATION
Payer: MEDICARE

## 2023-05-10 PROCEDURE — 97110 THERAPEUTIC EXERCISES: CPT | Performed by: OCCUPATIONAL THERAPIST

## 2023-05-10 PROCEDURE — 97110 THERAPEUTIC EXERCISES: CPT

## 2023-05-10 PROCEDURE — 97140 MANUAL THERAPY 1/> REGIONS: CPT | Performed by: OCCUPATIONAL THERAPIST

## 2023-05-10 NOTE — PROGRESS NOTES
Diagnosis:   Acute ischemic left MCA stroke ([facility] (I15.414)                                                                                                Authorized # of Visits: 12        Next MD visit: unknown  Fall Risk:  mild      Precautions: Falls, Cancer  Medication Changes since last visit?:  No  Subjective:  \"My shoulder was hurting a little more after the last session, but it's better today. \"  Objective: Treatment began with moist heat, gentle shoulder/scapula mobilization and stretching, strengthening and functional activities. See flow sheet for details.   Measurements: measurement: Right  strength AV lbs      Left  strength AV lbs      Date 04/26/23  05/01/23  05/03/23  05/08/23  05/10/23         Visit # 1  2  3  4  5                            Evaluation x                Manual                   Review HEP 8 min    gentle PROM of right shoulder in supine 3 min  gentle PROM of right shoulder in supine 3 min  gentle PROM of right shoulder in supine 3 min          Mobilization right scapula seated in chair    5 min  5 min in supine  5 min  5 min          Posterior capsule stretch    3 min  ARAT assessment, 9 hole peg test assessment             Ther ex                   Isometric shoulder flex/ext, abduction, IR/ER   x10 each  place and hold shoulder into flexion in supine x 10  shoulder shrugs and retract  shoulder shrugs and retraction x 10         Ball on wall , isometric with ball shoulder flex/ext, abduction, IR/ER   x10 each  place and hold shoulder into abduction in supine x 10  pulleys shoulder rlx/abd x 10    Bicep/BR  curls 2 # x 10 each  pulleys shoulder flexion/abd x 10            cuff weight bicep    2 # on right, 3# on left x 20  IR/ER PRE in side lying with 2 # wt x 10, 2 sets  IR/ER with 1# in standing x 10    Isometric shoulder flex/ext, abductopm, ER ball on wall x 10 each composite flexion grasping potato masher push into putty x 10      Isometric shoulder flex/ext, abductopm, ER ball on wall x 10 each          red T band punches    x10, 2 sets  x10, 2 sets  three point pinch of green clothes pins  40 velcro checkers  three point pinch to place clothespins, yellow, red, green, blue          two point pinch, velcro checkers, alternat    x40  AAROM shoulder flex/ext in side lying to  cones x 12  two point pinch , alternate to  40 velcro checkers  Red T band PRE bicep, tricep, IR/ER right  X 10 each          Red Putty           IP , twist, pull and pinch 10 min                             HEP instruction                                        Therapeutic Activity                    Functional reaching    placing velcro checkers on lower levels at tree, standing x 30      Scooping sens bin #3, into pan and pouring  5 min      PNF diagonal D1/D2 reaching across with right to place cones on stool , seated in chair x 20         Neuromuscular Re-education                      PNF,D1, D2    x10 each                                   Modalities                    Moist heat        3 min                                 Assessment: Patient reports compliance with HEP. Improved right  strength by 6 lbs in past few weeks. Less compensating with trunk noted during reaching exercises. Goals:    Pt will be independent and compliant with comprehensive HEP to maintain progress achieved in OT  Patient to be assessed for UE function by completing ARAT test and 9 hole peg test by third visit. .. .(Achieved)  Patient will demonstrate improved right shoulder flexion to 100 degrees, abduction to 90 degrees for ease in reaching into closet. Patient will demonstrate using fork in right hand to simulate feeding self at SBA. Patient will demonstrate increase in right  strength to at least 25 lbs for ease in carrying bag of groceries. ....(made progress toward)  Plan: Continue with work on improving right UE motor control and strength for improved functional use of right UE. Charges:MT,TE2   Total Timed Treatment:    45                   Total Treatment Time: 45

## 2023-05-10 NOTE — PROGRESS NOTES
Diagnosis: Diagnosis: CVA, R hemiparesis   Visit (# authorized): 10 per POC (Medicare)                          Referring Physician: Raquel Ruiz; PCP: Dr Candy Ruffin    Precautions: at risk of falls     Medication changes since last visit?:  No    Subjective: Has been feeling a little more globally weak for past couple of days. Has been trying to use RUE more, noted a couple \"twinges\". Objective:    Date  4/26 5/8 5/10        Visit Number  2 3 4        NMR x x         Ther EX x x x        Ther Act           Gait Training           CRM            Manual             Additional Treatment Information:    Therapeutic Exercise (39 mins):  Nu-step level 3 x10 mins, BLE only    Shuttle    B press (2 blue) 2x10 reps    SL press (1 blue) 3x10    March alt focus on stance limb control 2x10 B -tactile cues R to prevent hyperEXT  Standing fitness slider   Hip ABD 2x10 B - tactile cues for stance limb control R with L swing    Hip EXT 2x10 B   Sit<>Stand R foot back, L forward x10     Patient Education:  Encouraged performance of sit<>stand as performed in session today. Assessment:   Reports legs feel \"better\" after activity today.     Plan: in future sessions: rockerboard R<>L, obstacle negotiation, step ups    Charges: 3 TE       Total Timed Treatment: 39 min  Total Treatment Time: 39 min

## 2023-05-12 ENCOUNTER — OFFICE VISIT (OUTPATIENT)
Dept: SPEECH THERAPY | Facility: HOSPITAL | Age: 78
End: 2023-05-12
Attending: PHYSICAL MEDICINE & REHABILITATION
Payer: MEDICARE

## 2023-05-12 DIAGNOSIS — I63.512 ACUTE ISCHEMIC LEFT MCA STROKE (HCC): ICD-10-CM

## 2023-05-12 PROCEDURE — 92523 SPEECH SOUND LANG COMPREHEN: CPT

## 2023-05-15 ENCOUNTER — OFFICE VISIT (OUTPATIENT)
Dept: OCCUPATIONAL MEDICINE | Facility: HOSPITAL | Age: 78
End: 2023-05-15
Attending: PHYSICAL MEDICINE & REHABILITATION
Payer: MEDICARE

## 2023-05-15 ENCOUNTER — TELEPHONE (OUTPATIENT)
Dept: SURGERY | Facility: CLINIC | Age: 78
End: 2023-05-15

## 2023-05-15 ENCOUNTER — OFFICE VISIT (OUTPATIENT)
Dept: PHYSICAL THERAPY | Facility: HOSPITAL | Age: 78
End: 2023-05-15
Attending: PHYSICAL MEDICINE & REHABILITATION
Payer: MEDICARE

## 2023-05-15 PROCEDURE — 97110 THERAPEUTIC EXERCISES: CPT

## 2023-05-15 PROCEDURE — 97110 THERAPEUTIC EXERCISES: CPT | Performed by: OCCUPATIONAL THERAPIST

## 2023-05-15 PROCEDURE — 97140 MANUAL THERAPY 1/> REGIONS: CPT | Performed by: OCCUPATIONAL THERAPIST

## 2023-05-15 PROCEDURE — 97530 THERAPEUTIC ACTIVITIES: CPT | Performed by: OCCUPATIONAL THERAPIST

## 2023-05-15 NOTE — TELEPHONE ENCOUNTER
Pt is requesting a new RX for Brilinta 60 mg; 1 in the morning,1 at night sent to [facility] so she can get auto refill. Pt's best call back number is [phone]. Endorsed to RN for Provider.

## 2023-05-15 NOTE — TELEPHONE ENCOUNTER
Called and spoke with patient's pharmacy, who states she has refills already ordered. Called and spoke with patient, who stated understanding. Also, she is scheduled for MRI on 5/16. Please schedule her for follow up with Dr. Olga Sandy on 5/25 at 2pm. I discussed this with her and she was agreeable; however, please send a message to her mychart confirming apt and giving address of our office. She was unable to take notes during our conversation. Thank you!

## 2023-05-15 NOTE — PROGRESS NOTES
Diagnosis:   Acute ischemic left MCA stroke (HCC) (D83.866)                                                                                                Authorized # of Visits: 12        Next MD visit: unknown  Fall Risk:  mild      Precautions: Falls, Cancer  Medication Changes since last visit?:  No  Subjective:  \"I helped my  make dinner the other day, used my right hand a little more. \"  Objective: Treatment began with moist heat, gentle shoulder/scapula mobilization and stretching, strengthening and functional activities. See flow sheet for details.       Date 04/26/23  05/01/23  05/03/23  05/08/23  05/10/23  05/15/23       Visit # 1  2  3  4  5  6                          Evaluation x                Manual                   Review HEP 8 min    gentle PROM of right shoulder in supine 3 min  gentle PROM of right shoulder in supine 3 min  gentle PROM of right shoulder in supine 3 min  Gentle PROM of right shoulder in sitting 3 min        Mobilization right scapula seated in chair    5 min  5 min in supine  5 min  5 min  5 min        Posterior capsule stretch    3 min  ARAT assessment, 9 hole peg test assessment             Ther ex                   Isometric shoulder flex/ext, abduction, IR/ER   x10 each  place and hold shoulder into flexion in supine x 10  shoulder shrugs and retract  shoulder shrugs and retraction x 10  PRE shoulder IR/ER with red T band x 10, 2 sets       Ball on wall , isometric with ball shoulder flex/ext, abduction, IR/ER   x10 each  place and hold shoulder into abduction in supine x 10  pulleys shoulder rlx/abd x 10    Bicep/BR  curls 2 # x 10 each  pulleys shoulder flexion/abd x 10    PRE elbow flex/ext with red T band x 10, 2 sets        cuff weight bicep    2 # on right, 3# on left x 20  IR/ER PRE in side lying with 2 # wt x 10, 2 sets  IR/ER with 1# in standing x 10    Isometric shoulder flex/ext, abductopm, ER ball on wall x 10 each composite flexion grasping potato masher push into putty x 10      Isometric shoulder flex/ext, abductopm, ER ball on wall x 10 each  composite flexion grasping potato masher push into putty x 10        red T band punches    x10, 2 sets  x10, 2 sets  three point pinch of green clothes pins  40 velcro checkers  three point pinch to place clothespins, yellow, red, green, blue          two point pinch, velcro checkers, alternat    x40  AAROM shoulder flex/ext in side lying to  cones x 12  two point pinch , alternate to  40 velcro checkers  Red T band PRE bicep, tricep, IR/ER right  X 10 each          Red Putty           IP , twist, pull and pinch 10 min  IP , twist, pull and pinch 5 min                           HEP instruction                                        Therapeutic Activity                    Functional reaching    placing velcro checkers on lower levels at tree, standing x 30      Scooping sens bin #3, into pan and pouring  5 min      PNF diagonal D1/D2 reaching across with right to place cones on stool , seated in chair x 20  Reaching across with right to place cones on stool , standing at table x 20    Open close 6 different screw top containers    Roll out red putty with rolling pin       Neuromuscular Re-education                      PNF,D1, D2    x10 each                                   Modalities                    Moist heat        3 min                                 Assessment: Patient fatigues quickly with T band shoulder and bicep/triceps exercise. Successful with independently open close, screw top containers. Improving motor control with right hand. Goals:    Pt will be independent and compliant with comprehensive HEP to maintain progress achieved in OT  Patient to be assessed for UE function by completing ARAT test and 9 hole peg test by third visit. .. .(Achieved)  Patient will demonstrate improved right shoulder flexion to 100 degrees, abduction to 90 degrees for ease in reaching into closet. Patient will demonstrate using fork in right hand to simulate feeding self at SBA. Patient will demonstrate increase in right  strength to at least 25 lbs for ease in carrying bag of groceries. ....(made progress toward)  Plan: Continue with work on improving right UE motor control and strength for improved functional use of right UE.      Charges:MT,TE,TA  Total Timed Treatment:    45                   Total Treatment Time: 45

## 2023-05-15 NOTE — TELEPHONE ENCOUNTER
Noted the patient request for medication listed below.      Routed to the provider for review and feedback

## 2023-05-15 NOTE — PROGRESS NOTES
Diagnosis: Diagnosis: CVA, R hemiparesis   Visit (# authorized): 10 per POC (Medicare)                          Referring Physician: Thea Moss; PCP: Dr Maddison Solomon    Precautions: at risk of falls     Medication changes since last visit?:  No    Subjective: No new information to report. Objective:    Date  4/26 5/8 5/10 5/15       Visit Number  2 3 4 5       NMR x x         Ther EX x x x x       Ther Act           Gait Training           CRM            Manual             Additional Treatment Information:    Therapeutic Exercise (39 mins):  Nu-step level 3 x10 mins, BLE only    Shuttle    B press (2 blue, silver) x25   SL press (1 blue, yellow) 3x10 B   Hip ABD isometric with belt 8 sec hold x20   Side step up 4 in step x15 B   Standing fitness slider   Hip EXT 2x15 B   Seated hip flexion alt (march) 1 min bout x2     Patient Education:  Encouraged continued performance of HEP    Assessment:   Tolerated session well. BLE appear fatigued at end of session.      Plan: in future sessions: rockerboard R<>L, obstacle negotiation, stork    Charges: 3 TE       Total Timed Treatment: 39 min  Total Treatment Time: 39 min

## 2023-05-16 ENCOUNTER — HOSPITAL ENCOUNTER (OUTPATIENT)
Dept: MRI IMAGING | Facility: HOSPITAL | Age: 78
Discharge: HOME | End: 2023-05-16
Payer: MEDICARE

## 2023-05-16 ENCOUNTER — PATIENT MESSAGE (OUTPATIENT)
Dept: FAMILY MEDICINE CLINIC | Facility: CLINIC | Age: 78
End: 2023-05-16

## 2023-05-16 ENCOUNTER — OFFICE VISIT (OUTPATIENT)
Dept: SPEECH THERAPY | Facility: HOSPITAL | Age: 78
End: 2023-05-16
Attending: PHYSICAL MEDICINE & REHABILITATION
Payer: MEDICARE

## 2023-05-16 DIAGNOSIS — I63.9 CEREBROVASCULAR ACCIDENT (CVA), UNSPECIFIED MECHANISM (HCC): ICD-10-CM

## 2023-05-16 PROCEDURE — A9575 INJ GADOTERATE MEGLUMI 0.1ML: HCPCS

## 2023-05-16 PROCEDURE — 70553 MRI BRAIN STEM W/O & W/DYE: CPT

## 2023-05-16 PROCEDURE — 92507 TX SP LANG VOICE COMM INDIV: CPT

## 2023-05-16 RX ORDER — GADOTERATE MEGLUMINE 376.9 MG/ML
20 INJECTION INTRAVENOUS
Status: COMPLETED | OUTPATIENT
Start: 2023-05-16 | End: 2023-05-16

## 2023-05-16 RX ADMIN — GADOTERATE MEGLUMINE 15 ML: 376.9 INJECTION INTRAVENOUS at 15:19:00

## 2023-05-17 ENCOUNTER — OFFICE VISIT (OUTPATIENT)
Dept: SPEECH THERAPY | Facility: HOSPITAL | Age: 78
End: 2023-05-17
Attending: PHYSICAL MEDICINE & REHABILITATION
Payer: MEDICARE

## 2023-05-17 ENCOUNTER — OFFICE VISIT (OUTPATIENT)
Dept: OCCUPATIONAL MEDICINE | Facility: HOSPITAL | Age: 78
End: 2023-05-17
Attending: PHYSICAL MEDICINE & REHABILITATION
Payer: MEDICARE

## 2023-05-17 ENCOUNTER — OFFICE VISIT (OUTPATIENT)
Dept: PHYSICAL THERAPY | Facility: HOSPITAL | Age: 78
End: 2023-05-17
Attending: PHYSICAL MEDICINE & REHABILITATION
Payer: MEDICARE

## 2023-05-17 PROCEDURE — 97110 THERAPEUTIC EXERCISES: CPT

## 2023-05-17 PROCEDURE — 92507 TX SP LANG VOICE COMM INDIV: CPT

## 2023-05-17 PROCEDURE — 97112 NEUROMUSCULAR REEDUCATION: CPT

## 2023-05-17 PROCEDURE — 97110 THERAPEUTIC EXERCISES: CPT | Performed by: OCCUPATIONAL THERAPIST

## 2023-05-17 PROCEDURE — 97140 MANUAL THERAPY 1/> REGIONS: CPT | Performed by: OCCUPATIONAL THERAPIST

## 2023-05-17 NOTE — PROGRESS NOTES
Diagnosis:   Acute ischemic left MCA stroke (HCC) (G98.670)                                                                                                Authorized # of Visits: 12        Next MD visit: unknown  Fall Risk:  mild      Precautions: Falls, Cancer  Medication Changes since last visit?:  No  Subjective:  \"I'm able to do a lot with my right hand now, problem only if something is heavy then I might drop it. \"  Objective: Treatment began with moist heat, gentle shoulder/scapula mobilization and stretching, strengthening and functional activities. See flow sheet for details.       Date 04/26/23  05/01/23  05/03/23  05/08/23  05/10/23  05/15/23  05/17/23     Visit # 1  2  3  4  5  6  7                        Evaluation x                Manual                   Review HEP 8 min    gentle PROM of right shoulder in supine 3 min  gentle PROM of right shoulder in supine 3 min  gentle PROM of right shoulder in supine 3 min  Gentle PROM of right shoulder in sitting 3 min  Gentle PROM of right shoulder in sitting 3 min      Mobilization right scapula seated in chair    5 min  5 min in supine  5 min  5 min  5 min  5 min      Posterior capsule stretch    3 min  ARAT assessment, 9 hole peg test assessment             Ther ex                   Isometric shoulder flex/ext, abduction, IR/ER   x10 each  place and hold shoulder into flexion in supine x 10  shoulder shrugs and retract  shoulder shrugs and retraction x 10  PRE shoulder IR/ER with red T band x 10, 2 sets  PRE shoulder IR/ER with red T band x 10, 2 sets     Ball on wall , isometric with ball shoulder flex/ext, abduction, IR/ER   x10 each  place and hold shoulder into abduction in supine x 10  pulleys shoulder rlx/abd x 10    Bicep/BR  curls 2 # x 10 each  pulleys shoulder flexion/abd x 10    PRE elbow flex/ext with red T band x 10, 2 sets  PRE elbow flex/ext with red T band x 10, 2 sets      cuff weight bicep    2 # on right, 3# on left x 20  IR/ER PRE in side lying with 2 # wt x 10, 2 sets  IR/ER with 1# in standing x 10    Isometric shoulder flex/ext, abductopm, ER ball on wall x 10 each composite flexion grasping potato masher push into putty x 10      Isometric shoulder flex/ext, abductopm, ER ball on wall x 10 each  composite flexion grasping potato masher push into putty x 10  composite flexion grasping potato masher push into putty x 10    Scapula rows with red T band x 20      Right shoulder flexion with towel on wall, place and hold x 8      red T band punches    x10, 2 sets  x10, 2 sets  three point pinch of green clothes pins  40 velcro checkers  three point pinch to place clothespins, yellow, red, green, blue  three point pinch of green clothes pins  40 velcro checkers  three point pinch of green clothes pins  40 velcro checkers      two point pinch, velcro checkers, alternat    x40  AAROM shoulder flex/ext in side lying to  cones x 12  two point pinch , alternate to  40 velcro checkers  Red T band PRE bicep, tricep, IR/ER right  X 10 each  Red T band PRE bicep, tricep, IR/ER right  X 10 each, 2 sets  Red T band PRE bicep, tricep, IR/ER right  X 10 each, 2 sets      Red Putty           IP , twist, pull and pinch 10 min  IP , twist, pull and pinch 5 min  IP , twist, pull and pinch 5 min 5 min                         HEP instruction                                        Therapeutic Activity                    Functional reaching    placing velcro checkers on lower levels at tree, standing x 30      Scooping sens bin #3, into pan and pouring  5 min      PNF diagonal D1/D2 reaching across with right to place cones on stool , seated in chair x 20  Reaching across with right to place cones on stool , standing at table x 20    Open close 6 different screw top containers    Roll out red putty with rolling pin       Neuromuscular Re-education                      PNF,D1, D2    x10 each                                   Modalities Moist heat        3 min                                 Assessment: Patient demonstrates improved  in right hand. Able to maintain grasp of red T band during shoulder, elbow PRE exercises. Observed significant crepitus in right shoulder with flexion and abduction although patient denies pain. Goals:    Pt will be independent and compliant with comprehensive HEP to maintain progress achieved in OT  Patient to be assessed for UE function by completing ARAT test and 9 hole peg test by third visit. .. .(Achieved)  Patient will demonstrate improved right shoulder flexion to 100 degrees, abduction to 90 degrees for ease in reaching into closet. Patient will demonstrate using fork in right hand to simulate feeding self at SBA. Patient will demonstrate increase in right  strength to at least 25 lbs for ease in carrying bag of groceries. ....(made progress toward)  Plan: Continue with work on improving right UE motor control and strength for improved functional use of right UE. Assess eating utensil use next session.      Charges:MT,TE2  Total Timed Treatment:    45                   Total Treatment Time: 45

## 2023-05-17 NOTE — PROGRESS NOTES
Diagnosis: Diagnosis: CVA, R hemiparesis   Visit (# authorized): 10 per POC (Medicare)                          Referring Physician: Vernon Zaragoza; PCP: Dr Stormy Humeml    Precautions: at risk of falls     Medication changes since last visit?:  No    Subjective: Had MRI- received results but plans to discuss with MD at next appointment on the 25th of this month. Objective:    Date  4/26 5/8 5/10 5/15 5/17      Visit Number  2 3 4 5 6      NMR x x   x      Ther EX x x x x x      Ther Act           Gait Training           CRM            Manual             Additional Treatment Information:    Therapeutic Exercise (19 mins):  Nu-step level 4 x10 mins, BLE only    Seated R foot on dynadisc    PF<>DF x30    PRO<>SUP x30  Seated R ankle ABC's -pt instructed in performance at home, demo with return demo    NMR (20 mins):   Tap ups 4 in step x10 B   TKE green TB R only x15, 5 sec hold   TKE with tap up green TB R, L tap up 4 in step x20   Rockerboard    A<>P, static holds-->taps    R/L taps-->centered hold   Step stance -wings opening- ant wt shift x10 ea foot in front , CGA-SBA- tactile cues for proper wt shift     Patient Education:  ABC's for R ankle added to HEP    Assessment:   Tolerated session well. BLE appear fatigued at end of session otherwise session tolerated well. Mild R ankle instability with push off in step stance wt shift activity.      Plan: in future sessions: obstacle negotiationthompson    Charges: 1 TE, 2 NMR       Total Timed Treatment: 39 min  Total Treatment Time: 40 min

## 2023-05-22 ENCOUNTER — OFFICE VISIT (OUTPATIENT)
Dept: SPEECH THERAPY | Facility: HOSPITAL | Age: 78
End: 2023-05-22
Attending: PHYSICAL MEDICINE & REHABILITATION
Payer: MEDICARE

## 2023-05-22 ENCOUNTER — OFFICE VISIT (OUTPATIENT)
Dept: PHYSICAL THERAPY | Facility: HOSPITAL | Age: 78
End: 2023-05-22
Attending: PHYSICAL MEDICINE & REHABILITATION
Payer: MEDICARE

## 2023-05-22 PROCEDURE — 92507 TX SP LANG VOICE COMM INDIV: CPT

## 2023-05-22 PROCEDURE — 97112 NEUROMUSCULAR REEDUCATION: CPT

## 2023-05-22 PROCEDURE — 97110 THERAPEUTIC EXERCISES: CPT

## 2023-05-22 NOTE — PROGRESS NOTES
Diagnosis: Diagnosis: CVA, R hemiparesis   Visit (# authorized): 10 per POC (Medicare)                          Referring Physician: Thea Moss; PCP: Dr Maddison Solomon    Precautions: at risk of falls     Medication changes since last visit?:  No    Subjective: Did step reciprocally at home. Objective:    Date  4/26 5/8 5/10 5/15 5/17 5/22     Visit Number  2 3 4 5 6 7     NMR x x   x x     Ther EX x x x x x x     Ther Act           Gait Training           CRM            Manual             Additional Treatment Information:    Therapeutic Exercise (8 mins):  Standing foot on slider    Hip ABD 2x15 B    Hip EXT 2x15 B     NMR (32 mins):   Ambulation outdoors including uneven surfaces, inclines, declines- 28 mins including 3 seated rest breaks  1 UE support, R hand on glut med- fwd step L with facilitation of R glut med stability in stance x30 -demo with return demo    Patient Education:  Encouraged continued performance of HEP. Assessment:   Tolerated session well. RLE fatiguing with community ambulation but demo's good postural stability throughout. Responds well to self facilitation of R glut med in stance.      Plan: in future sessions: obstacle negotiationthompson    Charges: 1 TE, 2 NMR       Total Timed Treatment: 40 min  Total Treatment Time: 40 min

## 2023-05-24 ENCOUNTER — OFFICE VISIT (OUTPATIENT)
Dept: SPEECH THERAPY | Facility: HOSPITAL | Age: 78
End: 2023-05-24
Attending: PHYSICAL MEDICINE & REHABILITATION
Payer: MEDICARE

## 2023-05-24 ENCOUNTER — PATIENT OUTREACH (OUTPATIENT)
Dept: CASE MANAGEMENT | Age: 78
End: 2023-05-24

## 2023-05-24 ENCOUNTER — OFFICE VISIT (OUTPATIENT)
Dept: PHYSICAL THERAPY | Facility: HOSPITAL | Age: 78
End: 2023-05-24
Attending: PHYSICAL MEDICINE & REHABILITATION
Payer: MEDICARE

## 2023-05-24 PROCEDURE — 97110 THERAPEUTIC EXERCISES: CPT

## 2023-05-24 PROCEDURE — 92507 TX SP LANG VOICE COMM INDIV: CPT

## 2023-05-24 NOTE — PROGRESS NOTES
Diagnosis: Diagnosis: CVA, R hemiparesis   Visit (# authorized): 10 per POC (Medicare)                          Referring Physician: Theresa Chambers; PCP: Dr Nadia Martinez    Precautions: at risk of falls     Medication changes since last visit?:  No    Subjective: No new information to report. Objective:    Date  4/26 5/8 5/10 5/15 5/17 5/22 5/24    Visit Number  2 3 4 5 6 7 8    NMR x x   x x     Ther EX x x x x x x x    Ther Act           Gait Training           CRM            Manual             Additional Treatment Information:    Therapeutic Exercise (42 mins):  Nu-step level 4 BLE only x10 mins   Seated hip ABD with belt, isometric 10 sec hold x20   Seated hip ADD with ball, isometric hold 10 sec x20  Stork 3 sec hold x15 B -demo with return demo  Seated hip IR x15 B   Stool scoots 40 ft x2       Assessment:   Tolerated session well despite fatigue at end of session.       Plan: in future sessions: obstacle negotiation, backwards walking, resisted side stepping     Charges: 3 TE    Total Timed Treatment: 42 min  Total Treatment Time: 42 min

## 2023-05-25 ENCOUNTER — OFFICE VISIT (OUTPATIENT)
Dept: SURGERY | Facility: CLINIC | Age: 78
End: 2023-05-25
Payer: MEDICARE

## 2023-05-25 VITALS
HEIGHT: 61 IN | BODY MASS INDEX: 31.15 KG/M2 | HEART RATE: 70 BPM | SYSTOLIC BLOOD PRESSURE: 118 MMHG | DIASTOLIC BLOOD PRESSURE: 78 MMHG | WEIGHT: 165 LBS

## 2023-05-25 DIAGNOSIS — I67.1 INTRACRANIAL ANEURYSM: Primary | ICD-10-CM

## 2023-05-25 PROBLEM — E78.00 PURE HYPERCHOLESTEROLEMIA: Status: ACTIVE | Noted: 2018-08-30

## 2023-05-25 PROBLEM — R07.2 PRECORDIAL PAIN: Status: ACTIVE | Noted: 2018-08-30

## 2023-05-25 PROBLEM — K58.9 IRRITABLE BOWEL SYNDROME: Status: ACTIVE | Noted: 2023-05-25

## 2023-05-25 PROBLEM — J44.9 COPD (CHRONIC OBSTRUCTIVE PULMONARY DISEASE) (HCC): Status: ACTIVE | Noted: 2023-03-31

## 2023-05-25 PROBLEM — F43.21 ADJUSTMENT DISORDER WITH DEPRESSED MOOD: Status: ACTIVE | Noted: 2023-04-03

## 2023-05-25 PROBLEM — I63.512 ACUTE ISCHEMIC LEFT MCA STROKE (HCC): Status: ACTIVE | Noted: 2023-03-31

## 2023-05-25 PROBLEM — R94.31 ABNORMAL EKG: Status: ACTIVE | Noted: 2021-12-30

## 2023-05-25 PROBLEM — E04.1 THYROID NODULE: Status: ACTIVE | Noted: 2023-05-25

## 2023-05-25 PROBLEM — I63.9 CEREBROVASCULAR ACCIDENT (CVA) DUE TO EMBOLISM (HCC): Status: ACTIVE | Noted: 2023-03-31

## 2023-05-25 PROCEDURE — 99215 OFFICE O/P EST HI 40 MIN: CPT | Performed by: RADIOLOGY

## 2023-05-25 RX ORDER — PANTOPRAZOLE SODIUM 20 MG/1
20 TABLET, DELAYED RELEASE ORAL 2 TIMES DAILY
COMMUNITY
Start: 2023-04-21

## 2023-05-25 RX ORDER — CHOLECALCIFEROL (VITAMIN D3) 125 MCG
5 CAPSULE ORAL NIGHTLY
COMMUNITY

## 2023-05-25 RX ORDER — METHYL SALICYLATE/MENTHOL
1 CREAM (GRAM) TOPICAL 3 TIMES DAILY PRN
COMMUNITY
Start: 2023-04-21

## 2023-05-25 RX ORDER — MAGNESIUM OXIDE 400 MG (241.3 MG MAGNESIUM) TABLET
2 TABLET EVERY 6 HOURS PRN
COMMUNITY
Start: 2023-04-21 | End: 2023-05-25

## 2023-05-25 RX ORDER — ROSUVASTATIN CALCIUM 10 MG/1
20 TABLET, COATED ORAL DAILY
COMMUNITY
Start: 2023-04-21

## 2023-05-25 RX ORDER — OLOPATADINE HYDROCHLORIDE 2 MG/ML
1 SOLUTION/ DROPS OPHTHALMIC DAILY
COMMUNITY

## 2023-05-25 RX ORDER — ANTIOX #8/OM3/DHA/EPA/LUT/ZEAX 250-2.5 MG
1 CAPSULE ORAL 2 TIMES DAILY WITH MEALS
COMMUNITY

## 2023-05-25 NOTE — PROGRESS NOTES
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   Changes since LOV: N/A  Anticoagulants: asp 81    Review of Systems:    Hand Dominance: right  General: fatigue  Neuro: memory loss  Head: no symptoms reported  Musculoskeletal: no symptoms reported  Cardiovascular: no symptoms reported  Gastrointestinal: no symptoms reported  Genitourinary: no symptoms reported  Respiratory: no symptoms reported  Eyes: no symptoms reported  Skin: no symptoms reported  Mouth & throat: no symptoms reported  Neck: no symptoms reported  Nose: no symptoms reported  Psychiatric: no symptoms reported     Barthel: 95  MRS: 1  Stroke Scale: 0

## 2023-05-30 ENCOUNTER — OFFICE VISIT (OUTPATIENT)
Dept: SPEECH THERAPY | Facility: HOSPITAL | Age: 78
End: 2023-05-30
Attending: PHYSICAL MEDICINE & REHABILITATION
Payer: MEDICARE

## 2023-05-30 PROCEDURE — 92507 TX SP LANG VOICE COMM INDIV: CPT

## 2023-06-02 ENCOUNTER — OFFICE VISIT (OUTPATIENT)
Dept: SPEECH THERAPY | Facility: HOSPITAL | Age: 78
End: 2023-06-02
Attending: PHYSICAL MEDICINE & REHABILITATION
Payer: MEDICARE

## 2023-06-02 PROCEDURE — 92507 TX SP LANG VOICE COMM INDIV: CPT

## 2023-06-05 DIAGNOSIS — F41.1 GENERALIZED ANXIETY DISORDER: ICD-10-CM

## 2023-06-06 ENCOUNTER — OFFICE VISIT (OUTPATIENT)
Dept: PHYSICAL THERAPY | Facility: HOSPITAL | Age: 78
End: 2023-06-06
Attending: PHYSICAL MEDICINE & REHABILITATION
Payer: MEDICARE

## 2023-06-06 ENCOUNTER — OFFICE VISIT (OUTPATIENT)
Dept: SPEECH THERAPY | Facility: HOSPITAL | Age: 78
End: 2023-06-06
Attending: PHYSICAL MEDICINE & REHABILITATION
Payer: MEDICARE

## 2023-06-06 PROCEDURE — 92507 TX SP LANG VOICE COMM INDIV: CPT

## 2023-06-06 PROCEDURE — 97110 THERAPEUTIC EXERCISES: CPT

## 2023-06-06 NOTE — PROGRESS NOTES
Diagnosis: Diagnosis: CVA, R hemiparesis   Visit (# authorized): 10 per POC (Medicare)                          Referring Physician: Yolanda Zamora; PCP: Dr Rolando Jimenez    Precautions: at risk of falls     Medication changes since last visit?:  No    Subjective: Has been trying to do more at home. Notes fatigue in her RLE as she does more. Objective:    Date  4/26 5/8 5/10 5/15 5/17 5/22 5/24 6/6   Visit Number  2 3 4 5 6 7 8 9   NMR x x   x x     Ther EX x x x x x x x x   Ther Act           Gait Training           CRM            Manual             Additional Treatment Information:    Therapeutic Exercise (40 mins):  Nu-step level 5 BLE only x10 mins   Step up 6 in step RLE only    Fwd x15    Side step x15   Step down 4 in step leading with L (eccentric R) x20   Stork 3 sec hold x15 B -demo with return demo, tactile cues initially  Seated hip flex mid range (foot on 8 in stool) x20 R only   Stool scoots 40 ft x2   EVR ankle yellow TB x15    Patient education: POC briefly discussed. Assessment:   Tolerated session well despite progressed TE today with increased reps and load. Demo's improvement in strength and endurance per observation. Progressing well towards set goals.        Plan: reassess     Charges: 3 TE    Total Timed Treatment: 40 min  Total Treatment Time: 40 min

## 2023-06-06 NOTE — TELEPHONE ENCOUNTER
Please review refill failed/no protocol     Requested Prescriptions     Pending Prescriptions Disp Refills    BUSPIRONE HCL 30 MG Oral Tab [Pharmacy Med Name: Buspirone Hydrochloride 30 Mg Tab Epic] 90 tablet 0     Sig: TAKE ONE TABLET BY MOUTH ONE TIME DAILY         Recent Visits  Date Type Provider Dept   03/13/23 Office Visit Dorota Pinto MD [facility]   12/19/22 Office Visit Dorota Pinto MD [facility]   11/29/22 Office Visit Dorota Pinto MD [facility]   02/23/22 Office Visit Dorota Pinto MD [facility]   01/18/22 Office Visit Dorota Pinto MD [facility]   Showing recent visits within past 540 days with a meds authorizing provider and meeting all other requirements  Future Appointments  No visits were found meeting these conditions.   Showing future appointments within next 150 days with a meds authorizing provider and meeting all other requirements    Requested Prescriptions   Pending Prescriptions Disp Refills    BUSPIRONE HCL 30 MG Oral Tab [Pharmacy Med Name: Buspirone Hydrochloride 30 Mg Tab Epic] 90 tablet 0     Sig: TAKE ONE TABLET BY MOUTH ONE TIME DAILY       There is no refill protocol information for this order

## 2023-06-07 ENCOUNTER — OFFICE VISIT (OUTPATIENT)
Dept: OCCUPATIONAL MEDICINE | Facility: HOSPITAL | Age: 78
End: 2023-06-07
Attending: PHYSICAL MEDICINE & REHABILITATION
Payer: MEDICARE

## 2023-06-07 ENCOUNTER — PATIENT OUTREACH (OUTPATIENT)
Dept: CASE MANAGEMENT | Age: 78
End: 2023-06-07

## 2023-06-07 DIAGNOSIS — F41.1 GENERALIZED ANXIETY DISORDER: ICD-10-CM

## 2023-06-07 DIAGNOSIS — R53.1 RIGHT SIDED WEAKNESS: ICD-10-CM

## 2023-06-07 DIAGNOSIS — I63.412 CEREBRAL INFARCTION DUE TO EMBOLISM OF LEFT MIDDLE CEREBRAL ARTERY (HCC): ICD-10-CM

## 2023-06-07 DIAGNOSIS — E78.5 HYPERLIPIDEMIA, UNSPECIFIED HYPERLIPIDEMIA TYPE: ICD-10-CM

## 2023-06-07 DIAGNOSIS — I10 ESSENTIAL HYPERTENSION: Primary | ICD-10-CM

## 2023-06-07 DIAGNOSIS — M06.9 RHEUMATOID ARTHRITIS INVOLVING MULTIPLE SITES, UNSPECIFIED WHETHER RHEUMATOID FACTOR PRESENT (HCC): ICD-10-CM

## 2023-06-07 DIAGNOSIS — M15.9 PRIMARY OSTEOARTHRITIS INVOLVING MULTIPLE JOINTS: ICD-10-CM

## 2023-06-07 PROCEDURE — 97110 THERAPEUTIC EXERCISES: CPT | Performed by: OCCUPATIONAL THERAPIST

## 2023-06-07 PROCEDURE — 97530 THERAPEUTIC ACTIVITIES: CPT | Performed by: OCCUPATIONAL THERAPIST

## 2023-06-07 RX ORDER — BUSPIRONE HYDROCHLORIDE 30 MG/1
15 TABLET ORAL 2 TIMES DAILY
Qty: 90 TABLET | Refills: 0 | Status: SHIPPED | OUTPATIENT
Start: 2023-06-07

## 2023-06-07 NOTE — PROGRESS NOTES
Diagnosis:   Acute ischemic left MCA stroke (HCC) (V92.679)                                                                                                Authorized # of Visits: 12        Next MD visit: unknown  Fall Risk:  mild      Precautions: Falls, Cancer  Medication Changes since last visit?:  No  Subjective:  \"I'm able to brush my teeth and floss using my right hand now  Objective: Treatment began with AAROM of shoulder, digit stretching, strengthening and functional activities. See flow sheet for details.       Date 04/26/23  05/01/23  05/03/23  05/08/23  05/10/23  05/15/23  05/17/23  06/07/23   Visit # 1  2  3  4  5  6  7 8                       Evaluation x                Manual                   Review HEP 8 min    gentle PROM of right shoulder in supine 3 min  gentle PROM of right shoulder in supine 3 min  gentle PROM of right shoulder in supine 3 min  Gentle PROM of right shoulder in sitting 3 min  Gentle PROM of right shoulder in sitting 3 min      Mobilization right scapula seated in chair    5 min  5 min in supine  5 min  5 min  5 min  5 min      Posterior capsule stretch    3 min  ARAT assessment, 9 hole peg test assessment             Ther ex                   Isometric shoulder flex/ext, abduction, IR/ER   x10 each  place and hold shoulder into flexion in supine x 10  shoulder shrugs and retract  shoulder shrugs and retraction x 10  PRE shoulder IR/ER with red T band x 10, 2 sets  PRE shoulder IR/ER with red T band x 10, 2 sets  PRE shoulder IR/ER with red T band x 10, 2 sets   Ball on wall , isometric with ball shoulder flex/ext, abduction, IR/ER   x10 each  place and hold shoulder into abduction in supine x 10  pulleys shoulder rlx/abd x 10    Bicep/BR  curls 2 # x 10 each  pulleys shoulder flexion/abd x 10    PRE elbow flex/ext with red T band x 10, 2 sets  PRE elbow flex/ext with red T band x 10, 2 sets  PRE elbow flex/ext with red T band x 10, 2 sets    cuff weight bicep    2 # on right, 3# on left x 20  IR/ER PRE in side lying with 2 # wt x 10, 2 sets  IR/ER with 1# in standing x 10    Isometric shoulder flex/ext, abductopm, ER ball on wall x 10 each composite flexion grasping potato masher push into putty x 10      Isometric shoulder flex/ext, abductopm, ER ball on wall x 10 each  composite flexion grasping potato masher push into putty x 10  composite flexion grasping potato masher push into putty x 10    Scapula rows with red T band x 20      Right shoulder flexion with towel on wall, place and hold x 8  fine motor - alternate two point pinch to remove 40 velcro checkers       Three point pinch of green CP to replace 40 velcro checkers    red T band punches    x10, 2 sets  x10, 2 sets  three point pinch of green clothes pins  40 velcro checkers  three point pinch to place clothespins, yellow, red, green, blue  three point pinch of green clothes pins  40 velcro checkers  three point pinch of green clothes pins  40 velcro checkers      two point pinch, velcro checkers, alternat    x40  AAROM shoulder flex/ext in side lying to  cones x 12  two point pinch , alternate to  40 velcro checkers  Red T band PRE bicep, tricep, IR/ER right  X 10 each  Red T band PRE bicep, tricep, IR/ER right  X 10 each, 2 sets  Red T band PRE bicep, tricep, IR/ER right  X 10 each, 2 sets  Red T band PRE bicep, tricep, IR/ER right  X 10 each, 2 sets    Red Putty           IP , twist, pull and pinch 10 min  IP , twist, pull and pinch 5 min  IP , twist, pull and pinch 5 min 5 min  Red putty resisted digit abd/adduciotn, two point pinch x 20                    AAROM with pulleys, shoulder flex/ext, ad/add x 10 each   HEP instruction                                        Therapeutic Activity                    Functional reaching    placing velcro checkers on lower levels at tree, standing x 30      Scooping sens bin #3, into pan and pouring  5 min      PNF diagonal D1/D2 reaching across with right to place cones on stool , seated in chair x 20  Reaching across with right to place cones on stool , standing at table x 20    Open close 6 different screw top containers    Roll out red putty with rolling pin    Functional activity: simulate using fork to  food, roll out dough (yellow oytty), cut putty with pizza cutter, use spatula to flip \"burgers\" in pan   Neuromuscular Re-education                      PNF,D1, D2    x10 each                                   Modalities                    Moist heat        3 min                                 Assessment: Observed less crepitus in right shoulder, tolerated resistive shoulder exercises without pain, less fatigue. Patient demonstrated modified independence with using right hand for functional activities. Goals:    Pt will be independent and compliant with comprehensive HEP to maintain progress achieved in OT  Patient to be assessed for UE function by completing ARAT test and 9 hole peg test by third visit. .. .(Achieved)  Patient will demonstrate improved right shoulder flexion to 100 degrees, abduction to 90 degrees for ease in reaching into closet. Patient will demonstrate using fork in right hand to simulate feeding self at SBA. .. Karthikeyan Puff (Achieved)  Patient will demonstrate increase in right  strength to at least 25 lbs for ease in carrying bag of groceries. ....(made progress toward)  Plan: Continue with work on improving right UE motor control and strength for improved functional use of right UE.     Charges:TE2,TA  Total Timed Treatment:    45                   Total Treatment Time: 45

## 2023-06-08 ENCOUNTER — OFFICE VISIT (OUTPATIENT)
Dept: PHYSICAL THERAPY | Facility: HOSPITAL | Age: 78
End: 2023-06-08
Attending: PHYSICAL MEDICINE & REHABILITATION
Payer: MEDICARE

## 2023-06-08 PROCEDURE — 97110 THERAPEUTIC EXERCISES: CPT

## 2023-06-08 PROCEDURE — 97112 NEUROMUSCULAR REEDUCATION: CPT

## 2023-06-08 NOTE — PROGRESS NOTES
Progress Summary    Referring Physician: Verito Ortiz   PCP: Dr Migdalia Russ    Diagnosis: CVA, R hemiparesis      Pt has attended 10 visits in Physical Therapy. Subjective: Pt reports general improvement in strength and mobility with therapy. No longer ambulating with cane. Feels like she needs to continue to work on strength and stability such as to step over obstacles/door threshold safely. Does experience fatigue (less severe than before). She is motivated to continue PT at this time. Feels as if her leg strength is 85% better since starting therapy. Compliant to HEP. Assessment: Pt demonstrates significant improvement in majority of functional outcomes performed. No longer considered a fall risk based on TUG. SSWS also improved to level considered \"normal\" for community ambulators which is reflected in her improvements TUG and 6MWT. Tolerance to activity also improved as she was able to complete 6MWT without rest. She is considered a fall risk based on the DGI and FGA with continued difficulty negotiating obstacles and head turns with ambulation though this is steadily improving. Minimal R trailing limb posture is noted with gait with infrequent reduction in foot clearance. Gait mechanics likely negatively by lack of/minimal arm swing on the R. Goals have been updated to reflect progress in therapy thus far. Pt was also educated on progress in therapy to date and resulting POC moving forward. She is in agreement with proposed plan. Kourtney Marlow would benefit from continued, skilled PT intervention in order to further progress strength and gait to facilitate full return to PLOF.      Objective:   *values from initial testing documented in parenthesis below     6 minute walk test  Distance walked: 1076 ft no AD(515 ft no AD-supervision)  Total time walked: 6 mins (4 mins 53 seconds)   Number of rest breaks: 0 (4)    Age matched norms:  60-69 yrs:  M: 1876 ft  F: [facility] ft  70-79 yrs:  M: 1729 ft  F: 1545 ft  80-89 yrs: M: 80 ft  F: 1286 ft    5x Sit to Stand: 10.7 seconds (14 seconds)     Timed Up and Go (AD, time): 9 seconds no AD (27 seconds SBQC, 25 seconds SPC- SBA, 23.5 seconds no AD CGA-MIN)  Fall Risk: no    Gait speed: 0.8 m/s (0.63 m/s) no AD    Dynamic Gait Index    1. Gait level surface _2____  2. Change in gait speed __3___  3. Gait with horizontal head turns ___2__  4. Gait with vertical head turns ___2__  5. Gait and pivot turn _2____  6. Step over obstacle _2___  7. Step around obstacles __3___  8. Steps __2___    Total  _18 (14)___/24  </= 19/24 = predictive of falls    Functional Gait Assessment   Item Description Score (0 worst, 3 best)    ___1___1. Gait Level Surface-  Time: ____7.6____seconds  ___3___2. Change in gait speed  ___2___3. Gait with horizontal head turns   ___2___4. Gait with vertical head turns  ___2___5. Gait and pivot turn  ___1___6. Step over obstacle  ___0___7. Gait with narrow base of support-  # of steps___0_____  ___0___8. Gait with eyes closed-  Time: ____unable__-path deviation  ___2___9. Ambulating backward  ___2___10. Steps    TOTAL SCORE: ___15___/30    (less than 22/30 = fall risk)    4 square step test: 14.7 seconds  At risk of falls: no    8 inch curb negotiation ascent<>descent - SBA, no UE support   Ramp ascent<>descent -SBA, no UE support          Goals:   Goals to be met within POC or 90 days:  1. Pt to be independent in HEP ongoing   2. Pt will ambulate independently without AD in and out of home. Goal met 6/8  3. Pt will navigate complex environments, visually scanning without significant alteration of gait. In progress   New goal (6/8): Pt will improve functional, proximal hip strength in order to negotiate threshold safely with adequate foot clearance. New goal (6/8): Pt will improve FGA by at least 5 points to reflect increased level of safety with functional ambulation tasks.        Charges: 1 TE, 2 NMR     Total timed treatment: 40 mins  Total treatment time: 40 mins Rehab Potential: good    Plan: Continue skilled Physical Therapy 1 x/week or a total of 4 visits over a 90 day period. Treatment will include: Balance Training, Gait Training,  Therapeutic Exercises; Neuromuscular Re-education; Therapeutic Activity; Patient education; Home exercise program instruction. Patient was advised of these findings, precautions, and treatment options and has agreed to actively participate in planning and for this course of care. Thank you for your referral. If you have any questions, please contact me at Dept: [phone]. Sincerely,    Ferdinand Eden PT, NCS    Electronically signed by therapist: Ferdinand Eden PT    Physician's certification required: Yes  Please co-sign or sign and return this letter via fax as soon as possible to [phone]. I certify the need for these services furnished under this plan of treatment and while under my care. X___________________________________________________ Date____________________    Certification From: 4/8/1600  To:9/6/2023 21st Century Cures Act Notice to Patient: Medical documents like this are made available to patients in the interest of transparency. However, be advised this is a medical document and it is intended as [id number] communication between your medical providers. This medical document may contain abbreviations, assessments, medical data, and results or other terms that are unfamiliar. Medical documents are intended to carry relevant information, facts as evident, and the clinical opinion of the practitioner. As such, this medical document may be written in language that appears blunt or direct. You are encouraged to contact your medical provider and/or [place] [address] Patient Experience if you have any questions about this medical document.

## 2023-06-12 ENCOUNTER — TELEPHONE (OUTPATIENT)
Dept: SURGERY | Facility: CLINIC | Age: 78
End: 2023-06-12

## 2023-06-12 NOTE — TELEPHONE ENCOUNTER
Pt calling requesting a follow up after MRA. Pt has MRA scheduled on 7/6/23. Should patient schedule a follow with a different provider.

## 2023-06-12 NOTE — TELEPHONE ENCOUNTER
LOV 5/25/23 w/ Dr. Valdivia Hazen:  \"Plan:  - Continue DAPT (Brilinta 60mg BID & ASA 81mg daily)   - Plan for 3 month follow up with MRA w/wo contrast (July 2023)  - Pending results of MRA, will plan for 6 month follow up [facility] in (Oct 2023); at this time will discuss potential to discontinue Brilinta pending these results\"    Routed to Sofia Juarez for review & feedback.

## 2023-06-14 ENCOUNTER — OFFICE VISIT (OUTPATIENT)
Dept: SPEECH THERAPY | Facility: HOSPITAL | Age: 78
End: 2023-06-14
Attending: PHYSICAL MEDICINE & REHABILITATION
Payer: MEDICARE

## 2023-06-14 ENCOUNTER — OFFICE VISIT (OUTPATIENT)
Dept: PHYSICAL THERAPY | Facility: HOSPITAL | Age: 78
End: 2023-06-14
Attending: PHYSICAL MEDICINE & REHABILITATION
Payer: MEDICARE

## 2023-06-14 ENCOUNTER — OFFICE VISIT (OUTPATIENT)
Dept: OCCUPATIONAL MEDICINE | Facility: HOSPITAL | Age: 78
End: 2023-06-14
Attending: PHYSICAL MEDICINE & REHABILITATION
Payer: MEDICARE

## 2023-06-14 DIAGNOSIS — I63.512 ACUTE ISCHEMIC LEFT MCA STROKE (HCC): ICD-10-CM

## 2023-06-14 PROCEDURE — 97140 MANUAL THERAPY 1/> REGIONS: CPT | Performed by: OCCUPATIONAL THERAPIST

## 2023-06-14 PROCEDURE — 92507 TX SP LANG VOICE COMM INDIV: CPT

## 2023-06-14 PROCEDURE — 97110 THERAPEUTIC EXERCISES: CPT

## 2023-06-14 PROCEDURE — 97110 THERAPEUTIC EXERCISES: CPT | Performed by: OCCUPATIONAL THERAPIST

## 2023-06-14 NOTE — PROGRESS NOTES
Diagnosis:   Acute ischemic left MCA stroke ([facility] [address] (W67.584)                                                                                                Authorized # of Visits: 12        Next MD visit: unknown  Fall Risk:  mild      Precautions: Falls, Cancer  Medication Changes since last visit?:  No  Subjective:  \"My exercises are getting easier now. \"  Objective: Treatment began with AAROM of shoulder, digit stretching, strengthening and functional activities. See flow sheet for details.       Date 04/26/23  05/01/23  05/10/23  05/15/23  05/17/23  06/07/23 06/12/23    Visit # 1  2  5  6  7 8 9                      Evaluation x              Manual                 Review HEP 8 min    gentle PROM of right shoulder in supine 3 min  Gentle PROM of right shoulder in sitting 3 min  Gentle PROM of right shoulder in sitting 3 min  Gentle PROM of right shoulder in sitting 3 min Gentle PROM of right shoulder in supine min 5 min     Mobilization right scapula seated in chair    5 min  5 min  5 min  5 min   PROM elbow and wrist  in supine  5 min     Posterior capsule stretch    3 min         Right shoulder mobilization 3 min    Ther ex                 Isometric shoulder flex/ext, abduction, IR/ER   x10 each  shoulder shrugs and retraction x 10  PRE shoulder IR/ER with red T band x 10, 2 sets  PRE shoulder IR/ER with red T band x 10, 2 sets  PRE shoulder IR/ER with red T band x 10, 2 sets PRE shoulder with 2# dowel in supine, flex/ext, abd/add, circles x 10, 2 sets    Ball on wall , isometric with ball shoulder flex/ext, abduction, IR/ER   x10 each  pulleys shoulder flexion/abd x 10    PRE elbow flex/ext with red T band x 10, 2 sets  PRE elbow flex/ext with red T band x 10, 2 sets  PRE elbow flex/ext with red T band x 10, 2 sets IR/ER in side lying with 2# wt x 10, 2 sets     cuff weight bicep    2 # on right, 3# on left x 20 composite flexion grasping potato masher push into putty x 10      Isometric shoulder flex/ext, abductopm, ER ball on wall x 10 each  composite flexion grasping potato masher push into putty x 10  composite flexion grasping potato masher push into putty x 10    Scapula rows with red T band x 20      Right shoulder flexion with towel on wall, place and hold x 8  fine motor - alternate two point pinch to remove 40 velcro checkers       Three point pinch of green CP to replace 40 velcro checkers Tricep PRE with 2# x 10, 2 sets in supine               red T band punches    x10, 2 sets  three point pinch to place clothespins, yellow, red, green, blue  three point pinch of green clothes pins  40 velcro checkers  three point pinch of green clothes pins  40 velcro checkers        two point pinch, velcro checkers, alternat    x40  Red T band PRE bicep, tricep, IR/ER right  X 10 each  Red T band PRE bicep, tricep, IR/ER right  X 10 each, 2 sets  Red T band PRE bicep, tricep, IR/ER right  X 10 each, 2 sets  Red T band PRE bicep, tricep, IR/ER right  X 10 each, 2 sets Bicep pre with 2# wt x 20     Red Putty       IP , twist, pull and pinch 10 min  IP , twist, pull and pinch 5 min  IP , twist, pull and pinch 5 min 5 min  Red putty resisted digit abd/adduciotn, two point pinch x 20 Green putty resisted digit abd/adduciotn, two point pinch x 20                 AAROM with pulleys, shoulder flex/ext, ad/add x 10 each AAROM with pulleys, shoulder flex/ext, ad/add x 10 each    HEP instruction                                    Therapeutic Activity                  Functional reaching    placing velcro checkers on lower levels at tree, standing x 30      Scooping sens bin #3, into pan and pouring  5 min  PNF diagonal D1/D2 reaching across with right to place cones on stool , seated in chair x 20  Reaching across with right to place cones on stool , standing at table x 20    Open close 6 different screw top containers    Roll out red putty with rolling pin    Functional activity: simulate using fork to  food, roll out dough (yellow oytty), cut putty with pizza cutter, use spatula to flip \"burgers\" in pan     Neuromuscular Re-education                    PNF,D1, D2    x10 each                               Modalities                  Moist heat              3 min                        Assessment: Performed shoulder exercises in supine, less crepitus observed. Upgrade resistive exercises, patient feeling challenged but able to complete without pain. Goals:    Pt will be independent and compliant with comprehensive HEP to maintain progress achieved in OT  Patient to be assessed for UE function by completing ARAT test and 9 hole peg test by third visit. .. .(Achieved)  Patient will demonstrate improved right shoulder flexion to 100 degrees, abduction to 90 degrees for ease in reaching into closet. Patient will demonstrate using fork in right hand to simulate feeding self at SBA. .. Simon Robles (Achieved)  Patient will demonstrate increase in right  strength to at least 25 lbs for ease in carrying bag of groceries. ....(made progress toward)  Plan: Continue with work on improving right UE motor control and strength for improved functional use of right UE.     Charges: MT, TE2  Total Timed Treatment:    40                   Total Treatment Time: 45

## 2023-06-14 NOTE — PROGRESS NOTES
Diagnosis: Diagnosis: CVA, R hemiparesis   Visit (# authorized): 14 per POC (Medicare)                          Referring Physician: Aide Suarez; PCP: Dr Kim Stallings    Precautions: at risk of falls     Medication changes since last visit?:  No    Subjective: No new information to report. Objective:    Date  4/26 5/8 5/10 5/15 5/17 5/22 5/24 6/6 6/8 6/14    Visit Number  2 3 4 5 6 7 8 9 10 (PN) 11    NMR x x   x x   x     Ther EX x x x x x x x x x x    Ther Act              Gait Training              CRM               Manual                Additional Treatment Information:    Therapeutic Exercise (38 mins):  Nu-step level 5 BLE only x10 mins   Shuttle    B press (2 blue, silver) x20-->with wobble disc x20    SL press R only (blue, yellow) x20-->with wobble disc x20   Seated hip flex mid range (foot on 8 in stool) x20 R only (knee straight)  Seated hip flex mid range (foot on aerobic step) x20 R only (knee bent)  Stool scoots 40 ft x2   EVR ankle yellow TB B 2x10, R only x15     Assessment:   Tolerated session well despite progressed TE today with increased reps and load. Demo's improvement in strength and endurance per observation. Progressing well towards set goals.        Plan: update HEP as appropriate next session     Charges: 3 TE    Total Timed Treatment: 38 min  Total Treatment Time: 38 min

## 2023-06-16 ENCOUNTER — OFFICE VISIT (OUTPATIENT)
Dept: SPEECH THERAPY | Facility: HOSPITAL | Age: 78
End: 2023-06-16
Attending: PHYSICAL MEDICINE & REHABILITATION
Payer: MEDICARE

## 2023-06-16 PROCEDURE — 92507 TX SP LANG VOICE COMM INDIV: CPT

## 2023-06-20 ENCOUNTER — OFFICE VISIT (OUTPATIENT)
Dept: SPEECH THERAPY | Facility: HOSPITAL | Age: 78
End: 2023-06-20
Attending: PHYSICAL MEDICINE & REHABILITATION
Payer: MEDICARE

## 2023-06-20 PROCEDURE — 92507 TX SP LANG VOICE COMM INDIV: CPT

## 2023-06-21 ENCOUNTER — OFFICE VISIT (OUTPATIENT)
Dept: OCCUPATIONAL MEDICINE | Facility: HOSPITAL | Age: 78
End: 2023-06-21
Attending: PHYSICAL MEDICINE & REHABILITATION
Payer: MEDICARE

## 2023-06-21 ENCOUNTER — OFFICE VISIT (OUTPATIENT)
Dept: SPEECH THERAPY | Facility: HOSPITAL | Age: 78
End: 2023-06-21
Attending: PHYSICAL MEDICINE & REHABILITATION
Payer: MEDICARE

## 2023-06-21 ENCOUNTER — OFFICE VISIT (OUTPATIENT)
Dept: PHYSICAL THERAPY | Facility: HOSPITAL | Age: 78
End: 2023-06-21
Attending: PHYSICAL MEDICINE & REHABILITATION
Payer: MEDICARE

## 2023-06-21 PROCEDURE — 97110 THERAPEUTIC EXERCISES: CPT

## 2023-06-21 PROCEDURE — 92507 TX SP LANG VOICE COMM INDIV: CPT

## 2023-06-21 PROCEDURE — 97530 THERAPEUTIC ACTIVITIES: CPT | Performed by: OCCUPATIONAL THERAPIST

## 2023-06-21 PROCEDURE — 97140 MANUAL THERAPY 1/> REGIONS: CPT | Performed by: OCCUPATIONAL THERAPIST

## 2023-06-21 PROCEDURE — 97110 THERAPEUTIC EXERCISES: CPT | Performed by: OCCUPATIONAL THERAPIST

## 2023-06-21 NOTE — PROGRESS NOTES
Diagnosis:   Acute ischemic left MCA stroke (HCC) (G06.571)                                                                                                Authorized # of Visits: 12        Next MD visit: unknown  Fall Risk:  mild      Precautions: Falls, Cancer  Medication Changes since last visit?:  No  Subjective:  \"I was able to crack an egg and make an egg sandwich for my . \"  Objective: Treatment began with AAROM of shoulder, digit stretching, strengthening and functional activities. See flow sheet for details.       Date 04/26/23  05/01/23  05/10/23  05/15/23  05/17/23  06/07/23 06/12/23 06/21/23   Visit # 1  2  5  6  7 8 9 10                     Evaluation x              Manual                 Review HEP 8 min    gentle PROM of right shoulder in supine 3 min  Gentle PROM of right shoulder in sitting 3 min  Gentle PROM of right shoulder in sitting 3 min  Gentle PROM of right shoulder in sitting 3 min Gentle PROM of right shoulder in supine min 5 min Gentle PROM of right shoulder in supine min 5 min    Mobilization right scapula seated in chair    5 min  5 min  5 min  5 min   PROM elbow and wrist  in supine  5 min     Posterior capsule stretch    3 min         Right shoulder mobilization 3 min Right shoulder mobilization 3 min   Ther ex                 Isometric shoulder flex/ext, abduction, IR/ER   x10 each  shoulder shrugs and retraction x 10  PRE shoulder IR/ER with red T band x 10, 2 sets  PRE shoulder IR/ER with red T band x 10, 2 sets  PRE shoulder IR/ER with red T band x 10, 2 sets PRE shoulder with 2# dowel in supine, flex/ext, abd/add, circles x 10, 2 sets PRE shoulder with 2# dowel in supine, flex/ext, abd/add, circles x 10, 2 sets   Ball on wall , isometric with ball shoulder flex/ext, abduction, IR/ER   x10 each  pulleys shoulder flexion/abd x 10    PRE elbow flex/ext with red T band x 10, 2 sets  PRE elbow flex/ext with red T band x 10, 2 sets  PRE elbow flex/ext with red T band x 10, 2 sets IR/ER in side lying with 2# wt x 10, 2 sets IR/ER in side lying with 2# wt x 10, 2 sets    cuff weight bicep    2 # on right, 3# on left x 20 composite flexion grasping potato masher push into putty x 10      Isometric shoulder flex/ext, abductopm, ER ball on wall x 10 each  composite flexion grasping potato masher push into putty x 10  composite flexion grasping potato masher push into putty x 10    Scapula rows with red T band x 20      Right shoulder flexion with towel on wall, place and hold x 8  fine motor - alternate two point pinch to remove 40 velcro checkers       Three point pinch of green CP to replace 40 velcro checkers Tricep PRE with 2# x 10, 2 sets in supine           Tricep PRE with 2# x 10, 2 sets in supine    red T band punches    x10, 2 sets  three point pinch to place clothespins, yellow, red, green, blue  three point pinch of green clothes pins  40 velcro checkers  three point pinch of green clothes pins  40 velcro checkers   Triceps PRE with 3# wt x 10, 2 sets in supine     two point pinch, velcro checkers, alternat    x40  Red T band PRE bicep, tricep, IR/ER right  X 10 each  Red T band PRE bicep, tricep, IR/ER right  X 10 each, 2 sets  Red T band PRE bicep, tricep, IR/ER right  X 10 each, 2 sets  Red T band PRE bicep, tricep, IR/ER right  X 10 each, 2 sets Bicep pre with 2# wt x 20 Bicep/BR pre with 3# wt x 10, 2 sets    Red Putty       IP , twist, pull and pinch 10 min  IP , twist, pull and pinch 5 min  IP , twist, pull and pinch 5 min 5 min  Red putty resisted digit abd/adduciotn, two point pinch x 20 Green putty resisted digit abd/adduciotn, two point pinch x 20 Green putty resisted digit abd/adduciotn, two point pinch x 20                AAROM with pulleys, shoulder flex/ext, ad/add x 10 each     HEP instruction                                    Therapeutic Activity                  Functional reaching    placing velcro checkers on lower levels at tree, standing x 30      Scooping sens bin #3, into pan and pouring  5 min  PNF diagonal D1/D2 reaching across with right to place cones on stool , seated in chair x 20  Reaching across with right to place cones on stool , standing at table x 20    Open close 6 different screw top containers    Roll out red putty with rolling pin    Functional activity: simulate using fork to  food, roll out dough (yellow oytty), cut putty with pizza cutter, use spatula to flip \"burgers\" in pan  Functional activity- scooping sens bin #3 into pitcher to 3/4 and pour  X 4   Neuromuscular Re-education                    PNF,D1, D2    x10 each                               Modalities                  Moist heat              3 min                        Assessment: Continue to upgrade resistive exercises, patient feeling challenged but able to complete without pain. Continue with HEP , instructed to double reps. Goals:    Pt will be independent and compliant with comprehensive HEP to maintain progress achieved in OT  Patient to be assessed for UE function by completing ARAT test and 9 hole peg test by third visit. .. .(Achieved)  Patient will demonstrate improved right shoulder flexion to 100 degrees, abduction to 90 degrees for ease in reaching into closet. Patient will demonstrate using fork in right hand to simulate feeding self at SBA. .. Collin Martin (Achieved)  Patient will demonstrate increase in right  strength to at least 25 lbs for ease in carrying bag of groceries. ....(made progress toward)  Plan: Continue with work on improving right UE motor control and strength for improved functional use of right UE.     Charges: MT, TE,TA  Total Timed Treatment:    40                   Total Treatment Time: 45

## 2023-06-21 NOTE — PROGRESS NOTES
Diagnosis: Diagnosis: CVA, R hemiparesis   Visit (# authorized): 14 per POC (Medicare)                          Referring Physician: Verito Ortiz; PCP: Dr Migdalia Russ    Precautions: at risk of falls     Medication changes since last visit?:  No    Subjective: reports feeling stronger. Notes improvement in stair negotiation. Objective:    Date  4/26 5/8 5/10 5/15 5/17 5/22 5/24 6/6 6/8 6/14 6/21   Visit Number  2 3 4 5 6 7 8 9 10 (PN) 11 12   NMR x x   x x   x     Ther EX x x x x x x x x x x x   Ther Act              Gait Training              CRM               Manual                Additional Treatment Information:    Therapeutic Exercise (40 mins):  Nu-step level 5 BLE only x10 mins   Seated hip ABD blue band x25   Standing LE diagonals x10 B-demo with return demo   Standing hip EXT x15 B   Sit<>Stand left foot forward to promote increased [address] R x15, hands on knees  Stool scoots 40 ft x2   Stool scoots reverse 40 ft x2   Seated hip flex mid range (foot on 8 in stool) x15 R only (knee straight)  Seated hip flex mid range (foot on aerobic step) x15 R only (knee bent)    Patient education: HEP updated and issued, see pt instructions    Assessment:  Demo's understanding of exercises provided for home in session. Appears fatigued at end of session.        Plan: large staircase     Charges: 3 TE    Total Timed Treatment: 40 min  Total Treatment Time: 40 min

## 2023-06-21 NOTE — PATIENT INSTRUCTIONS
[employer] [facility] yoga: search for \"Yoga with Lucykeshia STARR\"     Do these exercises 2 times each day    Sit up tall. Tie blue band around thighs. Separate knees and hold position for a couple seconds before relaxing. Do this 20 times. Stand up tall at countertop holding on with both hands. Lift one leg up and across as if you were going to tie your shoe. Return foot to floor out to the side pointing your toe. Do this 10 times with each leg. Stand with your bed behind you for safety (backs of legs should not touch the bed). Stand tall and place feet next to each other so that they are touching. Cross arms, close your eyes. Balance for 30 seconds, paying attention to the feeling of your feet on the floor. Stand next to bed for safety (do not lean against bed). Take a step forward with one foot. Cross arms and balance with eyes open for 30 seconds. Then practice with the other foot in front. Sit up tall in a sturdy chair. Left foot slightly in front of right. Hands on knees, stand up. Return to sitting in a controlled manner. Do this 15 times. Stand up tall at countertop and hold on with both hands. Lift leg backwards just enough to feel it in your bottom. Do this 15 times with each leg.

## 2023-06-28 ENCOUNTER — OFFICE VISIT (OUTPATIENT)
Dept: OCCUPATIONAL MEDICINE | Facility: HOSPITAL | Age: 78
End: 2023-06-28
Attending: PHYSICAL MEDICINE & REHABILITATION
Payer: MEDICARE

## 2023-06-28 ENCOUNTER — OFFICE VISIT (OUTPATIENT)
Dept: PHYSICAL THERAPY | Facility: HOSPITAL | Age: 78
End: 2023-06-28
Attending: PHYSICAL MEDICINE & REHABILITATION
Payer: MEDICARE

## 2023-06-28 ENCOUNTER — OFFICE VISIT (OUTPATIENT)
Dept: SPEECH THERAPY | Facility: HOSPITAL | Age: 78
End: 2023-06-28
Attending: PHYSICAL MEDICINE & REHABILITATION
Payer: MEDICARE

## 2023-06-28 PROCEDURE — 97110 THERAPEUTIC EXERCISES: CPT | Performed by: OCCUPATIONAL THERAPIST

## 2023-06-28 PROCEDURE — 92507 TX SP LANG VOICE COMM INDIV: CPT

## 2023-06-28 PROCEDURE — 97530 THERAPEUTIC ACTIVITIES: CPT | Performed by: OCCUPATIONAL THERAPIST

## 2023-06-28 PROCEDURE — 97140 MANUAL THERAPY 1/> REGIONS: CPT | Performed by: OCCUPATIONAL THERAPIST

## 2023-06-28 PROCEDURE — 97112 NEUROMUSCULAR REEDUCATION: CPT

## 2023-06-30 ENCOUNTER — OFFICE VISIT (OUTPATIENT)
Dept: SPEECH THERAPY | Facility: HOSPITAL | Age: 78
End: 2023-06-30
Attending: PHYSICAL MEDICINE & REHABILITATION
Payer: MEDICARE

## 2023-06-30 PROCEDURE — 92507 TX SP LANG VOICE COMM INDIV: CPT

## 2023-07-03 ENCOUNTER — TELEPHONE (OUTPATIENT)
Dept: SPEECH THERAPY | Facility: HOSPITAL | Age: 78
End: 2023-07-03

## 2023-07-03 ENCOUNTER — APPOINTMENT (OUTPATIENT)
Dept: SPEECH THERAPY | Facility: HOSPITAL | Age: 78
End: 2023-07-03
Attending: PHYSICAL MEDICINE & REHABILITATION
Payer: MEDICARE

## 2023-07-03 ENCOUNTER — OFFICE VISIT (OUTPATIENT)
Dept: OCCUPATIONAL MEDICINE | Facility: HOSPITAL | Age: 78
End: 2023-07-03
Attending: PHYSICAL MEDICINE & REHABILITATION
Payer: MEDICARE

## 2023-07-03 PROCEDURE — 97140 MANUAL THERAPY 1/> REGIONS: CPT | Performed by: OCCUPATIONAL THERAPIST

## 2023-07-03 PROCEDURE — 97110 THERAPEUTIC EXERCISES: CPT | Performed by: OCCUPATIONAL THERAPIST

## 2023-07-05 ENCOUNTER — OFFICE VISIT (OUTPATIENT)
Dept: SPEECH THERAPY | Facility: HOSPITAL | Age: 78
End: 2023-07-05
Attending: PHYSICAL MEDICINE & REHABILITATION
Payer: MEDICARE

## 2023-07-05 ENCOUNTER — APPOINTMENT (OUTPATIENT)
Dept: PHYSICAL THERAPY | Facility: HOSPITAL | Age: 78
End: 2023-07-05
Attending: PHYSICAL MEDICINE & REHABILITATION
Payer: MEDICARE

## 2023-07-05 ENCOUNTER — APPOINTMENT (OUTPATIENT)
Dept: OCCUPATIONAL MEDICINE | Facility: HOSPITAL | Age: 78
End: 2023-07-05
Attending: PHYSICAL MEDICINE & REHABILITATION
Payer: MEDICARE

## 2023-07-05 PROCEDURE — 92507 TX SP LANG VOICE COMM INDIV: CPT

## 2023-07-05 NOTE — PROGRESS NOTES
Diagnosis:   Acute ischemic left MCA stroke [facility]) (E85.487)      Referring Provider: Willie Sierra  Date of Evaluation:    23    Precautions:  None Next MD visit:   none scheduled  Date of Surgery: n/a   Insurance Primary/Secondary: MEDICARE / [place] Loges     # Auth Visits: NA           Subjective: Pt has been experiencing double vision. She is having an MRA tomorrow and has a f/u with her neurologist. Double vision does not impact her reading.     Pain: 0/10      Objective:     Date: 2023  TX#: 2/10 Date: 2023            TX#: 3/10 Date: 2023             TX#: 4/10 Date: 2023             TX#: 5/10 Date: 2023  Tx#: 6/10 Date: 2023  Tx#: 7/10 Date: 2023  Tx#: 8/10   SFA common pictured nouns: 100%, min verba/cues GOAL MET  BARRIER TASK: 100%, no support GOAL MET  SFA written nouns: 100%, min verbal cues GOAL MET       Divergent naming concrete nouns  Trial 1: 15 items  Trial 2: 13 items  Trial 3: 15 items  Trial 4: 8 items  Trial 5: 8 items  Divergent naming concrete nouns  Trial 1: 8 items  Trial 2: 6 items  Trial 3: 12 items  Trial 4: 12 items  Trial 5: 10 items  Divergent naming concrete nouns  Trial 1: 11 items  Trial 2: 11 items  Trial 3: 10 items  Trial 4: 6 items  Trial 5: 8 items      Divergent naming phonemic 1-min  P: 9 words  T: 11 words  B: 14 words   words  N: 9 words  Divergent naming phonemic 1-min  S: 9 words  K: 11 words  SH: 5 words  H: 8 words  R: 14 words  Divergent naming phonemic 1-min  B: 14 words  F: 7 words  P: 11 words  D: 8 words  CH: 6 words  Divergent naming phonemic 1-min  T: 10 words  S: 9 words   words  N: 6 words  K: 7 words  Divergent naming phonemic 1-min  P: 5 words  M: 10 words  H: 10 words  SH: 4 words  D: 11 words  Divergent naming phonemic 1-min  K: 10 words  N: 6 words  S: 8 words   words  T: 7 words     PCA: 100%, mod verbal cues PCA: 100%, mod verbal cues PCA: 100%, mod verbal cues PCA: 100%, min-mod verbal cues PCA: 100%, min-mod verbal cues PCA: 100%, min-mod verbal cues    Similarity/Difference of 2 pictured nouns: 100% no support, GOAL MET  Similarity/Difference of 2 written nouns: 100% min verbal cues  Similarity/Difference of 2 written nouns: 100% min verbal cues       Date: 23  TX#: 9/10 Date: 2023             TX#: 10/10 Date: 2023             TX#: 1/10 Date: 2023                TX#: 2/10 Date: 2023  Tx#: 3/10 Date: 2023  Tx#: 3/10 Date:   Tx#: 4/10   PCA: 100%, occ verbal cues  PCA: 100%, occ verbal cues  PCA: 100%, occ verbal cues  GOAL MET     Divergent naming phonemic 1-min  D: 10 words  P: 14 words  N: 11 words  S: 11 words  CH: 6 words   Divergent naming phonemic 1-min  K: 10 words  T: 10 words  R: 12 words   words  B: 12 words    Divergent naming phonemic 1-min  P: 16 words  S: 9 words  N: 11 words  H: 11 words  D: 8 words     Divergent naming concrete nouns  Trial 1: 16 items  Trial 2: 9 items  Trial 3: 9 items   Divergent naming concrete nouns  Trial 1: 7 items  Trial 2: 9 items  Trial 3: 13 items  Trial 4: 13 items  Trial 5: 10  items       [place] Cog Reading Strategies 1-2 paragraphs 100%, min verbal cues GOAL MET [place] Cog Reading Strategies 3-5 paragraphs 100%, min verbal cues  [place] Cog Reading Strategies 3-5 paragraphs 100% no support [place] Cog Reading Strategies 3-5 paragraphs 100% no support GOAL MET [place] Cog Reading Strategies 7-10 paragraphs 100% occ verbal cues       Oral reading 3-5 paragraphs  x7 errors, 4/7 SC Oral reading 3-5 paragraphs  Trial 1: x8 errors, 3/8 SC  Trial 2: x6 erros, 0 SC  Trial 3: x7 errors, 0 SC Oral reading 3-5 paragraphs  Trial 1: x4 errors, 2/4 SC  Trial 2: x4 erros, 1 SC Oral reading 3-5 paragraphs  Trial 1: x4 errors, 0/4 SC          Similarity/Difference of 2 written nouns: 100% no support GOAL MET       Assessment: Pt continued to demonstrate x4 errors during oral reading with no self-correction noted; errors consisted of semantic paraphasia for function words. Significant progress noted as Pt able to generate x2 similarities and x2 differences of 2 written nouns given no support; goal met. Pt did demonstrate increased time for formulation with repetition of features d/t memory deficits.       Goals:     LTG:    To consistently demonstrate expressive and receptive language skills for participation in conversation with familiar and unfamiliar partners - progressing, continue     To implement word retrieval strategies during instances of word finding breakdowns in unstructured conversation for carryover of strategies outside of the treatment room Progressing, continue   To utilize SFA to generate 3-5 features of less common written nouns DURING A BARRIER TASK with provision of a written scaffold as needed to address word finding, processing, and compensatory strategy use 5/16/2023: Goal met for common pictured objects  5/22/2023: Goal met for common written nouns   To complete phonological mapping task with generation of 3+ rhyming words and 3+ words starting with the same letter/sound given a pictured object to address phonological processing 6/28/2023: Goal met   To generate a minimum of 2 shared and 2 differentiating features for 2 related written nouns given provision of a visual scaffold as needed to address word finding, processing, and compensatory strategy use 5/17/2023: Goal met for pictured nouns  7/5/2023: Goal met   To generate at least 15 words in a concrete category in 1-minute during semantic divergent naming exercise to address word finding, thought organization, and processing speed Progressing, continue   To generate at least 15 words in 1-minute during phonemic divergent naming exercise to address word finding, thought organization, and processing speed Progressing, continue   To answer wh-questions re: unmodified special interest topic after use of reading comprehension strategies (underline key words, reread, skim words already understood), 7-10 paragraphs, 80% accuracy min verbal cues 6/16/2023: Goal met for 1-2 paragraphs  6/28/2023: Goal met for 3-5 paragraphs   To orally read 3-5 paragraphs with implementation of compensatory strategies (i.e., slow, loud, over-articulate, breath support) for increased articulatory precision and reduced instances of verbal paraphasias/perseveration/omission/addition of words with no more than 2 errors given min verbal cues      Plan: Recommend continue POC as established    HEP: Language worksheets    Charges: [id number]      Total Treatment Time: 45 min

## 2023-07-06 ENCOUNTER — HOSPITAL ENCOUNTER (OUTPATIENT)
Dept: MRI IMAGING | Facility: HOSPITAL | Age: 78
Discharge: HOME | End: 2023-07-06
Payer: MEDICARE

## 2023-07-06 DIAGNOSIS — I67.1 INTRACRANIAL ANEURYSM: ICD-10-CM

## 2023-07-06 PROCEDURE — A9575 INJ GADOTERATE MEGLUMI 0.1ML: HCPCS

## 2023-07-06 PROCEDURE — 70546 MR ANGIOGRAPH HEAD W/O&W/DYE: CPT

## 2023-07-06 RX ORDER — GADOTERATE MEGLUMINE 376.9 MG/ML
15 INJECTION INTRAVENOUS
Status: COMPLETED | OUTPATIENT
Start: 2023-07-06 | End: 2023-07-06

## 2023-07-06 RX ADMIN — GADOTERATE MEGLUMINE 15 ML: 376.9 INJECTION INTRAVENOUS at 14:20:00

## 2023-07-10 ENCOUNTER — OFFICE VISIT (OUTPATIENT)
Dept: SPEECH THERAPY | Facility: HOSPITAL | Age: 78
End: 2023-07-10
Attending: PHYSICAL MEDICINE & REHABILITATION
Payer: MEDICARE

## 2023-07-10 ENCOUNTER — APPOINTMENT (OUTPATIENT)
Dept: OCCUPATIONAL MEDICINE | Facility: HOSPITAL | Age: 78
End: 2023-07-10
Attending: PHYSICAL MEDICINE & REHABILITATION
Payer: MEDICARE

## 2023-07-10 ENCOUNTER — PATIENT OUTREACH (OUTPATIENT)
Dept: FAMILY MEDICINE CLINIC | Facility: CLINIC | Age: 78
End: 2023-07-10

## 2023-07-10 PROCEDURE — 92507 TX SP LANG VOICE COMM INDIV: CPT

## 2023-07-10 NOTE — PROGRESS NOTES
Diagnosis:   Acute ischemic left MCA stroke [facility]) (P23.096)      Referring Provider: Lourdes Mojica  Date of Evaluation:    23    Precautions:  None Next MD visit:   none scheduled  Date of Surgery: n/a   Insurance Primary/Secondary: MEDICARE / [place]     # Auth Visits: NA           Subjective: Pt had a hard time talking on the phone today. Pt talked slowly.      Pain: 0/10      Objective:     Date: 2023  TX#: 2/10 Date: 2023            TX#: 3/10 Date: 2023             TX#: 4/10 Date: 2023             TX#: 5/10 Date: 2023  Tx#: 6/10 Date: 2023  Tx#: 7/10 Date: 2023  Tx#: 8/10   SFA common pictured nouns: 100%, min verba/cues GOAL MET  BARRIER TASK: 100%, no support GOAL MET  SFA written nouns: 100%, min verbal cues GOAL MET       Divergent naming concrete nouns  Trial 1: 15 items  Trial 2: 13 items  Trial 3: 15 items  Trial 4: 8 items  Trial 5: 8 items  Divergent naming concrete nouns  Trial 1: 8 items  Trial 2: 6 items  Trial 3: 12 items  Trial 4: 12 items  Trial 5: 10 items  Divergent naming concrete nouns  Trial 1: 11 items  Trial 2: 11 items  Trial 3: 10 items  Trial 4: 6 items  Trial 5: 8 items      Divergent naming phonemic 1-min  P: 9 words  T: 11 words  B: 14 words   words  N: 9 words  Divergent naming phonemic 1-min  S: 9 words  K: 11 words  SH: 5 words  H: 8 words  R: 14 words  Divergent naming phonemic 1-min  B: 14 words  F: 7 words  P: 11 words  D: 8 words  CH: 6 words  Divergent naming phonemic 1-min  T: 10 words  S: 9 words   words  N: 6 words  K: 7 words  Divergent naming phonemic 1-min  P: 5 words  M: 10 words  H: 10 words  SH: 4 words  D: 11 words  Divergent naming phonemic 1-min  K: 10 words  N: 6 words  S: 8 words   words  T: 7 words     PCA: 100%, mod verbal cues PCA: 100%, mod verbal cues PCA: 100%, mod verbal cues PCA: 100%, min-mod verbal cues PCA: 100%, min-mod verbal cues PCA: 100%, min-mod verbal cues    Similarity/Difference of 2 pictured nouns: 100% no support, GOAL MET  Similarity/Difference of 2 written nouns: 100% min verbal cues  Similarity/Difference of 2 written nouns: 100% min verbal cues       Date: 23  TX#: 9/10 Date: 2023             TX#: 10/10 Date: 2023             TX#: 1/10 Date: 2023                TX#: 2/10 Date: 2023  Tx#: 3/10 Date: 2023  Tx#: 4/10 Date: 2023  Tx#: 5/10   PCA: 100%, occ verbal cues  PCA: 100%, occ verbal cues  PCA: 100%, occ verbal cues  GOAL MET     Divergent naming phonemic 1-min  D: 10 words  P: 14 words  N: 11 words  S: 11 words  CH: 6 words   Divergent naming phonemic 1-min  K: 10 words  T: 10 words  R: 12 words   words  B: 12 words    Divergent naming phonemic 1-min  P: 16 words  S: 9 words  N: 11 words  H: 11 words  D: 8 words     Divergent naming concrete nouns  Trial 1: 16 items  Trial 2: 9 items  Trial 3: 9 items   Divergent naming concrete nouns  Trial 1: 7 items  Trial 2: 9 items  Trial 3: 13 items  Trial 4: 13 items  Trial 5: 10  items       [place] Cog Reading Strategies 1-2 paragraphs 100%, min verbal cues GOAL MET [place] Cog Reading Strategies 3-5 paragraphs 100%, min verbal cues  [place] Cog Reading Strategies 3-5 paragraphs 100% no support [place] Cog Reading Strategies 3-5 paragraphs 100% no support GOAL MET [place] Cog Reading Strategies 7-10 paragraphs 100% occ verbal cues       Oral reading 3-5 paragraphs  x7 errors, 4/7 SC Oral reading 3-5 paragraphs  Trial 1: x8 errors, 3/8 SC  Trial 2: x6 erros, 0 SC  Trial 3: x7 errors, 0 SC Oral reading 3-5 paragraphs  Trial 1: x4 errors, 2/4 SC  Trial 2: x4 erros, 1 SC Oral reading 3-5 paragraphs  Trial 1: x4 errors, 0/4 SC          Similarity/Difference of 2 written nouns: 100% no support GOAL MET     Date: 7/10/2023  TX#: 6/10 Date:                 TX#: 7/10 Date:                 TX#: 8/10 Date:                 TX#: 9/10 Date:    Tx#: 10/10   CLQT           Assessment: CLQT administered this session as Pt's aphasia has improved to a point where it should not interfere with cognitive assessment. Pt scored below the cut score for subtests highlighted in red below. Pt's overall Composite Severity Rating is WNL; however, Pt scored on the low-end of WNL for Attention, Executive Functions, Language, and Visuospatial skills. In addition, she scores within the mild severity range for her clock drawing. (It should be taken into consideration that the mild severity rating on this subtest could be due to motoric deficits with her right hand vs cognitive-communicative deficits). Given the CLQT results as well as Pt reports, Pt would benefit from compensatory strategy training for memory, alternating attention, and divided attention. Goals have been added to addresses these cognitive-communicative domains.      CLQT    Personal Facts: 7 (cut score: 8)  Symbol Cancellation: 12 (cut score: 10)  Confrontation Naming: 10 (cut score: 10)  Clock Drawing: 10 (cut score: 11) (but may be d/t motoric issue vs cognitive-communicative)  Story Retellin (cut score: 5)  Symbol Trails: 5 (cut score: 6)  Generative Namin (cut score: 4)  Design Memory: 6 (cut score: 4)  Mazes: 3 (cut score: 4)  Design Generation: 7 (cut score 5)    Domain Severity Rating  Attention: 173 (WNL) (low-end of WNL)  Memory: 162 (WNL)  Executive Functions: 21 (low-end of WNL)  Language: 30 (low-end of WNL)  Visuospatial Skills: 76 (low-end of WNL)  Clock Drawing: 10 (mild)    Composite Severity Rating: WNL (4.0)      Goals:     LTG:    To consistently demonstrate expressive and receptive language skills for participation in conversation with familiar and unfamiliar partners - progressing, continue   To demonstrate appropriate use of compensatory strategies to compensate for cognitive-communicative deficits in order to facilitate optimal performance on cognitive and functional tasks for participation in select premorbid activities    Language STGs  To implement word retrieval strategies during instances of word finding breakdowns in unstructured conversation for carryover of strategies outside of the treatment room Progressing, continue   To utilize SFA to generate 3-5 features of less common written nouns DURING A BARRIER TASK with provision of a written scaffold as needed to address word finding, processing, and compensatory strategy use 5/16/2023: Goal met for common pictured objects  5/22/2023: Goal met for common written nouns   To complete phonological mapping task with generation of 3+ rhyming words and 3+ words starting with the same letter/sound given a pictured object to address phonological processing 6/28/2023: Goal met   To generate a minimum of 2 shared and 2 differentiating features for 2 related written nouns given provision of a visual scaffold as needed to address word finding, processing, and compensatory strategy use 5/17/2023: Goal met for pictured nouns  7/5/2023: Goal met   To generate at least 15 words in a concrete category in 1-minute during semantic divergent naming exercise to address word finding, thought organization, and processing speed Progressing, continue   To generate at least 15 words in 1-minute during phonemic divergent naming exercise to address word finding, thought organization, and processing speed Progressing, continue   To answer wh-questions re: unmodified special interest topic after use of reading comprehension strategies (underline key words, reread, skim words already understood), 7-10 paragraphs, 80% accuracy min verbal cues 6/16/2023: Goal met for 1-2 paragraphs  6/28/2023: Goal met for 3-5 paragraphs   To orally read 3-5 paragraphs with implementation of compensatory strategies (i.e., slow, loud, over-articulate, breath support) for increased articulatory precision and reduced instances of verbal paraphasias/perseveration/omission/addition of words with no more than 2 errors given min verbal cues      Cognitive STGs  Goal Comments   To express a fx implementation for compensatory strategies for increased attention (e.g., reducing distractions, one thing at a time, prove to yourself the task is complete, eye contact, complete tasks fully) for increased participation in complex ADLs, 4/5, min verbal cues    To express a fx implementation for compensatory strategies for increased retention (i.e., write it down, repeat it, association, picture it, calendar) for increased participation in complex ADLs    To generate and implement solutions to identified cognitive-communicative related inaccuracies for carryover of compensatory strategies outside of the treatment room, 80%, min verbal cues        Plan: Recommend continue POC as established    HEP: Language worksheets    Charges: [id number]      Total Treatment Time: 45 min

## 2023-07-12 ENCOUNTER — APPOINTMENT (OUTPATIENT)
Dept: PHYSICAL THERAPY | Facility: HOSPITAL | Age: 78
End: 2023-07-12
Attending: PHYSICAL MEDICINE & REHABILITATION
Payer: MEDICARE

## 2023-07-12 ENCOUNTER — APPOINTMENT (OUTPATIENT)
Dept: OCCUPATIONAL MEDICINE | Facility: HOSPITAL | Age: 78
End: 2023-07-12
Attending: PHYSICAL MEDICINE & REHABILITATION
Payer: MEDICARE

## 2023-07-13 ENCOUNTER — PATIENT OUTREACH (OUTPATIENT)
Dept: CASE MANAGEMENT | Age: 78
End: 2023-07-13

## 2023-07-13 DIAGNOSIS — R53.1 RIGHT SIDED WEAKNESS: ICD-10-CM

## 2023-07-13 DIAGNOSIS — M06.9 RHEUMATOID ARTHRITIS INVOLVING MULTIPLE SITES, UNSPECIFIED WHETHER RHEUMATOID FACTOR PRESENT (HCC): ICD-10-CM

## 2023-07-13 DIAGNOSIS — I10 ESSENTIAL HYPERTENSION: Primary | ICD-10-CM

## 2023-07-13 DIAGNOSIS — I63.412 CEREBRAL INFARCTION DUE TO EMBOLISM OF LEFT MIDDLE CEREBRAL ARTERY (HCC): ICD-10-CM

## 2023-07-13 DIAGNOSIS — E78.5 HYPERLIPIDEMIA, UNSPECIFIED HYPERLIPIDEMIA TYPE: ICD-10-CM

## 2023-07-13 DIAGNOSIS — M15.9 PRIMARY OSTEOARTHRITIS INVOLVING MULTIPLE JOINTS: ICD-10-CM

## 2023-07-13 DIAGNOSIS — F41.1 GENERALIZED ANXIETY DISORDER: ICD-10-CM

## 2023-07-13 NOTE — PROGRESS NOTES
7/13/2023  Spoke to Rachel for [facility]. Updates to patient care team/ comments: Reviewed with patient   Patient reported changes in medications: None  Med Adherence  Comment: Taking as directed    Health Maintenance:  Reviewed with patient. Annual Physical due on 02/23/2023  Mammogram due on 04/27/2023  COVID-19 Vaccine(5 - Pfizer series) due on 04/30/2023 (Declined)  Colorectal Cancer Screening due on 02/01/2026  Influenza Vaccine(1) due on 10/01/2023  DEXA Scan Completed  Annual Depression Screening Completed  Fall Risk Screening (Annual) Completed  Pneumococcal Vaccine: 65+ Years Completed  Zoster Vaccines Completed    Patient current concerns: Patient voiced concern with results of MRA brain, she was able to view the results through [facility] [address]. Patient states some words she does not understand and will not look them up, she would rather wait until her appointment with Dr. Dr. El Felty on 08/03/23 to discuss the results. Patient states she is feeling well, physical and occupation therapy have been completed. She continues to receive speech therapy which has been helping. Patient continues to work on home exercises to help with right leg weakness. Patient denies recent falls, she does have gait issues due to leg weakness. Patient is not able to drive, her  assists with transportation. Patient reports having improvement with her memory, she has been working on cognitive exercises. Patient denies feeling anxious or depressed, she states those feelings are no longer an issue. She does not like to discuss her health with people since it does cause a bit of stress which she would like to avoid. Patient states she is focused on getting better and has a positive outlook. She has been receiving a lot of family and friend support. Patient reports being compliant with her medication regimen, does not currently need refills.  Patient is able to do some ADL however does need assistance which her  assists. Patient states she is eating well and keeping up with her water intake. Previous goal met: Continues working towards goal     Self-Management Goals/Action Plan: Active goal from previous outreach: improve physically and cognitive  Patient reported progress toward goal: slowly progress       Update to previous barriers: No previous barriers             Patient agrees to goal action plan. yes  Self-Management Abilities (patient reported)             1= least confident in achieving goal, 5= very confident               - confidence: 3     Care Manager Follow Up: One month     Reason For Follow Up: review progress and or barriers towards patient's goals. Care managers interventions: Updated health maintenance. Patient reminded of Medicare Physical, she will wait until next month to schedule the appointment. Listened to patient discuss her overall health and concerns, provided support. Continued to provide education on how to better manage and cope with chronic conditions. Informed of the importance on reporting any new symptoms to prevent any health complications. Reviewed healthy eating habits, regular exercise and preventative guidelines. Appointments reviewed with patient. Future Appointments   Date Time Provider Evangelina Chambers   7/14/2023  9:30 AM Moriah Inch, [facility]   7/19/2023 11:45 AM Moriah Inch, [facility]   7/21/2023  9:30 AM Moriah Inch, [facility]   7/26/2023 11:45 AM Moriah Inch, [facility]   7/28/2023  9:30 AM Moriah Inch, [facility]   8/3/2023 10:00 AM Kati Corea MD [email or web address]        Time Spent This Encounter Total: 21 min medical record review, telephone communication, care plan updates where needed, education, goals and action plan recreation/update. Provided acknowledgment and validation to patient's concerns.    Monthly Minute Total including today: 21 min  Physical assessment, complete health history, and care plan established by Felicitas Baum. MD Kesha, need for CCM determined from these assessments and data.

## 2023-07-14 ENCOUNTER — OFFICE VISIT (OUTPATIENT)
Dept: SPEECH THERAPY | Facility: HOSPITAL | Age: 78
End: 2023-07-14
Attending: PHYSICAL MEDICINE & REHABILITATION
Payer: MEDICARE

## 2023-07-14 PROCEDURE — 92507 TX SP LANG VOICE COMM INDIV: CPT

## 2023-07-14 NOTE — PROGRESS NOTES
Diagnosis:   Acute ischemic left MCA stroke [facility]) (S55.159)      Referring Provider: Edmundo Tineo  Date of Evaluation:    23    Precautions:  None Next MD visit:   none scheduled  Date of Surgery: n/a   Insurance Primary/Secondary: MEDICARE / [facility]     # Auth Visits: NA           Subjective: Pt had a hard time talking on the phone today. Pt talked slowly.      Pain: 0/10      Objective:     Date: 2023  TX#: 2/10 Date: 2023            TX#: 3/10 Date: 2023             TX#: 4/10 Date: 2023             TX#: 5/10 Date: 2023  Tx#: 6/10 Date: 2023  Tx#: 7/10 Date: 2023  Tx#: 8/10   SFA common pictured nouns: 100%, min verba/cues GOAL MET  BARRIER TASK: 100%, no support GOAL MET  SFA written nouns: 100%, min verbal cues GOAL MET       Divergent naming concrete nouns  Trial 1: 15 items  Trial 2: 13 items  Trial 3: 15 items  Trial 4: 8 items  Trial 5: 8 items  Divergent naming concrete nouns  Trial 1: 8 items  Trial 2: 6 items  Trial 3: 12 items  Trial 4: 12 items  Trial 5: 10 items  Divergent naming concrete nouns  Trial 1: 11 items  Trial 2: 11 items  Trial 3: 10 items  Trial 4: 6 items  Trial 5: 8 items      Divergent naming phonemic 1-min  P: 9 words  T: 11 words  B: 14 words   words  N: 9 words  Divergent naming phonemic 1-min  S: 9 words  K: 11 words  SH: 5 words  H: 8 words  R: 14 words  Divergent naming phonemic 1-min  B: 14 words  F: 7 words  P: 11 words  D: 8 words  CH: 6 words  Divergent naming phonemic 1-min  T: 10 words  S: 9 words   words  N: 6 words  K: 7 words  Divergent naming phonemic 1-min  P: 5 words  M: 10 words  H: 10 words  SH: 4 words  D: 11 words  Divergent naming phonemic 1-min  K: 10 words  N: 6 words  S: 8 words   words  T: 7 words     PCA: 100%, mod verbal cues PCA: 100%, mod verbal cues PCA: 100%, mod verbal cues PCA: 100%, min-mod verbal cues PCA: 100%, min-mod verbal cues PCA: 100%, min-mod verbal cues    Similarity/Difference of 2 pictured nouns: 100% no support, GOAL MET  Similarity/Difference of 2 written nouns: 100% min verbal cues  Similarity/Difference of 2 written nouns: 100% min verbal cues       Date: 23  TX#: 9/10 Date: 2023             TX#: 10/10 Date: 2023             TX#: 1/10 Date: 2023                TX#: 2/10 Date: 2023  Tx#: 3/10 Date: 2023  Tx#: 4/10 Date: 2023  Tx#: 5/10   PCA: 100%, occ verbal cues  PCA: 100%, occ verbal cues  PCA: 100%, occ verbal cues  GOAL MET     Divergent naming phonemic 1-min  D: 10 words  P: 14 words  N: 11 words  S: 11 words  CH: 6 words   Divergent naming phonemic 1-min  K: 10 words  T: 10 words  R: 12 words   words  B: 12 words    Divergent naming phonemic 1-min  P: 16 words  S: 9 words  N: 11 words  H: 11 words  D: 8 words     Divergent naming concrete nouns  Trial 1: 16 items  Trial 2: 9 items  Trial 3: 9 items   Divergent naming concrete nouns  Trial 1: 7 items  Trial 2: 9 items  Trial 3: 13 items  Trial 4: 13 items  Trial 5: 10  items       [place] Cog Reading Strategies 1-2 paragraphs 100%, min verbal cues GOAL MET [place] Cog Reading Strategies 3-5 paragraphs 100%, min verbal cues  [place] Cog Reading Strategies 3-5 paragraphs 100% no support [place] Cog Reading Strategies 3-5 paragraphs 100% no support GOAL MET [place] Cog Reading Strategies 7-10 paragraphs 100% occ verbal cues       Oral reading 3-5 paragraphs  x7 errors, 4/7 SC Oral reading 3-5 paragraphs  Trial 1: x8 errors, 3/8 SC  Trial 2: x6 erros, 0 SC  Trial 3: x7 errors, 0 SC Oral reading 3-5 paragraphs  Trial 1: x4 errors, 2/4 SC  Trial 2: x4 erros, 1 SC Oral reading 3-5 paragraphs  Trial 1: x4 errors, 0/4 SC          Similarity/Difference of 2 written nouns: 100% no support GOAL MET     Date: 7/10/2023  TX#: 6/10 Date: 2023         TX#: 7/10 Date:                 TX#: 8/10 Date:                 TX#: 9/10 Date:    Tx#: 10/10   CLQT Fx implementation memory strategies: 100%, min verbal cue  GOAL MET       Fx implementation attention strategies: 100%, min verbal cue  GOAL MET        Assessment: Introduced stages of memory with all questions answered. Memory strategies introduced. Pt will write down to-do items. Pt will write down a phone number with the reason she has to call. Pt implements repetition to facilitate comprehension of information heard/read. Mde recommendation for verbal rehearsal with utilization of note, especially before phone calls. Introduced attention strategies and importance of doing one task at a time. Made recommendation for \"stop note\" if unable to complete tasks fully. In addition, discussed use of self-talk as a metacognitive strategy to facilitate attention and memory.     Goals:     LTG:    To consistently demonstrate expressive and receptive language skills for participation in conversation with familiar and unfamiliar partners - progressing, continue   To demonstrate appropriate use of compensatory strategies to compensate for cognitive-communicative deficits in order to facilitate optimal performance on cognitive and functional tasks for participation in select premorbid activities    Language STGs  To implement word retrieval strategies during instances of word finding breakdowns in unstructured conversation for carryover of strategies outside of the treatment room Progressing, continue   To utilize SFA to generate 3-5 features of less common written nouns DURING A BARRIER TASK with provision of a written scaffold as needed to address word finding, processing, and compensatory strategy use 5/16/2023: Goal met for common pictured objects  5/22/2023: Goal met for common written nouns   To complete phonological mapping task with generation of 3+ rhyming words and 3+ words starting with the same letter/sound given a pictured object to address phonological processing 6/28/2023: Goal met   To generate a minimum of 2 shared and 2 differentiating features for 2 related written nouns given provision of a visual scaffold as needed to address word finding, processing, and compensatory strategy use 5/17/2023: Goal met for pictured nouns  7/5/2023: Goal met   To generate at least 15 words in a concrete category in 1-minute during semantic divergent naming exercise to address word finding, thought organization, and processing speed Progressing, continue   To generate at least 15 words in 1-minute during phonemic divergent naming exercise to address word finding, thought organization, and processing speed Progressing, continue   To answer wh-questions re: unmodified special interest topic after use of reading comprehension strategies (underline key words, reread, skim words already understood), 7-10 paragraphs, 80% accuracy min verbal cues 6/16/2023: Goal met for 1-2 paragraphs  6/28/2023: Goal met for 3-5 paragraphs   To orally read 3-5 paragraphs with implementation of compensatory strategies (i.e., slow, loud, over-articulate, breath support) for increased articulatory precision and reduced instances of verbal paraphasias/perseveration/omission/addition of words with no more than 2 errors given min verbal cues      Cognitive STGs  Goal Comments   To express a fx implementation for compensatory strategies for increased attention (e.g., reducing distractions, one thing at a time, prove to yourself the task is complete, eye contact, complete tasks fully) for increased participation in complex ADLs, 4/5, min verbal cues 7/14/2023: Goal met   To express a fx implementation for compensatory strategies for increased retention (i.e., write it down, repeat it, association, picture it, calendar) for increased participation in complex ADLs 7/14/2023: Goal met   To generate and implement solutions to identified cognitive-communicative related inaccuracies for carryover of compensatory strategies outside of the treatment room, 80%, min verbal cues        Plan: Recommend continue POC as established    HEP: Language worksheets    Charges: [id number]      Total Treatment Time: 45 min

## 2023-07-17 ENCOUNTER — APPOINTMENT (OUTPATIENT)
Dept: OCCUPATIONAL MEDICINE | Facility: HOSPITAL | Age: 78
End: 2023-07-17
Attending: PHYSICAL MEDICINE & REHABILITATION
Payer: MEDICARE

## 2023-07-18 ENCOUNTER — APPOINTMENT (OUTPATIENT)
Dept: SPEECH THERAPY | Facility: HOSPITAL | Age: 78
End: 2023-07-18
Attending: PHYSICAL MEDICINE & REHABILITATION
Payer: MEDICARE

## 2023-07-19 ENCOUNTER — OFFICE VISIT (OUTPATIENT)
Dept: SPEECH THERAPY | Facility: HOSPITAL | Age: 78
End: 2023-07-19
Attending: PHYSICAL MEDICINE & REHABILITATION
Payer: MEDICARE

## 2023-07-19 ENCOUNTER — APPOINTMENT (OUTPATIENT)
Dept: OCCUPATIONAL MEDICINE | Facility: HOSPITAL | Age: 78
End: 2023-07-19
Attending: PHYSICAL MEDICINE & REHABILITATION
Payer: MEDICARE

## 2023-07-19 PROCEDURE — 92507 TX SP LANG VOICE COMM INDIV: CPT

## 2023-07-19 NOTE — PROGRESS NOTES
Diagnosis:   Acute ischemic left MCA stroke [facility]) (D74.359)      Referring Provider: David Bender  Date of Evaluation:    23    Precautions:  None Next MD visit:   none scheduled  Date of Surgery: n/a   Insurance Primary/Secondary: MEDICARE / [facility]     # Auth Visits: NA           Subjective: Pt had difficulty with processing when playing her computer games. Pt rested and she felt better.      Pain: 0/10      Objective:     Date: 2023  TX#: 2/10 Date: 2023            TX#: 3/10 Date: 2023             TX#: 4/10 Date: 2023             TX#: 5/10 Date: 2023  Tx#: 6/10 Date: 2023  Tx#: 7/10 Date: 2023  Tx#: 8/10   SFA common pictured nouns: 100%, min verba/cues GOAL MET  BARRIER TASK: 100%, no support GOAL MET  SFA written nouns: 100%, min verbal cues GOAL MET       Divergent naming concrete nouns  Trial 1: 15 items  Trial 2: 13 items  Trial 3: 15 items  Trial 4: 8 items  Trial 5: 8 items  Divergent naming concrete nouns  Trial 1: 8 items  Trial 2: 6 items  Trial 3: 12 items  Trial 4: 12 items  Trial 5: 10 items  Divergent naming concrete nouns  Trial 1: 11 items  Trial 2: 11 items  Trial 3: 10 items  Trial 4: 6 items  Trial 5: 8 items      Divergent naming phonemic 1-min  P: 9 words  T: 11 words  B: 14 words   words  N: 9 words  Divergent naming phonemic 1-min  S: 9 words  K: 11 words  SH: 5 words  H: 8 words  R: 14 words  Divergent naming phonemic 1-min  B: 14 words  F: 7 words  P: 11 words  D: 8 words  CH: 6 words  Divergent naming phonemic 1-min  T: 10 words  S: 9 words   words  N: 6 words  K: 7 words  Divergent naming phonemic 1-min  P: 5 words  M: 10 words  H: 10 words  SH: 4 words  D: 11 words  Divergent naming phonemic 1-min  K: 10 words  N: 6 words  S: 8 words   words  T: 7 words     PCA: 100%, mod verbal cues PCA: 100%, mod verbal cues PCA: 100%, mod verbal cues PCA: 100%, min-mod verbal cues PCA: 100%, min-mod verbal cues PCA: 100%, min-mod verbal cues Similarity/Difference of 2 pictured nouns: 100% no support, GOAL MET  Similarity/Difference of 2 written nouns: 100% min verbal cues  Similarity/Difference of 2 written nouns: 100% min verbal cues       Date: 23  TX#: 9/10 Date: 2023             TX#: 10/10 Date: 2023             TX#: 1/10 Date: 2023                TX#: 2/10 Date: 2023  Tx#: 3/10 Date: 2023  Tx#: 4/10 Date: 2023  Tx#: 5/10   PCA: 100%, occ verbal cues  PCA: 100%, occ verbal cues  PCA: 100%, occ verbal cues  GOAL MET     Divergent naming phonemic 1-min  D: 10 words  P: 14 words  N: 11 words  S: 11 words  CH: 6 words   Divergent naming phonemic 1-min  K: 10 words  T: 10 words  R: 12 words   words  B: 12 words    Divergent naming phonemic 1-min  P: 16 words  S: 9 words  N: 11 words  H: 11 words  D: 8 words     Divergent naming concrete nouns  Trial 1: 16 items  Trial 2: 9 items  Trial 3: 9 items   Divergent naming concrete nouns  Trial 1: 7 items  Trial 2: 9 items  Trial 3: 13 items  Trial 4: 13 items  Trial 5: 10  items       [place] Cog Reading Strategies 1-2 paragraphs 100%, min verbal cues GOAL MET [place] Cog Reading Strategies 3-5 paragraphs 100%, min verbal cues  [place] Cog Reading Strategies 3-5 paragraphs 100% no support [place] Cog Reading Strategies 3-5 paragraphs 100% no support GOAL MET [place] Cog Reading Strategies 7-10 paragraphs 100% occ verbal cues       Oral reading 3-5 paragraphs  x7 errors, 4/7 SC Oral reading 3-5 paragraphs  Trial 1: x8 errors, 3/8 SC  Trial 2: x6 erros, 0 SC  Trial 3: x7 errors, 0 SC Oral reading 3-5 paragraphs  Trial 1: x4 errors, 2/4 SC  Trial 2: x4 erros, 1 SC Oral reading 3-5 paragraphs  Trial 1: x4 errors, 0/4 SC          Similarity/Difference of 2 written nouns: 100% no support GOAL MET     Date: 7/10/2023  TX#: 6/10 Date: 2023         TX#: 7/10 Date: 2023                TX#: 8/10 Date:                 TX#: 9/10 Date:    Tx#: 10/10   CLQT Fx implementation memory strategies: 100%, min verbal cue  GOAL MET       Fx implementation attention strategies: 100%, min verbal cue  GOAL MET        Divergent naming phonemic 1-min  T: 11 words  M: 12 words  F: 12 words  G: 10 words  H: 7 words        Synonyms: 100%, min verbal/visual cues       Assessment: Pt performed written synonym exercise. Pt required increased time for formulation and benefited from written cues (i.e., first letter of word) and semantic cues to facilitate generation. Progress noted as Pt with at least 10 generated words during phonemic generative naming exercise in 4/5 trials suggesting continued improvement in processing speed and phonemic organization.      Goals:     LTG:    To consistently demonstrate expressive and receptive language skills for participation in conversation with familiar and unfamiliar partners - progressing, continue   To demonstrate appropriate use of compensatory strategies to compensate for cognitive-communicative deficits in order to facilitate optimal performance on cognitive and functional tasks for participation in select premorbid activities    Language STGs  To implement word retrieval strategies during instances of word finding breakdowns in unstructured conversation for carryover of strategies outside of the treatment room Progressing, continue   To utilize SFA to generate 3-5 features of less common written nouns DURING A BARRIER TASK with provision of a written scaffold as needed to address word finding, processing, and compensatory strategy use 5/16/2023: Goal met for common pictured objects  5/22/2023: Goal met for common written nouns   To complete phonological mapping task with generation of 3+ rhyming words and 3+ words starting with the same letter/sound given a pictured object to address phonological processing 6/28/2023: Goal met   To generate a minimum of 2 shared and 2 differentiating features for 2 related written nouns given provision of a visual scaffold as needed to address word finding, processing, and compensatory strategy use 5/17/2023: Goal met for pictured nouns  7/5/2023: Goal met   To generate at least 15 words in a concrete category in 1-minute during semantic divergent naming exercise to address word finding, thought organization, and processing speed Progressing, continue   To generate at least 15 words in 1-minute during phonemic divergent naming exercise to address word finding, thought organization, and processing speed Progressing, continue   To answer wh-questions re: unmodified special interest topic after use of reading comprehension strategies (underline key words, reread, skim words already understood), 7-10 paragraphs, 80% accuracy min verbal cues 6/16/2023: Goal met for 1-2 paragraphs  6/28/2023: Goal met for 3-5 paragraphs   To orally read 3-5 paragraphs with implementation of compensatory strategies (i.e., slow, loud, over-articulate, breath support) for increased articulatory precision and reduced instances of verbal paraphasias/perseveration/omission/addition of words with no more than 2 errors given min verbal cues      Cognitive STGs  Goal Comments   To express a fx implementation for compensatory strategies for increased attention (e.g., reducing distractions, one thing at a time, prove to yourself the task is complete, eye contact, complete tasks fully) for increased participation in complex ADLs, 4/5, min verbal cues 7/14/2023: Goal met   To express a fx implementation for compensatory strategies for increased retention (i.e., write it down, repeat it, association, picture it, calendar) for increased participation in complex ADLs 7/14/2023: Goal met   To generate and implement solutions to identified cognitive-communicative related inaccuracies for carryover of compensatory strategies outside of the treatment room, 80%, min verbal cues        Plan: Recommend continue POC as established; Discussed POC, recommend Pt attend remaining 7 sessions with DC/transition to HEP. Pt expressed interest in [facility]; Pt provided information for [facility].      HEP: Language worksheets    Charges: [id number]      Total Treatment Time: 45 min

## 2023-07-21 ENCOUNTER — OFFICE VISIT (OUTPATIENT)
Dept: SPEECH THERAPY | Facility: HOSPITAL | Age: 78
End: 2023-07-21
Attending: PHYSICAL MEDICINE & REHABILITATION
Payer: MEDICARE

## 2023-07-21 PROCEDURE — 92507 TX SP LANG VOICE COMM INDIV: CPT

## 2023-07-21 NOTE — PROGRESS NOTES
Diagnosis:   Acute ischemic left MCA stroke [facility]) (R18.130)      Referring Provider: Basia Vázquez  Date of Evaluation:    23    Precautions:  None Next MD visit:   none scheduled  Date of Surgery: n/a   Insurance Primary/Secondary: MEDICARE / [facility]     # Auth Visits: NA           Subjective: No reported changes from last session.      Pain: 0/10      Objective:     Date: 2023  TX#: 2/10 Date: 2023            TX#: 3/10 Date: 2023             TX#: 4/10 Date: 2023             TX#: 5/10 Date: 2023  Tx#: 6/10 Date: 2023  Tx#: 7/10 Date: 2023  Tx#: 8/10   SFA common pictured nouns: 100%, min verba/cues GOAL MET  BARRIER TASK: 100%, no support GOAL MET  SFA written nouns: 100%, min verbal cues GOAL MET       Divergent naming concrete nouns  Trial 1: 15 items  Trial 2: 13 items  Trial 3: 15 items  Trial 4: 8 items  Trial 5: 8 items  Divergent naming concrete nouns  Trial 1: 8 items  Trial 2: 6 items  Trial 3: 12 items  Trial 4: 12 items  Trial 5: 10 items  Divergent naming concrete nouns  Trial 1: 11 items  Trial 2: 11 items  Trial 3: 10 items  Trial 4: 6 items  Trial 5: 8 items      Divergent naming phonemic 1-min  P: 9 words  T: 11 words  B: 14 words   words  N: 9 words  Divergent naming phonemic 1-min  S: 9 words  K: 11 words  SH: 5 words  H: 8 words  R: 14 words  Divergent naming phonemic 1-min  B: 14 words  F: 7 words  P: 11 words  D: 8 words  CH: 6 words  Divergent naming phonemic 1-min  T: 10 words  S: 9 words   words  N: 6 words  K: 7 words  Divergent naming phonemic 1-min  P: 5 words  M: 10 words  H: 10 words  SH: 4 words  D: 11 words  Divergent naming phonemic 1-min  K: 10 words  N: 6 words  S: 8 words   words  T: 7 words     PCA: 100%, mod verbal cues PCA: 100%, mod verbal cues PCA: 100%, mod verbal cues PCA: 100%, min-mod verbal cues PCA: 100%, min-mod verbal cues PCA: 100%, min-mod verbal cues    Similarity/Difference of 2 pictured nouns: 100% no support, GOAL MET Similarity/Difference of 2 written nouns: 100% min verbal cues  Similarity/Difference of 2 written nouns: 100% min verbal cues       Date: 23  TX#: 9/10 Date: 2023             TX#: 10/10 Date: 2023             TX#: 1/10 Date: 2023                TX#: 2/10 Date: 2023  Tx#: 3/10 Date: 2023  Tx#: 4/10 Date: 2023  Tx#: 5/10   PCA: 100%, occ verbal cues  PCA: 100%, occ verbal cues  PCA: 100%, occ verbal cues  GOAL MET     Divergent naming phonemic 1-min  D: 10 words  P: 14 words  N: 11 words  S: 11 words  CH: 6 words   Divergent naming phonemic 1-min  K: 10 words  T: 10 words  R: 12 words   words  B: 12 words    Divergent naming phonemic 1-min  P: 16 words  S: 9 words  N: 11 words  H: 11 words  D: 8 words     Divergent naming concrete nouns  Trial 1: 16 items  Trial 2: 9 items  Trial 3: 9 items   Divergent naming concrete nouns  Trial 1: 7 items  Trial 2: 9 items  Trial 3: 13 items  Trial 4: 13 items  Trial 5: 10  items       [place] Cog Reading Strategies 1-2 paragraphs 100%, min verbal cues GOAL MET [place] Cog Reading Strategies 3-5 paragraphs 100%, min verbal cues  [place] Cog Reading Strategies 3-5 paragraphs 100% no support [place] Cog Reading Strategies 3-5 paragraphs 100% no support GOAL MET [place] Cog Reading Strategies 7-10 paragraphs 100% occ verbal cues       Oral reading 3-5 paragraphs  x7 errors, 4/7 SC Oral reading 3-5 paragraphs  Trial 1: x8 errors, 3/8 SC  Trial 2: x6 erros, 0 SC  Trial 3: x7 errors, 0 SC Oral reading 3-5 paragraphs  Trial 1: x4 errors, 2/4 SC  Trial 2: x4 erros, 1 SC Oral reading 3-5 paragraphs  Trial 1: x4 errors, 0/4 SC          Similarity/Difference of 2 written nouns: 100% no support GOAL MET     Date: 7/10/2023  TX#: 6/10 Date: 2023         TX#: 7/10 Date: 2023                TX#: 8/10 Date: 2023  TX#: 9/10 Date:    Tx#: 10/10   CLQT Fx implementation memory strategies: 100%, min verbal cue  GOAL MET       Fx implementation attention strategies: 100%, min verbal cue  GOAL MET        Divergent naming phonemic 1-min  T: 11 words  M: 12 words  F: 12 words  G: 10 words  H: 7 words         Divergent naming concrete nouns  Trial 1: 8 items  Trial 2: 12 items  Trial 3: 9 items  Trial 4: 11 items  Trial 5: 9 items      Synonyms: 100%, min verbal/visual cues        Oral reading 3-5 paragraphs  Trial 1: x11 errors, 5/11 SC       Assessment: Pt with increased errors during oral reading; however, Pt with increased self-correction. Errors consisted mostly of semantic paraphasia (e.g., you/my, critical/crucial). Pt benefited from reading at a slower rate to facilitate decoding. In addition, Pt demonstrated difficulty with words that are pronounced different ways (e.g., content). Pt with overall reduced average during semantic divergent naming d/t word finding and processing difficulties.      Goals:     LTG:    To consistently demonstrate expressive and receptive language skills for participation in conversation with familiar and unfamiliar partners - progressing, continue   To demonstrate appropriate use of compensatory strategies to compensate for cognitive-communicative deficits in order to facilitate optimal performance on cognitive and functional tasks for participation in select premorbid activities    Language STGs  To implement word retrieval strategies during instances of word finding breakdowns in unstructured conversation for carryover of strategies outside of the treatment room Progressing, continue   To utilize SFA to generate 3-5 features of less common written nouns DURING A BARRIER TASK with provision of a written scaffold as needed to address word finding, processing, and compensatory strategy use 5/16/2023: Goal met for common pictured objects  5/22/2023: Goal met for common written nouns   To complete phonological mapping task with generation of 3+ rhyming words and 3+ words starting with the same letter/sound given a pictured object to address phonological processing 6/28/2023: Goal met   To generate a minimum of 2 shared and 2 differentiating features for 2 related written nouns given provision of a visual scaffold as needed to address word finding, processing, and compensatory strategy use 5/17/2023: Goal met for pictured nouns  7/5/2023: Goal met   To generate at least 15 words in a concrete category in 1-minute during semantic divergent naming exercise to address word finding, thought organization, and processing speed Progressing, continue   To generate at least 15 words in 1-minute during phonemic divergent naming exercise to address word finding, thought organization, and processing speed Progressing, continue   To answer wh-questions re: unmodified special interest topic after use of reading comprehension strategies (underline key words, reread, skim words already understood), 7-10 paragraphs, 80% accuracy min verbal cues 6/16/2023: Goal met for 1-2 paragraphs  6/28/2023: Goal met for 3-5 paragraphs   To orally read 3-5 paragraphs with implementation of compensatory strategies (i.e., slow, loud, over-articulate, breath support) for increased articulatory precision and reduced instances of verbal paraphasias/perseveration/omission/addition of words with no more than 2 errors given min verbal cues      Cognitive STGs  Goal Comments   To express a fx implementation for compensatory strategies for increased attention (e.g., reducing distractions, one thing at a time, prove to yourself the task is complete, eye contact, complete tasks fully) for increased participation in complex ADLs, 4/5, min verbal cues 7/14/2023: Goal met   To express a fx implementation for compensatory strategies for increased retention (i.e., write it down, repeat it, association, picture it, calendar) for increased participation in complex ADLs 7/14/2023: Goal met   To generate and implement solutions to identified cognitive-communicative related inaccuracies for carryover of compensatory strategies outside of the treatment room, 80%, min verbal cues        Plan: Recommend continue POC as established; Discussed POC, recommend Pt attend remaining 7 sessions with DC/transition to HEP. Pt expressed interest in [facility]; Pt provided information for [facility].      HEP: Language worksheets    Charges: [id number]      Total Treatment Time: 45 min

## 2023-07-24 ENCOUNTER — APPOINTMENT (OUTPATIENT)
Dept: OCCUPATIONAL MEDICINE | Facility: HOSPITAL | Age: 78
End: 2023-07-24
Attending: PHYSICAL MEDICINE & REHABILITATION
Payer: MEDICARE

## 2023-07-25 ENCOUNTER — APPOINTMENT (OUTPATIENT)
Dept: SPEECH THERAPY | Facility: HOSPITAL | Age: 78
End: 2023-07-25
Attending: PHYSICAL MEDICINE & REHABILITATION
Payer: MEDICARE

## 2023-07-26 ENCOUNTER — APPOINTMENT (OUTPATIENT)
Dept: OCCUPATIONAL MEDICINE | Facility: HOSPITAL | Age: 78
End: 2023-07-26
Attending: PHYSICAL MEDICINE & REHABILITATION
Payer: MEDICARE

## 2023-07-26 ENCOUNTER — OFFICE VISIT (OUTPATIENT)
Dept: SPEECH THERAPY | Facility: HOSPITAL | Age: 78
End: 2023-07-26
Attending: PHYSICAL MEDICINE & REHABILITATION
Payer: MEDICARE

## 2023-07-26 PROCEDURE — 92507 TX SP LANG VOICE COMM INDIV: CPT

## 2023-07-26 NOTE — PROGRESS NOTES
Diagnosis:   Acute ischemic left MCA stroke [facility]) (B64.631)      Referring Provider: Willie Sierra  Date of Evaluation:    5/12/23    Precautions:  None Next MD visit:   none scheduled  Date of Surgery: n/a   Insurance Primary/Secondary: Johnny Gomez / Chinyere Loges     # Auth Visits: NA          Progress Summary  Pt has attended 10 visits in Speech Therapy. Assessment: Pt has demonstrated excellent progress during this interim. Pt has met x5 STGs with progress noted towards remaining goals. Pt is able to generate 3-5 salient features of uncommon written nouns given occ verbal cues. In addition, Pt is able to generate 2 similarities and 2 differences of related written nouns to address word finding. Goal has been met for PCA but still benefits from performed phonemic divergent naming to target phonemic system and word finding. Pt has demonstrated an overall increased average during this exercise. Progress noted as Pt is able to answer [facility] questions after reading 3-5 paragraphs of written information and has advanced to 7-10 paragraphs. Pt continues to demonstrate semantic paraphasias during oral reading but her awareness and self-correction is improving. Functional carryover noted as Pt with overall reduced use of non-functional fillers in unstructured conversation with improved fluidity. CLQT was administered on 7/10/23 as Pt's aphasia improved to a point where it should not interfere with cognitive assessment. Pt scored below the cut score for subtests highlighted in red below. Pt's overall Composite Severity Rating is WNL; however, Pt scored on the low-end of WNL for Attention, Executive Functions, Language, and Visuospatial skills. In addition, she scores within the mild severity range for her clock drawing. (It should be taken into consideration that the mild severity rating on this subtest could be due to motoric deficits with her right hand vs cognitive-communicative deficits).  Given the CLQT results as well as Pt reports, Pt would benefit from compensatory strategy training for memory, alternating attention, and divided attention. Despite progress Pt continues to demonstrate language deficits in the areas of verbal expression and reading comprehension as well as cognitive-communicative deficits. Continued skilled ST services are recommended to enhance the aforementioned areas for increased participation in complex ADLs as well as improved expression of ideas/thoughts to familiar/unfamiliar communication partners.      Outcome Measures  Reading Comprehension (eval-->today)  Oral Reading of Sentences: 3/5-->5/5  Comprehension: 2/3 -->2/3    CLQT    Personal Facts: 7 (cut score: 8)  Symbol Cancellation: 12 (cut score: 10)  Confrontation Naming: 10 (cut score: 10)  Clock Drawing: 10 (cut score: 11) (but may be d/t motoric issue vs cognitive-communicative)  Story Retellin (cut score: 5)  Symbol Trails: 5 (cut score: 6)  Generative Namin (cut score: 4)  Design Memory: 6 (cut score: 4)  Mazes: 3 (cut score: 4)  Design Generation: 7 (cut score 5)    Domain Severity Rating  Attention: 173 (WNL) (low-end of WNL)  Memory: 162 (WNL)  Executive Functions: 21 (low-end of WNL)  Language: 30 (low-end of WNL)  Visuospatial Skills: 76 (low-end of WNL)  Clock Drawing: 10 (mild)    Composite Severity Rating: WNL (4.0)    Goals:     LTG:    To consistently demonstrate expressive and receptive language skills for participation in conversation with familiar and unfamiliar partners - progressing, continue   To demonstrate appropriate use of compensatory strategies to compensate for cognitive-communicative deficits in order to facilitate optimal performance on cognitive and functional tasks for participation in select premorbid activities - progressing, continue    Language STGs  To implement word retrieval strategies during instances of word finding breakdowns in unstructured conversation for carryover of strategies outside of the treatment room Progressing, continue   To utilize SFA to generate 3-5 features of less common written nouns DURING A BARRIER TASK with provision of a written scaffold as needed to address word finding, processing, and compensatory strategy use 5/16/2023: Goal met for common pictured objects  5/22/2023: Goal met for common written nouns  7/26/2023: Goal met   To complete phonological mapping task with generation of 3+ rhyming words and 3+ words starting with the same letter/sound given a pictured object to address phonological processing 6/28/2023: Goal met   To generate a minimum of 2 shared and 2 differentiating features for 2 related written nouns given provision of a visual scaffold as needed to address word finding, processing, and compensatory strategy use 5/17/2023: Goal met for pictured nouns  7/5/2023: Goal met   To generate at least 15 words in a concrete category in 1-minute during semantic divergent naming exercise to address word finding, thought organization, and processing speed Progressing, continue   To generate at least 15 words in 1-minute during phonemic divergent naming exercise to address word finding, thought organization, and processing speed Progressing, continue   To answer wh-questions re: unmodified special interest topic after use of reading comprehension strategies (underline key words, reread, skim words already understood), 7-10 paragraphs, 80% accuracy min verbal cues 6/16/2023: Goal met for 1-2 paragraphs  6/28/2023: Goal met for 3-5 paragraphs   To orally read 3-5 paragraphs with implementation of compensatory strategies (i.e., slow, loud, over-articulate, breath support) for increased articulatory precision and reduced instances of verbal paraphasias/perseveration/omission/addition of words with no more than 2 errors given min verbal cues Progressing, continue     Cognitive STGs  Goal Comments   To express a fx implementation for compensatory strategies for increased attention (e.g., reducing distractions, one thing at a time, prove to yourself the task is complete, eye contact, complete tasks fully) for increased participation in complex ADLs, 4/5, min verbal cues 7/14/2023: Goal met   To express a fx implementation for compensatory strategies for increased retention (i.e., write it down, repeat it, association, picture it, calendar) for increased participation in complex ADLs 7/14/2023: Goal met   To generate and implement solutions to identified cognitive-communicative related inaccuracies for carryover of compensatory strategies outside of the treatment room, 80%, min verbal cues Progressing, continue       Plan: Continue skilled Speech Therapy 2 x/week or a total of 10 visits over a 90 day period. Treatment will include: speech therapy       Patient/Family/Caregiver was advised of these findings, precautions, and treatment options and has agreed to actively participate in planning and for this course of care. Thank you for your referral. If you have any questions, please contact me at Dept: [phone]. Sincerely,  Electronically signed by therapist: ASIYA Luke     Physician's certification required:  Yes  Please co-sign or sign and return this letter via fax as soon as possible to [phone]. I certify the need for these services furnished under this plan of treatment and while under my care. X___________________________________________________ Date____________________    Certification From: 8/37/7440  To:10/24/2023       Subjective: No reported changes from last session.      Pain: 0/10      Objective:     Date: 5/16/2023  TX#: 2/10 Date: 5/17/2023            TX#: 3/10 Date: 5/22/2023             TX#: 4/10 Date: 5/24/2023             TX#: 5/10 Date: 5/30/2023  Tx#: 6/10 Date: 6/2/2023  Tx#: 7/10 Date: 6/6/2023  Tx#: 8/10   SFA common pictured nouns: 100%, min verba/cues GOAL MET  BARRIER TASK: 100%, no support GOAL MET  SFA written nouns: 100%, min verbal cues GOAL MET       Divergent naming concrete nouns  Trial 1: 15 items  Trial 2: 13 items  Trial 3: 15 items  Trial 4: 8 items  Trial 5: 8 items  Divergent naming concrete nouns  Trial 1: 8 items  Trial 2: 6 items  Trial 3: 12 items  Trial 4: 12 items  Trial 5: 10 items  Divergent naming concrete nouns  Trial 1: 11 items  Trial 2: 11 items  Trial 3: 10 items  Trial 4: 6 items  Trial 5: 8 items      Divergent naming phonemic 1-min  P: 9 words  T: 11 words  B: 14 words   words  N: 9 words  Divergent naming phonemic 1-min  S: 9 words  K: 11 words  SH: 5 words  H: 8 words  R: 14 words  Divergent naming phonemic 1-min  B: 14 words  F: 7 words  P: 11 words  D: 8 words  CH: 6 words  Divergent naming phonemic 1-min  T: 10 words  S: 9 words   words  N: 6 words  K: 7 words  Divergent naming phonemic 1-min  P: 5 words  M: 10 words  H: 10 words  SH: 4 words  D: 11 words  Divergent naming phonemic 1-min  K: 10 words  N: 6 words  S: 8 words   words  T: 7 words     PCA: 100%, mod verbal cues PCA: 100%, mod verbal cues PCA: 100%, mod verbal cues PCA: 100%, min-mod verbal cues PCA: 100%, min-mod verbal cues PCA: 100%, min-mod verbal cues    Similarity/Difference of 2 pictured nouns: 100% no support, GOAL MET  Similarity/Difference of 2 written nouns: 100% min verbal cues  Similarity/Difference of 2 written nouns: 100% min verbal cues       Date: 23  TX#: 9/10 Date: 2023             TX#: 10/10 Date: 2023             TX#: 1/10 Date: 2023                TX#: 2/10 Date: 2023  Tx#: 3/10 Date: 2023  Tx#: 4/10 Date: 2023  Tx#: 5/10   PCA: 100%, occ verbal cues  PCA: 100%, occ verbal cues  PCA: 100%, occ verbal cues  GOAL MET     Divergent naming phonemic 1-min  D: 10 words  P: 14 words  N: 11 words  S: 11 words  CH: 6 words   Divergent naming phonemic 1-min  K: 10 words  T: 10 words  R: 12 words   words  B: 12 words    Divergent naming phonemic 1-min  P: 16 words  S: 9 words  N: 11 words  H: 11 words  D: 8 words     Divergent naming concrete nouns  Trial 1: 16 items  Trial 2: 9 items  Trial 3: 9 items   Divergent naming concrete nouns  Trial 1: 7 items  Trial 2: 9 items  Trial 3: 13 items  Trial 4: 13 items  Trial 5: 10  items       [place] Cog Reading Strategies 1-2 paragraphs 100%, min verbal cues GOAL MET [place] Cog Reading Strategies 3-5 paragraphs 100%, min verbal cues  [place] Cog Reading Strategies 3-5 paragraphs 100% no support [place] Cog Reading Strategies 3-5 paragraphs 100% no support GOAL MET [place] Cog Reading Strategies 7-10 paragraphs 100% occ verbal cues       Oral reading 3-5 paragraphs  x7 errors, 4/7 SC Oral reading 3-5 paragraphs  Trial 1: x8 errors, 3/8 SC  Trial 2: x6 erros, 0 SC  Trial 3: x7 errors, 0 SC Oral reading 3-5 paragraphs  Trial 1: x4 errors, 2/4 SC  Trial 2: x4 erros, 1 SC Oral reading 3-5 paragraphs  Trial 1: x4 errors, 0/4 SC          Similarity/Difference of 2 written nouns: 100% no support GOAL MET     Date: 7/10/2023  TX#: 6/10 Date: 7/14/2023         TX#: 7/10 Date: 7/19/2023                TX#: 8/10 Date: 7/21/2023  TX#: 9/10 Date: 7/26/2023  Tx#: 10/10   CLQT Fx implementation memory strategies: 100%, min verbal cue  GOAL MET       Fx implementation attention strategies: 100%, min verbal cue  GOAL MET        Divergent naming phonemic 1-min  T: 11 words  M: 12 words  F: 12 words  G: 10 words  H: 7 words         Divergent naming concrete nouns  Trial 1: 8 items  Trial 2: 12 items  Trial 3: 9 items  Trial 4: 11 items  Trial 5: 9 items      Synonyms: 100%, min verbal/visual cues        Oral reading 3-5 paragraphs  Trial 1: x11 errors, 5/11 SC         SFA uncommon written nouns: 100% occ verbal cues  GOAL MET     Plan: Recommend continue POC as established.      HEP: Language worksheets    Charges: [id number]      Total Treatment Time: 45 min

## 2023-07-28 ENCOUNTER — OFFICE VISIT (OUTPATIENT)
Dept: SPEECH THERAPY | Facility: HOSPITAL | Age: 78
End: 2023-07-28
Attending: PHYSICAL MEDICINE & REHABILITATION
Payer: MEDICARE

## 2023-07-28 PROCEDURE — 92507 TX SP LANG VOICE COMM INDIV: CPT

## 2023-07-28 NOTE — PROGRESS NOTES
Diagnosis:   Acute ischemic left MCA stroke [facility]) (V06.549)      Referring Provider: Vernon Zaragoza  Date of Evaluation:    23    Precautions:  None Next MD visit:   none scheduled  Date of Surgery: n/a   Insurance Primary/Secondary: MEDICARE / [employer]     # Auth Visits: NA             Subjective: No reported changes from last session.      Pain: 0/10      Objective:     Date: 2023  TX#: 2/10 Date: 2023            TX#: 3/10 Date: 2023             TX#: 4/10 Date: 2023             TX#: 5/10 Date: 2023  Tx#: 6/10 Date: 2023  Tx#: 7/10 Date: 2023  Tx#: 8/10   SFA common pictured nouns: 100%, min verba/cues GOAL MET  BARRIER TASK: 100%, no support GOAL MET  SFA written nouns: 100%, min verbal cues GOAL MET       Divergent naming concrete nouns  Trial 1: 15 items  Trial 2: 13 items  Trial 3: 15 items  Trial 4: 8 items  Trial 5: 8 items  Divergent naming concrete nouns  Trial 1: 8 items  Trial 2: 6 items  Trial 3: 12 items  Trial 4: 12 items  Trial 5: 10 items  Divergent naming concrete nouns  Trial 1: 11 items  Trial 2: 11 items  Trial 3: 10 items  Trial 4: 6 items  Trial 5: 8 items      Divergent naming phonemic 1-min  P: 9 words  T: 11 words  B: 14 words   words  N: 9 words  Divergent naming phonemic 1-min  S: 9 words  K: 11 words  SH: 5 words  H: 8 words  R: 14 words  Divergent naming phonemic 1-min  B: 14 words  F: 7 words  P: 11 words  D: 8 words  CH: 6 words  Divergent naming phonemic 1-min  T: 10 words  S: 9 words   words  N: 6 words  K: 7 words  Divergent naming phonemic 1-min  P: 5 words  M: 10 words  H: 10 words  SH: 4 words  D: 11 words  Divergent naming phonemic 1-min  K: 10 words  N: 6 words  S: 8 words   words  T: 7 words     PCA: 100%, mod verbal cues PCA: 100%, mod verbal cues PCA: 100%, mod verbal cues PCA: 100%, min-mod verbal cues PCA: 100%, min-mod verbal cues PCA: 100%, min-mod verbal cues    Similarity/Difference of 2 pictured nouns: 100% no support, GOAL MET Similarity/Difference of 2 written nouns: 100% min verbal cues  Similarity/Difference of 2 written nouns: 100% min verbal cues       Date: 23  TX#: 9/10 Date: 2023             TX#: 10/10 Date: 2023             TX#: 1/10 Date: 2023                TX#: 2/10 Date: 2023  Tx#: 3/10 Date: 2023  Tx#: 4/10 Date: 2023  Tx#: 5/10   PCA: 100%, occ verbal cues  PCA: 100%, occ verbal cues  PCA: 100%, occ verbal cues  GOAL MET     Divergent naming phonemic 1-min  D: 10 words  P: 14 words  N: 11 words  S: 11 words  CH: 6 words   Divergent naming phonemic 1-min  K: 10 words  T: 10 words  R: 12 words   words  B: 12 words    Divergent naming phonemic 1-min  P: 16 words  S: 9 words  N: 11 words  H: 11 words  D: 8 words     Divergent naming concrete nouns  Trial 1: 16 items  Trial 2: 9 items  Trial 3: 9 items   Divergent naming concrete nouns  Trial 1: 7 items  Trial 2: 9 items  Trial 3: 13 items  Trial 4: 13 items  Trial 5: 10  items       [place] Cog Reading Strategies 1-2 paragraphs 100%, min verbal cues GOAL MET [place] Cog Reading Strategies 3-5 paragraphs 100%, min verbal cues  [place] Cog Reading Strategies 3-5 paragraphs 100% no support [place] Cog Reading Strategies 3-5 paragraphs 100% no support GOAL MET [place] Cog Reading Strategies 7-10 paragraphs 100% occ verbal cues       Oral reading 3-5 paragraphs  x7 errors, 4/7 SC Oral reading 3-5 paragraphs  Trial 1: x8 errors, 3/8 SC  Trial 2: x6 erros, 0 SC  Trial 3: x7 errors, 0 SC Oral reading 3-5 paragraphs  Trial 1: x4 errors, 2/4 SC  Trial 2: x4 erros, 1 SC Oral reading 3-5 paragraphs  Trial 1: x4 errors, 0/4 SC          Similarity/Difference of 2 written nouns: 100% no support GOAL MET     Date: 7/10/2023  TX#: 6/10 Date: 2023         TX#: 7/10 Date: 2023                TX#: 8/10 Date: 2023  TX#: 9/10 Date: 2023  Tx#: 10/10 Date: 2023  Tx#: 1/10   CLQT Fx implementation memory strategies: 100%, min verbal cue  GOAL MET Fx implementation attention strategies: 100%, min verbal cue  GOAL MET         Divergent naming phonemic 1-min  T: 11 words  M: 12 words  F: 12 words  G: 10 words  H: 7 words          Divergent naming concrete nouns  Trial 1: 8 items  Trial 2: 12 items  Trial 3: 9 items  Trial 4: 11 items  Trial 5: 9 items       Synonyms: 100%, min verbal/visual cues          [place] Cog Reading Strategies 7-10 paragraphs 100%, no support  GOAL MET       Oral reading 3-5 paragraphs  Trial 1: x11 errors, 5/11 SC   Oral reading 3-5 paragraphs  Trial 1: x3 errors  Trial 2: 2 errors       SFA uncommon written nouns: 100% occ verbal cues  GOAL MET      Assessment: Progress noted as Pt able to answer -[address] questions after reading 7-10 paragraphs with no support; goal met and upgraded to 2-3 pages for additional challenge. Pt expressed that reading/comprehending this text length is becoming easier. Pt endorsed double vision towards the end of 7-10 paragraph passage and is seeing her doctor next week to address this. Pt with overall reduced errors during oral reading; discussed slowing down reading rate for increased accuracy.        Goals:     LTG:    To consistently demonstrate expressive and receptive language skills for participation in conversation with familiar and unfamiliar partners - progressing, continue   To demonstrate appropriate use of compensatory strategies to compensate for cognitive-communicative deficits in order to facilitate optimal performance on cognitive and functional tasks for participation in select premorbid activities - progressing, continue    Language STGs  To implement word retrieval strategies during instances of word finding breakdowns in unstructured conversation for carryover of strategies outside of the treatment room Progressing, continue   To utilize SFA to generate 3-5 features of less common written nouns DURING A BARRIER TASK with provision of a written scaffold as needed to address word finding, processing, and compensatory strategy use 5/16/2023: Goal met for common pictured objects  5/22/2023: Goal met for common written nouns  7/26/2023: Goal met   To complete phonological mapping task with generation of 3+ rhyming words and 3+ words starting with the same letter/sound given a pictured object to address phonological processing 6/28/2023: Goal met   To generate a minimum of 2 shared and 2 differentiating features for 2 related written nouns given provision of a visual scaffold as needed to address word finding, processing, and compensatory strategy use 5/17/2023: Goal met for pictured nouns  7/5/2023: Goal met   To generate at least 15 words in a concrete category in 1-minute during semantic divergent naming exercise to address word finding, thought organization, and processing speed Progressing, continue   To generate at least 15 words in 1-minute during phonemic divergent naming exercise to address word finding, thought organization, and processing speed Progressing, continue   To answer wh-questions re: unmodified special interest topic after use of reading comprehension strategies (underline key words, reread, skim words already understood), 2-3 pages, 80% accuracy min verbal cues 6/16/2023: Goal met for 1-2 paragraphs  6/28/2023: Goal met for 3-5 paragraphs  7/28/2023: Goal met for 7-10 paragraphs   To orally read 3-5 paragraphs with implementation of compensatory strategies (i.e., slow, loud, over-articulate, breath support) for increased articulatory precision and reduced instances of verbal paraphasias/perseveration/omission/addition of words with no more than 2 errors given min verbal cues Progressing, continue     Cognitive STGs  Goal Comments   To express a fx implementation for compensatory strategies for increased attention (e.g., reducing distractions, one thing at a time, prove to yourself the task is complete, eye contact, complete tasks fully) for increased participation in complex ADLs, 4/5, min verbal cues 7/14/2023: Goal met   To express a fx implementation for compensatory strategies for increased retention (i.e., write it down, repeat it, association, picture it, calendar) for increased participation in complex ADLs 7/14/2023: Goal met   To generate and implement solutions to identified cognitive-communicative related inaccuracies for carryover of compensatory strategies outside of the treatment room, 80%, min verbal cues Progressing, continue       Plan: Recommend continue POC as established.      HEP: Language worksheets    Charges: [id number]      Total Treatment Time: 45 min

## 2023-07-29 ENCOUNTER — OFFICE VISIT (OUTPATIENT)
Dept: FAMILY MEDICINE CLINIC | Facility: CLINIC | Age: 78
End: 2023-07-29
Payer: MEDICARE

## 2023-07-29 VITALS
OXYGEN SATURATION: 98 % | RESPIRATION RATE: 16 BRPM | BODY MASS INDEX: 33.42 KG/M2 | TEMPERATURE: 98 F | WEIGHT: 177 LBS | DIASTOLIC BLOOD PRESSURE: 74 MMHG | HEART RATE: 85 BPM | HEIGHT: 61 IN | SYSTOLIC BLOOD PRESSURE: 112 MMHG

## 2023-07-29 DIAGNOSIS — J01.00 SUBACUTE MAXILLARY SINUSITIS: Primary | ICD-10-CM

## 2023-07-29 PROCEDURE — 99213 OFFICE O/P EST LOW 20 MIN: CPT | Performed by: NURSE PRACTITIONER

## 2023-07-29 RX ORDER — AZITHROMYCIN 250 MG/1
TABLET, FILM COATED ORAL
Qty: 6 TABLET | Refills: 0 | Status: SHIPPED | OUTPATIENT
Start: 2023-07-29 | End: 2023-07-29

## 2023-07-29 RX ORDER — AZITHROMYCIN 250 MG/1
TABLET, FILM COATED ORAL
Qty: 6 TABLET | Refills: 0 | Status: SHIPPED | OUTPATIENT
Start: 2023-07-29 | End: 2023-08-03

## 2023-07-31 ENCOUNTER — OFFICE VISIT (OUTPATIENT)
Dept: SPEECH THERAPY | Facility: HOSPITAL | Age: 78
End: 2023-07-31
Attending: PHYSICAL MEDICINE & REHABILITATION
Payer: MEDICARE

## 2023-07-31 PROCEDURE — 92507 TX SP LANG VOICE COMM INDIV: CPT

## 2023-07-31 NOTE — PROGRESS NOTES
Diagnosis:   Acute ischemic left MCA stroke [facility]) (I51.013)      Referring Provider: Katherin Smith  Date of Evaluation:    23    Precautions:  None Next MD visit:   none scheduled  Date of Surgery: n/a   Insurance Primary/Secondary: MEDICARE / [employer]ft     # Auth Visits: NA             Subjective: No reported changes from last session.      Pain: 0/10      Objective:     Date: 2023  TX#: 2/10 Date: 2023            TX#: 3/10 Date: 2023             TX#: 4/10 Date: 2023             TX#: 5/10 Date: 2023  Tx#: 6/10 Date: 2023  Tx#: 7/10 Date: 2023  Tx#: 8/10   SFA common pictured nouns: 100%, min verba/cues GOAL MET  BARRIER TASK: 100%, no support GOAL MET  SFA written nouns: 100%, min verbal cues GOAL MET       Divergent naming concrete nouns  Trial 1: 15 items  Trial 2: 13 items  Trial 3: 15 items  Trial 4: 8 items  Trial 5: 8 items  Divergent naming concrete nouns  Trial 1: 8 items  Trial 2: 6 items  Trial 3: 12 items  Trial 4: 12 items  Trial 5: 10 items  Divergent naming concrete nouns  Trial 1: 11 items  Trial 2: 11 items  Trial 3: 10 items  Trial 4: 6 items  Trial 5: 8 items      Divergent naming phonemic 1-min  P: 9 words  T: 11 words  B: 14 words   words  N: 9 words  Divergent naming phonemic 1-min  S: 9 words  K: 11 words  SH: 5 words  H: 8 words  R: 14 words  Divergent naming phonemic 1-min  B: 14 words  F: 7 words  P: 11 words  D: 8 words  CH: 6 words  Divergent naming phonemic 1-min  T: 10 words  S: 9 words   words  N: 6 words  K: 7 words  Divergent naming phonemic 1-min  P: 5 words  M: 10 words  H: 10 words  SH: 4 words  D: 11 words  Divergent naming phonemic 1-min  K: 10 words  N: 6 words  S: 8 words   words  T: 7 words     PCA: 100%, mod verbal cues PCA: 100%, mod verbal cues PCA: 100%, mod verbal cues PCA: 100%, min-mod verbal cues PCA: 100%, min-mod verbal cues PCA: 100%, min-mod verbal cues    Similarity/Difference of 2 pictured nouns: 100% no support, GOAL MET Similarity/Difference of 2 written nouns: 100% min verbal cues  Similarity/Difference of 2 written nouns: 100% min verbal cues       Date: 23  TX#: 9/10 Date: 2023             TX#: 10/10 Date: 2023             TX#: 1/10 Date: 2023                TX#: 2/10 Date: 2023  Tx#: 3/10 Date: 2023  Tx#: 4/10 Date: 2023  Tx#: 5/10   PCA: 100%, occ verbal cues  PCA: 100%, occ verbal cues  PCA: 100%, occ verbal cues  GOAL MET     Divergent naming phonemic 1-min  D: 10 words  P: 14 words  N: 11 words  S: 11 words  CH: 6 words   Divergent naming phonemic 1-min  K: 10 words  T: 10 words  R: 12 words   words  B: 12 words    Divergent naming phonemic 1-min  P: 16 words  S: 9 words  N: 11 words  H: 11 words  D: 8 words     Divergent naming concrete nouns  Trial 1: 16 items  Trial 2: 9 items  Trial 3: 9 items   Divergent naming concrete nouns  Trial 1: 7 items  Trial 2: 9 items  Trial 3: 13 items  Trial 4: 13 items  Trial 5: 10  items       [place] Cog Reading Strategies 1-2 paragraphs 100%, min verbal cues GOAL MET [place] Cog Reading Strategies 3-5 paragraphs 100%, min verbal cues  [place] Cog Reading Strategies 3-5 paragraphs 100% no support [place] Cog Reading Strategies 3-5 paragraphs 100% no support GOAL MET [place] Cog Reading Strategies 7-10 paragraphs 100% occ verbal cues       Oral reading 3-5 paragraphs  x7 errors, 4/7 SC Oral reading 3-5 paragraphs  Trial 1: x8 errors, 3/8 SC  Trial 2: x6 erros, 0 SC  Trial 3: x7 errors, 0 SC Oral reading 3-5 paragraphs  Trial 1: x4 errors, 2/4 SC  Trial 2: x4 erros, 1 SC Oral reading 3-5 paragraphs  Trial 1: x4 errors, 0/4 SC          Similarity/Difference of 2 written nouns: 100% no support GOAL MET     Date: 7/10/2023  TX#: 6/10 Date: 2023         TX#: 7/10 Date: 2023                TX#: 8/10 Date: 2023  TX#: 9/10 Date: 2023  Tx#: 10/10 Date: 2023  Tx#: 1/10 Date: 2023  Tx#: 2/10   CLQT Fx implementation memory strategies: 100%, min verbal cue  GOAL MET         Fx implementation attention strategies: 100%, min verbal cue  GOAL MET          Divergent naming phonemic 1-min  T: 11 words  M: 12 words  F: 12 words  G: 10 words  H: 7 words           Divergent naming concrete nouns  Trial 1: 8 items  Trial 2: 12 items  Trial 3: 9 items  Trial 4: 11 items  Trial 5: 9 items        Synonyms: 100%, min verbal/visual cues           [place] Cog Reading Strategies 7-10 paragraphs 100%, no support  GOAL MET  [place] Cog Reading Strategies 2-3 pages paragraphs 100%, no support      Oral reading 3-5 paragraphs  Trial 1: x11 errors, 5/11 SC   Oral reading 3-5 paragraphs  Trial 1: x3 errors  Trial 2: 2 errors Oral reading 3-5 paragraphs  Trial 1: x0 errors, x6 SC  Trial 2: x1 error, x2 SC       SFA uncommon written nouns: 100% occ verbal cues  GOAL MET       Assessment: Progress noted as Pt able to answer -[address] questions after advancing to  2-3 pages of written text with no support. Pt continued to endorse double vision, this time in the middle of the passage. Pt with overall reduced errors during oral reading with increased self-correction.        Goals:     LTG:    To consistently demonstrate expressive and receptive language skills for participation in conversation with familiar and unfamiliar partners - progressing, continue   To demonstrate appropriate use of compensatory strategies to compensate for cognitive-communicative deficits in order to facilitate optimal performance on cognitive and functional tasks for participation in select premorbid activities - progressing, continue    Language STGs  To implement word retrieval strategies during instances of word finding breakdowns in unstructured conversation for carryover of strategies outside of the treatment room Progressing, continue   To utilize SFA to generate 3-5 features of less common written nouns DURING A BARRIER TASK with provision of a written scaffold as needed to address word finding, processing, and compensatory strategy use 5/16/2023: Goal met for common pictured objects  5/22/2023: Goal met for common written nouns  7/26/2023: Goal met   To complete phonological mapping task with generation of 3+ rhyming words and 3+ words starting with the same letter/sound given a pictured object to address phonological processing 6/28/2023: Goal met   To generate a minimum of 2 shared and 2 differentiating features for 2 related written nouns given provision of a visual scaffold as needed to address word finding, processing, and compensatory strategy use 5/17/2023: Goal met for pictured nouns  7/5/2023: Goal met   To generate at least 15 words in a concrete category in 1-minute during semantic divergent naming exercise to address word finding, thought organization, and processing speed Progressing, continue   To generate at least 15 words in 1-minute during phonemic divergent naming exercise to address word finding, thought organization, and processing speed Progressing, continue   To answer wh-questions re: unmodified special interest topic after use of reading comprehension strategies (underline key words, reread, skim words already understood), 2-3 pages, 80% accuracy min verbal cues 6/16/2023: Goal met for 1-2 paragraphs  6/28/2023: Goal met for 3-5 paragraphs  7/28/2023: Goal met for 7-10 paragraphs   To orally read 3-5 paragraphs with implementation of compensatory strategies (i.e., slow, loud, over-articulate, breath support) for increased articulatory precision and reduced instances of verbal paraphasias/perseveration/omission/addition of words with no more than 2 errors given min verbal cues Progressing, continue     Cognitive STGs  Goal Comments   To express a fx implementation for compensatory strategies for increased attention (e.g., reducing distractions, one thing at a time, prove to yourself the task is complete, eye contact, complete tasks fully) for increased participation in complex ADLs, 4/5, min verbal cues 7/14/2023: Goal met   To express a fx implementation for compensatory strategies for increased retention (i.e., write it down, repeat it, association, picture it, calendar) for increased participation in complex ADLs 7/14/2023: Goal met   To generate and implement solutions to identified cognitive-communicative related inaccuracies for carryover of compensatory strategies outside of the treatment room, 80%, min verbal cues Progressing, continue       Plan: Recommend continue POC as established.      HEP: Language worksheets    Charges: [id number]      Total Treatment Time: 45 min

## 2023-08-01 RX ORDER — ROSUVASTATIN CALCIUM 10 MG/1
10 TABLET, COATED ORAL NIGHTLY
Qty: 90 TABLET | Refills: 3 | Status: SHIPPED | OUTPATIENT
Start: 2023-08-01

## 2023-08-01 NOTE — TELEPHONE ENCOUNTER
Dr. Migdalia Russ, please review and advise. Listed as external. Thanks.     Future Appointments   Date Time Provider Evangelina Zuletai   8/3/2023 10:00 AM Jacob Corea MD [facility] CARE AT [facility]   8/4/2023  8:45 AM Sridevi Acosta [facility]   8/9/2023 12:30 PM Sridevi Acosta [facility] CARE SYSTEM [facility]   8/11/2023 12:30 PM Sridevi Acosta [facility]   8/18/2023 12:30 PM Sridevi Acosta, [facility]   8/25/2023 12:30 PM Sridevi Acosta [facility] CARE SYSTEM [facility]   9/1/2023 12:30 PM Sridevi Acosta [facility]

## 2023-08-01 NOTE — TELEPHONE ENCOUNTER
Clarification needed. 10mg dosage being requested. However dosage was increased to 20mg daily on 3/31/23 after last hospitalization. [employer] message sent to patient for clarification. 3/31/23 discharge notes:    rosuvastatin 10 mg tablet  Commonly known as: CRESTOR  Take 2 tablets by mouth daily. What changed: how much to take   Requested Prescriptions   Pending Prescriptions Disp Refills    ROSUVASTATIN 10 MG Oral Tab [Pharmacy Med Name: Rosuvastatin Calcium 10 Mg Tab Nort] 90 tablet 0     Sig: Take 1 tablet (10 mg total) by mouth nightly.        Cholesterol Medication Protocol Passed - 7/31/2023  3:04 PM        Passed - ALT in past 12 months        Passed - LDL in past 12 months        Passed - Last ALT < 80     Lab Results   Component Value Date    ALT 33 03/27/2023             Passed - Last LDL < 130     Lab Results   Component Value Date    LDL 65 03/29/2023             Passed - In person appointment or virtual visit in the past 12 mos or appointment in next 3 mos     Recent Outpatient Visits              Today     [facility] Speech Pathology Shan Garcia, SLP    Office Visit    2 days ago Subacute maxillary sinusitis    [facility], [address], [address], [facility], [facility] [place], [address] [facility] Visit    3 days ago     [facility] Pathology Shan Garcia, SLP    Office Visit    5 days ago     [facility] Pathology Shan Garcia, SLP    Office Visit    1 week ago     [facility] Pathology Shan Garcia, SLP    Office Visit          Future Appointments         Provider Department Appt Notes    In 3 days Jayde Corea MD [address] [facility],[address], [address] [facility] follow up after MRI aneurysm ok per Mike Lies    In 4 days [address] Speech Pathology Medicare/[employer]ARP    In 1 week Clayton [address] Speech Pathology Medicare/AARP    In 1 week ASIYA Lozyoa [facility] Speech Pathology Medicare/AARP    In 2 weeks Piper Ludwin, [address] Speech Pathology Medicare/AARP    In 3 weeks Piper Mascorro, [address] Speech Pathology Medicare/AARP    In 1 month Piper Mascorro, lCayton [address] Speech Pathology Medicare/AARP

## 2023-08-03 ENCOUNTER — OFFICE VISIT (OUTPATIENT)
Dept: SURGERY | Facility: CLINIC | Age: 78
End: 2023-08-03
Payer: MEDICARE

## 2023-08-03 VITALS
HEART RATE: 82 BPM | WEIGHT: 177 LBS | HEIGHT: 61 IN | DIASTOLIC BLOOD PRESSURE: 72 MMHG | SYSTOLIC BLOOD PRESSURE: 138 MMHG | BODY MASS INDEX: 33.42 KG/M2

## 2023-08-03 DIAGNOSIS — I72.9 ANEURYSM (HCC): Primary | ICD-10-CM

## 2023-08-03 PROCEDURE — 99214 OFFICE O/P EST MOD 30 MIN: CPT

## 2023-08-03 RX ORDER — CHOLECALCIFEROL (VITAMIN D3) 50 MCG
TABLET ORAL AS DIRECTED
COMMUNITY

## 2023-08-03 NOTE — PROGRESS NOTES
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 81, brillinta    Review of Systems:    Hand Dominance: right  General: no symptoms reported  Neuro: no symptoms reported  Head: no symptoms reported  Musculoskeletal: no symptoms reported  Cardiovascular: no symptoms reported  Gastrointestinal: no symptoms reported  Genitourinary: no symptoms reported  Respiratory: no symptoms reported  Eyes: double vision and blurred vision every other day, bilateral eyes  Skin: no symptoms reported  Mouth & throat: no symptoms reported  Neck: no symptoms reported  Nose: no symptoms reported  Psychiatric: anxiety  Barthel: 100  MRS: 0  NIH: 0

## 2023-08-04 ENCOUNTER — TELEPHONE (OUTPATIENT)
Dept: SURGERY | Facility: CLINIC | Age: 78
End: 2023-08-04

## 2023-08-04 ENCOUNTER — LAB ENCOUNTER (OUTPATIENT)
Dept: LAB | Facility: HOSPITAL | Age: 78
End: 2023-08-04
Payer: MEDICARE

## 2023-08-04 ENCOUNTER — OFFICE VISIT (OUTPATIENT)
Dept: SPEECH THERAPY | Facility: HOSPITAL | Age: 78
End: 2023-08-04
Attending: PHYSICAL MEDICINE & REHABILITATION
Payer: MEDICARE

## 2023-08-04 LAB — PA ADP PRP-ACNC: 36

## 2023-08-04 PROCEDURE — 85576 BLOOD PLATELET AGGREGATION: CPT

## 2023-08-04 PROCEDURE — 36415 COLL VENOUS BLD VENIPUNCTURE: CPT

## 2023-08-04 PROCEDURE — 92507 TX SP LANG VOICE COMM INDIV: CPT

## 2023-08-04 NOTE — TELEPHONE ENCOUNTER
Please schedule patient for repeat [facility] in early September. (Can be with Nahomy Akhtar, or Serena, whomever is available.) Pt is agreeable to any MD.     Thank you!

## 2023-08-04 NOTE — TELEPHONE ENCOUNTER
P2Y12 resulted at 36. Discussed with Dr. Eliot Charles, will decrease Brilinta to 60mg daily. Will repeat P2Y12 in 1 week. Called patient and advised of the above.

## 2023-08-04 NOTE — PROGRESS NOTES
Diagnosis:   Acute ischemic left MCA stroke [facility]) (D46.695)      Referring Provider: Tate Ronquillo  Date of Evaluation:    23    Precautions:  None Next MD visit:   none scheduled  Date of Surgery: n/a   Insurance Primary/Secondary: MEDICARE / [employer]     # Auth Visits: NA             Subjective: Pt had an MRA and per her report results were unremarkable.     Pain: 0/10      Objective:     Date: 2023  TX#: 2/10 Date: 2023            TX#: 3/10 Date: 2023             TX#: 4/10 Date: 2023             TX#: 5/10 Date: 2023  Tx#: 6/10 Date: 2023  Tx#: 7/10 Date: 2023  Tx#: 8/10   SFA common pictured nouns: 100%, min verba/cues GOAL MET  BARRIER TASK: 100%, no support GOAL MET  SFA written nouns: 100%, min verbal cues GOAL MET       Divergent naming concrete nouns  Trial 1: 15 items  Trial 2: 13 items  Trial 3: 15 items  Trial 4: 8 items  Trial 5: 8 items  Divergent naming concrete nouns  Trial 1: 8 items  Trial 2: 6 items  Trial 3: 12 items  Trial 4: 12 items  Trial 5: 10 items  Divergent naming concrete nouns  Trial 1: 11 items  Trial 2: 11 items  Trial 3: 10 items  Trial 4: 6 items  Trial 5: 8 items      Divergent naming phonemic 1-min  P: 9 words  T: 11 words  B: 14 words   words  N: 9 words  Divergent naming phonemic 1-min  S: 9 words  K: 11 words  SH: 5 words  H: 8 words  R: 14 words  Divergent naming phonemic 1-min  B: 14 words  F: 7 words  P: 11 words  D: 8 words  CH: 6 words  Divergent naming phonemic 1-min  T: 10 words  S: 9 words   words  N: 6 words  K: 7 words  Divergent naming phonemic 1-min  P: 5 words  M: 10 words  H: 10 words  SH: 4 words  D: 11 words  Divergent naming phonemic 1-min  K: 10 words  N: 6 words  S: 8 words   words  T: 7 words     PCA: 100%, mod verbal cues PCA: 100%, mod verbal cues PCA: 100%, mod verbal cues PCA: 100%, min-mod verbal cues PCA: 100%, min-mod verbal cues PCA: 100%, min-mod verbal cues    Similarity/Difference of 2 pictured nouns: 100% no support, GOAL MET  Similarity/Difference of 2 written nouns: 100% min verbal cues  Similarity/Difference of 2 written nouns: 100% min verbal cues       Date: 23  TX#: 9/10 Date: 2023             TX#: 10/10 Date: 2023             TX#: 1/10 Date: 2023                TX#: 2/10 Date: 2023  Tx#: 3/10 Date: 2023  Tx#: 4/10 Date: 2023  Tx#: 5/10   PCA: 100%, occ verbal cues  PCA: 100%, occ verbal cues  PCA: 100%, occ verbal cues  GOAL MET     Divergent naming phonemic 1-min  D: 10 words  P: 14 words  N: 11 words  S: 11 words  CH: 6 words   Divergent naming phonemic 1-min  K: 10 words  T: 10 words  R: 12 words   words  B: 12 words    Divergent naming phonemic 1-min  P: 16 words  S: 9 words  N: 11 words  H: 11 words  D: 8 words     Divergent naming concrete nouns  Trial 1: 16 items  Trial 2: 9 items  Trial 3: 9 items   Divergent naming concrete nouns  Trial 1: 7 items  Trial 2: 9 items  Trial 3: 13 items  Trial 4: 13 items  Trial 5: 10  items       [place] Cog Reading Strategies 1-2 paragraphs 100%, min verbal cues GOAL MET [place] Cog Reading Strategies 3-5 paragraphs 100%, min verbal cues  [place] Cog Reading Strategies 3-5 paragraphs 100% no support [place] Cog Reading Strategies 3-5 paragraphs 100% no support GOAL MET [place] Cog Reading Strategies 7-10 paragraphs 100% occ verbal cues       Oral reading 3-5 paragraphs  x7 errors, 4/7 SC Oral reading 3-5 paragraphs  Trial 1: x8 errors, 3/8 SC  Trial 2: x6 erros, 0 SC  Trial 3: x7 errors, 0 SC Oral reading 3-5 paragraphs  Trial 1: x4 errors, 2/4 SC  Trial 2: x4 erros, 1 SC Oral reading 3-5 paragraphs  Trial 1: x4 errors, 0/4 SC          Similarity/Difference of 2 written nouns: 100% no support GOAL MET     Date: 7/10/2023  TX#: 6/10 Date: 2023         TX#: 7/10 Date: 2023                TX#: 8/10 Date: 2023  TX#: 9/10 Date: 2023  Tx#: 10/10 Date: 2023  Tx#: 1/10 Date: 2023  Tx#: 2/10   CLQT Fx implementation memory strategies: 100%, min verbal cue  GOAL MET         Fx implementation attention strategies: 100%, min verbal cue  GOAL MET          Divergent naming phonemic 1-min  T: 11 words  M: 12 words  F: 12 words  G: 10 words  H: 7 words           Divergent naming concrete nouns  Trial 1: 8 items  Trial 2: 12 items  Trial 3: 9 items  Trial 4: 11 items  Trial 5: 9 items        Synonyms: 100%, min verbal/visual cues           [place] Cog Reading Strategies 7-10 paragraphs 100%, no support  GOAL MET  [place] Cog Reading Strategies 2-3 pages paragraphs 100%, no support      Oral reading 3-5 paragraphs  Trial 1: x11 errors, 5/11 SC   Oral reading 3-5 paragraphs  Trial 1: x3 errors  Trial 2: 2 errors Oral reading 3-5 paragraphs  Trial 1: x0 errors, x6 SC  Trial 2: x1 error, x2 SC       SFA uncommon written nouns: 100% occ verbal cues  GOAL MET       Date: 8/4/2023  Tx#: 3/10 Date:       TX#:  Date:       TX#:  Date:          TX#:  Date: Tx#:  Date: Tx#:   Divergent naming phonemic 1-min  C/K: 7 words  S: 12 words  N: 8 words  H: 12 words  T: 8 words   Min verbal cues        Divergent naming concrete nouns  Trial 1: 8 items  Trial 2: 14 items  Trial 3: 10 items  Trial 4: 10 items  Trial 5: 8 items  Min verbal cues          Assessment: Pt with overall inconsistent performance during divergent naming exercises; however, this exercise has not consistently been addressed d/t a focus on oral reading and reading comprehension. Pt did benefit from min semantic cues  when encountering word finding difficulties.       Goals:     LTG:    To consistently demonstrate expressive and receptive language skills for participation in conversation with familiar and unfamiliar partners - progressing, continue   To demonstrate appropriate use of compensatory strategies to compensate for cognitive-communicative deficits in order to facilitate optimal performance on cognitive and functional tasks for participation in select premorbid activities - progressing, continue    Language STGs  To implement word retrieval strategies during instances of word finding breakdowns in unstructured conversation for carryover of strategies outside of the treatment room Progressing, continue   To utilize SFA to generate 3-5 features of less common written nouns DURING A BARRIER TASK with provision of a written scaffold as needed to address word finding, processing, and compensatory strategy use 5/16/2023: Goal met for common pictured objects  5/22/2023: Goal met for common written nouns  7/26/2023: Goal met   To complete phonological mapping task with generation of 3+ rhyming words and 3+ words starting with the same letter/sound given a pictured object to address phonological processing 6/28/2023: Goal met   To generate a minimum of 2 shared and 2 differentiating features for 2 related written nouns given provision of a visual scaffold as needed to address word finding, processing, and compensatory strategy use 5/17/2023: Goal met for pictured nouns  7/5/2023: Goal met   To generate at least 15 words in a concrete category in 1-minute during semantic divergent naming exercise to address word finding, thought organization, and processing speed Progressing, continue   To generate at least 15 words in 1-minute during phonemic divergent naming exercise to address word finding, thought organization, and processing speed Progressing, continue   To answer wh-questions re: unmodified special interest topic after use of reading comprehension strategies (underline key words, reread, skim words already understood), 2-3 pages, 80% accuracy min verbal cues 6/16/2023: Goal met for 1-2 paragraphs  6/28/2023: Goal met for 3-5 paragraphs  7/28/2023: Goal met for 7-10 paragraphs   To orally read 3-5 paragraphs with implementation of compensatory strategies (i.e., slow, loud, over-articulate, breath support) for increased articulatory precision and reduced instances of verbal paraphasias/perseveration/omission/addition of words with no more than 2 errors given min verbal cues Progressing, continue     Cognitive STGs  Goal Comments   To express a fx implementation for compensatory strategies for increased attention (e.g., reducing distractions, one thing at a time, prove to yourself the task is complete, eye contact, complete tasks fully) for increased participation in complex ADLs, 4/5, min verbal cues 7/14/2023: Goal met   To express a fx implementation for compensatory strategies for increased retention (i.e., write it down, repeat it, association, picture it, calendar) for increased participation in complex ADLs 7/14/2023: Goal met   To generate and implement solutions to identified cognitive-communicative related inaccuracies for carryover of compensatory strategies outside of the treatment room, 80%, min verbal cues Progressing, continue       Plan: Recommend continue POC as established.      HEP: Language worksheets    Charges: [id number]      Total Treatment Time: 45 min

## 2023-08-09 ENCOUNTER — OFFICE VISIT (OUTPATIENT)
Dept: SPEECH THERAPY | Facility: HOSPITAL | Age: 78
End: 2023-08-09
Attending: PHYSICAL MEDICINE & REHABILITATION
Payer: MEDICARE

## 2023-08-09 PROCEDURE — 92507 TX SP LANG VOICE COMM INDIV: CPT

## 2023-08-09 NOTE — PROGRESS NOTES
Diagnosis:   Acute ischemic left MCA stroke [facility]) (Z49.554)      Referring Provider: Aide Suarez  Date of Evaluation:    23    Precautions:  None Next MD visit:   none scheduled  Date of Surgery: n/a   Insurance Primary/Secondary: Ange Blas / Kassy Kaovnaundrea     # Auth Visits: NA             Subjective: No reported changes since last session    Pain: 0/10      Objective:     Date: 2023  TX#: 2/10 Date: 2023            TX#: 3/10 Date: 2023             TX#: 4/10 Date: 2023             TX#: 5/10 Date: 2023  Tx#: 6/10 Date: 2023  Tx#: 7/10 Date: 2023  Tx#: 8/10   SFA common pictured nouns: 100%, min verba/cues GOAL MET  BARRIER TASK: 100%, no support GOAL MET  SFA written nouns: 100%, min verbal cues GOAL MET       Divergent naming concrete nouns  Trial 1: 15 items  Trial 2: 13 items  Trial 3: 15 items  Trial 4: 8 items  Trial 5: 8 items  Divergent naming concrete nouns  Trial 1: 8 items  Trial 2: 6 items  Trial 3: 12 items  Trial 4: 12 items  Trial 5: 10 items  Divergent naming concrete nouns  Trial 1: 11 items  Trial 2: 11 items  Trial 3: 10 items  Trial 4: 6 items  Trial 5: 8 items      Divergent naming phonemic 1-min  P: 9 words  T: 11 words  B: 14 words   words  N: 9 words  Divergent naming phonemic 1-min  S: 9 words  K: 11 words  SH: 5 words  H: 8 words  R: 14 words  Divergent naming phonemic 1-min  B: 14 words  F: 7 words  P: 11 words  D: 8 words  CH: 6 words  Divergent naming phonemic 1-min  T: 10 words  S: 9 words   words  N: 6 words  K: 7 words  Divergent naming phonemic 1-min  P: 5 words  M: 10 words  H: 10 words  SH: 4 words  D: 11 words  Divergent naming phonemic 1-min  K: 10 words  N: 6 words  S: 8 words   words  T: 7 words     PCA: 100%, mod verbal cues PCA: 100%, mod verbal cues PCA: 100%, mod verbal cues PCA: 100%, min-mod verbal cues PCA: 100%, min-mod verbal cues PCA: 100%, min-mod verbal cues    Similarity/Difference of 2 pictured nouns: 100% no support, GOAL MET Similarity/Difference of 2 written nouns: 100% min verbal cues  Similarity/Difference of 2 written nouns: 100% min verbal cues       Date: 23  TX#: 9/10 Date: 2023             TX#: 10/10 Date: 2023             TX#: 1/10 Date: 2023                TX#: 2/10 Date: 2023  Tx#: 3/10 Date: 2023  Tx#: 4/10 Date: 2023  Tx#: 5/10   PCA: 100%, occ verbal cues  PCA: 100%, occ verbal cues  PCA: 100%, occ verbal cues  GOAL MET     Divergent naming phonemic 1-min  D: 10 words  P: 14 words  N: 11 words  S: 11 words  CH: 6 words   Divergent naming phonemic 1-min  K: 10 words  T: 10 words  R: 12 words   words  B: 12 words    Divergent naming phonemic 1-min  P: 16 words  S: 9 words  N: 11 words  H: 11 words  D: 8 words     Divergent naming concrete nouns  Trial 1: 16 items  Trial 2: 9 items  Trial 3: 9 items   Divergent naming concrete nouns  Trial 1: 7 items  Trial 2: 9 items  Trial 3: 13 items  Trial 4: 13 items  Trial 5: 10  items       [place] Cog Reading Strategies 1-2 paragraphs 100%, min verbal cues GOAL MET [place] Cog Reading Strategies 3-5 paragraphs 100%, min verbal cues  [place] Cog Reading Strategies 3-5 paragraphs 100% no support [place] Cog Reading Strategies 3-5 paragraphs 100% no support GOAL MET [place] Cog Reading Strategies 7-10 paragraphs 100% occ verbal cues       Oral reading 3-5 paragraphs  x7 errors, 4/7 SC Oral reading 3-5 paragraphs  Trial 1: x8 errors, 3/8 SC  Trial 2: x6 erros, 0 SC  Trial 3: x7 errors, 0 SC Oral reading 3-5 paragraphs  Trial 1: x4 errors, 2/4 SC  Trial 2: x4 erros, 1 SC Oral reading 3-5 paragraphs  Trial 1: x4 errors, 0/4 SC          Similarity/Difference of 2 written nouns: 100% no support GOAL MET     Date: 7/10/2023  TX#: 6/10 Date: 2023         TX#: 7/10 Date: 2023                TX#: 8/10 Date: 2023  TX#: 9/10 Date: 2023  Tx#: 10/10 Date: 2023  Tx#: 1/10 Date: 2023  Tx#: 2/10   CLQT Fx implementation memory strategies: 100%, min verbal cue  GOAL MET         Fx implementation attention strategies: 100%, min verbal cue  GOAL MET          Divergent naming phonemic 1-min  T: 11 words  M: 12 words  F: 12 words  G: 10 words  H: 7 words           Divergent naming concrete nouns  Trial 1: 8 items  Trial 2: 12 items  Trial 3: 9 items  Trial 4: 11 items  Trial 5: 9 items        Synonyms: 100%, min verbal/visual cues           [place] Cog Reading Strategies 7-10 paragraphs 100%, no support  GOAL MET  [place] Cog Reading Strategies 2-3 pages paragraphs 100%, no support      Oral reading 3-5 paragraphs  Trial 1: x11 errors, 5/11 SC   Oral reading 3-5 paragraphs  Trial 1: x3 errors  Trial 2: 2 errors Oral reading 3-5 paragraphs  Trial 1: x0 errors, x6 SC  Trial 2: x1 error, x2 SC       SFA uncommon written nouns: 100% occ verbal cues  GOAL MET       Date: 2023  Tx#: 3/10 Date: 2023  TX#: 4/10 Date:       TX#:  Date:          TX#:  Date: Tx#:  Date: Tx#:   Divergent naming phonemic 1-min  C/K: 7 words  S: 12 words  N: 8 words  H: 12 words  T: 8 words   Min verbal cues Divergent naming phonemic 1-min  B: 11 words  R: 11 words   words  D: 8 words  J: 6 words   No support       Divergent naming concrete nouns  Trial 1: 8 items  Trial 2: 14 items  Trial 3: 10 items  Trial 4: 10 items  Trial 5: 8 items  Min verbal cues         Oral reading 3-5 paragraphs  Trial 1: x0 errors, x2 SC  Trial 2: x1 error, x1 SC        Compensatory strategies implementation unstructured conversation: 100%, no support         Assessment: Pt with overall inconsistent performance during divergent naming exercises; however, this exercise has not consistently been addressed d/t a focus on oral reading and reading comprehension. Pt did benefit from min semantic cues  when encountering word finding difficulties. Pt able to generate at least 10 words during phonemic divergent naming in 3/5 trials which is an improved average compared to last session.  Pt able to implement compensatory strategies during instances of word finding difficulties in unstructured conversation given no support.        Goals:     LTG:    To consistently demonstrate expressive and receptive language skills for participation in conversation with familiar and unfamiliar partners - progressing, continue   To demonstrate appropriate use of compensatory strategies to compensate for cognitive-communicative deficits in order to facilitate optimal performance on cognitive and functional tasks for participation in select premorbid activities - progressing, continue    Language STGs  To implement word retrieval strategies during instances of word finding breakdowns in unstructured conversation for carryover of strategies outside of the treatment room Progressing, continue   To utilize SFA to generate 3-5 features of less common written nouns DURING A BARRIER TASK with provision of a written scaffold as needed to address word finding, processing, and compensatory strategy use 5/16/2023: Goal met for common pictured objects  5/22/2023: Goal met for common written nouns  7/26/2023: Goal met   To complete phonological mapping task with generation of 3+ rhyming words and 3+ words starting with the same letter/sound given a pictured object to address phonological processing 6/28/2023: Goal met   To generate a minimum of 2 shared and 2 differentiating features for 2 related written nouns given provision of a visual scaffold as needed to address word finding, processing, and compensatory strategy use 5/17/2023: Goal met for pictured nouns  7/5/2023: Goal met   To generate at least 15 words in a concrete category in 1-minute during semantic divergent naming exercise to address word finding, thought organization, and processing speed Progressing, continue   To generate at least 15 words in 1-minute during phonemic divergent naming exercise to address word finding, thought organization, and processing speed Progressing, continue To answer wh-questions re: unmodified special interest topic after use of reading comprehension strategies (underline key words, reread, skim words already understood), 2-3 pages, 80% accuracy min verbal cues 6/16/2023: Goal met for 1-2 paragraphs  6/28/2023: Goal met for 3-5 paragraphs  7/28/2023: Goal met for 7-10 paragraphs   To orally read 3-5 paragraphs with implementation of compensatory strategies (i.e., slow, loud, over-articulate, breath support) for increased articulatory precision and reduced instances of verbal paraphasias/perseveration/omission/addition of words with no more than 2 errors given min verbal cues Progressing, continue     Cognitive STGs  Goal Comments   To express a fx implementation for compensatory strategies for increased attention (e.g., reducing distractions, one thing at a time, prove to yourself the task is complete, eye contact, complete tasks fully) for increased participation in complex ADLs, 4/5, min verbal cues 7/14/2023: Goal met   To express a fx implementation for compensatory strategies for increased retention (i.e., write it down, repeat it, association, picture it, calendar) for increased participation in complex ADLs 7/14/2023: Goal met   To generate and implement solutions to identified cognitive-communicative related inaccuracies for carryover of compensatory strategies outside of the treatment room, 80%, min verbal cues Progressing, continue       Plan: Recommend continue POC as established.      HEP: Language worksheets    Charges: [id number]      Total Treatment Time: 45 min

## 2023-08-10 NOTE — TELEPHONE ENCOUNTER
Medication: Brilinta 60MG     Date of last refill: 3/30/23 (#60/3)  [place]      Last office visit: 8/3/23  Due back to clinic per last office note:    Date next office visit scheduled:    Future Appointments   Date Time Provider Evangelina Cahmbers   8/11/2023 12:30 PM ASIYA Salazar [address] [facility] [address]   8/18/2023 12:30 PM [place]ves SLP [address] [facility] [address]   8/25/2023 12:30 PM [place]ves SLP [address] [facility] [address]   9/1/2023 12:30 PM [place], SLP [facility]           Last OV note recommendation:  \"Plan:  - Will obtain P2Y12 and adjust Brilinta accordingly   - Will plan for follow up [facility] in early September\"

## 2023-08-11 ENCOUNTER — OFFICE VISIT (OUTPATIENT)
Dept: SPEECH THERAPY | Facility: HOSPITAL | Age: 78
End: 2023-08-11
Attending: PHYSICAL MEDICINE & REHABILITATION
Payer: MEDICARE

## 2023-08-11 ENCOUNTER — LAB ENCOUNTER (OUTPATIENT)
Dept: LAB | Facility: HOSPITAL | Age: 78
End: 2023-08-11
Payer: MEDICARE

## 2023-08-11 DIAGNOSIS — I72.9 ANEURYSM (HCC): ICD-10-CM

## 2023-08-11 LAB — PA ADP PRP-ACNC: 137

## 2023-08-11 PROCEDURE — 92507 TX SP LANG VOICE COMM INDIV: CPT

## 2023-08-11 PROCEDURE — 85576 BLOOD PLATELET AGGREGATION: CPT

## 2023-08-11 PROCEDURE — 36415 COLL VENOUS BLD VENIPUNCTURE: CPT

## 2023-08-11 NOTE — PROGRESS NOTES
Diagnosis:   Acute ischemic left MCA stroke [facility]) (L19.213)      Referring Provider: Mary Moraes  Date of Evaluation:    23    Precautions:  None Next MD visit:   none scheduled  Date of Surgery: n/a   Insurance Primary/Secondary: Samuel Hardy / Paz Bain     # Auth Visits: NA             Subjective: Pt was feeling off today and made a few mistakes     Pain: 0/10      Objective:     Date: 2023  TX#: 2/10 Date: 2023            TX#: 3/10 Date: 2023             TX#: 4/10 Date: 2023             TX#: 5/10 Date: 2023  Tx#: 6/10 Date: 2023  Tx#: 7/10 Date: 2023  Tx#: 8/10   SFA common pictured nouns: 100%, min verba/cues GOAL MET  BARRIER TASK: 100%, no support GOAL MET  SFA written nouns: 100%, min verbal cues GOAL MET       Divergent naming concrete nouns  Trial 1: 15 items  Trial 2: 13 items  Trial 3: 15 items  Trial 4: 8 items  Trial 5: 8 items  Divergent naming concrete nouns  Trial 1: 8 items  Trial 2: 6 items  Trial 3: 12 items  Trial 4: 12 items  Trial 5: 10 items  Divergent naming concrete nouns  Trial 1: 11 items  Trial 2: 11 items  Trial 3: 10 items  Trial 4: 6 items  Trial 5: 8 items      Divergent naming phonemic 1-min  P: 9 words  T: 11 words  B: 14 words   words  N: 9 words  Divergent naming phonemic 1-min  S: 9 words  K: 11 words  SH: 5 words  H: 8 words  R: 14 words  Divergent naming phonemic 1-min  B: 14 words  F: 7 words  P: 11 words  D: 8 words  CH: 6 words  Divergent naming phonemic 1-min  T: 10 words  S: 9 words   words  N: 6 words  K: 7 words  Divergent naming phonemic 1-min  P: 5 words  M: 10 words  H: 10 words  SH: 4 words  D: 11 words  Divergent naming phonemic 1-min  K: 10 words  N: 6 words  S: 8 words   words  T: 7 words     PCA: 100%, mod verbal cues PCA: 100%, mod verbal cues PCA: 100%, mod verbal cues PCA: 100%, min-mod verbal cues PCA: 100%, min-mod verbal cues PCA: 100%, min-mod verbal cues    Similarity/Difference of 2 pictured nouns: 100% no support, GOAL MET  Similarity/Difference of 2 written nouns: 100% min verbal cues  Similarity/Difference of 2 written nouns: 100% min verbal cues       Date: 23  TX#: 9/10 Date: 2023             TX#: 10/10 Date: 2023             TX#: 1/10 Date: 2023                TX#: 2/10 Date: 2023  Tx#: 3/10 Date: 2023  Tx#: 4/10 Date: 2023  Tx#: 5/10   PCA: 100%, occ verbal cues  PCA: 100%, occ verbal cues  PCA: 100%, occ verbal cues  GOAL MET     Divergent naming phonemic 1-min  D: 10 words  P: 14 words  N: 11 words  S: 11 words  CH: 6 words   Divergent naming phonemic 1-min  K: 10 words  T: 10 words  R: 12 words   words  B: 12 words    Divergent naming phonemic 1-min  P: 16 words  S: 9 words  N: 11 words  H: 11 words  D: 8 words     Divergent naming concrete nouns  Trial 1: 16 items  Trial 2: 9 items  Trial 3: 9 items   Divergent naming concrete nouns  Trial 1: 7 items  Trial 2: 9 items  Trial 3: 13 items  Trial 4: 13 items  Trial 5: 10  items       Ethel Cog Reading Strategies 1-2 paragraphs 100%, min verbal cues GOAL MET Ethel Cog Reading Strategies 3-5 paragraphs 100%, min verbal cues  Ethel Cog Reading Strategies 3-5 paragraphs 100% no support Ethel Cog Reading Strategies 3-5 paragraphs 100% no support GOAL MET Ethel Cog Reading Strategies 7-10 paragraphs 100% occ verbal cues       Oral reading 3-5 paragraphs  x7 errors, 4/7 SC Oral reading 3-5 paragraphs  Trial 1: x8 errors, 3/8 SC  Trial 2: x6 erros, 0 SC  Trial 3: x7 errors, 0 SC Oral reading 3-5 paragraphs  Trial 1: x4 errors, 2/4 SC  Trial 2: x4 erros, 1 SC Oral reading 3-5 paragraphs  Trial 1: x4 errors, 0/4 SC          Similarity/Difference of 2 written nouns: 100% no support GOAL MET     Date: 7/10/2023  TX#: 6/10 Date: 2023         TX#: 7/10 Date: 2023                TX#: 8/10 Date: 2023  TX#: 9/10 Date: 2023  Tx#: 10/10 Date: 2023  Tx#: 1/10 Date: 2023  Tx#: 2/10   CLQT Fx implementation memory strategies: 100%, min verbal cue  GOAL MET         Fx implementation attention strategies: 100%, min verbal cue  GOAL MET          Divergent naming phonemic 1-min  T: 11 words  M: 12 words  F: 12 words  G: 10 words  H: 7 words           Divergent naming concrete nouns  Trial 1: 8 items  Trial 2: 12 items  Trial 3: 9 items  Trial 4: 11 items  Trial 5: 9 items        Synonyms: 100%, min verbal/visual cues           [place]mo Cog Reading Strategies 7-10 paragraphs 100%, no support  GOAL MET  Elmo Cog Reading Strategies 2-3 pages paragraphs 100%, no support      Oral reading 3-5 paragraphs  Trial 1: x11 errors, 5/11 SC   Oral reading 3-5 paragraphs  Trial 1: x3 errors  Trial 2: 2 errors Oral reading 3-5 paragraphs  Trial 1: x0 errors, x6 SC  Trial 2: x1 error, x2 SC       SFA uncommon written nouns: 100% occ verbal cues  GOAL MET       Date: 2023  Tx#: 3/10 Date: 2023  TX#: 4/10 Date: 2023  TX#: 5/10 Date:          TX#:  Date: Tx#:  Date: Tx#:   Divergent naming phonemic 1-min  C/K: 7 words  S: 12 words  N: 8 words  H: 12 words  T: 8 words   Min verbal cues Divergent naming phonemic 1-min  B: 11 words  R: 11 words   words  D: 8 words  J: 6 words   No support Divergent naming phonemic 1-min  S: 13 words  M: 12 words       Divergent naming concrete nouns  Trial 1: 8 items  Trial 2: 14 items  Trial 3: 10 items  Trial 4: 10 items  Trial 5: 8 items  Min verbal cues         Oral reading 3-5 paragraphs  Trial 1: x0 errors, x2 SC  Trial 2: x1 error, x1 SC Oral reading 3-5 paragraphs  Trial 1: x0 errors  Trial 2: x0 errors        Elmo Cog Reading Strategies 2-3 pages paragraphs 100%, no support  GOAL MET       Compensatory strategies implementation unstructured conversation: 100%, no support         Assessment: Progress noted as Pt with no paraphasias during oral reading. Pt able to self-correct without any support. Recommend this goal be addressed x1 more session for consistency.  Progress noted as Pt with consistent reading comprehension for 2-3 pages of written text. Pt continues to benefit from use of metacognitive reading strategies to facilitate comprehension with independent implementation; goal met. In addition, progress noted as Pt able to generate at least 10 items in 2/2 trials during divergent phonemic naming exercise suggesting improvements in processing speed and phonological access.     Goals:     LTG:    To consistently demonstrate expressive and receptive language skills for participation in conversation with familiar and unfamiliar partners - progressing, continue   To demonstrate appropriate use of compensatory strategies to compensate for cognitive-communicative deficits in order to facilitate optimal performance on cognitive and functional tasks for participation in select premorbid activities - progressing, continue    Language STGs  To implement word retrieval strategies during instances of word finding breakdowns in unstructured conversation for carryover of strategies outside of the treatment room Progressing, continue   To utilize SFA to generate 3-5 features of less common written nouns DURING A BARRIER TASK with provision of a written scaffold as needed to address word finding, processing, and compensatory strategy use 5/16/2023: Goal met for common pictured objects  5/22/2023: Goal met for common written nouns  7/26/2023: Goal met   To complete phonological mapping task with generation of 3+ rhyming words and 3+ words starting with the same letter/sound given a pictured object to address phonological processing 6/28/2023: Goal met   To generate a minimum of 2 shared and 2 differentiating features for 2 related written nouns given provision of a visual scaffold as needed to address word finding, processing, and compensatory strategy use 5/17/2023: Goal met for pictured nouns  7/5/2023: Goal met   To generate at least 15 words in a concrete category in 1-minute during semantic divergent naming exercise to address word finding, thought organization, and processing speed Progressing, continue   To generate at least 15 words in 1-minute during phonemic divergent naming exercise to address word finding, thought organization, and processing speed Progressing, continue   To answer wh-questions re: unmodified special interest topic after use of reading comprehension strategies (underline key words, reread, skim words already understood), 2-3 pages, 80% accuracy min verbal cues 6/16/2023: Goal met for 1-2 paragraphs  6/28/2023: Goal met for 3-5 paragraphs  7/28/2023: Goal met for 7-10 paragraphs  8/11/2023: Goal met   To orally read 3-5 paragraphs with implementation of compensatory strategies (i.e., slow, loud, over-articulate, breath support) for increased articulatory precision and reduced instances of verbal paraphasias/perseveration/omission/addition of words with no more than 2 errors given min verbal cues Progressing, continue     Cognitive STGs  Goal Comments   To express a fx implementation for compensatory strategies for increased attention (e.g., reducing distractions, one thing at a time, prove to yourself the task is complete, eye contact, complete tasks fully) for increased participation in complex ADLs, 4/5, min verbal cues 7/14/2023: Goal met   To express a fx implementation for compensatory strategies for increased retention (i.e., write it down, repeat it, association, picture it, calendar) for increased participation in complex ADLs 7/14/2023: Goal met   To generate and implement solutions to identified cognitive-communicative related inaccuracies for carryover of compensatory strategies outside of the treatment room, 80%, min verbal cues Progressing, continue       Plan: Recommend continue POC as established.      HEP: Language worksheets    Charges: [id number]      Total Treatment Time: 45 min

## 2023-08-14 ENCOUNTER — TELEPHONE (OUTPATIENT)
Dept: SURGERY | Facility: CLINIC | Age: 78
End: 2023-08-14

## 2023-08-14 ENCOUNTER — PATIENT OUTREACH (OUTPATIENT)
Dept: CASE MANAGEMENT | Age: 78
End: 2023-08-14

## 2023-08-14 DIAGNOSIS — R23.2 HOT FLASHES: ICD-10-CM

## 2023-08-14 DIAGNOSIS — F41.1 GENERALIZED ANXIETY DISORDER: ICD-10-CM

## 2023-08-14 NOTE — PROGRESS NOTES
Contacting patient to follow up on CCM for the month, patient states she is unable to talk at this time.       Total time - 2 min  Total Monthly time- 2 min

## 2023-08-14 NOTE — TELEPHONE ENCOUNTER
Most recent P2Y12 resulted at 137. Called and discussed with patient. We will continue Brilinta 60mg daily.

## 2023-08-15 RX ORDER — VENLAFAXINE HYDROCHLORIDE 150 MG/1
150 CAPSULE, EXTENDED RELEASE ORAL DAILY
Qty: 90 CAPSULE | Refills: 3 | Status: SHIPPED | OUTPATIENT
Start: 2023-08-15

## 2023-08-15 NOTE — TELEPHONE ENCOUNTER
Refill passed per [place] [employer], [facility] protocol. Requested Prescriptions   Pending Prescriptions Disp Refills    VENLAFAXINE  MG Oral Capsule SR 24 Hr [Pharmacy Med Name: Venlafaxine Hcl Er 24hr 150 Mg Cap Auro] 90 capsule 0     Sig: Take 1 capsule (150 mg total) by mouth daily.        Psychiatric Non-Scheduled (Anti-Anxiety) Passed - 8/14/2023 12:53 PM        Passed - In person appointment or virtual visit in the past 6 mos or appointment in next 3 mos     Recent Outpatient Visits              4 days ago     [address] [facility] Thapa, SLP    Office Visit    6 days ago     [address] [facility] Pathology Ana Diaz, SLP    Office Visit    1 week ago     [address] [facility] Pathology Ana Diaz, SLP    Office Visit    1 week ago [facility])    Tatianna Harrington MD    Office Visit    2 weeks ago     [address] [facility] Ana Diaz, SLP    Office Visit          Future Appointments         Provider Department Appt Notes    In 3 days Clayton Norton [address] Speech [facility]ology Medicare/[employer]    In 1 week Clayton Norton [address] Speech Pathology Medicare/[employer]    In 2 weeks Belen Johansen MD [address], [address] [facility] annual physical    In 2 weeks Clayton Norton [address] Speech Pathology Medicare/[employer]                  Future Appointments         Provider Department Appt Notes    In 3 days Clayton Norton [address] Speech Pathology Medicare/[employer]ARP    In 1 week Clayton Norton [address] Speech Pathology Medicare/[employer]ARP    In 2 weeks Belen Johansen MD [address], [address] [facility] annual physical    In 2 weeks Clayton Norton [address] Speech Pathology Medicare/AARP          Recent Outpatient Visits              4 days ago     [facility] Speech Pathology Codie Hahn, SLP    Office Visit    6 days ago     [address] [facility] Pathology Codie Hahn, SLP    Office Visit    1 week ago     [address] [facility] Pathology Codie Hahn, SLP    Office Visit    1 week ago [facility])    Mariel Palma MD    Office Visit    2 weeks ago     [address] [facility] Pathology Codie Hahn, SLP    Office Visit

## 2023-08-18 ENCOUNTER — OFFICE VISIT (OUTPATIENT)
Dept: SPEECH THERAPY | Facility: HOSPITAL | Age: 78
End: 2023-08-18
Attending: PHYSICAL MEDICINE & REHABILITATION
Payer: MEDICARE

## 2023-08-18 PROCEDURE — 92507 TX SP LANG VOICE COMM INDIV: CPT

## 2023-08-18 NOTE — PROGRESS NOTES
Diagnosis:   Acute ischemic left MCA stroke [facility]) (V51.178)      Referring Provider: Katherin Smith  Date of Evaluation:    23    Precautions:  None Next MD visit:   none scheduled  Date of Surgery: n/a   Insurance Primary/Secondary: MEDICARE / Nancy Jaramillo     # Auth Visits: NA             Subjective: Pt went out with her friends last night for the first time since her stroke and feels like she did really well.      Pain: 0/10      Objective:     Date: 2023  TX#: 2/10 Date: 2023            TX#: 3/10 Date: 2023             TX#: 4/10 Date: 2023             TX#: 5/10 Date: 2023  Tx#: 6/10 Date: 2023  Tx#: 7/10 Date: 2023  Tx#: 8/10   SFA common pictured nouns: 100%, min verba/cues GOAL MET  BARRIER TASK: 100%, no support GOAL MET  SFA written nouns: 100%, min verbal cues GOAL MET       Divergent naming concrete nouns  Trial 1: 15 items  Trial 2: 13 items  Trial 3: 15 items  Trial 4: 8 items  Trial 5: 8 items  Divergent naming concrete nouns  Trial 1: 8 items  Trial 2: 6 items  Trial 3: 12 items  Trial 4: 12 items  Trial 5: 10 items  Divergent naming concrete nouns  Trial 1: 11 items  Trial 2: 11 items  Trial 3: 10 items  Trial 4: 6 items  Trial 5: 8 items      Divergent naming phonemic 1-min  P: 9 words  T: 11 words  B: 14 words   words  N: 9 words  Divergent naming phonemic 1-min  S: 9 words  K: 11 words  SH: 5 words  H: 8 words  R: 14 words  Divergent naming phonemic 1-min  B: 14 words  F: 7 words  P: 11 words  D: 8 words  CH: 6 words  Divergent naming phonemic 1-min  T: 10 words  S: 9 words   words  N: 6 words  K: 7 words  Divergent naming phonemic 1-min  P: 5 words  M: 10 words  H: 10 words  SH: 4 words  D: 11 words  Divergent naming phonemic 1-min  K: 10 words  N: 6 words  S: 8 words   words  T: 7 words     PCA: 100%, mod verbal cues PCA: 100%, mod verbal cues PCA: 100%, mod verbal cues PCA: 100%, min-mod verbal cues PCA: 100%, min-mod verbal cues PCA: 100%, min-mod verbal cues Similarity/Difference of 2 pictured nouns: 100% no support, GOAL MET  Similarity/Difference of 2 written nouns: 100% min verbal cues  Similarity/Difference of 2 written nouns: 100% min verbal cues       Date: 23  TX#: 9/10 Date: 2023             TX#: 10/10 Date: 2023             TX#: 1/10 Date: 2023                TX#: 2/10 Date: 2023  Tx#: 3/10 Date: 2023  Tx#: 4/10 Date: 2023  Tx#: 5/10   PCA: 100%, occ verbal cues  PCA: 100%, occ verbal cues  PCA: 100%, occ verbal cues  GOAL MET     Divergent naming phonemic 1-min  D: 10 words  P: 14 words  N: 11 words  S: 11 words  CH: 6 words   Divergent naming phonemic 1-min  K: 10 words  T: 10 words  R: 12 words   words  B: 12 words    Divergent naming phonemic 1-min  P: 16 words  S: 9 words  N: 11 words  H: 11 words  D: 8 words     Divergent naming concrete nouns  Trial 1: 16 items  Trial 2: 9 items  Trial 3: 9 items   Divergent naming concrete nouns  Trial 1: 7 items  Trial 2: 9 items  Trial 3: 13 items  Trial 4: 13 items  Trial 5: 10  items       [place] Cog Reading Strategies 1-2 paragraphs 100%, min verbal cues GOAL MET [place] Cog Reading Strategies 3-5 paragraphs 100%, min verbal cues  [place] Cog Reading Strategies 3-5 paragraphs 100% no support [place] Cog Reading Strategies 3-5 paragraphs 100% no support GOAL MET [place] Cog Reading Strategies 7-10 paragraphs 100% occ verbal cues       Oral reading 3-5 paragraphs  x7 errors, 4/7 SC Oral reading 3-5 paragraphs  Trial 1: x8 errors, 3/8 SC  Trial 2: x6 erros, 0 SC  Trial 3: x7 errors, 0 SC Oral reading 3-5 paragraphs  Trial 1: x4 errors, 2/4 SC  Trial 2: x4 erros, 1 SC Oral reading 3-5 paragraphs  Trial 1: x4 errors, 0/4 SC          Similarity/Difference of 2 written nouns: 100% no support GOAL MET     Date: 7/10/2023  TX#: 6/10 Date: 2023         TX#: 7/10 Date: 2023                TX#: 8/10 Date: 2023  TX#: 9/10 Date: 2023  Tx#: 10/10 Date: 2023  Tx#: 1/10 Date: 2023  Tx#: 2/10   CLQT Fx implementation memory strategies: 100%, min verbal cue  GOAL MET         Fx implementation attention strategies: 100%, min verbal cue  GOAL MET          Divergent naming phonemic 1-min  T: 11 words  M: 12 words  F: 12 words  G: 10 words  H: 7 words           Divergent naming concrete nouns  Trial 1: 8 items  Trial 2: 12 items  Trial 3: 9 items  Trial 4: 11 items  Trial 5: 9 items        Synonyms: 100%, min verbal/visual cues           Ferdinand Cog Reading Strategies 7-10 paragraphs 100%, no support  GOAL MET  Ferdinand Cog Reading Strategies 2-3 pages paragraphs 100%, no support      Oral reading 3-5 paragraphs  Trial 1: x11 errors, 5/11 SC   Oral reading 3-5 paragraphs  Trial 1: x3 errors  Trial 2: 2 errors Oral reading 3-5 paragraphs  Trial 1: x0 errors, x6 SC  Trial 2: x1 error, x2 SC       SFA uncommon written nouns: 100% occ verbal cues  GOAL MET       Date: 2023  Tx#: 3/10 Date: 2023  TX#: 4/10 Date: 2023  TX#: 5/10 Date: 2023  TX#: 2023 Date: Tx#:  Date:    Tx#:   Divergent naming phonemic 1-min  C/K: 7 words  S: 12 words  N: 8 words  H: 12 words  T: 8 words   Min verbal cues Divergent naming phonemic 1-min  B: 11 words  R: 11 words   words  D: 8 words  J: 6 words   No support Divergent naming phonemic 1-min  S: 13 words  M: 12 words  Divergent naming phonemic 1-min  N: 7 words  T: 13 words  D: 8 words  L: 10 words  P: 14 words  No suppor     Divergent naming concrete nouns  Trial 1: 8 items  Trial 2: 14 items  Trial 3: 10 items  Trial 4: 10 items  Trial 5: 8 items  Min verbal cues   Divergent naming concrete nouns  Trial 1: 11 items  Trial 2: 7 items  Trial 3: 10 items  Trial 4: 13 items  Trial 5: 10 items  Min verbal cues      Oral reading 3-5 paragraphs  Trial 1: x0 errors, x2 SC  Trial 2: x1 error, x1 SC Oral reading 3-5 paragraphs  Trial 1: x0 errors  Trial 2: x0 errors        Ferdinand Cog Reading Strategies 2-3 pages paragraphs 100%, no support  GOAL MET Compensatory strategies implementation unstructured conversation: 100%, no support         Assessment: Divergent naming performed to address processing speed and word finding. Pt benefited from min verbal cues during semantic divergent naming for subcategorization. Pt with similar average compared to previous sessions d/t processing speed.        Goals:     LTG:    To consistently demonstrate expressive and receptive language skills for participation in conversation with familiar and unfamiliar partners - progressing, continue   To demonstrate appropriate use of compensatory strategies to compensate for cognitive-communicative deficits in order to facilitate optimal performance on cognitive and functional tasks for participation in select premorbid activities - progressing, continue    Language STGs  To implement word retrieval strategies during instances of word finding breakdowns in unstructured conversation for carryover of strategies outside of the treatment room Progressing, continue   To utilize SFA to generate 3-5 features of less common written nouns DURING A BARRIER TASK with provision of a written scaffold as needed to address word finding, processing, and compensatory strategy use 5/16/2023: Goal met for common pictured objects  5/22/2023: Goal met for common written nouns  7/26/2023: Goal met   To complete phonological mapping task with generation of 3+ rhyming words and 3+ words starting with the same letter/sound given a pictured object to address phonological processing 6/28/2023: Goal met   To generate a minimum of 2 shared and 2 differentiating features for 2 related written nouns given provision of a visual scaffold as needed to address word finding, processing, and compensatory strategy use 5/17/2023: Goal met for pictured nouns  7/5/2023: Goal met   To generate at least 15 words in a concrete category in 1-minute during semantic divergent naming exercise to address word finding, thought organization, and processing speed Progressing, continue   To generate at least 15 words in 1-minute during phonemic divergent naming exercise to address word finding, thought organization, and processing speed Progressing, continue   To answer wh-questions re: unmodified special interest topic after use of reading comprehension strategies (underline key words, reread, skim words already understood), 2-3 pages, 80% accuracy min verbal cues 6/16/2023: Goal met for 1-2 paragraphs  6/28/2023: Goal met for 3-5 paragraphs  7/28/2023: Goal met for 7-10 paragraphs  8/11/2023: Goal met   To orally read 3-5 paragraphs with implementation of compensatory strategies (i.e., slow, loud, over-articulate, breath support) for increased articulatory precision and reduced instances of verbal paraphasias/perseveration/omission/addition of words with no more than 2 errors given min verbal cues Progressing, continue     Cognitive STGs  Goal Comments   To express a fx implementation for compensatory strategies for increased attention (e.g., reducing distractions, one thing at a time, prove to yourself the task is complete, eye contact, complete tasks fully) for increased participation in complex ADLs, 4/5, min verbal cues 7/14/2023: Goal met   To express a fx implementation for compensatory strategies for increased retention (i.e., write it down, repeat it, association, picture it, calendar) for increased participation in complex ADLs 7/14/2023: Goal met   To generate and implement solutions to identified cognitive-communicative related inaccuracies for carryover of compensatory strategies outside of the treatment room, 80%, min verbal cues Progressing, continue       Plan: Recommend continue POC as established.      HEP: Language worksheets    Charges: [id number]      Total Treatment Time: 45 min

## 2023-08-22 DIAGNOSIS — R06.2 WHEEZING: ICD-10-CM

## 2023-08-23 RX ORDER — ALBUTEROL SULFATE 90 UG/1
2 AEROSOL, METERED RESPIRATORY (INHALATION) EVERY 4 HOURS PRN
Qty: 18 G | Refills: 0 | Status: SHIPPED | OUTPATIENT
Start: 2023-08-23

## 2023-08-24 NOTE — TELEPHONE ENCOUNTER
Refill passed per [place] [facility], [facility] protocol. Please review, last prescribed 1 year ago with 0 refills. Thank you. Navneet Charles   Requested Prescriptions   Pending Prescriptions Disp Refills    VENTOLIN  (90 Base) MCG/ACT Inhalation Aero Soln [Pharmacy Med Name: Ventolin Hfa 108 Mcg/Act Aer Glax] 18 g 0     Sig: INHALE 2 PUFFS INTO THE LUNGS EVERY 4 HOURS AS NEEDED FOR WHEEZING       Asthma & COPD Medication Protocol Passed - 8/22/2023  4:10 PM        Passed - In person appointment or virtual visit in the past 6 mos or appointment in next 3 mos     Recent Outpatient Visits              5 days ago     1990 [facility] Pathology Arvell Loud, SLP    Office Visit    1 week ago     1990 [facility]ology Arvell Loud, SLP    Office Visit    2 weeks ago     1990 [facility] Arvell Loud, SLP    Office Visit    2 weeks ago     1990 [facility]ology Arvell Loud, SLP    Office Visit    2 weeks ago [facility])    Kirby Primrose, Perley Labrum, MD    Office Visit          Future Appointments         Provider Department Appt Notes    In 2 days Clayton Milner [facility]ology Medicare/AARP    In 1 week Velia Redmond MD [address], [address] [place] [address] annual physical    In 1 week Clayton Milner [facility] Speech Pathology Medicare/AARP                    Recent Outpatient Visits              5 days ago     [address] Loud, SLP    Office Visit    1 week ago     [facility] Pathology Arvell Loud, SLP    Office Visit    2 weeks ago     1990 [facility]ology Arvell Loud, SLP    Office Visit    2 weeks ago     1990 [facility]ology Arvell Loud, SLP    Office Visit    2 weeks ago [facility]    [address], [facility] Arianna Mims MD    Office Visit            Future Appointments         Provider Department Appt Notes    In 2 days Clayton Milner [facility] Speech Pathology Medicare/AARP    In 1 week Velia Redmond MD [facility], [address] [place] [address] annual physical    In 1 week Clayton Milner [address] Speech Pathology Medicare/AARP

## 2023-08-25 ENCOUNTER — OFFICE VISIT (OUTPATIENT)
Dept: SPEECH THERAPY | Facility: HOSPITAL | Age: 78
End: 2023-08-25
Attending: PHYSICAL MEDICINE & REHABILITATION
Payer: MEDICARE

## 2023-08-25 PROCEDURE — 92507 TX SP LANG VOICE COMM INDIV: CPT

## 2023-08-25 NOTE — PROGRESS NOTES
Diagnosis:   Acute ischemic left MCA stroke [facility]) (S57.812)      Referring Provider: Tate Ronquillo  Date of Evaluation:    23    Precautions:  None Next MD visit:   none scheduled  Date of Surgery: n/a   Insurance Primary/Secondary: MEDICARE / [employer]     # Auth Visits: NA             Subjective: Pt read an article in a magazine and was able to understanding it.      Pain: 0/10      Objective:     Date: 2023  TX#: 2/10 Date: 2023            TX#: 3/10 Date: 2023             TX#: 4/10 Date: 2023             TX#: 5/10 Date: 2023  Tx#: 6/10 Date: 2023  Tx#: 7/10 Date: 2023  Tx#: 8/10   SFA common pictured nouns: 100%, min verba/cues GOAL MET  BARRIER TASK: 100%, no support GOAL MET  SFA written nouns: 100%, min verbal cues GOAL MET       Divergent naming concrete nouns  Trial 1: 15 items  Trial 2: 13 items  Trial 3: 15 items  Trial 4: 8 items  Trial 5: 8 items  Divergent naming concrete nouns  Trial 1: 8 items  Trial 2: 6 items  Trial 3: 12 items  Trial 4: 12 items  Trial 5: 10 items  Divergent naming concrete nouns  Trial 1: 11 items  Trial 2: 11 items  Trial 3: 10 items  Trial 4: 6 items  Trial 5: 8 items      Divergent naming phonemic 1-min  P: 9 words  T: 11 words  B: 14 words   words  N: 9 words  Divergent naming phonemic 1-min  S: 9 words  K: 11 words  SH: 5 words  H: 8 words  R: 14 words  Divergent naming phonemic 1-min  B: 14 words  F: 7 words  P: 11 words  D: 8 words  CH: 6 words  Divergent naming phonemic 1-min  T: 10 words  S: 9 words   words  N: 6 words  K: 7 words  Divergent naming phonemic 1-min  P: 5 words  M: 10 words  H: 10 words  SH: 4 words  D: 11 words  Divergent naming phonemic 1-min  K: 10 words  N: 6 words  S: 8 words   words  T: 7 words     PCA: 100%, mod verbal cues PCA: 100%, mod verbal cues PCA: 100%, mod verbal cues PCA: 100%, min-mod verbal cues PCA: 100%, min-mod verbal cues PCA: 100%, min-mod verbal cues    Similarity/Difference of 2 pictured nouns: 100% no support, GOAL MET  Similarity/Difference of 2 written nouns: 100% min verbal cues  Similarity/Difference of 2 written nouns: 100% min verbal cues       Date: 23  TX#: 9/10 Date: 2023             TX#: 10/10 Date: 2023             TX#: 1/10 Date: 2023                TX#: 2/10 Date: 2023  Tx#: 3/10 Date: 2023  Tx#: 4/10 Date: 2023  Tx#: 5/10   PCA: 100%, occ verbal cues  PCA: 100%, occ verbal cues  PCA: 100%, occ verbal cues  GOAL MET     Divergent naming phonemic 1-min  D: 10 words  P: 14 words  N: 11 words  S: 11 words  CH: 6 words   Divergent naming phonemic 1-min  K: 10 words  T: 10 words  R: 12 words   words  B: 12 words    Divergent naming phonemic 1-min  P: 16 words  S: 9 words  N: 11 words  H: 11 words  D: 8 words     Divergent naming concrete nouns  Trial 1: 16 items  Trial 2: 9 items  Trial 3: 9 items   Divergent naming concrete nouns  Trial 1: 7 items  Trial 2: 9 items  Trial 3: 13 items  Trial 4: 13 items  Trial 5: 10  items       [place] Cog Reading Strategies 1-2 paragraphs 100%, min verbal cues GOAL MET [place] Cog Reading Strategies 3-5 paragraphs 100%, min verbal cues  [place] Cog Reading Strategies 3-5 paragraphs 100% no support [place] Cog Reading Strategies 3-5 paragraphs 100% no support GOAL MET [place] Cog Reading Strategies 7-10 paragraphs 100% occ verbal cues       Oral reading 3-5 paragraphs  x7 errors, 4/7 SC Oral reading 3-5 paragraphs  Trial 1: x8 errors, 3/8 SC  Trial 2: x6 erros, 0 SC  Trial 3: x7 errors, 0 SC Oral reading 3-5 paragraphs  Trial 1: x4 errors, 2/4 SC  Trial 2: x4 erros, 1 SC Oral reading 3-5 paragraphs  Trial 1: x4 errors, 0/4 SC          Similarity/Difference of 2 written nouns: 100% no support GOAL MET     Date: 7/10/2023  TX#: 6/10 Date: 2023         TX#: 7/10 Date: 2023                TX#: 8/10 Date: 2023  TX#: 9/10 Date: 2023  Tx#: 10/10 Date: 2023  Tx#: 1/10 Date: 2023  Tx#: 2/10   CLQT Fx implementation memory strategies: 100%, min verbal cue  GOAL MET         Fx implementation attention strategies: 100%, min verbal cue  GOAL MET          Divergent naming phonemic 1-min  T: 11 words  M: 12 words  F: 12 words  G: 10 words  H: 7 words           Divergent naming concrete nouns  Trial 1: 8 items  Trial 2: 12 items  Trial 3: 9 items  Trial 4: 11 items  Trial 5: 9 items        Synonyms: 100%, min verbal/visual cues           [place] Cog Reading Strategies 7-10 paragraphs 100%, no support  GOAL MET  [place] Cog Reading Strategies 2-3 pages paragraphs 100%, no support      Oral reading 3-5 paragraphs  Trial 1: x11 errors, 5/11 SC   Oral reading 3-5 paragraphs  Trial 1: x3 errors  Trial 2: 2 errors Oral reading 3-5 paragraphs  Trial 1: x0 errors, x6 SC  Trial 2: x1 error, x2 SC       SFA uncommon written nouns: 100% occ verbal cues  GOAL MET       Date: 2023  Tx#: 3/10 Date: 2023  TX#: 4/10 Date: 2023  TX#: 5/10 Date: 2023  TX#: 6/10 Date: 2023  Tx#: 7/10 Date:    Tx#:   Divergent naming phonemic 1-min  C/K: 7 words  S: 12 words  N: 8 words  H: 12 words  T: 8 words   Min verbal cues Divergent naming phonemic 1-min  B: 11 words  R: 11 words   words  D: 8 words  J: 6 words   No support Divergent naming phonemic 1-min  S: 13 words  M: 12 words  Divergent naming phonemic 1-min  N: 7 words  T: 13 words  D: 8 words  L: 10 words  P: 14 words  No support Divergent naming phonemic 1-min  B: 16 words  S: 18 words  H: 10 words  R: 13 words  D: 11 words  No support    Divergent naming concrete nouns  Trial 1: 8 items  Trial 2: 14 items  Trial 3: 10 items  Trial 4: 10 items  Trial 5: 8 items  Min verbal cues   Divergent naming concrete nouns  Trial 1: 11 items  Trial 2: 7 items  Trial 3: 10 items  Trial 4: 13 items  Trial 5: 10 items  Min verbal cues      Oral reading 3-5 paragraphs  Trial 1: x0 errors, x2 SC  Trial 2: x1 error, x1 SC Oral reading 3-5 paragraphs  Trial 1: x0 errors  Trial 2: x0 errors        [place] Cog Reading Strategies 2-3 pages paragraphs 100%, no support  GOAL MET       Compensatory strategies implementation unstructured conversation: 100%, no support   Compensatory strategies implementation unstructured conversation: 100%, no support  GOAL MET      Assessment: Progress noted as Pt with consistent ability to implement compensatory strategies during unstructured conversation given no support. Pt appears to benefit from use of pauses to facilitate word finding. Goal met. Significant progress noted as this is the first session Pt generated at least 10 words across all trials during phonemic divergent naming; this suggests improvements in phonemic access/organization as well as processing speed.      Goals:     LTG:    To consistently demonstrate expressive and receptive language skills for participation in conversation with familiar and unfamiliar partners - progressing, continue   To demonstrate appropriate use of compensatory strategies to compensate for cognitive-communicative deficits in order to facilitate optimal performance on cognitive and functional tasks for participation in select premorbid activities - progressing, continue    Language STGs  To implement word retrieval strategies during instances of word finding breakdowns in unstructured conversation for carryover of strategies outside of the treatment room 8/25/2023: Goal met   To utilize SFA to generate 3-5 features of less common written nouns DURING A BARRIER TASK with provision of a written scaffold as needed to address word finding, processing, and compensatory strategy use 5/16/2023: Goal met for common pictured objects  5/22/2023: Goal met for common written nouns  7/26/2023: Goal met   To complete phonological mapping task with generation of 3+ rhyming words and 3+ words starting with the same letter/sound given a pictured object to address phonological processing 6/28/2023: Goal met   To generate a minimum of 2 shared and 2 differentiating features for 2 related written nouns given provision of a visual scaffold as needed to address word finding, processing, and compensatory strategy use 5/17/2023: Goal met for pictured nouns  7/5/2023: Goal met   To generate at least 15 words in a concrete category in 1-minute during semantic divergent naming exercise to address word finding, thought organization, and processing speed Progressing, continue   To generate at least 15 words in 1-minute during phonemic divergent naming exercise to address word finding, thought organization, and processing speed Progressing, continue   To answer wh-questions re: unmodified special interest topic after use of reading comprehension strategies (underline key words, reread, skim words already understood), 2-3 pages, 80% accuracy min verbal cues 6/16/2023: Goal met for 1-2 paragraphs  6/28/2023: Goal met for 3-5 paragraphs  7/28/2023: Goal met for 7-10 paragraphs  8/11/2023: Goal met   To orally read 3-5 paragraphs with implementation of compensatory strategies (i.e., slow, loud, over-articulate, breath support) for increased articulatory precision and reduced instances of verbal paraphasias/perseveration/omission/addition of words with no more than 2 errors given min verbal cues Progressing, continue     Cognitive STGs  Goal Comments   To express a fx implementation for compensatory strategies for increased attention (e.g., reducing distractions, one thing at a time, prove to yourself the task is complete, eye contact, complete tasks fully) for increased participation in complex ADLs, 4/5, min verbal cues 7/14/2023: Goal met   To express a fx implementation for compensatory strategies for increased retention (i.e., write it down, repeat it, association, picture it, calendar) for increased participation in complex ADLs 7/14/2023: Goal met   To generate and implement solutions to identified cognitive-communicative related inaccuracies for carryover of compensatory strategies outside of the treatment room, 80%, min verbal cues Progressing, continue       Plan: Recommend x1 more session with discharge/transition to a HEP    HEP: Language worksheets    Charges: [id number]      Total Treatment Time: 45 min

## 2023-08-31 ENCOUNTER — OFFICE VISIT (OUTPATIENT)
Dept: FAMILY MEDICINE CLINIC | Facility: CLINIC | Age: 78
End: 2023-08-31

## 2023-08-31 VITALS
HEART RATE: 69 BPM | SYSTOLIC BLOOD PRESSURE: 115 MMHG | HEIGHT: 61 IN | WEIGHT: 175 LBS | OXYGEN SATURATION: 99 % | DIASTOLIC BLOOD PRESSURE: 78 MMHG | BODY MASS INDEX: 33.04 KG/M2

## 2023-08-31 DIAGNOSIS — E78.5 HYPERLIPIDEMIA, UNSPECIFIED HYPERLIPIDEMIA TYPE: ICD-10-CM

## 2023-08-31 DIAGNOSIS — I10 ESSENTIAL HYPERTENSION: ICD-10-CM

## 2023-08-31 DIAGNOSIS — F41.1 GENERALIZED ANXIETY DISORDER: ICD-10-CM

## 2023-08-31 DIAGNOSIS — R53.1 RIGHT SIDED WEAKNESS: ICD-10-CM

## 2023-08-31 DIAGNOSIS — I63.412 CEREBRAL INFARCTION DUE TO EMBOLISM OF LEFT MIDDLE CEREBRAL ARTERY (HCC): ICD-10-CM

## 2023-08-31 DIAGNOSIS — B37.2 SKIN CANDIDIASIS: ICD-10-CM

## 2023-08-31 DIAGNOSIS — Z12.31 ENCOUNTER FOR SCREENING MAMMOGRAM FOR BREAST CANCER: ICD-10-CM

## 2023-08-31 DIAGNOSIS — K57.30 DIVERTICULOSIS OF COLON: ICD-10-CM

## 2023-08-31 DIAGNOSIS — E04.2 MULTINODULAR GOITER: ICD-10-CM

## 2023-08-31 DIAGNOSIS — Z00.00 ENCOUNTER FOR ANNUAL HEALTH EXAMINATION: Primary | ICD-10-CM

## 2023-08-31 DIAGNOSIS — Z86.79 HISTORY OF CEREBRAL ANEURYSM: ICD-10-CM

## 2023-08-31 DIAGNOSIS — J45.20 MILD INTERMITTENT REACTIVE AIRWAY DISEASE WITHOUT COMPLICATION: ICD-10-CM

## 2023-08-31 DIAGNOSIS — Z86.69 HISTORY OF MIGRAINE HEADACHES: ICD-10-CM

## 2023-08-31 DIAGNOSIS — M15.9 PRIMARY OSTEOARTHRITIS INVOLVING MULTIPLE JOINTS: ICD-10-CM

## 2023-08-31 DIAGNOSIS — Z86.000 HISTORY OF DUCTAL CARCINOMA IN SITU (DCIS) OF BREAST: ICD-10-CM

## 2023-08-31 PROBLEM — J45.909 REACTIVE AIRWAY DISEASE: Status: ACTIVE | Noted: 2023-08-31

## 2023-08-31 PROBLEM — J45.909 REACTIVE AIRWAY DISEASE (HCC): Status: ACTIVE | Noted: 2023-08-31

## 2023-08-31 PROCEDURE — 1125F AMNT PAIN NOTED PAIN PRSNT: CPT | Performed by: FAMILY MEDICINE

## 2023-08-31 PROCEDURE — G0439 PPPS, SUBSEQ VISIT: HCPCS | Performed by: FAMILY MEDICINE

## 2023-08-31 PROCEDURE — 99214 OFFICE O/P EST MOD 30 MIN: CPT | Performed by: FAMILY MEDICINE

## 2023-08-31 RX ORDER — NYSTATIN 100000 [USP'U]/G
1 POWDER TOPICAL 2 TIMES DAILY
Qty: 60 G | Refills: 0 | Status: SHIPPED | OUTPATIENT
Start: 2023-08-31

## 2023-08-31 RX ORDER — LOSARTAN POTASSIUM 100 MG/1
100 TABLET ORAL DAILY
Qty: 90 TABLET | Refills: 3 | Status: SHIPPED | OUTPATIENT
Start: 2023-08-31

## 2023-08-31 RX ORDER — ROSUVASTATIN CALCIUM 10 MG/1
10 TABLET, COATED ORAL NIGHTLY
Qty: 90 TABLET | Refills: 3 | Status: SHIPPED | OUTPATIENT
Start: 2023-08-31

## 2023-08-31 RX ORDER — BUSPIRONE HYDROCHLORIDE 30 MG/1
15 TABLET ORAL 2 TIMES DAILY
Qty: 90 TABLET | Refills: 3 | Status: SHIPPED | OUTPATIENT
Start: 2023-08-31

## 2023-09-01 ENCOUNTER — OFFICE VISIT (OUTPATIENT)
Dept: SPEECH THERAPY | Facility: HOSPITAL | Age: 78
End: 2023-09-01
Attending: PHYSICAL MEDICINE & REHABILITATION
Payer: MEDICARE

## 2023-09-01 ENCOUNTER — PATIENT OUTREACH (OUTPATIENT)
Dept: CASE MANAGEMENT | Age: 78
End: 2023-09-01

## 2023-09-01 PROCEDURE — 92507 TX SP LANG VOICE COMM INDIV: CPT

## 2023-09-06 ENCOUNTER — LAB ENCOUNTER (OUTPATIENT)
Dept: LAB | Age: 78
End: 2023-09-06
Attending: FAMILY MEDICINE
Payer: MEDICARE

## 2023-09-06 DIAGNOSIS — I10 ESSENTIAL HYPERTENSION: ICD-10-CM

## 2023-09-06 DIAGNOSIS — I63.412 CEREBRAL INFARCTION DUE TO EMBOLISM OF LEFT MIDDLE CEREBRAL ARTERY (HCC): ICD-10-CM

## 2023-09-06 DIAGNOSIS — E78.5 HYPERLIPIDEMIA, UNSPECIFIED HYPERLIPIDEMIA TYPE: ICD-10-CM

## 2023-09-06 DIAGNOSIS — E04.2 MULTINODULAR GOITER: ICD-10-CM

## 2023-09-06 LAB
ALBUMIN SERPL-MCNC: 3.8 G/DL (ref 3.4–5)
ALBUMIN/GLOB SERPL: 1.1 {RATIO} (ref 1–2)
ALP LIVER SERPL-CCNC: 124 U/L
ALT SERPL-CCNC: 23 U/L
ANION GAP SERPL CALC-SCNC: 6 MMOL/L (ref 0–18)
AST SERPL-CCNC: 23 U/L (ref 15–37)
BASOPHILS # BLD AUTO: 0.05 X10(3) UL (ref 0–0.2)
BASOPHILS NFR BLD AUTO: 1 %
BILIRUB SERPL-MCNC: 0.3 MG/DL (ref 0.1–2)
BUN BLD-MCNC: 16 MG/DL (ref 7–18)
BUN/CREAT SERPL: 20.5 (ref 10–20)
CALCIUM BLD-MCNC: 9.3 MG/DL (ref 8.5–10.1)
CHLORIDE SERPL-SCNC: 105 MMOL/L (ref 98–112)
CHOLEST SERPL-MCNC: 170 MG/DL (ref ?–200)
CO2 SERPL-SCNC: 29 MMOL/L (ref 21–32)
CREAT BLD-MCNC: 0.78 MG/DL
DEPRECATED RDW RBC AUTO: 50.4 FL (ref 35.1–46.3)
EGFRCR SERPLBLD CKD-EPI 2021: 79 ML/MIN/1.73M2 (ref 60–?)
EOSINOPHIL # BLD AUTO: 0.14 X10(3) UL (ref 0–0.7)
EOSINOPHIL NFR BLD AUTO: 2.9 %
ERYTHROCYTE [DISTWIDTH] IN BLOOD BY AUTOMATED COUNT: 13.9 % (ref 11–15)
FASTING PATIENT LIPID ANSWER: YES
FASTING STATUS PATIENT QL REPORTED: YES
GLOBULIN PLAS-MCNC: 3.5 G/DL (ref 2.8–4.4)
GLUCOSE BLD-MCNC: 95 MG/DL (ref 70–99)
HCT VFR BLD AUTO: 40 %
HDLC SERPL-MCNC: 65 MG/DL (ref 40–59)
HGB BLD-MCNC: 12.9 G/DL
IMM GRANULOCYTES # BLD AUTO: 0.01 X10(3) UL (ref 0–1)
IMM GRANULOCYTES NFR BLD: 0.2 %
LDLC SERPL CALC-MCNC: 73 MG/DL (ref ?–100)
LYMPHOCYTES # BLD AUTO: 1.32 X10(3) UL (ref 1–4)
LYMPHOCYTES NFR BLD AUTO: 27.3 %
MCH RBC QN AUTO: 31.5 PG (ref 26–34)
MCHC RBC AUTO-ENTMCNC: 32.3 G/DL (ref 31–37)
MCV RBC AUTO: 97.8 FL
MONOCYTES # BLD AUTO: 0.4 X10(3) UL (ref 0.1–1)
MONOCYTES NFR BLD AUTO: 8.3 %
NEUTROPHILS # BLD AUTO: 2.92 X10 (3) UL (ref 1.5–7.7)
NEUTROPHILS # BLD AUTO: 2.92 X10(3) UL (ref 1.5–7.7)
NEUTROPHILS NFR BLD AUTO: 60.3 %
NONHDLC SERPL-MCNC: 105 MG/DL (ref ?–130)
OSMOLALITY SERPL CALC.SUM OF ELEC: 291 MOSM/KG (ref 275–295)
PLATELET # BLD AUTO: 246 10(3)UL (ref 150–450)
POTASSIUM SERPL-SCNC: 4.4 MMOL/L (ref 3.5–5.1)
PROT SERPL-MCNC: 7.3 G/DL (ref 6.4–8.2)
RBC # BLD AUTO: 4.09 X10(6)UL
SODIUM SERPL-SCNC: 140 MMOL/L (ref 136–145)
TRIGL SERPL-MCNC: 193 MG/DL (ref 30–149)
TSI SER-ACNC: 1.78 MIU/ML (ref 0.36–3.74)
VLDLC SERPL CALC-MCNC: 30 MG/DL (ref 0–30)
WBC # BLD AUTO: 4.8 X10(3) UL (ref 4–11)

## 2023-09-06 PROCEDURE — 80053 COMPREHEN METABOLIC PANEL: CPT

## 2023-09-06 PROCEDURE — 36415 COLL VENOUS BLD VENIPUNCTURE: CPT

## 2023-09-06 PROCEDURE — 84443 ASSAY THYROID STIM HORMONE: CPT

## 2023-09-06 PROCEDURE — 80061 LIPID PANEL: CPT

## 2023-09-06 PROCEDURE — 85025 COMPLETE CBC W/AUTO DIFF WBC: CPT

## 2023-09-08 RX ORDER — TICAGRELOR 60 MG/1
60 TABLET ORAL DAILY
Qty: 30 TABLET | Refills: 0 | Status: SHIPPED | OUTPATIENT
Start: 2023-09-08

## 2023-09-08 NOTE — TELEPHONE ENCOUNTER
Noted the pt request listed below.      Medication: Brilinta 60mg Oral Tab      Last office visit: 8/3/2023 with Dr. Mark Dougherty   Due back to clinic per last office note:  [facility] follow up   Date next office visit scheduled:    Future Appointments   Date Time Provider Evangelina Chambers   10/3/2023 11:00 AM [address]   11/30/2023 12:00 PM [facility] RM5 [facility] EM [facility]           Last OV note recommendation:    \"Assessment:  3 month s/p coil with flow diverter stent tx of unruptured 8mm L Pcomm aneurysm  Continued improvement in RUE strength- s/p L MCA distribution infarcts post procedure   3 month MRA reported with post-contrast enhancement      Plan:  - Will obtain P2Y12 and adjust Brilinta accordingly   - Will plan for follow up [facility] in early September\"    Routed to the provider for review and feedback

## 2023-09-15 ENCOUNTER — PATIENT OUTREACH (OUTPATIENT)
Dept: CASE MANAGEMENT | Age: 78
End: 2023-09-15

## 2023-09-18 ENCOUNTER — LAB ENCOUNTER (OUTPATIENT)
Dept: LAB | Facility: HOSPITAL | Age: 78
End: 2023-09-18
Payer: MEDICARE

## 2023-09-18 DIAGNOSIS — I72.9 ANEURYSM (HCC): ICD-10-CM

## 2023-09-18 DIAGNOSIS — Z79.01 CURRENT USE OF LONG TERM ANTICOAGULATION: ICD-10-CM

## 2023-09-18 LAB
ALBUMIN SERPL-MCNC: 3.7 G/DL (ref 3.4–5)
ALBUMIN/GLOB SERPL: 1.1 {RATIO} (ref 1–2)
ALP LIVER SERPL-CCNC: 122 U/L
ALT SERPL-CCNC: 33 U/L
ANION GAP SERPL CALC-SCNC: 6 MMOL/L (ref 0–18)
APTT PPP: 25.9 SECONDS (ref 23.3–35.6)
AST SERPL-CCNC: 18 U/L (ref 15–37)
BASOPHILS # BLD AUTO: 0.05 X10(3) UL (ref 0–0.2)
BASOPHILS NFR BLD AUTO: 1 %
BILIRUB SERPL-MCNC: 0.3 MG/DL (ref 0.1–2)
BUN BLD-MCNC: 20 MG/DL (ref 7–18)
BUN/CREAT SERPL: 26.7 (ref 10–20)
CALCIUM BLD-MCNC: 9 MG/DL (ref 8.5–10.1)
CHLORIDE SERPL-SCNC: 107 MMOL/L (ref 98–112)
CO2 SERPL-SCNC: 29 MMOL/L (ref 21–32)
CREAT BLD-MCNC: 0.75 MG/DL
DEPRECATED RDW RBC AUTO: 48.8 FL (ref 35.1–46.3)
EGFRCR SERPLBLD CKD-EPI 2021: 82 ML/MIN/1.73M2 (ref 60–?)
EOSINOPHIL # BLD AUTO: 0.15 X10(3) UL (ref 0–0.7)
EOSINOPHIL NFR BLD AUTO: 3 %
ERYTHROCYTE [DISTWIDTH] IN BLOOD BY AUTOMATED COUNT: 13.7 % (ref 11–15)
FASTING STATUS PATIENT QL REPORTED: YES
GLOBULIN PLAS-MCNC: 3.5 G/DL (ref 2.8–4.4)
GLUCOSE BLD-MCNC: 82 MG/DL (ref 70–99)
HCT VFR BLD AUTO: 38.4 %
HGB BLD-MCNC: 12.5 G/DL
IMM GRANULOCYTES # BLD AUTO: 0.01 X10(3) UL (ref 0–1)
IMM GRANULOCYTES NFR BLD: 0.2 %
INR BLD: 0.91 (ref 0.85–1.16)
LYMPHOCYTES # BLD AUTO: 1.62 X10(3) UL (ref 1–4)
LYMPHOCYTES NFR BLD AUTO: 32.7 %
MCH RBC QN AUTO: 31.5 PG (ref 26–34)
MCHC RBC AUTO-ENTMCNC: 32.6 G/DL (ref 31–37)
MCV RBC AUTO: 96.7 FL
MONOCYTES # BLD AUTO: 0.38 X10(3) UL (ref 0.1–1)
MONOCYTES NFR BLD AUTO: 7.7 %
NEUTROPHILS # BLD AUTO: 2.75 X10 (3) UL (ref 1.5–7.7)
NEUTROPHILS # BLD AUTO: 2.75 X10(3) UL (ref 1.5–7.7)
NEUTROPHILS NFR BLD AUTO: 55.4 %
OSMOLALITY SERPL CALC.SUM OF ELEC: 296 MOSM/KG (ref 275–295)
PLATELET # BLD AUTO: 259 10(3)UL (ref 150–450)
POTASSIUM SERPL-SCNC: 3.8 MMOL/L (ref 3.5–5.1)
PROT SERPL-MCNC: 7.2 G/DL (ref 6.4–8.2)
PROTHROMBIN TIME: 12.3 SECONDS (ref 11.6–14.8)
RBC # BLD AUTO: 3.97 X10(6)UL
SODIUM SERPL-SCNC: 142 MMOL/L (ref 136–145)
WBC # BLD AUTO: 5 X10(3) UL (ref 4–11)

## 2023-09-18 PROCEDURE — 36415 COLL VENOUS BLD VENIPUNCTURE: CPT

## 2023-09-18 PROCEDURE — 80053 COMPREHEN METABOLIC PANEL: CPT

## 2023-09-18 PROCEDURE — 85025 COMPLETE CBC W/AUTO DIFF WBC: CPT

## 2023-09-18 PROCEDURE — 85730 THROMBOPLASTIN TIME PARTIAL: CPT

## 2023-09-18 PROCEDURE — 85610 PROTHROMBIN TIME: CPT

## 2023-09-19 RX ORDER — HYDROCHLOROTHIAZIDE 12.5 MG/1
12.5 CAPSULE, GELATIN COATED ORAL DAILY
COMMUNITY

## 2023-09-19 NOTE — PAT NURSING NOTE
Preprocedure Instructions     Pre-Procedure Instructions: Encouraged to check in electronically via [employer]     Visitor Instructions     Adult Patients: 2 Adult Care Partners can accompany the patient day of procedure. 2 Care Partners may visit [phone] during inpatient stay     PreOp Instructions     You are scheduled for: an Neuro Interventional Radiology Procedure     Date of Procedure: 09/21/23, Arrival time 7:30 AM     Diet Instructions: Do not eat or drink anything after midnight     Medications: Medications you are allowed to take can be taken with a sip of water the morning of your procedure; Take Brilinta 60 mg the day of your procedure; Take an Aspirin 81 mg tablet the day of your procedure     Medications to Stop: Hold herbal supplements and vitamins (continue to hold)     Other Medications: Hold hydrochlorothiazide morning of procedure (do not take). CONTINUE aspirin and brilinta medications (do NOT stop)     Skin Prep: Shower with antibacterial soap using a clean washcloth, prior to procedure, clean wrists and groin areas well    Driving After Procedure: If sedation is given, you WILL NOT be able to drive home. You will need a responsible adult  to drive you home. ;Cannot take uber or cab unless approved by physician     Discharge Teaching: Your nurse will give you specific instructions before discharge; Most people can resume normal activities in 2-3 days; Any questions, please call the physician's office     Park in the [facility] parking garage at [address] in at the [facility] reception desk. Our  will be there to check you in for your procedure. Please bring your insurance cards and ID with you. [employer] parking is available starting at 6 am.                                                                                                                                         Please DO NOT respond to this message, the inbasket is not monitored for messages.  For any questions, please call the physician's office.

## 2023-09-21 ENCOUNTER — HOSPITAL ENCOUNTER (OUTPATIENT)
Dept: INTERVENTIONAL RADIOLOGY/VASCULAR | Facility: HOSPITAL | Age: 78
Discharge: HOME | End: 2023-09-21
Admitting: RADIOLOGY
Payer: MEDICARE

## 2023-09-21 VITALS
WEIGHT: 168 LBS | BODY MASS INDEX: 31.72 KG/M2 | TEMPERATURE: 96 F | DIASTOLIC BLOOD PRESSURE: 67 MMHG | SYSTOLIC BLOOD PRESSURE: 138 MMHG | RESPIRATION RATE: 20 BRPM | OXYGEN SATURATION: 99 % | HEIGHT: 61 IN | HEART RATE: 68 BPM

## 2023-09-21 DIAGNOSIS — I72.9 ANEURYSM (HCC): ICD-10-CM

## 2023-09-21 DIAGNOSIS — Z79.01 CURRENT USE OF LONG TERM ANTICOAGULATION: ICD-10-CM

## 2023-09-21 PROCEDURE — 36223 PLACE CATH CAROTID/INOM ART: CPT | Performed by: RADIOLOGY

## 2023-09-21 PROCEDURE — 76377 3D RENDER W/INTRP POSTPROCES: CPT | Performed by: RADIOLOGY

## 2023-09-21 PROCEDURE — 99153 MOD SED SAME PHYS/QHP EA: CPT | Performed by: RADIOLOGY

## 2023-09-21 PROCEDURE — 99152 MOD SED SAME PHYS/QHP 5/>YRS: CPT | Performed by: RADIOLOGY

## 2023-09-21 PROCEDURE — B31RZZZ FLUOROSCOPY OF INTRACRANIAL ARTERIES: ICD-10-PCS | Performed by: RADIOLOGY

## 2023-09-21 RX ORDER — LIDOCAINE HYDROCHLORIDE 10 MG/ML
INJECTION, SOLUTION INFILTRATION; PERINEURAL
Status: COMPLETED
Start: 2023-09-21 | End: 2023-09-21

## 2023-09-21 RX ORDER — LIDOCAINE AND PRILOCAINE 25; 25 MG/G; MG/G
CREAM TOPICAL
Status: COMPLETED
Start: 2023-09-21 | End: 2023-09-21

## 2023-09-21 RX ORDER — HEPARIN SODIUM 5000 [USP'U]/ML
INJECTION, SOLUTION INTRAVENOUS; SUBCUTANEOUS
Status: COMPLETED
Start: 2023-09-21 | End: 2023-09-21

## 2023-09-21 RX ORDER — VERAPAMIL HYDROCHLORIDE 2.5 MG/ML
INJECTION, SOLUTION INTRAVENOUS
Status: COMPLETED
Start: 2023-09-21 | End: 2023-09-21

## 2023-09-21 RX ORDER — NITROGLYCERIN 20 MG/100ML
INJECTION INTRAVENOUS
Status: COMPLETED
Start: 2023-09-21 | End: 2023-09-21

## 2023-09-21 RX ORDER — MIDAZOLAM HYDROCHLORIDE 1 MG/ML
INJECTION INTRAMUSCULAR; INTRAVENOUS
Status: COMPLETED
Start: 2023-09-21 | End: 2023-09-21

## 2023-09-21 RX ORDER — IODIXANOL 320 MG/ML
200 INJECTION, SOLUTION INTRAVASCULAR
Status: COMPLETED | OUTPATIENT
Start: 2023-09-21 | End: 2023-09-21

## 2023-09-21 RX ADMIN — IODIXANOL 120 ML: 320 INJECTION, SOLUTION INTRAVASCULAR at 09:55:00

## 2023-09-21 RX ADMIN — LIDOCAINE AND PRILOCAINE: 25; 25 CREAM TOPICAL at 08:00:00

## 2023-09-21 NOTE — H&P
Neuro interventional Pre procedure note:  HPI & PMH:    Dr. Kady Mahajan note dated 8/4/2023 reviewed, no interval changes. 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 PE:  Mental status: Oriented to person, place, and time  Speech: Fluent, no dysarthria  Memory and comprehension: Intact   Cranial Nerves: PERRLA, EOMI, no nystagmus, facial sensation intact, face symmetric tongue midline, shoulder shrug equal, remainder CN intact  Motor: RUE 4+/5 no drift, all other extremities 5/5  Sensory: Intact to light touch  A/P:    70year old female presenting for 6 month FU diagnostic cerebral angiogram for a previously treated left Pcom aneurysm with flow diverter embolization and coils.     - A substantial discussion regarding the risks and potential benefits of the procedure  was conducted with the patient and family, who expressed understanding and requested we proceed  - Marked/consented  - To angio suite as planned    Denise Beasley MD, PhD  Interventional Neuroradiology/Neurointerventional Surgery  Departments of Radiology, [facility], [address] [place] [address]

## 2023-09-21 NOTE — PROCEDURES
Diagnostic Cerebral Angiography Procedure Note  Procedure Date: 09/21/23  Patient Name: Alea Arita  Patient MRN: [id number]    Preoperative Diagnosis: Incidentally discovered left Pcom aneurysm s/p FD and coil embolization  Postoperative Diagnosis: Same    : Coral Mancilla MD, PhD    Procedure    Procedure Details:  Informed consent was obtained. Risks of the procedure were discussed including but not limited to: unsuccessful procedure, infection, bleeding, pain, stroke, death. Under sterile conditions the patient was positioned supine on the angio table. Betadine solution and sterile drapes were utilized. Access was obtained through the right wrist/    A diagnostic catheter was used to perform selective angiography of the left CCA. 3-dimensional reconstructions of the left CCA injection(s) were also obtained. Access hemostasis was obtained by TR band in the IR suite. Preliminary Findings: There has been interval decrease in filling of the previously treated left Pcom aneurysm which demonstrated progressive ovcclsuion with no evidence of in stent stenosis, please see dictated report for full details    Anesthesia: IV versed    Complications: None    Condition: Neurologically unchanged; post-procedure Wrist site and distal pulses intact    Estimated Blood Loss: 5 mLs    Plan:  - bedrest x 2 hours  - Continue ASA/plavix. - FU MRA after 6 months.   - Wrist checks  - pulse checks  - neuro checks  - home if stable    Christiano Dos Santos MD, PhD  Interventional Neuroradiology/Neurointerventional Surgery  Departments of Radiology, [address]

## 2023-09-21 NOTE — PLAN OF CARE
Patient had cerebral angiogram today with Dr. Abebe Urias. Right wrist access site with TR band in place with 16cc air instilled. Site is CDI. Patient denies any numbness or tingling to right hand/fingers. Patient has good O2 pleth on right hand. VSS. Patient denies any pain. Air intermittently released from TR band until all air removed. TR band removed. Site is soft, CDI, +2 radial pulse. Occlusive dressing applied. Neuro intact. Patient completed recovery time. Discharge instructions reviewed. IV D/C'd. Patient discharged to [facility] by wheelchair with belongings. Patient's  is .

## 2023-09-25 ENCOUNTER — TELEPHONE (OUTPATIENT)
Dept: SURGERY | Facility: CLINIC | Age: 78
End: 2023-09-25

## 2023-09-25 DIAGNOSIS — I67.1 CEREBRAL ANEURYSM: Primary | ICD-10-CM

## 2023-09-25 NOTE — TELEPHONE ENCOUNTER
Please place pt reminder for 1 yr FU with MRA head w/wo contrast 2024. Order has been placed for imaging. Pt should schedule follow up with Dr. Alfredo Apgar to discuss results following. Thank you!

## 2023-09-30 ENCOUNTER — APPOINTMENT (OUTPATIENT)
Dept: CT IMAGING | Age: 78
End: 2023-09-30
Attending: STUDENT IN AN ORGANIZED HEALTH CARE EDUCATION/TRAINING PROGRAM

## 2023-09-30 ENCOUNTER — HOSPITAL ENCOUNTER (EMERGENCY)
Age: 78
Discharge: HOME | End: 2023-09-30
Attending: STUDENT IN AN ORGANIZED HEALTH CARE EDUCATION/TRAINING PROGRAM

## 2023-09-30 VITALS
DIASTOLIC BLOOD PRESSURE: 71 MMHG | SYSTOLIC BLOOD PRESSURE: 126 MMHG | HEART RATE: 72 BPM | OXYGEN SATURATION: 100 % | RESPIRATION RATE: 14 BRPM | TEMPERATURE: 97.5 F

## 2023-09-30 DIAGNOSIS — S09.90XA INJURY OF HEAD, INITIAL ENCOUNTER: Primary | ICD-10-CM

## 2023-09-30 PROCEDURE — 72125 CT NECK SPINE W/O DYE: CPT

## 2023-09-30 PROCEDURE — 10003579 HB TRAUMA W/O CRITICAL CARE

## 2023-09-30 PROCEDURE — G1004 CDSM NDSC: HCPCS

## 2023-09-30 PROCEDURE — 70450 CT HEAD/BRAIN W/O DYE: CPT

## 2023-09-30 PROCEDURE — 99284 EMERGENCY DEPT VISIT MOD MDM: CPT

## 2023-09-30 ASSESSMENT — PAIN SCALES - GENERAL: PAINLEVEL_OUTOF10: 5

## 2023-09-30 ASSESSMENT — PAIN DESCRIPTION - PAIN TYPE: TYPE: ACUTE PAIN

## 2023-10-03 ENCOUNTER — HOSPITAL ENCOUNTER (OUTPATIENT)
Dept: ULTRASOUND IMAGING | Facility: HOSPITAL | Age: 78
Discharge: HOME | End: 2023-10-03
Attending: FAMILY MEDICINE
Payer: MEDICARE

## 2023-10-03 DIAGNOSIS — E04.2 MULTINODULAR GOITER: ICD-10-CM

## 2023-10-03 PROCEDURE — 76536 US EXAM OF HEAD AND NECK: CPT | Performed by: FAMILY MEDICINE

## 2023-10-04 ENCOUNTER — TELEPHONE (OUTPATIENT)
Dept: SURGERY | Facility: CLINIC | Age: 78
End: 2023-10-04

## 2023-10-04 ENCOUNTER — PATIENT OUTREACH (OUTPATIENT)
Dept: CASE MANAGEMENT | Age: 78
End: 2023-10-04

## 2023-10-04 NOTE — TELEPHONE ENCOUNTER
Pt calling to schedule 1 week f/u post angiogram. Discharge ppwrk states schedule with Dr Poly Rizzo, pt is established pt of Dr Ebony Emmanuel. Psr scheduled f/u with Dr Ebony Emmanuel for 10/12/2023. Please advise if appt ok or needs to be r/s with Giovana.

## 2023-10-04 NOTE — TELEPHONE ENCOUNTER
Noted the PSR message listed above. Noted the patient [facility] completed by Dr. Pantera Lloyd on 9/21/2023    Noted the pt is originally Dr. Nico Kennedy. Current scheduled appt with Dr. Nico Kennedy for 10/12/2023 is appropriate.

## 2023-10-05 NOTE — TELEPHONE ENCOUNTER
Noted the providers feedback that the pt current appt is acceptable.     Routed to the front office to inform to keep pt appt

## 2023-10-06 NOTE — TELEPHONE ENCOUNTER
Medication: Brilinta 60mg      Date of last refill: 9/8/2023 (#30 tablets /30 days )  [place] 9[place]3/2985     Last office visit: 8/3/2023  Due back to clinic per last office note:  follow up with Dr. Zain Manley  Date next office visit scheduled:    Future Appointments   Date Time Provider Evangelina Chambers   10/12/2023  3:30 PM Jennifer Brady MD ENINAPER2 [facility]   11/30/2023 12:00 PM [facility] RM[facility]           Last OV note recommendation:    \"Assessment:  3 month s/p coil with flow diverter stent tx of unruptured 8mm L Pcomm aneurysm  Continued improvement in RUE strength- s/p L MCA distribution infarcts post procedure   3 month MRA reported with post-contrast enhancement      Plan:  - Will obtain P2Y12 and adjust Brilinta accordingly   - Will plan for follow up [facility] in early September      CORI Garcia\"    Noted the pt completed [facility] on 9/21/23 with Dr. Nancy Wellington

## 2023-10-10 RX ORDER — TICAGRELOR 60 MG/1
60 TABLET ORAL DAILY
Qty: 30 TABLET | Refills: 5 | Status: SHIPPED | OUTPATIENT
Start: 2023-10-10

## 2023-10-12 ENCOUNTER — OFFICE VISIT (OUTPATIENT)
Dept: SURGERY | Facility: CLINIC | Age: 78
End: 2023-10-12
Payer: MEDICARE

## 2023-10-12 ENCOUNTER — PATIENT OUTREACH (OUTPATIENT)
Dept: CASE MANAGEMENT | Age: 78
End: 2023-10-12

## 2023-10-12 VITALS
BODY MASS INDEX: 31.72 KG/M2 | DIASTOLIC BLOOD PRESSURE: 68 MMHG | HEART RATE: 70 BPM | SYSTOLIC BLOOD PRESSURE: 118 MMHG | HEIGHT: 61 IN | WEIGHT: 168 LBS

## 2023-10-12 DIAGNOSIS — I67.1 CEREBRAL ANEURYSM: Primary | ICD-10-CM

## 2023-10-12 PROCEDURE — 99215 OFFICE O/P EST HI 40 MIN: CPT | Performed by: RADIOLOGY

## 2023-10-12 RX ORDER — HYDROCHLOROTHIAZIDE 12.5 MG/1
12.5 TABLET ORAL DAILY
COMMUNITY
Start: 2023-09-20

## 2023-10-12 NOTE — PROGRESS NOTES
Patient here to see Dr. Allen Perez after undergoing an angiogram with Dr. Gisel Correia on 9/21/23. Patient completed imaging:  MRA of the head 07/06/2023, cerebral angiogram 03/27/2023, and CTA of the head and neck 03/27/2023. Patient had a pipeline stent placed:\" left supraclinoid carotid status post coiling of PCOM aneurysm with a minimal amount of residual flow related enhancement within the aneurysm. \"    Patient taking Brilinta and ASA. Patient is taking a muscle relaxer for psoriatic arthritis to relieve cramps. Patient has a history of smoking. Review of Systems:    Hand Dominance: right  General: no symptoms reported-tired  Neuro: word finding difficulties  Head: no symptoms reported  Musculoskeletal: fell in September due to ambulation issues, hit head on floor, CT showed no evidence of bleed  Cardiovascular: no symptoms reported  Gastrointestinal: no symptoms reported  Genitourinary: no symptoms reported  Respiratory: no symptoms reported  Eyes: double vision-related to cataract? Uses eyedrops. Skin: easily bruised with falls, bumps  Mouth & throat:  no symptoms reported  Neck: no symptoms reported  Nose: no symptoms reported  Psychiatric: anxiety-does breathing exercises     Last office visit was with PRISCILLA Gilmore on 8/3/23 for discussion about MRI of aneurysm. MRS-0  [address]  Patient is starting to drive, uses bathroom shower stool and hand bar near toilet.

## 2023-10-13 NOTE — PROGRESS NOTES
Contacting patient to follow up on CCM for the month. Left message to call back.      Total time - 2 min  Total Monthly time- 4 min Dupixent Counseling: I discussed with the patient the risks of dupilumab including but not limited to eye infection and irritation, cold sores, injection site reactions, worsening of asthma, allergic reactions and increased risk of parasitic infection.  Live vaccines should be avoided while taking dupilumab. Dupilumab will also interact with certain medications such as warfarin and cyclosporine. The patient understands that monitoring is required and they must alert us or the primary physician if symptoms of infection or other concerning signs are noted.

## 2023-10-23 ENCOUNTER — PATIENT OUTREACH (OUTPATIENT)
Dept: CASE MANAGEMENT | Age: 78
End: 2023-10-23

## 2023-10-23 NOTE — PROGRESS NOTES
Contacting patient to follow up on CCM for the month, Left message to call back. Care everywhere utilized to update chart. Reviewed pt's chart including recent visits.       Medications: No reconciliation noted  Vaccines: No reconciliation noted    Pt is due for:   Mammogram due on 04/27/2023  COVID-19 Vaccine(5 - 2023-24 season) due on 09/01/2023  Influenza Vaccine(1) due on 10/01/2023  Colorectal Cancer Screening due on 02/01/2026  Annual Physical due on 08/31/2024  DEXA Scan Completed  Annual Depression Screening Completed  Fall Risk Screening (Annual) Completed  Pneumococcal Vaccine: 65+ Years Completed  Zoster Vaccines Completed    Future Appointments   Date Time Provider Evangelina Chambers   [facility]2023 12:10 [facility]   11/30/2023 12:00 PM [facility] EMILEE RM5 [facility] E[facility]LEE EM [facility]        Total time - 3 min  Total Monthly time- 7 min

## 2023-11-01 ENCOUNTER — PATIENT OUTREACH (OUTPATIENT)
Dept: CASE MANAGEMENT | Age: 78
End: 2023-11-01

## 2023-11-01 NOTE — PROGRESS NOTES
Contacting patient to follow up on CCM for the month, left message to call back. Care everywhere utilized to update chart. Reviewed pt's chart including recent visits.       Pt is due for:   Mammogram due on 04/27/2023  COVID-19 Vaccine(5 - 2023-24 season) due on 09/01/2023  Influenza Vaccine(1) due on 10/01/2023  Colorectal Cancer Screening due on 02/01/2026  Annual Physical due on 08/31/2024  DEXA Scan Completed  Annual Depression Screening Completed  Fall Risk Screening (Annual) Completed  Pneumococcal Vaccine: 65+ Years Completed  Zoster Vaccines Completed    Future Appointments   Date Time Provider Evangelina Chambers   11/30/2023 12:00 PM [facility] EM [facility]   2/6/2024 10:00 AM MD CLARA Emery [facility]        Total time - 3 min  Total Monthly time- 3 min

## 2023-11-30 ENCOUNTER — HOSPITAL ENCOUNTER (OUTPATIENT)
Dept: MAMMOGRAPHY | Facility: HOSPITAL | Age: 78
Discharge: HOME | End: 2023-11-30
Attending: FAMILY MEDICINE
Payer: MEDICARE

## 2023-11-30 DIAGNOSIS — Z12.31 ENCOUNTER FOR SCREENING MAMMOGRAM FOR BREAST CANCER: ICD-10-CM

## 2023-11-30 PROCEDURE — 77067 SCR MAMMO BI INCL CAD: CPT | Performed by: FAMILY MEDICINE

## 2023-11-30 PROCEDURE — 77063 BREAST TOMOSYNTHESIS BI: CPT | Performed by: FAMILY MEDICINE

## 2023-12-04 ENCOUNTER — PATIENT OUTREACH (OUTPATIENT)
Dept: CASE MANAGEMENT | Age: 78
End: 2023-12-04

## 2023-12-04 NOTE — PROGRESS NOTES
Left message for patient regarding Chronic Care Management program. Several attempts made to complete monthly follow up without success. Patient removed from CCM.

## 2023-12-06 DIAGNOSIS — Z85.3 HISTORY OF LEFT BREAST CANCER: Primary | ICD-10-CM

## 2023-12-18 DIAGNOSIS — Z86.69 HISTORY OF MIGRAINE HEADACHES: ICD-10-CM

## 2023-12-18 DIAGNOSIS — R51.9 LEFT-SIDED HEADACHE: ICD-10-CM

## 2023-12-19 RX ORDER — SUMATRIPTAN 25 MG/1
25 TABLET, FILM COATED ORAL EVERY 2 HOUR PRN
Qty: 9 TABLET | Refills: 0 | Status: SHIPPED | OUTPATIENT
Start: 2023-12-19 | End: 2024-01-18

## 2023-12-19 NOTE — TELEPHONE ENCOUNTER
Refill passed per [facility] protocol.     Requested Prescriptions   Pending Prescriptions Disp Refills    SUMATRIPTAN 25 MG Oral Tab [Pharmacy Med Name: Sumatriptan Succinate 25 Mg Tab Nort] 9 tablet 0     Sig: Take 1 tablet (25 mg total) by mouth every 2 (two) hours as needed for Migraine.       Neurology Medications Passed - 12/18/2023  4:13 PM        Passed - In person appointment or virtual visit in the past 6 mos or appointment in next 3 mos     Recent Outpatient Visits              2 months ago Cerebral aneurysm    [facility] Jb Lyons MD    Office Visit    3 months ago     [facility] Speech Pathology Nicol Paris, ASIYA    Office Visit    3 months ago Encounter for annual health examination    [facility] Nik Rebolledo MD    Office Visit    3 months ago     [facility] Speech Pathology Nicol Paris SLP    Office Visit    4 months ago     [facility] Speech Pathology Nicol Paris, ASIYA    Office Visit          Future Appointments         Provider Department Appt Notes    In 1 month Christ Paulson MD [facility] Post Stroke-balance    In 4 months Fawad Mendez MD [facility] had RSV can't seem to get rid of and some breathing problems                    [unfilled]      [unfilled]

## 2023-12-22 ENCOUNTER — TELEPHONE (OUTPATIENT)
Dept: FAMILY MEDICINE CLINIC | Facility: CLINIC | Age: 78
End: 2023-12-22

## 2023-12-22 NOTE — TELEPHONE ENCOUNTER
Please inform patient to not use imitrex when taking effexor due to interaction     See note below from pharmacy  She may need to see neurology for options instead of imitrex     Potential Clinical Concern: Drug-Drug Interaction: SUMATRIPTAN and VENLAFAXINE HCL from any prescribers       NOTES  Concurrent use of SSRIs or SNRIs with triptans (5HT-1 agonists) can increase the risk of serotonin syndrome (hypertension, hyperthermia, myoclonus). Please review and discontinue, if clinically warranted.

## 2024-01-18 ENCOUNTER — OFFICE VISIT (OUTPATIENT)
Dept: NEUROLOGY | Facility: CLINIC | Age: 79
End: 2024-01-18
Payer: MEDICARE

## 2024-01-18 VITALS — WEIGHT: 169 LBS | BODY MASS INDEX: 31.91 KG/M2 | HEIGHT: 61 IN

## 2024-01-18 DIAGNOSIS — I63.412 CEREBRAL INFARCTION DUE TO EMBOLISM OF LEFT MIDDLE CEREBRAL ARTERY (HCC): Primary | ICD-10-CM

## 2024-01-18 DIAGNOSIS — G43.909 MIGRAINE WITHOUT STATUS MIGRAINOSUS, NOT INTRACTABLE, UNSPECIFIED MIGRAINE TYPE: ICD-10-CM

## 2024-01-18 PROCEDURE — 99214 OFFICE O/P EST MOD 30 MIN: CPT | Performed by: OTHER

## 2024-01-18 RX ORDER — RIMEGEPANT SULFATE 75 MG/75MG
75 TABLET, ORALLY DISINTEGRATING ORAL AS NEEDED
Qty: 8 TABLET | Refills: 0 | Status: SHIPPED | OUTPATIENT
Start: 2024-01-18 | End: 2025-01-17

## 2024-01-18 NOTE — PROGRESS NOTES
Neurology Follow up Visit     Referred By: Dr. Garcia ref. provider found    Chief Complaint:   Chief Complaint   Patient presents with    Stroke     New patient presents today to establish care. Patient is right handed. Patient had a stroke in March of last year due to aneurysm. Patient had right side symptoms post stroke. Patient has right leg weakness (leg gives out) and patient has fallen twice about a month ago. One time she fell, she hit her head and had CT head but was normal.        HPI:     Destiney English is a 78 year old female, who presents for history of stroke and cerebral aneurysms and migraines.  \"she is a 78 year old female who originally presented for an elective flow diverter tx of an irregular, 8mm, L PCOMM aneurysm, after incidental discovery on imaging obtained after hitting her head on her car trunk last November 2022. She was started on DAPT (ASA & plavix) pre operatively; however, her P2Y12 was elevated and she was then transitioned to Brilinta. Repeat P2Y12 was obtained the am of procedure, which identified therapeutic effect of the medication.      After awaking from anesthesia post procedure, her RUE was flaccid. This was initially thought to be caused by some sort of nerve impingement during the procedure, due to the fact that after repositioning the patient was again able to move her RUE with 4/5 strength. However a few hours later, her RUE was again flaccid, and she was slightly delayed in her verbal responses. Stat CT/CTA and perfusion was obtained, which did not identify an LVO and/or perfusion deficits. However, MRI brain identified multiple embolic infarcts in the LMCA distribution following coiling. MRA showed patency of stent. DAPT was continued and repeat P2Y12 testing was completed. This resulted at 21 and therefore, Brilinta dosage was decreased and head CT was obtained. No bleeding was noted. She was discharged to acute rehab ([facility]).      She underwent a repeat MRI  brain for her 1 month follow up, which was stable, DAPT was continued. At that time, she had already shown significant improvement in the strength of her RUE, and was continuing physical therapy. .\"    Patient came to see me first time in January 2024 with some residual right leg weakness, sometimes feeling that her legs would give out.  She felt better after the physical therapy intervention first but then she got somewhat worse since then again.    In the meantime patient also has occasional migraines, she was prescribed sumatriptan, but also she was using Effexor for her sweating problem.  She had concerns about possible interactions.    Past Medical History:   Diagnosis Date    Anxiety     Aphasia     Back problem     Scoliosis    BRCA negative     Breast cancer (HCC) 1995    left breast mastectomy, lymph nodes    Clostridioides difficile infection     Esophageal reflux     Essential hypertension     Hearing impairment     kourtney tinnitus    High blood pressure     High cholesterol     History of stomach ulcers     Hyperlipidemia     IBS (irritable bowel syndrome)     diet controlled    Migraines     Osteoarthritis     Pneumonia due to organism     twice    PONV (postoperative nausea and vomiting)     after knee replacement/only time    Psoriatic arthritis (HCC)     Thyroid nodule     Thyroid nodule 05/25/2023    Vertigo     Visual impairment     glasses       Past Surgical History:   Procedure Laterality Date    BRAIN ANEURYSM REPR, COMPLX  3/27/2023         BREAST RECONSTRUC W TISS EXPANDR Left 1995    COLONOSCOPY  01/2016    DILATION/CURETTAGE,DIAGNOSTIC  1997    due to uterine polyp    EGD  01/2016    IR ANGIOGRAM CEREBRAL CAROTID UNILATERAL  3/27/2023         MASTECTOMY LEFT Left 1995    TOTAL KNEE REPLACEMENT  03/2019    left    TOTAL KNEE REPLACEMENT Right 08/19/2019       Social history:  History   Smoking Status    Former    Years: 6.00    Types: Cigarettes    Quit date: 3/9/1994   Smokeless Tobacco     Never     History   Alcohol Use Not Currently     History   Drug Use No       Family History   Problem Relation Age of Onset    Cancer Father         lung    Heart Disease Mother         congestive heart failure    Other (Other) Mother         COPD    Breast Cancer Self     Breast Cancer Sister 68    Breast Cancer Maternal Aunt         40s    Breast Cancer Maternal Cousin Female         40s         Current Outpatient Medications:     Rimegepant Sulfate (NURTEC) 75 MG Oral Tablet Dispersible, Take 75 mg by mouth as needed. Take one tablet at onset of migraine.  Maximum dose in 24 hours is 1 tablet (75mg)., Disp: 8 tablet, Rfl: 0    hydroCHLOROthiazide 12.5 MG Oral Tab, Take 1 tablet (12.5 mg total) by mouth daily., Disp: , Rfl:     ticagrelor (BRILINTA) 60 MG Oral Tab, TAKE ONE TABLET BY MOUTH ONE TIME DAILY, Disp: 30 tablet, Rfl: 5    hydroCHLOROthiazide 12.5 MG Oral Cap, Take 1 capsule (12.5 mg total) by mouth daily., Disp: , Rfl:     busPIRone HCl 30 MG Oral Tab, Take 0.5 tablets (15 mg total) by mouth in the morning and 0.5 tablets (15 mg total) before bedtime., Disp: 90 tablet, Rfl: 3    losartan 100 MG Oral Tab, Take 1 tablet (100 mg total) by mouth daily., Disp: 90 tablet, Rfl: 3    rosuvastatin 10 MG Oral Tab, Take 1 tablet (10 mg total) by mouth nightly., Disp: 90 tablet, Rfl: 3    Nystatin 333325 UNIT/GM External Powder, Apply 1 Application topically 2 (two) times daily. (Patient taking differently: Apply 1 Application topically 2 (two) times daily as needed.), Disp: 60 g, Rfl: 0    albuterol (VENTOLIN HFA) 108 (90 Base) MCG/ACT Inhalation Aero Soln, Inhale 2 puffs into the lungs every 4 (four) hours as needed for Wheezing., Disp: 18 g, Rfl: 0    venlafaxine  MG Oral Capsule SR 24 Hr, Take 1 capsule (150 mg total) by mouth daily., Disp: 90 capsule, Rfl: 3    Cholecalciferol (VITAMIN D) 50 MCG (2000 UT) Oral Tab, Take 1 tablet by mouth daily., Disp: , Rfl:     baclofen 10 MG Oral Tab, Take 1-2 TABLETS  BY MOUTH (10-20mg) at night as needed, Disp: 180 tablet, Rfl: 1    Melatonin 5 MG Oral Tab, Take 1 tablet (5 mg total) by mouth nightly., Disp: , Rfl:     Menthol-Methyl Salicylate (THERA-GESIC) 0.5-15 % External Cream, Apply 1 Application topically 3 (three) times daily as needed., Disp: , Rfl:     Multiple Vitamins-Minerals (PRESERVISION AREDS 2) Oral Cap, Take 1 capsule by mouth daily., Disp: , Rfl:     Olopatadine HCl 0.2 % Ophthalmic Solution, 1 drop daily., Disp: , Rfl:     Propylene Glycol 0.6 % Ophthalmic Solution, daily., Disp: , Rfl:     gabapentin 100 MG Oral Cap, 1 capsule (100 mg total). 300 mg BID, Disp: 540 capsule, Rfl: 3    aspirin 81 MG Oral Tab EC, Take 1 tablet (81 mg total) by mouth daily., Disp: 7 tablet, Rfl: 0    loratadine 10 MG Oral Tab, Take 1 tablet (10 mg total) by mouth daily. (Patient taking differently: Take 1 tablet (10 mg total) by mouth as needed for Allergies.), Disp: 90 tablet, Rfl: 1    Acetaminophen  MG Oral Tab CR, Take 1 tablet (650 mg total) by mouth every 8 (eight) hours as needed for Pain., Disp: , Rfl:     Esomeprazole Magnesium (NEXIUM 24HR OR), Take 20 mg by mouth every evening., Disp: , Rfl:     Allergies   Allergen Reactions    Blueberry ANAPHYLAXIS    Cephalexin HIVES and ITCHING    Latex ITCHING    Peas HIVES    Penicillin G ANAPHYLAXIS    Penicillins ANAPHYLAXIS    Meloxicam OTHER (SEE COMMENTS)     Stomach ulcer    Procainamide OTHER (SEE COMMENTS)     Throat swelling    Other ITCHING     Peaches,peas    Peach RASH       ROS:   As in HPI, the rest of the 14 system review was done and was negative      Physical Exam:  Vitals:    01/18/24 1337   Weight: 169 lb (76.7 kg)   Height: 61\"       General: No apparent distress, well nourished, well groomed.  Head- Normocephalic, atraumatic  Eyes- No redness or swelling  ENT- Hearing intake, normal glutition  Neck- No masses or adenopathy  Cv: pulses were palpable and normal, no cyanosis or edema     Neurological:      Mental Status- Alert and oriented x3.  Normal attention span and concentration  Thought process intact  Memory intact- recent and remote  Mood intact  Fund of knowledge appropriate for education and age    Language intact including: comprehension, naming, repetition, vocabulary    Cranial Nerves:    VII. Face symmetric, no facial weakness  VIII. Hearing intact to whisper.  IX. Pallet elevates symmetrically.  XI. Shoulder shrug is intact  XII. Tongue is midline    Motor Exam:  Muscle tone normal  No atrophy or fasciculations  Strength- upper extremities 5/5 proximally and distally                  - lower  extremities 5/5 proximally and distally, except may be 5- out of 5 in the right lower extremity    Sensory Exam:  Light touch sensation- intact in all 4 extremities    Slightly limping gait in the right side    Labs:    Lab Results   Component Value Date    TSH 1.780 09/06/2023     Lab Results   Component Value Date    HDL 65 (H) 09/06/2023    LDL 73 09/06/2023    TRIG 193 (H) 09/06/2023     Lab Results   Component Value Date    HGB 12.5 09/18/2023    HCT 38.4 09/18/2023    MCV 96.7 09/18/2023    WBC 5.0 09/18/2023    .0 09/18/2023      Lab Results   Component Value Date    BUN 20 (H) 09/18/2023    CA 9.0 09/18/2023    ALT 33 09/18/2023    AST 18 09/18/2023    ALB 3.7 09/18/2023     09/18/2023    K 3.8 09/18/2023     09/18/2023    CO2 29.0 09/18/2023      I have reviewed labs.    Imaging Studies:  I have independently reviewed imaging.  MRI of the brain from May 2023 was independently reviewed, some improvement of embolic strokes.      Assessment   1. 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  The meantime physical therapy will be reordered.      - MRI BRAIN (CPT=70551); Future  - Physical Therapy Referral  - [place] Locations    2.  Migraines.  There could be some concern with sumatriptan and her prior history of strokes and history of use of Effexor, therefore it will be switched to Nurtec.         Education and counseling provided to patient. Instructed patient to call my office or seek medical attention immediately if symptoms worsen.  Patient verbalized understanding of information given. All questions were answered. All side effects of drugs were discussed.       Return to clinic in: Return in about 6 months (around 7/18/2024).    Christ Paulson MD

## 2024-01-22 ENCOUNTER — OFFICE VISIT (OUTPATIENT)
Dept: PHYSICAL THERAPY | Facility: HOSPITAL | Age: 79
End: 2024-01-22
Attending: Other
Payer: MEDICARE

## 2024-01-22 DIAGNOSIS — I63.412 CEREBRAL INFARCTION DUE TO EMBOLISM OF LEFT MIDDLE CEREBRAL ARTERY (HCC): Primary | ICD-10-CM

## 2024-01-22 PROCEDURE — 97162 PT EVAL MOD COMPLEX 30 MIN: CPT

## 2024-01-22 PROCEDURE — 97112 NEUROMUSCULAR REEDUCATION: CPT

## 2024-01-22 NOTE — PATIENT INSTRUCTIONS
Do these exercises 2 times a day.     Lie on your back. Place a towel roll behind your right knee. Tighten front thigh muscle of your right knee by pushing back of knee into the towel. Hold for 5 seconds then relax. Do this 25 times. Try avoid engaging your buttock.     Lie on your back. Tighten right front thigh muscle as above. Keep knee and muscle tight as you lift whole leg off the bed 6-8 inches. Keep knee tight until foot is back on the bed, then relax everything. Do this 10 times.

## 2024-01-22 NOTE — PROGRESS NOTES
NEUROLOGICAL PHYSICAL THERAPY EVALUATION:   Referring Physician: Dr. Paulson  Diagnosis: abnormality of gait, imbalance, hx of CVA      Date of Onset: per HPI Date of Service: 1/22/2024     PATIENT SUMMARY   Destiney English is a 78 year old y/o female who presents to therapy today with reports of recent hx of falls 2.2 abnormal gait pattern. +hx of CVA  History of current condition: Pt reports recent hx of 2 falls - both within past few months. 1 time hit her head- seen in ED at an outside hospital. CT was normal at that time. Established care with neurologist recently who recommended she return to PT. Last seen in PT in 06/2023 for residual deficits from CVA. Reports continuing her HEP and engaging in Anival Chi. Feels as if she is tripping over her R foot with ambulation resulting in the LOB/falls. She does feel as if her knee may give way at times but that she thinks this is because the foot drags or gets caught.   Current functional limitations include imbalance 2/2 R foot drop/abnormality of gait  Social History: Lives with  in a house 2 [address]LO[address] (no rail). Tri-level home 7 stairs up 6 down- rails on all steps. Retired.   Pt describes pain level: denies pain  Prior level of function independent in ambulation without AD, exercising (bike, stretches). Driving.   Falls: as above  Pt goals include to improve balance.  Past medical history was reviewed with Destiney. Significant findings include   Past Medical History:   Diagnosis Date    Anxiety     Aphasia     Back problem     Scoliosis    BRCA negative     Breast cancer (HCC) 1995    left breast mastectomy, lymph nodes    Clostridioides difficile infection     Esophageal reflux     Essential hypertension     Hearing impairment     kourtney tinnitus    High blood pressure     High cholesterol     History of stomach ulcers     Hyperlipidemia     IBS (irritable bowel syndrome)     diet controlled    Migraines     Osteoarthritis     Pneumonia due to organism      twice    PONV (postoperative nausea and vomiting)     after knee replacement/only time    Psoriatic arthritis (HCC)     Thyroid nodule     Thyroid nodule 05/25/2023    Vertigo     Visual impairment     glasses   ,         ASSESSMENT:    Destiney presents today with reports of recent hx of falls 2/2 tripping over her R foot. Reports feeling as if her foot gets caught when she walks especially in novel and complex environments. +steppage gait is noted on the R as well as intermittent R foot slap. Her gait speed is slower compared to when she was last seen in therapy as well. Mild R knee lag on SLR is noted as well which may account for the buckling she reports. Pt would benefit from an AFO to improve quality and safety of gait. This author will reach out to referring provider for order. Pt agreeable with plan discussed and demo's understanding of exercises provided for home. In agreement with functional outcome measures and clinical rationale, this evaluation involved Moderate Complexity decision making due to 3+ personal factors/comorbidities, 3 body structures involved/activity limitations, and evolving symptoms including  declining functional status with +hx of recent falls .        Pt. would benefit from skilled Physical Therapy to address the above impairments to improve gait and balance in order to reduce risk of future falls.     Precautions: Fall Risk      OBJECTIVE:   Observation/Posture: pleasant 79 y/o; NAD  Sensation: denies numbness and tingling, light touch sensation grossly intact BUE and BLE      ROM:WFL BLE    Strength:   Strength (-/5)    R L   Hip       Flexion 4+ 5   Knee       Flexion (seated) 4+ 5     Extension 4+ 5   Foot / Ankle        DF 4+ 5     PF (seated) 5 5       Postural Control:  TBA    Functional Mobility:  4 square step test: 13 seconds  At risk of falls: no    Gait deviations: R steppage gait, decreased gait speed, slowed swing R with decreased push off. Mild, intermittent foot slap  R     Functional Gait Assessment   Item Description Score (0 worst, 3 best)    ___1___1. Gait Level Surface-  Time: ____7.1__seconds  ___3___2. Change in gait speed  ___3___3. Gait with horizontal head turns   ___3___4. Gait with vertical head turns  ___3___5. Gait and pivot turn  ___2___6. Step over obstacle  ___0___7. Gait with narrow base of support-  # of steps___0_____  ___1___8. Gait with eyes closed-  Time: ____19 sec__  ___3___9. Ambulating backward  ___2___10. Steps    TOTAL SCORE: ___21__/30    (less than 22/30 = fall risk)    Gait speed: 0.85 m/s  no AD    Today’s Treatment and Response: Pt education provided on exam findings and POC. Discussed AFO and rationale for recommendation. Pt instructed in and performed the following for HEP: supine: R quad set focus on isolation of muscle contraction without co-contraction of gluts; SLR R with quad set -VC's for sequence and proper performance- NMR 15 mins     Charges: PT Eval x1, 1 NMR      Total Timed Treatment: 40 min     Total Treatment Time: 40 min         PLAN OF CARE:    Goals to be met within POC or 90 days:  Pt to be independent in HEP   Pt will improve quality of gait in order to ambulate >0.9 m/s to improve level of safety with functional community ambulation.   Pt will improve R knee extension strength to 5/5 in order to reduce risk of buckling/improve gait stability when in stance phase on this side.     Frequency / Duration: Patient will be seen for 1 x/week or a total of 8 visits over a 90 day period.  Treatment will include: Balance Training, Gait Training,  Therapeutic Exercises; Neuromuscular Re-education; Therapeutic Activity; Patient education; Home exercise program instruction.      Education or treatment limitation: None  Rehab Potential:good    Patient was advised of these findings, precautions, and treatment options and has agreed to actively participate in planning and for this course of care.    Thank you for your referral.  If you have  any questions, please contact me at Dept: [phone]    Sincerely,  ALICE Krishnamurthy PT      Electronically signed by therapist: Kacey Cordova PT, NCS    Physician's certification required: Yes  I certify the need for these services furnished under this plan of treatment and while under my care.    X___________________________________________________ Date____________________    Certification From: 1/22/2024  To:4/21/2024 21st Century Cures Act Notice to Patient: Medical documents like this are made available to patients in the interest of transparency. However, be advised this is a medical document and it is intended as [id number] communication between your medical providers. This medical document may contain abbreviations, assessments, medical data, and results or other terms that are unfamiliar. Medical documents are intended to carry relevant information, facts as evident, and the clinical opinion of the practitioner. As such, this medical document may be written in language that appears blunt or direct. You are encouraged to contact your medical provider and/or [facility] Patient Experience if you have any questions about this medical document.

## 2024-01-29 ENCOUNTER — PATIENT MESSAGE (OUTPATIENT)
Dept: NEUROLOGY | Facility: CLINIC | Age: 79
End: 2024-01-29

## 2024-01-29 ENCOUNTER — TELEPHONE (OUTPATIENT)
Dept: NEUROLOGY | Facility: CLINIC | Age: 79
End: 2024-01-29

## 2024-01-29 NOTE — TELEPHONE ENCOUNTER
PA for Nurtec has been requested through epic.      Called patient pharmacy to check status of medication Diane explained patient needed a PA for Nurtec.

## 2024-01-29 NOTE — TELEPHONE ENCOUNTER
From: Destiney English  To: Christ Paulson  Sent: 1/29/2024 1:14 PM CST  Subject: Prescription for Nurtec    The doctor prescribed the new prescription insted of imitrex because of interaction withe another prescription I was taking but to date I still have not gotten my new prescription because the insurance company has a problem with it and will not fill it but have not told me what the problem is and why they can't fill it so wanted to know if you could please call them and see what the problem is    thank you Destiney English

## 2024-01-30 ENCOUNTER — APPOINTMENT (OUTPATIENT)
Dept: PHYSICAL THERAPY | Facility: HOSPITAL | Age: 79
End: 2024-01-30
Attending: Other
Payer: MEDICARE

## 2024-02-06 ENCOUNTER — OFFICE VISIT (OUTPATIENT)
Dept: PHYSICAL THERAPY | Facility: HOSPITAL | Age: 79
End: 2024-02-06
Attending: Other
Payer: MEDICARE

## 2024-02-06 PROCEDURE — 97112 NEUROMUSCULAR REEDUCATION: CPT

## 2024-02-06 NOTE — PATIENT INSTRUCTIONS
Do these exercises 2 times a day.     Lie on your back. Place a towel roll behind your right knee. Tighten front thigh muscle of your right knee by pushing back of knee into the towel. Hold for 5 seconds then relax. Do this 25 times. Try avoid engaging your buttock.     Lie on your back. Tighten right front thigh muscle as above. Keep knee and muscle tight as you lift whole leg off the bed 6-8 inches. Keep knee tight until foot is back on the bed, then relax everything. Do this 10 times.     Stand  with your bed/couch behind you for safety (Do not lean against it).  Stand tall and place feet next to each other so that they are touching.  Cross arms, close your eyes.  Balance for 30 seconds, paying attention to the feeling of your feet on the floor.    Stand next to bed or couch for safety. Bend over to \"scoop water\" then stand up tall pouring water over your head. Do this 5 times with the right foot in front and 5 times with the left. Make sure feet are not too close together.

## 2024-02-06 NOTE — PROGRESS NOTES
Diagnosis: abnormality of gait, imbalance, hx of CVA      Visit (# authorized):  8 per POC (Medicare)                        Referring Physician: Khurram    Precautions: fall risk    Medication changes since last visit?:  No    Subjective: Pt reports compliance to HEP. She reports she has not heard from the orthotist yet to schedule a consultation appointment.     Objective:    Date  2/6          Visit Number  2          NMR x          Ther EX           Ther Act           Gait Training           CRM            Manual             Additional Treatment Information:    NMR   Ambulation through complex, busy environment- indoors- community distances, 1 rest break during, 1 post     Borden Balance Scale     Item Description Score (0 worst-4 best)    ___4___1. Sitting to standing  ___4___2. Standing unsupported   ___4___3. Sitting unsupported   ___4___4. Standing to sitting   ___4___5. Transfers   ___4___6. Standing with eyes closed   ___3___7. Standing with feet together   ___3___8. Reaching forward with outstretched arm   ___4___9. Retrieving object from floor   ___4__10. Turning to look behind   ___4__11. Turning 360 degrees   ___4__12. Placing alternate foot on stool   ___2__13. Standing with one foot in front   ___3__14. Standing on one foot     Total ___51__/56 (less than 46/56 = fall risk)    Romberg EC 30 seconds (increased postural)  SLS EO: R 6 sec, L 2 sec    Step stance: fwd flexion (bending forward to floor to \"scoop water\")<>upright standing x5 ea foot in front- demo with return demo     Patient Education:  HEP updated, POC discussed re: AFO.       Assessment:   Pt is not considered a fall risk based on BBS. Demo's generally good stability with bending task today in clinic. Verbalizes and demo's understanding of updated HEP and POC re:bracing.     Plan: TKE    Charges: 3 NMR       Total Timed Treatment: 38  min  Total Treatment Time: 42 min      21st Century Cures Act Notice to Patient: Medical documents like  this are made available to patients in the interest of transparency. However, be advised this is a medical document and it is intended as nchd-gf-wjiq communication between your medical providers. This medical document may contain abbreviations, assessments, medical data, and results or other terms that are unfamiliar. Medical documents are intended to carry relevant information, facts as evident, and the clinical opinion of the practitioner. As such, this medical document may be written in language that appears blunt or direct. You are encouraged to contact your medical provider and/or [facility] Patient Experience if you have any questions about this medical document.

## 2024-02-07 ENCOUNTER — TELEPHONE (OUTPATIENT)
Dept: SURGERY | Facility: CLINIC | Age: 79
End: 2024-02-07

## 2024-02-07 NOTE — TELEPHONE ENCOUNTER
Overdue result reminder received. While creating encounter, pt scheduled her imaging that was needed. Nothing further needed. Closing encounter.

## 2024-02-08 ENCOUNTER — HOSPITAL ENCOUNTER (OUTPATIENT)
Dept: MRI IMAGING | Facility: HOSPITAL | Age: 79
Discharge: HOME | End: 2024-02-08
Attending: Other
Payer: MEDICARE

## 2024-02-08 DIAGNOSIS — I63.412 CEREBRAL INFARCTION DUE TO EMBOLISM OF LEFT MIDDLE CEREBRAL ARTERY (HCC): ICD-10-CM

## 2024-02-08 PROCEDURE — 70551 MRI BRAIN STEM W/O DYE: CPT | Performed by: OTHER

## 2024-02-09 ENCOUNTER — TELEPHONE (OUTPATIENT)
Dept: NEUROLOGY | Facility: CLINIC | Age: 79
End: 2024-02-09

## 2024-02-09 NOTE — TELEPHONE ENCOUNTER
----- Message from Christ Paulson MD sent at 2/9/2024  6:13 AM CST -----  Please let patient know that MRI brain didn't show significant changes as compared to the prior MRI.

## 2024-02-14 ENCOUNTER — LAB ENCOUNTER (OUTPATIENT)
Dept: LAB | Age: 79
End: 2024-02-14
Attending: FAMILY MEDICINE
Payer: MEDICARE

## 2024-02-14 DIAGNOSIS — R53.83 FATIGUE, UNSPECIFIED TYPE: ICD-10-CM

## 2024-02-14 DIAGNOSIS — F41.1 GENERALIZED ANXIETY DISORDER: ICD-10-CM

## 2024-02-14 DIAGNOSIS — E55.9 VITAMIN D DEFICIENCY: ICD-10-CM

## 2024-02-14 DIAGNOSIS — I10 ESSENTIAL HYPERTENSION: ICD-10-CM

## 2024-02-14 LAB
ALBUMIN SERPL-MCNC: 4.8 G/DL (ref 3.2–4.8)
ALBUMIN/GLOB SERPL: 1.8 {RATIO} (ref 1–2)
ALP LIVER SERPL-CCNC: 109 U/L
ALT SERPL-CCNC: 22 U/L
ANION GAP SERPL CALC-SCNC: 9 MMOL/L (ref 0–18)
AST SERPL-CCNC: 29 U/L (ref ?–34)
BASOPHILS # BLD AUTO: 0.04 X10(3) UL (ref 0–0.2)
BASOPHILS NFR BLD AUTO: 0.6 %
BILIRUB SERPL-MCNC: 0.4 MG/DL (ref 0.2–1.1)
BUN BLD-MCNC: 22 MG/DL (ref 9–23)
BUN/CREAT SERPL: 23.7 (ref 10–20)
CALCIUM BLD-MCNC: 9.5 MG/DL (ref 8.7–10.4)
CHLORIDE SERPL-SCNC: 102 MMOL/L (ref 98–112)
CO2 SERPL-SCNC: 27 MMOL/L (ref 21–32)
CREAT BLD-MCNC: 0.93 MG/DL
DEPRECATED RDW RBC AUTO: 47.9 FL (ref 35.1–46.3)
EGFRCR SERPLBLD CKD-EPI 2021: 64 ML/MIN/1.73M2 (ref 60–?)
EOSINOPHIL # BLD AUTO: 0.02 X10(3) UL (ref 0–0.7)
EOSINOPHIL NFR BLD AUTO: 0.3 %
ERYTHROCYTE [DISTWIDTH] IN BLOOD BY AUTOMATED COUNT: 13.6 % (ref 11–15)
FASTING STATUS PATIENT QL REPORTED: YES
GLOBULIN PLAS-MCNC: 2.7 G/DL (ref 2.8–4.4)
GLUCOSE BLD-MCNC: 96 MG/DL (ref 70–99)
HCT VFR BLD AUTO: 38.2 %
HGB BLD-MCNC: 12.5 G/DL
IMM GRANULOCYTES # BLD AUTO: 0.01 X10(3) UL (ref 0–1)
IMM GRANULOCYTES NFR BLD: 0.2 %
LYMPHOCYTES # BLD AUTO: 0.95 X10(3) UL (ref 1–4)
LYMPHOCYTES NFR BLD AUTO: 15.4 %
MCH RBC QN AUTO: 31.2 PG (ref 26–34)
MCHC RBC AUTO-ENTMCNC: 32.7 G/DL (ref 31–37)
MCV RBC AUTO: 95.3 FL
MONOCYTES # BLD AUTO: 0.43 X10(3) UL (ref 0.1–1)
MONOCYTES NFR BLD AUTO: 7 %
NEUTROPHILS # BLD AUTO: 4.71 X10 (3) UL (ref 1.5–7.7)
NEUTROPHILS # BLD AUTO: 4.71 X10(3) UL (ref 1.5–7.7)
NEUTROPHILS NFR BLD AUTO: 76.5 %
OSMOLALITY SERPL CALC.SUM OF ELEC: 289 MOSM/KG (ref 275–295)
PLATELET # BLD AUTO: 274 10(3)UL (ref 150–450)
POTASSIUM SERPL-SCNC: 4.6 MMOL/L (ref 3.5–5.1)
PROT SERPL-MCNC: 7.5 G/DL (ref 5.7–8.2)
RBC # BLD AUTO: 4.01 X10(6)UL
SODIUM SERPL-SCNC: 138 MMOL/L (ref 136–145)
VIT B12 SERPL-MCNC: 1084 PG/ML (ref 211–911)
VIT D+METAB SERPL-MCNC: 73.4 NG/ML (ref 30–100)
WBC # BLD AUTO: 6.2 X10(3) UL (ref 4–11)

## 2024-02-14 PROCEDURE — 82607 VITAMIN B-12: CPT

## 2024-02-14 PROCEDURE — 82306 VITAMIN D 25 HYDROXY: CPT

## 2024-02-14 PROCEDURE — 85025 COMPLETE CBC W/AUTO DIFF WBC: CPT

## 2024-02-14 PROCEDURE — 36415 COLL VENOUS BLD VENIPUNCTURE: CPT

## 2024-02-14 PROCEDURE — 80053 COMPREHEN METABOLIC PANEL: CPT

## 2024-02-20 ENCOUNTER — OFFICE VISIT (OUTPATIENT)
Dept: PHYSICAL THERAPY | Facility: HOSPITAL | Age: 79
End: 2024-02-20
Attending: Other
Payer: MEDICARE

## 2024-02-20 PROCEDURE — 97110 THERAPEUTIC EXERCISES: CPT

## 2024-02-20 PROCEDURE — 97112 NEUROMUSCULAR REEDUCATION: CPT

## 2024-02-20 NOTE — PATIENT INSTRUCTIONS
Do these exercises 2 times a day.     Lie on your back. Place a towel roll behind your right knee. Tighten front thigh muscle of your right knee by pushing back of knee into the towel. Hold for 5 seconds then relax. Do this 25 times. Try avoid engaging your buttock.     Lie on your back. Tighten right front thigh muscle as above. Keep knee and muscle tight as you lift whole leg off the bed 6-8 inches. Keep knee tight until foot is back on the bed, then relax everything. Do this 10 times.     Stand  with your bed/couch behind you for safety (Do not lean against it).  Stand tall and place feet next to each other so that they are touching.  Cross arms, close your eyes.  Balance for 30 seconds, paying attention to the feeling of your feet on the floor.    Stand next to bed or couch for safety. Bend over to \"scoop water\" then stand up tall pouring water over your head. Do this 5 times with the right foot in front and 5 times with the left. Make sure feet are not too close together.     Put knotted end closed in door low down by the level of your knee. Have someone help you step through green band with right leg. Face the door and have the green band looped just behind your right knee joint. It will make you feel like the knee is buckling. Hold on to chair for balance. Tighten knee, straightening your leg against the resistance. Hold for 5 seconds, then slowly relax. 20 times. Then have someone help you step out of the band.     Sit in chair. Using right foot and ankle- \"Write\" the ABC's. A-Z.

## 2024-02-20 NOTE — PROGRESS NOTES
Diagnosis: abnormality of gait, imbalance, hx of CVA      Visit (# authorized):  8 per POC (Medicare)                        Referring Physician: Khurram    Precautions: fall risk    Medication changes since last visit?:  No    Subjective: Feels as if the knee buckling is improving overall. HEP going well.     Objective:    Date  2/6 2/20         Visit Number  2 3         NMR x x         Ther EX  x         Ther Act           Gait Training           CRM            Manual             Additional Treatment Information:    NMR (20 mins)  Ambulation through complex, busy environment- indoors- community distances, 1 rest break during, 1 post     Therapeutic Exercise (18 mins):  TKE green TB- demo with return demo - 5 sec hold x20 -R only   Seated ankle ABC's R only   Step ups 6 in step BUE support    Fwd x10 R only    Side step x10 B   Standing hip EXT -fitness slider- x10 B     Patient Education:  HEP updated, see pt instructions      Assessment:   Demo's understanding of exercises provided for home. No adverse response to activities in session.     Plan: therapy on hold, plan to return once brace is received.     Charges: 2 NMR, 1 TE        Total Timed Treatment: 38  min  Total Treatment Time: 39 min      21st Century Cures Act Notice to Patient: Medical documents like this are made available to patients in the interest of transparency. However, be advised this is a medical document and it is intended as pvsu-an-onsj communication between your medical providers. This medical document may contain abbreviations, assessments, medical data, and results or other terms that are unfamiliar. Medical documents are intended to carry relevant information, facts as evident, and the clinical opinion of the practitioner. As such, this medical document may be written in language that appears blunt or direct. You are encouraged to contact your medical provider and/or [facility] Patient Experience if you have any questions  about this medical document.

## 2024-02-27 ENCOUNTER — APPOINTMENT (OUTPATIENT)
Dept: PHYSICAL THERAPY | Facility: HOSPITAL | Age: 79
End: 2024-02-27
Attending: Other
Payer: MEDICARE

## 2024-03-05 ENCOUNTER — APPOINTMENT (OUTPATIENT)
Dept: PHYSICAL THERAPY | Facility: HOSPITAL | Age: 79
End: 2024-03-05
Attending: Other
Payer: MEDICARE

## 2024-03-12 ENCOUNTER — APPOINTMENT (OUTPATIENT)
Dept: PHYSICAL THERAPY | Facility: HOSPITAL | Age: 79
End: 2024-03-12
Attending: Other
Payer: MEDICARE

## 2024-03-18 ENCOUNTER — HOSPITAL ENCOUNTER (OUTPATIENT)
Dept: MRI IMAGING | Facility: HOSPITAL | Age: 79
Discharge: HOME | End: 2024-03-18
Payer: MEDICARE

## 2024-03-18 DIAGNOSIS — I67.1 CEREBRAL ANEURYSM (HCC): ICD-10-CM

## 2024-03-18 PROCEDURE — A9575 INJ GADOTERATE MEGLUMI 0.1ML: HCPCS

## 2024-03-18 PROCEDURE — 70546 MR ANGIOGRAPH HEAD W/O&W/DYE: CPT

## 2024-03-18 RX ORDER — GADOTERATE MEGLUMINE 376.9 MG/ML
15 INJECTION INTRAVENOUS
Status: COMPLETED | OUTPATIENT
Start: 2024-03-18 | End: 2024-03-18

## 2024-03-18 RX ORDER — HYDROCHLOROTHIAZIDE 12.5 MG/1
12.5 TABLET ORAL DAILY
Qty: 90 TABLET | Refills: 3 | Status: SHIPPED | OUTPATIENT
Start: 2024-03-18

## 2024-03-18 RX ADMIN — GADOTERATE MEGLUMINE 15 ML: 376.9 INJECTION INTRAVENOUS at 08:25:00

## 2024-03-18 NOTE — TELEPHONE ENCOUNTER
Refill passed per [facility] protocol.  Medication is listed as patient reported.    Requested Prescriptions   Pending Prescriptions Disp Refills    HYDROCHLOROTHIAZIDE 12.5 MG Oral Tab [Pharmacy Med Name: Hydrochlorothiazide 12.5 Mg Tab Acta] 90 tablet 0     Sig: Take 1 tablet (12.5 mg total) by mouth daily.       Hypertension Medications Protocol Passed - 3/16/2024  1:31 AM        Passed - CMP or BMP in past 12 months        Passed - Last BP reading less than 140/90     BP Readings from Last 1 Encounters:   02/14/24 115/77               Passed - In person appointment or virtual visit in the past 12 mos or appointment in next 3 mos     Recent Outpatient Visits              [facility] weeks [facility]    Office Visit    1 month ago Generalized anxiety disorder    [facility] Nik Rebolledo MD    Office Visit    1 month ago     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 follow-up after MRA procedure               Passed - EGFRCR or GFRNAA > 50     GFR Evaluation  EGFRCR: 64 , resulted on 2/14/2024             Recent Outpatient Visits              3 weeks ago     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

## 2024-03-19 ENCOUNTER — APPOINTMENT (OUTPATIENT)
Dept: PHYSICAL THERAPY | Facility: HOSPITAL | Age: 79
End: 2024-03-19
Attending: Other
Payer: MEDICARE

## 2024-03-26 ENCOUNTER — APPOINTMENT (OUTPATIENT)
Dept: PHYSICAL THERAPY | Facility: HOSPITAL | Age: 79
End: 2024-03-26
Attending: Other
Payer: MEDICARE

## 2024-03-27 ENCOUNTER — TELEPHONE (OUTPATIENT)
Dept: PHYSICAL THERAPY | Facility: HOSPITAL | Age: 79
End: 2024-03-27

## 2024-03-28 ENCOUNTER — OFFICE VISIT (OUTPATIENT)
Dept: SURGERY | Facility: CLINIC | Age: 79
End: 2024-03-28
Payer: MEDICARE

## 2024-03-28 VITALS
HEART RATE: 72 BPM | BODY MASS INDEX: 31.15 KG/M2 | WEIGHT: 165 LBS | SYSTOLIC BLOOD PRESSURE: 118 MMHG | DIASTOLIC BLOOD PRESSURE: 80 MMHG | HEIGHT: 61 IN

## 2024-03-28 DIAGNOSIS — I67.1 INTRACRANIAL ANEURYSM (HCC): Primary | ICD-10-CM

## 2024-03-28 PROCEDURE — 99214 OFFICE O/P EST MOD 30 MIN: CPT | Performed by: RADIOLOGY

## 2024-03-28 NOTE — PROGRESS NOTES
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    She underwent treatment with a pipeline embolization device and a single intra aneurysmal coil.  Her postoperative course was complicated by some right-sided weakness and evidence for small embolic phenomena in the left cerebral hemisphere.  Fortunately, over the next 6 weeks, she made a complete recovery back to normal.    She had a 6-month follow-up angiogram which demonstrated near complete occlusion of the aneurysm.  At that point, we decreased the Brilinta to 60 mg daily with a daily baby aspirin as well.    Today she returns with no neurologic complaints or deficits.  She is here to discuss the results of her most recent imaging follow-up.    Review of Systems  Changes since LOV: N/A  Anticoagulants: Aspirin 81mg AND Brilinta 60mg   Statin: YES   Hand Dominance: right  General: no symptoms reported  Neuro: no symptoms reported.  She has occasional migraine headaches.  Head: no symptoms reported  Musculoskeletal: no symptoms reported  Cardiovascular: no symptoms reported  Gastrointestinal: no symptoms reported  Genitourinary: no symptoms reported  Respiratory: no symptoms reported  Eyes: no symptoms reported  Skin: no symptoms reported  Mouth & throat: no symptoms reported  Neck: no symptoms reported  Nose: no symptoms reported  Psychiatric: no symptoms reported    UPDATED MEDICAL/SURGICAL HISTORY     No new medical or surgical history.    Past Medical History:   Diagnosis Date    Anxiety     Aphasia     Back problem     Scoliosis    BRCA negative     Breast cancer (HCC) 1995    left  breast mastectomy, lymph nodes    Clostridioides difficile infection     Esophageal reflux     Essential hypertension     Hearing impairment     kourtney tinnitus    High blood pressure     High cholesterol     History of stomach ulcers     Hyperlipidemia     IBS (irritable bowel syndrome)     diet controlled    Migraines     Osteoarthritis     Pneumonia due to organism     twice    PONV (postoperative nausea and vomiting)     after knee replacement/only time    Psoriatic arthritis (HCC)     Thyroid nodule     Thyroid nodule 05/25/2023    Vertigo     Visual impairment     glasses      Past Surgical History:   Procedure Laterality Date    BRAIN ANEURYSM REPR, COMPLX  3/27/2023         BREAST RECONSTRUC W TISS EXPANDR Left 1995    COLONOSCOPY  01/2016    DILATION/CURETTAGE,DIAGNOSTIC  1997    due to uterine polyp    EGD  01/2016    IR ANGIOGRAM CEREBRAL CAROTID UNILATERAL  3/27/2023         MASTECTOMY LEFT Left 1995    TOTAL KNEE REPLACEMENT  03/2019    left    TOTAL KNEE REPLACEMENT Right 08/19/2019      History   Drug Use No        Current Outpatient Medications   Medication Sig Dispense Refill    OPHTHALMIC IRRIGATION SOLUTION OP Apply to eye. Sustain drops      hydroCHLOROthiazide 12.5 MG Oral Tab Take 1 tablet (12.5 mg total) by mouth daily. 90 tablet 3    venlafaxine ER (EFFEXOR XR) 37.5 MG Oral Capsule SR 24 Hr Take 1 capsule (37.5 mg total) by mouth daily. Add to effexor xr  150mg daily 90 capsule 0    ticagrelor (BRILINTA) 60 MG Oral Tab TAKE ONE TABLET BY MOUTH ONE TIME DAILY 30 tablet 5    busPIRone HCl 30 MG Oral Tab Take 0.5 tablets (15 mg total) by mouth in the morning and 0.5 tablets (15 mg total) before bedtime. 90 tablet 3    losartan 100 MG Oral Tab Take 1 tablet (100 mg total) by mouth daily. 90 tablet 3    rosuvastatin 10 MG Oral Tab Take 1 tablet (10 mg total) by mouth nightly. 90 tablet 3    Nystatin 000351 UNIT/GM External Powder Apply 1 Application topically 2 (two) times daily. (Patient taking  differently: Apply 1 Application topically 2 (two) times daily as needed.) 60 g 0    albuterol (VENTOLIN HFA) 108 (90 Base) MCG/ACT Inhalation Aero Soln Inhale 2 puffs into the lungs every 4 (four) hours as needed for Wheezing. 18 g 0    venlafaxine  MG Oral Capsule SR 24 Hr Take 1 capsule (150 mg total) by mouth daily. 90 capsule 3    Cholecalciferol (VITAMIN D) 50 MCG (2000 UT) Oral Tab Take 1 tablet by mouth daily.      baclofen 10 MG Oral Tab Take 1-2 TABLETS BY MOUTH (10-20mg) at night as needed 180 tablet 1    Melatonin 5 MG Oral Tab Take 1 tablet (5 mg total) by mouth nightly.      Multiple Vitamins-Minerals (PRESERVISION AREDS 2) Oral Cap Take 1 capsule by mouth daily.      gabapentin 100 MG Oral Cap 1 capsule (100 mg total). 300 mg  capsule 3    aspirin 81 MG Oral Tab EC Take 1 tablet (81 mg total) by mouth daily. 7 tablet 0    loratadine 10 MG Oral Tab Take 1 tablet (10 mg total) by mouth daily. (Patient taking differently: Take 1 tablet (10 mg total) by mouth as needed for Allergies.) 90 tablet 1    Acetaminophen  MG Oral Tab CR Take 1 tablet (650 mg total) by mouth every 8 (eight) hours as needed for Pain.      Esomeprazole Magnesium (NEXIUM 24HR OR) Take 20 mg by mouth every evening.      Rimegepant Sulfate (NURTEC) 75 MG Oral Tablet Dispersible Take 75 mg by mouth as needed. Take one tablet at onset of migraine.  Maximum dose in 24 hours is 1 tablet (75mg). (Patient not taking: Reported on 2/14/2024) 8 tablet 0    Menthol-Methyl Salicylate (THERA-GESIC) 0.5-15 % External Cream Apply 1 Application topically 3 (three) times daily as needed. (Patient not taking: Reported on 3/28/2024)        Blueberry, Cephalexin, Latex, Peas, Penicillin g, Penicillins, Meloxicam, Procainamide, Other, Peach, and Tomato     Family History   Problem Relation Age of Onset    Cancer Father         lung    Heart Disease Mother         congestive heart failure    Other (Other) Mother         COPD    Breast  Cancer Self     Breast Cancer Sister 68    Breast Cancer Maternal Aunt         40s    Breast Cancer Maternal Cousin Female         40s      History   Smoking Status    Former    Years: 6.00    Types: Cigarettes    Quit date: 3/9/1994   Smokeless Tobacco    Never      History   Alcohol Use Not Currently     Comment: Socially        PHYSICAL EXAM   /80 (BP Location: Right arm, Patient Position: Sitting, Cuff Size: adult)   Pulse 72   Ht 61\"   Wt 165 lb (74.8 kg)   BMI 31.18 kg/m²   General appearance: 79-year-old  woman, moderately overweight, alert and oriented x 3 in no acute distress.  Her speech is clear and fluent and her recent and remote memory are intact.  She was accompanied by her .    Physical Exam  Constitutional:       General: She is not in acute distress.     Appearance: Normal appearance. She is not ill-appearing.   HENT:      Head: Normocephalic and atraumatic.   Eyes:      Extraocular Movements: Extraocular movements intact.      Conjunctiva/sclera: Conjunctivae normal.      Pupils: Pupils are equal, round, and reactive to light.      Comments: Visual fields were grossly normal to confrontation.   Neck:      Vascular: No carotid bruit.   Cardiovascular:      Rate and Rhythm: Normal rate and regular rhythm.      Pulses: Normal pulses.      Heart sounds: Normal heart sounds.   Pulmonary:      Effort: Pulmonary effort is normal.      Breath sounds: Normal breath sounds.   Musculoskeletal:         General: No tenderness, deformity or signs of injury. Normal range of motion.      Cervical back: Normal range of motion and neck supple.   Skin:     General: Skin is warm and dry.      Findings: No erythema or rash.   Neurological:      General: No focal deficit present.      Mental Status: She is alert and oriented to person, place, and time. Mental status is at baseline.      Cranial Nerves: No cranial nerve deficit.      Sensory: No sensory deficit.      Motor: No weakness.       Coordination: Romberg sign negative. Coordination normal. Heel to Shin Test normal.      Gait: Gait normal.   Psychiatric:         Mood and Affect: Mood normal.         Behavior: Behavior normal.         Thought Content: Thought content normal.         Judgment: Judgment normal.          Review of Imaging & Labs:   MRI of the head without and with contrast dated March 18, 2024-official impression:  CONCLUSION:   1. Postprocedural changes of left posterior communicating artery aneurysm stent assisted coiling are again identified.  Small approximate 4 x 3 mm focus of flow related signal and enhancement at the coil mass has decreased since comparison MR   arteriography of the head from July, 2023 (previously 6 x 5 mm).  If further intervention is deferred, continued imaging surveillance is recommended.   2. Stable multiple intracranial vascular variants as detailed.     6-month cerebral angiogram dated September 21, 2023-official impression:  IMPRESSION:   There has been progressive occlusion of the previously treated left posterior communicating artery aneurysm. There is no evidence of in stent stenosis or thrombosis.       My review: I concur with the findings noted above.  The previous angiogram in September had demonstrated near complete occlusion of the aneurysm and the current MRA represents an improvement from the prior MRA.    ASSESSMENT & PLAN     Destiney was seen today for follow - up.    Diagnoses and all orders for this visit:    Intracranial aneurysm (HCC)      1.  Destiney continues to do very well posttreatment.  The imaging is consistent with progressive occlusion of the aneurysm.    2.  Recommendations/plan: Continue baby aspirin and 60 mg of Brilinta daily.  Repeat MRA of the head without contrast in 1 year.  At that point, if there is further improvement, we would likely stop the Brilinta and continue the baby aspirin.  No follow-ups on file.

## 2024-03-28 NOTE — PATIENT INSTRUCTIONS
Refill policies:    Allow 2-3 business days for refills; controlled substances may take longer.  Contact your pharmacy at least 5 days prior to running out of medication and have them send an electronic request or submit request through the “request refill” option in your [employer] account.  Refills are not addressed on weekends; covering physicians do not authorize routine medications on weekends.  No narcotics or controlled substances are refilled after noon on Fridays or by on call physicians.  By law, narcotics must be electronically prescribed.  A 30 day supply with no refills is the maximum allowed.  If your prescription is due for a refill, you may be due for a follow up appointment.  To best provide you care, patients receiving routine medications need to be seen at least once a year.  Patients receiving narcotic/controlled substance medications need to be seen at least once every 3 months.  In the event that your preferred pharmacy does not have the requested medication in stock (e.g. Backordered), it is your responsibility to find another pharmacy that has the requested medication available.  We will gladly send a new prescription to that pharmacy at your request.    Scheduling Tests:    If your physician has ordered radiology tests such as MRI or CT scans, please contact Central Scheduling at [phone] right away to schedule the test.  Once scheduled, the [facility] Centralized Referral Team will work with your insurance carrier to obtain pre-certification or prior authorization.  Depending on your insurance carrier, approval may take 3-10 days.  It is highly recommended patients assure they have received an authorization before having a test performed.  If test is done without insurance authorization, patient may be responsible for the entire amount billed.      Precertification and Prior Authorizations:  If your physician has recommended that you have a procedure or additional testing performed the [facility]  Centralized Referral Team will contact your insurance carrier to obtain pre-certification or prior authorization.    You are strongly encouraged to contact your insurance carrier to verify that your procedure/test has been approved and is a COVERED benefit.  Although the [facility] Centralized Referral Team does its due diligence, the insurance carrier gives the disclaimer that \"Although the procedure is authorized, this does not guarantee payment.\"    Ultimately the patient is responsible for payment.   Thank you for your understanding in this matter.  Paperwork Completion:  If you require FMLA or disability paperwork for your recovery, please make sure to either drop it off or have it faxed to our office at [phone]. Be sure the form has your name and date of birth on it.  The form will be faxed to our Forms Department and they will complete it for you.  There is a 25$ fee for all forms that need to be filled out.  Please be aware there is a 10-14 day turnaround time.  You will need to sign a release of information (NATA) form if your paperwork does not come with one.  You may call the Forms Department with any questions at [phone].  Their fax number is [phone].

## 2024-03-28 NOTE — PROGRESS NOTES
[facility] rooming For established patients  Patient here for: F/U - 8mm L PCOMM Aneurysm  Last office visit on: 10.12.24  New imaging since last seen: MRA HEAD W+WO - 3.18.24  Last procedure: S/P coil emb. w/ flow diverter stent - 9.21.23  Changes since LOV: N/A  Anticoagulants: Aspirin 81mg AND Brilinta 60mg   Statin: YES      Review of Systems:    Hand Dominance: right  General: no symptoms reported  Neuro: no symptoms reported  Head: no symptoms reported  Musculoskeletal: no symptoms reported  Cardiovascular: no symptoms reported  Gastrointestinal: no symptoms reported  Genitourinary: no symptoms reported  Respiratory: no symptoms reported  Eyes: no symptoms reported  Skin: no symptoms reported  Mouth & throat: no symptoms reported  Neck: no symptoms reported  Nose: no symptoms reported  Psychiatric: no symptoms reported

## 2024-03-29 ENCOUNTER — TELEPHONE (OUTPATIENT)
Dept: SURGERY | Facility: CLINIC | Age: 79
End: 2024-03-29

## 2024-04-01 ENCOUNTER — TELEPHONE (OUTPATIENT)
Dept: PHYSICAL THERAPY | Facility: HOSPITAL | Age: 79
End: 2024-04-01

## 2024-04-02 ENCOUNTER — APPOINTMENT (OUTPATIENT)
Dept: PHYSICAL THERAPY | Facility: HOSPITAL | Age: 79
End: 2024-04-02
Attending: Other
Payer: MEDICARE

## 2024-04-02 DIAGNOSIS — I67.1 INTRACRANIAL ANEURYSM (HCC): Primary | ICD-10-CM

## 2024-04-02 RX ORDER — TICAGRELOR 60 MG/1
60 TABLET ORAL DAILY
Qty: 30 TABLET | Refills: 3 | Status: SHIPPED | OUTPATIENT
Start: 2024-04-02

## 2024-04-02 NOTE — TELEPHONE ENCOUNTER
Medication: Brilinta 60mg  Date of last refill: 10.10.23 (#30/5)  [place]  Last office visit: 3.28.24  Due back to clinic per last office note: March of 2025  Date next office visit scheduled:    Future Appointments   Date Time Provider Department Center   4/5/2024 12:00 PM Venessa Barrett [facility]   5/2/2024 11:15 AM Degeorge, Kacey, PT [facility]   5/9/2024 11:15 AM Degeorge, Kacey, PT [facility]   5/16/2024 11:15 AM Degeorge, Kacey, PT [facility]   5/23/2024 11:15 AM Degeorge, Kacey, PT [facility]   5/29/2024 10:15 AM Degeorge, Kacey, PT [facility]           Last OV note recommendation:    \"1.  Destiney continues to do very well posttreatment.  The imaging is consistent with progressive occlusion of the aneurysm.     2.  Recommendations/plan: Continue baby aspirin and 60 mg of Brilinta daily.  Repeat MRA of the head without contrast in 1 year.  At that point, if there is further improvement, we would likely stop the Brilinta and continue the baby aspirin.  No follow-ups on file.  \"

## 2024-04-18 ENCOUNTER — MED REC SCAN ONLY (OUTPATIENT)
Dept: NEUROLOGY | Facility: CLINIC | Age: 79
End: 2024-04-18

## 2024-05-02 ENCOUNTER — OFFICE VISIT (OUTPATIENT)
Dept: PHYSICAL THERAPY | Facility: HOSPITAL | Age: 79
End: 2024-05-02
Attending: FAMILY MEDICINE
Payer: MEDICARE

## 2024-05-02 PROCEDURE — 97112 NEUROMUSCULAR REEDUCATION: CPT

## 2024-05-02 PROCEDURE — 97535 SELF CARE MNGMENT TRAINING: CPT

## 2024-05-02 NOTE — PROGRESS NOTES
Discharge Summary    Referring Physician:  Khurram    Diagnosis: abnormality of gait, imbalance, hx of CVA        Pt has attended 4 visits in Physical Therapy.     Subjective: Pt reports receiving R AFO. She notes mild discomfort in her heel rubbing on the back of the heel- she plans to call orthotist. She is finding the brace helpful overall with more gait stability. She also feels as if her leg has gotten stronger. Remains compliant to HEP- continues to do Anival Chi.     Assessment: Pt demonstrates significant improvement in quality of gait with use of AFO though no change in gait speed is noted. Her strength is now full on the R per MMT. She is no longer considered a fall risk based on FGA. No additional skilled needs at this time. Pt is to be discharged from therapy.      Objective:     *values from initial testing documented in parenthesis below    Strength:   Strength (-/5)    R   Hip      Flexion 5 (4+)   Knee      Flexion (seated) 5 (4+)     Extension 5 (4+)         Functional Gait Assessment   Item Description Score (0 worst, 3 best)    ___1___1. Gait Level Surface-  Time: ____7.1 (unchanged)__seconds  ___3___2. Change in gait speed  ___3___3. Gait with horizontal head turns   ___3___4. Gait with vertical head turns  ___3___5. Gait and pivot turn  ___3___6. Step over obstacle  ___0___7. Gait with narrow base of support-  # of steps___0 (unchanged)_____  ___1___8. Gait with eyes closed-  Time: _15.7 (19) sec__  ___3___9. Ambulating backward  ___2___10. Steps    TOTAL SCORE: ___22 (21)__/30    (less than 22/30 = fall risk)    Gait speed: 0.85 m/s  no AD (unchanged)    Discussed don/doff of AFO. Foam added to heel and trialed in session with improvement in discomfort. Advised to contact orthotist for more permanent placement of foam. Discussed putting foot in AFO prior to putting on shoe vs all at once. Confirmed not to wear when driving as ankle is not articulated.     Goals:   Goals to be met within POC or  90 days:  Pt to be independent in HEP Goal met  Pt will improve quality of gait in order to ambulate >0.9 m/s to improve level of safety with functional community ambulation. Not met  Pt will improve R knee extension strength to 5/5 in order to reduce risk of buckling/improve gait stability when in stance phase on this side. Goal met       Charges: 1 NMR, 1 self care      Total timed treatment: 24 mins  Total treatment time: 24 mins      Plan: DC PT    Patient was advised of these findings, precautions, and treatment options and has agreed to actively participate in planning and for this course of care.    Thank you for your referral. If you have any questions, please contact me at Dept: [phone].    Sincerely,    Kacey Vazquez PT, NCS    Electronically signed by therapist: Kacey Vazquez PT    21st [employer] Cures Act Notice to Patient: Medical documents like this are made available to patients in the interest of transparency. However, be advised this is a medical document and it is intended as vuii-ee-nbyz communication between your medical providers. This medical document may contain abbreviations, assessments, medical data, and results or other terms that are unfamiliar. Medical documents are intended to carry relevant information, facts as evident, and the clinical opinion of the practitioner. As such, this medical document may be written in language that appears blunt or direct. You are encouraged to contact your medical provider and/or [facility] Experience if you have any questions about this medical document.

## 2024-05-06 ENCOUNTER — PATIENT MESSAGE (OUTPATIENT)
Dept: NEUROLOGY | Facility: CLINIC | Age: 79
End: 2024-05-06

## 2024-05-06 ENCOUNTER — PATIENT MESSAGE (OUTPATIENT)
Dept: SURGERY | Facility: CLINIC | Age: 79
End: 2024-05-06

## 2024-05-06 NOTE — TELEPHONE ENCOUNTER
Reply sent to patient informing her that it is best to address migraine medication approval process with Neurology doctor who is the ordering provider.

## 2024-05-06 NOTE — TELEPHONE ENCOUNTER
Prior authorization approved  Payer: [employer] Part D Case ID: [id number]    [phone]    [phone]  Note from payer: Request Reference Number: [id number].  NURTEC       TAB 75MG ODT is approved through 12/31/2024.  Your patient may now fill this prescription and it will be covered.  Approval Details    Authorization number: [id number]  Authorized from May 6, 2024 to December 31, 2024

## 2024-05-06 NOTE — TELEPHONE ENCOUNTER
Attempted to call pharmacy to have them get Nurtec prescription ready for patient. Pharmacist at lunch.    Called patient to notify of PA approval. Advised to call pharmacy to request refill & to call office back if any problems getting medication filled. Pt verbalized understanding.

## 2024-05-06 NOTE — TELEPHONE ENCOUNTER
From: Destiney English  To: Jb Lyons  Sent: 5/6/2024 12:43 PM CDT  Subject: Prescription for Nurtec    I saw DR DIAZ at [place] the end of January and still have not received my prescription and have not told me why apparently they did not approve it and don't know why so every time I have a migrain I have to just suffer and hope you can ge me something for my migrains because I am miserable

## 2024-05-06 NOTE — TELEPHONE ENCOUNTER
Noted patient has messaged asking for additional assistance in obtaining migraine medication.  Noted that Dr. Paulson's nurse has informed her that their staff will attempt a 2nd prior auth for Nurtec.      At last office visit with Dr. Lyons on 3.28.24:    \"ASSESSMENT & PLAN      Destiney was seen today for follow - up.     Diagnoses and all orders for this visit:     Intracranial aneurysm (HCC)     1.  Destiney continues to do very well posttreatment.  The imaging is consistent with progressive occlusion of the aneurysm.     2.  Recommendations/plan: Continue baby aspirin and 60 mg of Brilinta daily.  Repeat MRA of the head without contrast in 1 year.  At that point, if there is further improvement, we would likely stop the Brilinta and continue the baby aspirin.  No follow-ups on file.\"    Routed to CORI Shaw.

## 2024-05-08 RX ORDER — GABAPENTIN 100 MG/1
CAPSULE ORAL
Qty: 540 CAPSULE | OUTPATIENT
Start: 2024-05-08

## 2024-05-08 NOTE — TELEPHONE ENCOUNTER
LOV: 2/20/23  Last Refilled:#540, 3rfs 3/31/23    Summary  1. Cont. Tylenol as needed   2. Cont. baclofen 10mg at night as needed.  for muscle cramps - w  3. cont. gabapentin 200mg in am , 100mg in afternoon, and 300mg in pm   4. Rest both shoulders x 3 days each  5. Cont. Massages when appropriate after carotid surgery   6. Return to clinic in 6-12 months     Shilpa Sheikh MD  2/20/2023   8:54 AM  - [place]  through [place]      Please advise.

## 2024-05-09 ENCOUNTER — APPOINTMENT (OUTPATIENT)
Dept: PHYSICAL THERAPY | Facility: HOSPITAL | Age: 79
End: 2024-05-09
Attending: FAMILY MEDICINE
Payer: MEDICARE

## 2024-05-12 RX ORDER — VENLAFAXINE HYDROCHLORIDE 37.5 MG/1
37.5 CAPSULE, EXTENDED RELEASE ORAL DAILY
Qty: 90 CAPSULE | Refills: 0 | Status: SHIPPED | OUTPATIENT
Start: 2024-05-12

## 2024-05-12 NOTE — TELEPHONE ENCOUNTER
Refill passed per [facility] protocol.     Requested Prescriptions   Pending Prescriptions Disp Refills    VENLAFAXINE ER 37.5 MG Oral Capsule SR 24 Hr [Pharmacy Med Name: Venlafaxine Hydrochloride Er 24hr 37.5 Mg Cap Auro] 90 capsule 0     Sig: Take 1 capsule (37.5 mg total) by mouth daily**Add to effexor XR 150mg daily       Psychiatric Non-Scheduled (Anti-Anxiety) Passed - 5/10/2024  7:23 AM        Passed - In person appointment or virtual visit in the past 6 mos or appointment in next 3 mos     Recent Outpatient Visits              1 week ago     [facility] Kacey Vazquez, PT    Office Visit    1 month ago [facility]    [facility]    Office Visit    2 months ago Generalized anxiety disorder    [facility] Nik Rebolledo MD    Office Visit    3 months [facility]    Office Visit                      Passed - Depression Screening completed within the past 12 months              Recent Outpatient Visits              1 week [facility]    Office Visit    1 month ago Intracranial aneurysm (HCC)    [facility] Jb Beaulieu MD    Office Visit    2 months ago     [facility] Kacey Vazquez, PT    Office Visit    2 months ago Generalized anxiety disorder    [facility] Nik Rebolledo MD    Office Visit    3 months ago     [facility] Kacey Vazquez, PT    Office Visit

## 2024-05-12 NOTE — TELEPHONE ENCOUNTER
Refill passed per [facility] protocol.     Requested Prescriptions   Pending Prescriptions Disp Refills    VENLAFAXINE ER 37.5 MG Oral Capsule SR 24 Hr [Pharmacy Med Name: Venlafaxine Hydrochloride Er 24hr 37.5 Mg Cap Auro] 90 capsule 0     Sig: Take 1 capsule (37.5 mg total) by mouth daily**Add to effexor XR 150mg daily       Psychiatric Non-Scheduled (Anti-Anxiety) Passed - 5/10/2024  7:23 AM        Passed - In person appointment or virtual visit in the past 6 mos or appointment in next 3 mos     Recent Outpatient Visits              1 week ago     [facility] Kacey Vazquez, PT    Office Visit    1 month ago [facility]    [facility]    Office Visit    2 months ago Generalized anxiety disorder    [facility] Rebolledo [facility]    Office Visit    3 months ago     [facility]    Office Visit                      Passed - Depression Screening completed within the past 12 months              Recent Outpatient Visits              [facility] week [facility]    Office Visit    1 month ago Intracranial aneurysm (HCC)    [facility] Jb Beaulieu MD    Office Visit    2 months ago     [facility] Kacey Vazquez, PT    Office Visit    2 months ago Generalized anxiety disorder    [facility] Nik Rebolledo MD    Office Visit    3 months ago     [facility] Kacey Vazquez, PT    Office Visit

## 2024-05-15 DIAGNOSIS — R06.2 WHEEZING: ICD-10-CM

## 2024-05-16 ENCOUNTER — APPOINTMENT (OUTPATIENT)
Dept: PHYSICAL THERAPY | Facility: HOSPITAL | Age: 79
End: 2024-05-16
Attending: FAMILY MEDICINE
Payer: MEDICARE

## 2024-05-20 RX ORDER — ALBUTEROL SULFATE 90 UG/1
2 AEROSOL, METERED RESPIRATORY (INHALATION) EVERY 4 HOURS PRN
Qty: 56 G | Refills: 1 | Status: SHIPPED | OUTPATIENT
Start: 2024-05-20

## 2024-05-20 NOTE — TELEPHONE ENCOUNTER
Please review. Rx failed/no protocol.    Requested Prescriptions   Pending Prescriptions Disp Refills    VENTOLIN  (90 Base) MCG/ACT Inhalation Aero Soln [Pharmacy Med Name: Ventolin Hfa 108 Mcg/Act Aer Glax] 18 g 0     Sig: INHALE 2 PUFFS INTO THE LUNGS EVERY 4 (FOUR) HOURS AS NEEDED FOR WHEEZING       Asthma & COPD Medication Protocol Failed - 5/15/2024  8:27 PM        Failed - Asthma Action Score greater than or equal to 20        Failed - AAP/ACT given in last 12 months     No data recorded  No data recorded  No data recorded  No data recorded          Passed - Appointment in past 6 or next 3 months      Recent Outpatient Visits              2 weeks ago     [facility] Kacey Vazquez, PT    Office Visit    1 month ago [facility]    3 months ago     [facility] Kacey Vazquez, PT    Office Visit    3 months ago Generalized anxiety disorder    [facility] Nik Rebolledo MD    Office Visit    3 months ago     [facility] Kacey Vazquez, PT    Office Visit                             Recent Outpatient Visits              2 weeks ago     [facility] Kacey Vazquez, PT    Office Visit    1 month ago Intracranial aneurysm (HCC)    [facility] Jb Lyons MD    Office Visit    3 months ago     [facility] Kacey Vazquez, PT    Office Visit    3 months ago Generalized anxiety disorder    [facility] Nik Rebolledo MD    Office Visit    3 months ago     [facility] Kacey Vazquze, PT    Office Visit

## 2024-05-22 RX ORDER — GABAPENTIN 100 MG/1
CAPSULE ORAL
Qty: 540 CAPSULE | Refills: 0 | OUTPATIENT
Start: 2024-05-22

## 2024-05-23 ENCOUNTER — APPOINTMENT (OUTPATIENT)
Dept: PHYSICAL THERAPY | Facility: HOSPITAL | Age: 79
End: 2024-05-23
Attending: FAMILY MEDICINE
Payer: MEDICARE

## 2024-05-29 ENCOUNTER — APPOINTMENT (OUTPATIENT)
Dept: PHYSICAL THERAPY | Facility: HOSPITAL | Age: 79
End: 2024-05-29
Attending: FAMILY MEDICINE
Payer: MEDICARE

## 2024-05-30 NOTE — TELEPHONE ENCOUNTER
Patient calling, concerned about below telephone encounter refill denial.     She scheduled an appointment with Dr. Sheikh for the soonest available date if 7/22. She says that if Dr. Sheikh wants to only give her enough of the medication to get her to the 7/22 appointment that would be fine, however she cannot go without medication entirely.     She has not been able to come in for a visit due to having had a stroke.     Please call and discuss this with patient.

## 2024-06-03 RX ORDER — GABAPENTIN 100 MG/1
CAPSULE ORAL
Qty: 540 CAPSULE | Refills: 3 | Status: SHIPPED | OUTPATIENT
Start: 2024-06-03

## 2024-07-22 ENCOUNTER — OFFICE VISIT (OUTPATIENT)
Dept: RHEUMATOLOGY | Facility: CLINIC | Age: 79
End: 2024-07-22
Payer: MEDICARE

## 2024-07-22 VITALS
SYSTOLIC BLOOD PRESSURE: 149 MMHG | WEIGHT: 175 LBS | RESPIRATION RATE: 16 BRPM | BODY MASS INDEX: 33.04 KG/M2 | HEART RATE: 76 BPM | DIASTOLIC BLOOD PRESSURE: 75 MMHG | HEIGHT: 61 IN

## 2024-07-22 DIAGNOSIS — M25.512 CHRONIC PAIN OF BOTH SHOULDERS: ICD-10-CM

## 2024-07-22 DIAGNOSIS — M15.0 PRIMARY OSTEOARTHRITIS INVOLVING MULTIPLE JOINTS: Primary | ICD-10-CM

## 2024-07-22 DIAGNOSIS — M41.9 SCOLIOSIS OF THORACOLUMBAR SPINE, UNSPECIFIED SCOLIOSIS TYPE: ICD-10-CM

## 2024-07-22 DIAGNOSIS — G89.29 CHRONIC PAIN OF BOTH SHOULDERS: ICD-10-CM

## 2024-07-22 DIAGNOSIS — M25.511 CHRONIC PAIN OF BOTH SHOULDERS: ICD-10-CM

## 2024-07-22 PROCEDURE — 99214 OFFICE O/P EST MOD 30 MIN: CPT | Performed by: INTERNAL MEDICINE

## 2024-07-22 PROCEDURE — 20610 DRAIN/INJ JOINT/BURSA W/O US: CPT | Performed by: INTERNAL MEDICINE

## 2024-07-22 RX ORDER — TRIAMCINOLONE ACETONIDE 40 MG/ML
40 INJECTION, SUSPENSION INTRA-ARTICULAR; INTRAMUSCULAR ONCE
Status: COMPLETED | OUTPATIENT
Start: 2024-07-22 | End: 2024-07-22

## 2024-07-22 NOTE — PROCEDURES
With paitent's consent, I injected pt's bilat shoulder.  eachwith 1ml lidocaine 1 % and 1 ml kenalog 40. It was done under sterile technique using iodine and alcohol swabs and ethyl chloride was used as an anaesthetic spray. Pt.  tolerated it well.
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
## 2024-07-22 NOTE — PATIENT INSTRUCTIONS
1. Cont. Tylenol as needed   2. Cont. baclofen 10mg at night as needed.  for muscle cramps -  3. cont. gabapentin 200mg in am , 100mg in afternoon, and 300mg in pm   4. Rest both shoulders x 3 days each  5. Cont. Massages when appropriate   6. Return to clinic in 4 -16 weeks for trigger finger injections.

## 2024-07-23 DIAGNOSIS — I67.1 INTRACRANIAL ANEURYSM (HCC): ICD-10-CM

## 2024-07-24 RX ORDER — TICAGRELOR 60 MG/1
60 TABLET ORAL DAILY
Qty: 30 TABLET | Refills: 3 | Status: SHIPPED | OUTPATIENT
Start: 2024-07-24

## 2024-07-24 NOTE — TELEPHONE ENCOUNTER
Medication: BRILINTA 60mg tabs  Date of last refill: 4.2.24  (#30 / 3)  [place]  Last office visit: 3.28.24  Due back to clinic per last office note:  March of 2025  Date next office visit scheduled: No future appointments scheduled at this time.        Last OV note recommendation:    \"Diagnoses and all orders for this visit:     Intracranial aneurysm (HCC)     1.  Destinye continues to do very well posttreatment.  The imaging is consistent with progressive occlusion of the aneurysm.     2.  Recommendations/plan: Continue baby aspirin and 60 mg of Brilinta daily.  Repeat MRA of the head without contrast in 1 year.  At that point, if there is further improvement, we would likely stop the Brilinta and continue the baby aspirin.  No follow-ups on file. \"

## 2024-07-26 NOTE — TELEPHONE ENCOUNTER
Requested Prescriptions     Pending Prescriptions Disp Refills    NURTEC 75 MG Oral Tablet Dispersible [Pharmacy Med Name: Nurtec Odt 75 Mg Tab Pfiz] 8 tablet 0     Sig: Take 75 mg by mouth as needed. Take one tablet at onset of migraine**Maximum dose in 24 hours is 1 tablet (75mg)     Last OV: 1/18/2024 with a return in about 6 months (around 7/18/2024).  [place]/;

## 2024-07-29 RX ORDER — RIMEGEPANT SULFATE 75 MG/75MG
TABLET, ORALLY DISINTEGRATING ORAL
Qty: 8 TABLET | Refills: 0 | Status: SHIPPED | OUTPATIENT
Start: 2024-07-29

## 2024-08-03 DIAGNOSIS — R23.2 HOT FLASHES: ICD-10-CM

## 2024-08-03 DIAGNOSIS — F41.1 GENERALIZED ANXIETY DISORDER: ICD-10-CM

## 2024-08-07 RX ORDER — VENLAFAXINE HYDROCHLORIDE 150 MG/1
150 CAPSULE, EXTENDED RELEASE ORAL DAILY
Qty: 90 CAPSULE | Refills: 3 | Status: SHIPPED | OUTPATIENT
Start: 2024-08-07

## 2024-08-07 NOTE — TELEPHONE ENCOUNTER
REFILL PASSED PER [facility] PROTOCOLS    Requested Prescriptions   Pending Prescriptions Disp Refills    VENLAFAXINE  MG Oral Capsule SR 24 Hr [Pharmacy Med Name: Venlafaxine Hydrochloride Er 24hr 150 Mg Cap Auro] 90 capsule 0     Sig: Take 1 capsule (150 mg total) by mouth daily.       Psychiatric Non-Scheduled (Anti-Anxiety) Passed - 8/3/2024  4:11 AM        Passed - In person appointment or virtual visit in the past 6 mos or appointment in next 3 mos     Recent Outpatient Visits              2 weeks ago Primary osteoarthritis involving multiple joints    [facility] Shilpa Sheikh MD    Office Visit    3 months ago     [facility] Kacey Vazquez, PT    Office Visit    4 months ago Intracranial aneurysm ([facility])    [facility] Jb Lyons MD    Office Visit    5 months ago     [facility] Kacey Vazquez, PT    Office Visit    5 months ago Generalized anxiety disorder    [facility] Nik Rebolledo MD    Office Visit          Future Appointments         Provider Department Appt Notes    In 3 months Shilpa Sheikh MD [facility] 4-6 month fu                    Passed - Depression Screening completed within the past 12 months             Future Appointments         Provider Department Appt Notes    In 3 months Shilpa Sheikh MD [facility] 4-6 month fu          Recent Outpatient Visits              2 weeks ago Primary osteoarthritis involving multiple joints    [facility] Shilpa Sheikh MD    Office Visit    3 months ago     [facility] Kacey Vazquez, PT    Office Visit    4 months ago Intracranial aneurysm ([facility])    [facility]  Vicky Agrawal Ali, MD    Office Visit    5 months ago     [facility] Kacey Vazquez, PRINCESS    Office Visit    5 months ago Generalized anxiety disorder    [facility] Nik Rebolledo MD    Office Visit

## 2024-08-12 RX ORDER — VENLAFAXINE HYDROCHLORIDE 37.5 MG/1
37.5 CAPSULE, EXTENDED RELEASE ORAL DAILY
Qty: 90 CAPSULE | Refills: 0 | OUTPATIENT
Start: 2024-08-12

## 2024-08-13 ENCOUNTER — PATIENT MESSAGE (OUTPATIENT)
Dept: FAMILY MEDICINE CLINIC | Facility: CLINIC | Age: 79
End: 2024-08-13

## 2024-08-14 NOTE — TELEPHONE ENCOUNTER
From: Destiney English  To: Nik Rebolledo  Sent: 8/13/2024 5:15 PM CDT  Subject: physical    I Have an appointment scheduled for September 10, 2024 at 9:00 AM

## 2024-08-16 RX ORDER — VENLAFAXINE HYDROCHLORIDE 37.5 MG/1
37.5 CAPSULE, EXTENDED RELEASE ORAL DAILY
Qty: 90 CAPSULE | Refills: 3 | Status: SHIPPED | OUTPATIENT
Start: 2024-08-16

## 2024-08-16 NOTE — TELEPHONE ENCOUNTER
Refill passed per [facility] protocol.  Requested Prescriptions   Pending Prescriptions Disp Refills    VENLAFAXINE ER 37.5 MG Oral Capsule SR 24 Hr [Pharmacy Med Name: Venlafaxine Hydrochloride Er 24hr 37.5 Mg Cap Auro] 90 capsule 0     Sig: Take 1 capsule (37.5 mg total) by mouth daily. Add to effexor xr  150mg daily       Psychiatric Non-Scheduled (Anti-Anxiety) Passed - 8/13/2024  2:28 PM        Passed - In person appointment or virtual visit in the past 6 mos or appointment in next 3 mos     Recent Outpatient Visits              3 weeks ago Primary osteoarthritis involving multiple joints    [facility] Shilpa Sheikh MD    Office Visit    3 months ago     [facility] Kacey Vazquez, PT    Office Visit    4 months ago Intracranial aneurysm ([facility])    [facility] Jb Lyons MD    Office Visit    5 months ago     [facility] Kacey Vazquez, PRINCESS    Office Visit    6 months ago Generalized anxiety disorder    [facility] Nik Rebolledo MD    Office Visit          Future Appointments         Provider Department Appt Notes    In 3 weeks Nik Rebolledo MD [facility] annual physical  [place] phx 8/31/23 -[place]    In 3 months Shilpa Sheikh MD [facility] 4-6 month fu                    Passed - Depression Screening completed within the past 12 months           Recent Outpatient Visits              3 weeks ago Primary osteoarthritis involving multiple joints    [facility] Shilpa Sheikh MD    Office Visit    3 months ago     [facility] Kacey Vazquez, PT    Office Visit    4 months ago Intracranial aneurysm (HCC)    [facility]  Jb Lyons MD    Office Visit    5 months ago     [facility] Kacey Vazquez, PRINCESS    Office Visit    6 months ago Generalized anxiety disorder    [facility] Nik Rebolledo MD    Office Visit          Future Appointments         Provider Department Appt Notes    In 3 weeks Nik Rebolledo MD [facility] annual physical  Last phx 8/31/23 -[place]    In 3 months Shilpa Sheikh MD [facility] 4-6 month fu

## 2024-08-16 NOTE — TELEPHONE ENCOUNTER
Refill passed per [facility] protocol.    Please review last office visit 02/14/2024        last refill 05/12/02024    Is refill appropriate?     Please see message below for upcoming appointment.    Future Appointments   Date Time Provider Department Center          9/10/2024  9:30 AM Nik Rebolledo MD [facility]              Requested Prescriptions   Pending Prescriptions Disp Refills    venlafaxine ER 37.5 MG Oral Capsule SR 24 Hr 90 capsule 0     Sig: Take 1 capsule (37.5 mg total) by mouth daily. Add to effexor xr  150mg daily       Psychiatric Non-Scheduled (Anti-Anxiety) Passed - 8/16/2024 10:37 AM        Passed - In person appointment or virtual visit in the past 6 mos or appointment in next 3 mos     Recent Outpatient Visits              3 weeks ago Primary osteoarthritis involving multiple joints    [facility] Shilpa Sheikh MD    Office Visit    3 months ago     [facility] Kacey Vazquez, PT    Office Visit    4 months ago Intracranial aneurysm (HCC)    [facility] Jb Lyons MD    Office Visit    5 months ago     [facility] Kacey Vazquez, PT    Office Visit    6 months ago Generalized anxiety disorder    [facility] Nik Rebolledo MD    Office Visit          Future Appointments         Provider Department Appt Notes    In 3 weeks Nik Rebolledo MD [facility] annual physical  Last phx 8/31/23 -[place]    In 3 months Shilpa Sheikh MD [facility] 4-6 month fu                    Passed - Depression Screening completed within the past 12 months           Recent Outpatient Visits              3 weeks ago Primary osteoarthritis involving multiple joints    [facility] Aguilar  MD Shilpa    Office Visit    3 months ago     [facility] Kacey Vazquez, PT    Office Visit    4 months ago Intracranial aneurysm (HCC)    [facility] Jb Lyons MD    Office Visit    5 months ago     [facility] Kacey Vazquez, PT    Office Visit    6 months ago Generalized anxiety disorder    [facility] Nik Rebolledo MD    Office Visit          Future Appointments         Provider Department Appt Notes    In 3 weeks Nik Rebolledo MD [facility] annual physical  Last phx 8/31/23 -[place]    In 3 months Shilpa Sheikh MD [facility] 4-6 month fu

## 2024-08-17 RX ORDER — VENLAFAXINE HYDROCHLORIDE 37.5 MG/1
37.5 CAPSULE, EXTENDED RELEASE ORAL DAILY
Qty: 90 CAPSULE | Refills: 3 | OUTPATIENT
Start: 2024-08-17

## 2024-08-21 DIAGNOSIS — F41.1 GENERALIZED ANXIETY DISORDER: ICD-10-CM

## 2024-08-21 DIAGNOSIS — I10 ESSENTIAL HYPERTENSION: ICD-10-CM

## 2024-08-22 NOTE — TELEPHONE ENCOUNTER
Please Review. Protocol Failed; No Protocol   BP Readings from Last 1 Encounters:   07/22/24 149/75     Requested Prescriptions   Pending Prescriptions Disp Refills    BUSPIRONE HCL 30 MG Oral Tab [Pharmacy Med Name: Buspirone Hydrochloride 30 Mg Tab [facility]] 90 tablet 0     Sig: Take ONE-HALF tablet (15 mg total) by mouth in the morning and ONE-HALF tablet (15 mg total) before bedtime       There is no refill protocol information for this order       LOSARTAN 100 MG Oral Tab [Pharmacy Med Name: Losartan Potassium 100 Mg Tab [facility]] 90 tablet 0     Sig: Take 1 tablet (100 mg total) by mouth daily.       Hypertension Medications Protocol Failed - 8/22/2024 10:04 AM        Failed - Last BP reading less than 140/90     BP Readings from Last 1 Encounters:   07/22/24 149/75               Passed - CMP or BMP in past 12 months        Passed - In person appointment or virtual visit in the past 12 mos or appointment in next 3 mos     Recent Outpatient Visits              1 month ago Primary osteoarthritis involving multiple joints    [facility] Shilpa Sheikh MD    Office Visit    3 months ago     [facility] Kacey Vazquez, PT    Office Visit    4 months ago Intracranial aneurysm (HCC)    [facility] Jb Lyons MD    Office Visit    6 months ago     [facility] Kacey Vazquez, PT    Office Visit    6 months ago Generalized anxiety disorder    [facility] Nik Rebolledo MD    Office Visit          Future Appointments         Provider Department Appt Notes    In 2 weeks Nik Rebolledo MD [facility] annual physical  Last phx 8/31/23 -[place]    In 3 months Shilpa Sheikh MD [facility] 4-6 month fu                    Passed - EGFRCR or GFRNAA > 50     GFR  Evaluation  EGFRCR: 64 , resulted on 2/14/2024                 Future Appointments         Provider Department Appt Notes    In 2 weeks Nik Rebolledo MD [facility] annual physical  Last phx 8/31/23 -[place]    In 3 months Shilpa Sheikh MD [facility] 4-6 month fu          Recent Outpatient Visits              1 month ago Primary osteoarthritis involving multiple joints    [facility] Shilpa Sheikh MD    Office Visit    3 months ago     [facility] Kacey Vazquez, PT    Office Visit    4 months ago Intracranial aneurysm (HCC)    [facility] Jb Lyons MD    Office Visit    6 months ago     [facility] Kacey Vazquez, PT    Office Visit    6 months ago Generalized anxiety disorder    [facility] Nik Rebolledo MD    Office Visit

## 2024-08-23 RX ORDER — BUSPIRONE HYDROCHLORIDE 30 MG/1
15 TABLET ORAL 2 TIMES DAILY
Qty: 90 TABLET | Refills: 0 | Status: SHIPPED | OUTPATIENT
Start: 2024-08-23

## 2024-08-23 RX ORDER — LOSARTAN POTASSIUM 100 MG/1
100 TABLET ORAL DAILY
Qty: 90 TABLET | Refills: 0 | Status: SHIPPED | OUTPATIENT
Start: 2024-08-23

## 2024-09-10 ENCOUNTER — LAB ENCOUNTER (OUTPATIENT)
Dept: LAB | Age: 79
End: 2024-09-10
Attending: FAMILY MEDICINE
Payer: MEDICARE

## 2024-09-10 ENCOUNTER — OFFICE VISIT (OUTPATIENT)
Dept: FAMILY MEDICINE CLINIC | Facility: CLINIC | Age: 79
End: 2024-09-10
Payer: MEDICARE

## 2024-09-10 VITALS
DIASTOLIC BLOOD PRESSURE: 82 MMHG | SYSTOLIC BLOOD PRESSURE: 134 MMHG | BODY MASS INDEX: 32.66 KG/M2 | HEIGHT: 61 IN | HEART RATE: 66 BPM | WEIGHT: 173 LBS

## 2024-09-10 DIAGNOSIS — F41.0 PANIC ATTACKS: ICD-10-CM

## 2024-09-10 DIAGNOSIS — E04.2 MULTINODULAR GOITER: ICD-10-CM

## 2024-09-10 DIAGNOSIS — J30.2 SEASONAL ALLERGIES: ICD-10-CM

## 2024-09-10 DIAGNOSIS — F41.1 GENERALIZED ANXIETY DISORDER: ICD-10-CM

## 2024-09-10 DIAGNOSIS — M15.0 PRIMARY OSTEOARTHRITIS INVOLVING MULTIPLE JOINTS: ICD-10-CM

## 2024-09-10 DIAGNOSIS — I63.412 CEREBRAL INFARCTION DUE TO EMBOLISM OF LEFT MIDDLE CEREBRAL ARTERY (HCC): ICD-10-CM

## 2024-09-10 DIAGNOSIS — Z86.19 HISTORY OF CLOSTRIDIOIDES DIFFICILE COLITIS: ICD-10-CM

## 2024-09-10 DIAGNOSIS — I67.1 INTRACRANIAL ANEURYSM (HCC): ICD-10-CM

## 2024-09-10 DIAGNOSIS — Z86.000 HISTORY OF DUCTAL CARCINOMA IN SITU (DCIS) OF BREAST: ICD-10-CM

## 2024-09-10 DIAGNOSIS — E55.9 VITAMIN D DEFICIENCY: ICD-10-CM

## 2024-09-10 DIAGNOSIS — E78.5 HYPERLIPIDEMIA, UNSPECIFIED HYPERLIPIDEMIA TYPE: ICD-10-CM

## 2024-09-10 DIAGNOSIS — Z00.00 ENCOUNTER FOR ANNUAL HEALTH EXAMINATION: Primary | ICD-10-CM

## 2024-09-10 DIAGNOSIS — I10 ESSENTIAL HYPERTENSION: ICD-10-CM

## 2024-09-10 DIAGNOSIS — Z12.31 ENCOUNTER FOR SCREENING MAMMOGRAM FOR BREAST CANCER: ICD-10-CM

## 2024-09-10 LAB
ALBUMIN SERPL-MCNC: 4.6 G/DL (ref 3.2–4.8)
ALBUMIN/GLOB SERPL: 1.6 {RATIO} (ref 1–2)
ALP LIVER SERPL-CCNC: 117 U/L
ALT SERPL-CCNC: 23 U/L
ANION GAP SERPL CALC-SCNC: 4 MMOL/L (ref 0–18)
AST SERPL-CCNC: 26 U/L (ref ?–34)
BILIRUB SERPL-MCNC: 0.4 MG/DL (ref 0.2–1.1)
BUN BLD-MCNC: 16 MG/DL (ref 9–23)
BUN/CREAT SERPL: 20.8 (ref 10–20)
CALCIUM BLD-MCNC: 9.7 MG/DL (ref 8.7–10.4)
CHLORIDE SERPL-SCNC: 106 MMOL/L (ref 98–112)
CHOLEST SERPL-MCNC: 171 MG/DL (ref ?–200)
CO2 SERPL-SCNC: 29 MMOL/L (ref 21–32)
CREAT BLD-MCNC: 0.77 MG/DL
EGFRCR SERPLBLD CKD-EPI 2021: 79 ML/MIN/1.73M2 (ref 60–?)
FASTING PATIENT LIPID ANSWER: YES
FASTING STATUS PATIENT QL REPORTED: YES
GLOBULIN PLAS-MCNC: 2.8 G/DL (ref 2–3.5)
GLUCOSE BLD-MCNC: 96 MG/DL (ref 70–99)
HDLC SERPL-MCNC: 68 MG/DL (ref 40–59)
LDLC SERPL CALC-MCNC: 83 MG/DL (ref ?–100)
NONHDLC SERPL-MCNC: 103 MG/DL (ref ?–130)
OSMOLALITY SERPL CALC.SUM OF ELEC: 289 MOSM/KG (ref 275–295)
POTASSIUM SERPL-SCNC: 4.5 MMOL/L (ref 3.5–5.1)
PROT SERPL-MCNC: 7.4 G/DL (ref 5.7–8.2)
SODIUM SERPL-SCNC: 139 MMOL/L (ref 136–145)
TRIGL SERPL-MCNC: 116 MG/DL (ref 30–149)
TSI SER-ACNC: 0.92 MIU/ML (ref 0.55–4.78)
VIT D+METAB SERPL-MCNC: 66.9 NG/ML (ref 30–100)
VLDLC SERPL CALC-MCNC: 18 MG/DL (ref 0–30)

## 2024-09-10 PROCEDURE — 80053 COMPREHEN METABOLIC PANEL: CPT

## 2024-09-10 PROCEDURE — 82306 VITAMIN D 25 HYDROXY: CPT

## 2024-09-10 PROCEDURE — 80061 LIPID PANEL: CPT

## 2024-09-10 PROCEDURE — 84443 ASSAY THYROID STIM HORMONE: CPT

## 2024-09-10 PROCEDURE — 36415 COLL VENOUS BLD VENIPUNCTURE: CPT

## 2024-09-10 RX ORDER — CLONAZEPAM 0.5 MG/1
0.5 TABLET ORAL DAILY PRN
Qty: 30 TABLET | Refills: 0 | Status: SHIPPED | OUTPATIENT
Start: 2024-09-10

## 2024-09-10 RX ORDER — LORATADINE 10 MG/1
10 TABLET ORAL AS NEEDED
Qty: 90 TABLET | Refills: 1 | Status: SHIPPED | OUTPATIENT
Start: 2024-09-10

## 2024-09-10 NOTE — PROGRESS NOTES
Subjective:   Destiney English is a 79 year old female who presents for a Medicare Wellness Visit charge within the last 11 months and Patient may not meet criteria for AWV: Please evaluate for correct coding and scheduled follow up of multiple significant but stable problems.     BP Readings from Last 3 Encounters:   09/10/24 134/82   24 149/75   24 118/80     Wt Readings from Last 6 Encounters:   09/10/24 173 lb (78.5 kg)   24 175 lb (79.4 kg)   24 165 lb (74.8 kg)   24 174 lb (78.9 kg)   24 169 lb (76.7 kg)   10/12/23 168 lb (76.2 kg)         History/Other:   Fall Risk Assessment:   She has been screened for Falls and is low risk.      Cognitive Assessment:   She had a completely normal cognitive assessment - see flowsheet entries     Functional Ability/Status:   Destiney English has some abnormal functions as listed below:  She has Dressing and/or Bathing issues based on screening of functional status.  Difficulty dressing or bathing?: No  Bathing or Showering: Cannot do without help  Dressing: Cannot do without help  She has Toileting difficulties based on screening of functional status.She has Eating difficulties based on screening of functional status.She has Driving difficulties based on screening of functional status. She has Meal Preparation difficulties based on screening of functional status.She has difficulties Managing Money/Bills based on screening of functional status.She has difficulties Shopping for Groceries based on screening of functional status. She has difficulties Taking Meds as Rx'd based on screening of functional status.       Depression Screening (PHQ):  PHQ-2 SCORE: 0  , done 9/3/2024   Feeling tired or having little energy: 1    Poor appetite or overeatin    Trouble concentrating on things, such as reading the newspaper or watching television: 1    Moving or speaking so slowly that other people could have noticed. Or the opposite - being so  fidgety or restless that you have been moving around a lot more than usual: 2    If you checked off any problems, how difficult have these problems made it for you to do your work, take care of things at home, or get along with other people?: Not difficult at all             Advanced Directives:   She does NOT have a Living Will. [Do you have a living will?: No]  She does NOT have a Power of  for Health Care. [Do you have a healthcare power of ?: No]  Discussed Advance Care Planning with patient (and family/surrogate if present). Standard forms made available to patient in After Visit Summary.      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)    Gross hematuria    Irritable bowel syndrome    Abnormal EKG    Adjustment disorder with depressed mood    Arthropathy    COPD (chronic obstructive pulmonary disease) (HCC)    Hypertrophic and atrophic condition of skin    Malaise and fatigue    Malignant neoplasm of breast (HCC)    Precordial pain    Pure hypercholesterolemia    Skin tag, acquired    Thyroid nodule    Acute ischemic left MCA stroke (HCC)    Cerebrovascular accident (CVA) due to embolism (HCC)    History of cerebral aneurysm    Reactive airway disease (HCC)    Vitamin D deficiency     Allergies:  She is allergic to blueberry, cephalexin, latex, peas, penicillin g, penicillins, meloxicam, procainamide, other, peach, and tomato.    Current Medications:  Outpatient  Medications Marked as Taking for the 9/10/24 encounter (Office Visit) with Nik Rebolledo MD   Medication Sig    clonazePAM 0.5 MG Oral Tab Take 1 tablet (0.5 mg total) by mouth daily as needed for Anxiety.    loratadine 10 MG Oral Tab Take 1 tablet (10 mg total) by mouth as needed for Allergies.    busPIRone HCl 30 MG Oral Tab Take 0.5 tablets (15 mg total) by mouth in the morning and 0.5 tablets (15 mg total) before bedtime.    losartan 100 MG Oral Tab Take 1 tablet (100 mg total) by mouth daily.    venlafaxine ER 37.5 MG Oral Capsule SR 24 Hr Take 1 capsule (37.5 mg total) by mouth daily. Add to effexor xr  150mg daily    venlafaxine  MG Oral Capsule SR 24 Hr Take 1 capsule (150 mg total) by mouth daily.    NURTEC 75 MG Oral Tablet Dispersible Take 75 mg by mouth as needed. Take one tablet at onset of migraine**Maximum dose in 24 hours is 1 tablet (75mg)    ticagrelor (BRILINTA) 60 MG Oral Tab TAKE ONE TABLET BY MOUTH ONE TIME DAILY    gabapentin 100 MG Oral Cap Take 200mg in am , 100mg in afternoon, and 300mg in pm    albuterol (VENTOLIN HFA) 108 (90 Base) MCG/ACT Inhalation Aero Soln Inhale 2 puffs into the lungs every 4 (four) hours as needed for Wheezing.    OPHTHALMIC IRRIGATION SOLUTION OP Apply to eye. Sustain drops    hydroCHLOROthiazide 12.5 MG Oral Tab Take 1 tablet (12.5 mg total) by mouth daily.    rosuvastatin 10 MG Oral Tab Take 1 tablet (10 mg total) by mouth nightly.    Nystatin 155072 UNIT/GM External Powder Apply 1 Application topically 2 (two) times daily. (Patient taking differently: Apply 1 Application topically 2 (two) times daily as needed.)    Cholecalciferol (VITAMIN D) 50 MCG (2000 UT) Oral Tab Take 1 tablet by mouth daily.    baclofen 10 MG Oral Tab Take 1-2 TABLETS BY MOUTH (10-20mg) at night as needed    Melatonin 5 MG Oral Tab Take 1 tablet (5 mg total) by mouth nightly.    Menthol-Methyl Salicylate (THERA-GESIC) 0.5-15 % External Cream Apply 1 Application topically 3 (three)  times daily as needed.    Multiple Vitamins-Minerals (PRESERVISION AREDS 2) Oral Cap Take 1 capsule by mouth daily.    aspirin 81 MG Oral Tab EC Take 1 tablet (81 mg total) by mouth daily.    Acetaminophen  MG Oral Tab CR Take 1 tablet (650 mg total) by mouth every 8 (eight) hours as needed for Pain.    Esomeprazole Magnesium (NEXIUM 24HR OR) Take 20 mg by mouth every evening.       Medical History:  She  has a past medical history of Anxiety, Aphasia, Back problem, BRCA negative, Breast cancer (HCC) (1995), Clostridioides difficile infection, Esophageal reflux, Essential hypertension, Hearing impairment, High blood pressure, High cholesterol, History of stomach ulcers, Hyperlipidemia, IBS (irritable bowel syndrome), Migraines, Osteoarthritis, Pneumonia due to organism, PONV (postoperative nausea and vomiting), Psoriatic arthritis (HCC), Thyroid nodule, Thyroid nodule (05/25/2023), Vertigo, and Visual impairment.  Surgical History:  She  has a past surgical history that includes dilation/curettage,diagnostic (1997); breast reconstruc w tiss expandr (Left, 1995); mastectomy left (Left, 1995); total knee replacement (03/2019); total knee replacement (Right, 08/19/2019); egd (01/2016); colonoscopy (01/2016); ir angiogram cerebral carotid unilateral (3/27/2023); and brain aneurysm repr, complx (3/27/2023).   Family History:  Her family history includes Breast Cancer in her maternal aunt, maternal cousin female, and self; Breast Cancer (age of onset: 68) in her sister; Cancer in her father; Heart Disease in her mother; Other in her mother.  Social History:  She  reports that she quit smoking about 30 years ago. Her smoking use included cigarettes. She started smoking about 36 years ago. She has never used smokeless tobacco. She reports that she does not currently use alcohol after a past usage of about 1.0 standard drink of alcohol per week. She reports that she does not use drugs.    Tobacco:  She smoked tobacco  in the past but quit greater than 12 months ago.  Social History     Tobacco Use   Smoking Status Former    Current packs/day: 0.00    Types: Cigarettes    Start date: 3/9/1988    Quit date: 3/9/1994    Years since quittin.5   Smokeless Tobacco Never          CAGE Alcohol Screen:   CAGE screening score of 0 on 9/3/2024, showing low risk of alcohol abuse.      Patient Care Team:  Nik Rebolledo MD as PCP - General (Family Medicine)  Roman Arriaza MD (GASTROENTEROLOGY)  Mikaela Mireles MD (Hematology and Oncology)  Shilpa Sheikh MD (RHEUMATOLOGY)  Latoya Gayle MD (OBSTETRICS & GYNECOLOGY)  Yo Flores MD (Interventional, Cardiology)  Jb Lyons MD (Radiology, Diagnostic)  Luis Roman MD (Cardiovascular Diseases)  Nicol Wade APRN as Nurse Practitioner (Nurse Practitioner)  Nicol Paris SLP as Speech Language Pathologist (Speech-Language Pathologist)    Review of Systems     Negative except  slight cough, sinuses slightly plugged  Taking corcidin.  Has helped.  Spmetimario has panic attacks [place] since she had the stroke , [place] when the phone rings  Seeing therapist  Took her sons xanax that helped during the panic attack    Objective:   Physical Exam  General Appearance:  Alert, cooperative, no distress, appears stated age   Head:  Normocephalic, without obvious abnormality, atraumatic   Eyes:  PERRL, conjunctiva/corneas clear, EOM's intact both eyes   Ears:  Normal TM's and external ear canals, both ears   Nose: Nares normal, septum midline,mucosa normal, no drainage or sinus tenderness   Throat: Lips, mucosa, and tongue normal; teeth and gums normal   Neck: Supple, symmetrical, trachea midline, no adenopathy;  thyroid: not enlarged, symmetric, no tenderness/mass/nodules; no carotid bruit or JVD   Back:   Symmetric, no curvature, ROM normal, no CVA tenderness   Lungs:   Clear to auscultation bilaterally, respirations unlabored   Heart:  Regular rate and rhythm, S1 and S2  normal, no murmur, rub, or gallop   Abdomen:   Soft, non-tender, bowel sounds active all four quadrants,  no masses, no organomegaly   Pelvic: Deferred   Extremities: Extremities normal, atraumatic, no cyanosis or edema   Pulses: 2+ and symmetric   Skin: Skin color, texture, turgor normal, no rashes or lesions   Lymph nodes: Cervical, supraclavicular, and axillary nodes normal   Neurologic: Normal       /82   Pulse 66   Ht 5' 1\" (1.549 m)   Wt 173 lb (78.5 kg)   BMI 32.69 kg/m²  Estimated body mass index is 32.69 kg/m² as calculated from the following:    Height as of this encounter: 5' 1\" (1.549 m).    Weight as of this encounter: 173 lb (78.5 kg).    Medicare Hearing Assessment:   Hearing Screening    Screening Method: Finger Rub  Finger Rub Result: Pass               Assessment & Plan:   Destiney English is a 79 year old female who presents for a Medicare Assessment.     1. Encounter for annual health examination (Primary)  2. Essential hypertension  -     Detailed, Mod Complex (99214)  3. Hyperlipidemia, unspecified hyperlipidemia type  -     Detailed, Mod Complex (99214)  -     Lipid Panel; Future; Expected date: 09/10/2024  -     Comp Metabolic Panel (14); Future; Expected date: 09/10/2024  4. Primary osteoarthritis involving multiple joints  -     Detailed, Mod Complex (99214)  5. History of ductal carcinoma in situ (DCIS) of breast  Overview:  1995    Orders:  -     Detailed, Mod Complex (94150)  6. Generalized anxiety disorder  -     Detailed, Mod Complex (61483)  -     clonazePAM; Take 1 tablet (0.5 mg total) by mouth daily as needed for Anxiety.  Dispense: 30 tablet; Refill: 0  7. Multinodular goiter  -     Detailed, Mod Complex (99214)  -     TSH W Reflex To Free T4; Future; Expected date: 09/10/2024  8. History of Clostridioides difficile colitis  -     Detailed, Mod Complex (18232)  9. Intracranial aneurysm (HCC)  -     Detailed, Mod Complex (99214)  10. Cerebral infarction due to embolism  of left middle cerebral artery (HCC)  -     Detailed, Mod Complex (82211)  11. Vitamin D deficiency  -     Vitamin D; Future; Expected date: 09/10/2024  12. Panic attacks  -     clonazePAM; Take 1 tablet (0.5 mg total) by mouth daily as needed for Anxiety.  Dispense: 30 tablet; Refill: 0  13. Encounter for screening mammogram for breast cancer  -     [facility] ARDEN 2D+3D SCREENING BILAT (CPT=77067/44395); Future; Expected date: 11/10/2024  14. Seasonal allergies  -     Loratadine; Take 1 tablet (10 mg total) by mouth as needed for Allergies.  Dispense: 90 tablet; Refill: 1  The patient indicates understanding of these issues and agrees to the plan.    1. Encounter for annual health examination      2. Essential hypertension  Stable condition  Reviewed medications  Continue current medication management   All questions answered to the best of my ability.    - Detailed, Mod Complex (47488)    3. Hyperlipidemia, unspecified hyperlipidemia type  Stable condition  Reviewed medications  Continue current medication management   All questions answered to the best of my ability.    - Detailed, Mod Complex (04816)  - Lipid Panel; Future  - Comp Metabolic Panel (14); Future    4. Primary osteoarthritis involving multiple joints  Tylenol as needed   - Detailed, Mod Complex (99877)    5. History of ductal carcinoma in situ (DCIS) of breast  No change in exam   - Detailed, Mod Complex (08023)    6. Generalized anxiety disorder  Sometimes worse  Reviewed medications  Added clonazepam as needed use   Continue counseling  - Detailed, Mod Complex (31509)  - clonazePAM 0.5 MG Oral Tab; Take 1 tablet (0.5 mg total) by mouth daily as needed for Anxiety.  Dispense: 30 tablet; Refill: 0    7. Multinodular goiter  Stable  Last thyroid us reviewed from 11/23, unchanged nodules for 5 yrs  - Detailed, Mod Complex (85195)  - TSH W Reflex To Free T4; Future    8. History of Clostridioides difficile colitis  Caution with future antibiotic use  -  Detailed, Mod Complex (04491)    9. Intracranial aneurysm (HCC)  Continue follow up neurology  - Detailed, Mod Complex (32124)    10. 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 Vitamin D deficiency  On 2000 u daily  - Vitamin D [E]; Future    12. Panic attacks  Take medications as directed. Side effects/ risks discussed and patient verbalized understanding of plan. To follow up if symptoms worsen.    - clonazePAM 0.5 MG Oral Tab; Take 1 tablet (0.5 mg total) by mouth daily as needed for Anxiety.  Dispense: 30 tablet; Refill: 0    13. Encounter for screening mammogram for breast cancer  Due 11/2024  - [facility] ARDEN 2D+3D SCREENING BILAT (CPT=77067/96404); Future    14. Seasonal allergies  Resume claritin   - loratadine 10 MG Oral Tab; Take 1 tablet (10 mg total) by mouth as needed for Allergies.  Dispense: 90 tablet; Refill: 1    Continue with current treatment plan.  Lab work ordered.  Prescription medication ordered.  Reinforced healthy diet, lifestyle, and exercise.      No follow-ups on file.     Nik Rebolledo MD, 9/10/2024     Supplementary Documentation:   General Health:  In the past six months, have you lost more than 10 pounds without trying?: 2 - No  Has your appetite been poor?: No  Type of Diet: Balanced  How does the patient maintain a good energy level?: Stretching  How would you describe your daily physical activity?: Light  How would you describe your current health state?: Fair  How do you maintain positive mental well-being?: Games  On a scale of 0 to 10, with 0 being no pain and 10 being severe pain, what is your pain level?: 2 - (Mild)  In the past six months, have you experienced urine leakage?: 1-Yes  At any time do you feel concerned for the safety/well-being of yourself and/or your children, in your home or elsewhere?: No  Have you had any immunizations at another office such as  Influenza, Hepatitis B, Tetanus, or Pneumococcal?: Yes    Health Maintenance   Topic Date Due    HTN: BP Follow-Up  08/22/2024    Annual Physical  08/31/2024    COVID-19 Vaccine (6 - 2023-24 season) 09/01/2024    Mammogram  11/30/2024    Colorectal Cancer Screening  02/01/2026 (Originally 3/15/1945)    Influenza Vaccine (1) 10/01/2024    DEXA Scan  Completed    Annual Depression Screening  Completed    Fall Risk Screening (Annual)  Completed    Pneumococcal Vaccine: 65+ Years  Completed    Zoster Vaccines  Completed

## 2024-09-12 DIAGNOSIS — E78.5 HYPERLIPIDEMIA, UNSPECIFIED HYPERLIPIDEMIA TYPE: ICD-10-CM

## 2024-09-12 RX ORDER — ROSUVASTATIN CALCIUM 10 MG/1
10 TABLET, COATED ORAL NIGHTLY
Qty: 90 TABLET | Refills: 3 | Status: SHIPPED | OUTPATIENT
Start: 2024-09-12

## 2024-10-14 ENCOUNTER — PATIENT MESSAGE (OUTPATIENT)
Dept: NEUROLOGY | Facility: CLINIC | Age: 79
End: 2024-10-14

## 2024-10-14 DIAGNOSIS — I63.412 CEREBRAL INFARCTION DUE TO EMBOLISM OF LEFT MIDDLE CEREBRAL ARTERY (HCC): ICD-10-CM

## 2024-10-14 DIAGNOSIS — G43.909 MIGRAINE WITHOUT STATUS MIGRAINOSUS, NOT INTRACTABLE, UNSPECIFIED MIGRAINE TYPE: Primary | ICD-10-CM

## 2024-10-14 NOTE — TELEPHONE ENCOUNTER
Called [facility] pharmacy to verify amount and if the Prior Authorizing was granted.     Spoke with pharmacist, stated with the prior-auth the co-payment is $480.      Patient can not afford Nurtec.   Please advise of a different medication?

## 2024-11-11 ENCOUNTER — TELEPHONE (OUTPATIENT)
Dept: NEUROLOGY | Facility: CLINIC | Age: 79
End: 2024-11-11

## 2024-11-11 ENCOUNTER — OFFICE VISIT (OUTPATIENT)
Dept: FAMILY MEDICINE CLINIC | Facility: CLINIC | Age: 79
End: 2024-11-11
Payer: MEDICARE

## 2024-11-11 ENCOUNTER — LAB ENCOUNTER (OUTPATIENT)
Dept: LAB | Age: 79
End: 2024-11-11
Attending: FAMILY MEDICINE
Payer: MEDICARE

## 2024-11-11 VITALS
HEART RATE: 69 BPM | BODY MASS INDEX: 32.85 KG/M2 | DIASTOLIC BLOOD PRESSURE: 79 MMHG | WEIGHT: 174 LBS | SYSTOLIC BLOOD PRESSURE: 117 MMHG | OXYGEN SATURATION: 99 % | HEIGHT: 61 IN

## 2024-11-11 DIAGNOSIS — Z86.69 HISTORY OF MIGRAINE HEADACHES: ICD-10-CM

## 2024-11-11 DIAGNOSIS — R06.2 WHEEZING: ICD-10-CM

## 2024-11-11 DIAGNOSIS — D22.9 MULTIPLE NEVI: ICD-10-CM

## 2024-11-11 DIAGNOSIS — Z86.79 HISTORY OF CEREBRAL ANEURYSM: ICD-10-CM

## 2024-11-11 DIAGNOSIS — L98.9 SKIN LESIONS: Primary | ICD-10-CM

## 2024-11-11 DIAGNOSIS — R06.09 DYSPNEA ON EXERTION: ICD-10-CM

## 2024-11-11 LAB
ANION GAP SERPL CALC-SCNC: 7 MMOL/L (ref 0–18)
ATRIAL RATE: 62 BPM
BASOPHILS # BLD AUTO: 0.06 X10(3) UL (ref 0–0.2)
BASOPHILS NFR BLD AUTO: 0.9 %
BUN BLD-MCNC: 23 MG/DL (ref 9–23)
BUN/CREAT SERPL: 26.4 (ref 10–20)
CALCIUM BLD-MCNC: 9.6 MG/DL (ref 8.7–10.4)
CHLORIDE SERPL-SCNC: 102 MMOL/L (ref 98–112)
CO2 SERPL-SCNC: 29 MMOL/L (ref 21–32)
CREAT BLD-MCNC: 0.87 MG/DL
DEPRECATED RDW RBC AUTO: 46.3 FL (ref 35.1–46.3)
EGFRCR SERPLBLD CKD-EPI 2021: 69 ML/MIN/1.73M2 (ref 60–?)
EOSINOPHIL # BLD AUTO: 0.07 X10(3) UL (ref 0–0.7)
EOSINOPHIL NFR BLD AUTO: 1 %
ERYTHROCYTE [DISTWIDTH] IN BLOOD BY AUTOMATED COUNT: 13.1 % (ref 11–15)
FASTING STATUS PATIENT QL REPORTED: NO
GLUCOSE BLD-MCNC: 96 MG/DL (ref 70–99)
HCT VFR BLD AUTO: 37.1 %
HGB BLD-MCNC: 12.7 G/DL
IMM GRANULOCYTES # BLD AUTO: 0.08 X10(3) UL (ref 0–1)
IMM GRANULOCYTES NFR BLD: 1.2 %
LYMPHOCYTES # BLD AUTO: 1.34 X10(3) UL (ref 1–4)
LYMPHOCYTES NFR BLD AUTO: 19.3 %
MCH RBC QN AUTO: 33.4 PG (ref 26–34)
MCHC RBC AUTO-ENTMCNC: 34.2 G/DL (ref 31–37)
MCV RBC AUTO: 97.6 FL
MONOCYTES # BLD AUTO: 0.55 X10(3) UL (ref 0.1–1)
MONOCYTES NFR BLD AUTO: 7.9 %
NEUTROPHILS # BLD AUTO: 4.83 X10 (3) UL (ref 1.5–7.7)
NEUTROPHILS # BLD AUTO: 4.83 X10(3) UL (ref 1.5–7.7)
NEUTROPHILS NFR BLD AUTO: 69.7 %
OSMOLALITY SERPL CALC.SUM OF ELEC: 290 MOSM/KG (ref 275–295)
P AXIS: 53 DEGREES
P-R INTERVAL: 156 MS
PLATELET # BLD AUTO: 253 10(3)UL (ref 150–450)
POTASSIUM SERPL-SCNC: 4.3 MMOL/L (ref 3.5–5.1)
Q-T INTERVAL: 430 MS
QRS DURATION: 86 MS
QTC CALCULATION (BEZET): 436 MS
R AXIS: 11 DEGREES
RBC # BLD AUTO: 3.8 X10(6)UL
SODIUM SERPL-SCNC: 138 MMOL/L (ref 136–145)
T AXIS: 27 DEGREES
VENTRICULAR RATE: 62 BPM
WBC # BLD AUTO: 6.9 X10(3) UL (ref 4–11)

## 2024-11-11 PROCEDURE — 36415 COLL VENOUS BLD VENIPUNCTURE: CPT

## 2024-11-11 PROCEDURE — 99214 OFFICE O/P EST MOD 30 MIN: CPT | Performed by: FAMILY MEDICINE

## 2024-11-11 PROCEDURE — 93005 ELECTROCARDIOGRAM TRACING: CPT

## 2024-11-11 PROCEDURE — 85025 COMPLETE CBC W/AUTO DIFF WBC: CPT

## 2024-11-11 PROCEDURE — 80048 BASIC METABOLIC PNL TOTAL CA: CPT

## 2024-11-11 PROCEDURE — 93010 ELECTROCARDIOGRAM REPORT: CPT | Performed by: INTERNAL MEDICINE

## 2024-11-11 RX ORDER — ALBUTEROL SULFATE 90 UG/1
2 INHALANT RESPIRATORY (INHALATION) EVERY 4 HOURS PRN
Qty: 56 G | Refills: 1 | Status: SHIPPED | OUTPATIENT
Start: 2024-11-11

## 2024-11-11 NOTE — PROGRESS NOTES
HPI:    Patient ID: Destiney English is a 79 year old female.      HPI    Chief Complaint   Patient presents with    Growth     On chest for a few months changing in size and color       Medication Request     For migraines she was taking nurtec     Shortness Of Breath     When going up and down the stairs also when walking in stores        Wt Readings from Last 6 Encounters:   11/11/24 174 lb (78.9 kg)   09/10/24 173 lb (78.5 kg)   07/22/24 175 lb (79.4 kg)   03/28/24 165 lb (74.8 kg)   02/14/24 174 lb (78.9 kg)   01/18/24 169 lb (76.7 kg)     BP Readings from Last 3 Encounters:   11/11/24 117/79   09/10/24 134/82   07/22/24 149/75     Has lesions on chest, one on right and another left side chest.  Feels gotten bigger and changed colors.    Also noticing shortness of breath when walking   Happening recently  Happens after she walks 2 blocks or climb 2 flight of stairs  No chest pain   She does have reactive airway disease and uses albuterol sometimes as needed  She last saw cardiology in March 2024 and everything was stable and she was told to follow-up in 1 year    Review of Systems   Constitutional:  Negative for chills and fever.   Eyes:  Negative for visual disturbance.   Respiratory:  Positive for shortness of breath. Negative for cough and wheezing.    Cardiovascular:  Negative for chest pain, palpitations and leg swelling.   Genitourinary:  Negative for decreased urine volume and difficulty urinating.   Skin:  Positive for color change.   Neurological:  Negative for dizziness, tremors, seizures, weakness, light-headedness, numbness and headaches.       /79   Pulse 69   Ht 5' 1\" (1.549 m)   Wt 174 lb (78.9 kg)   SpO2 99%   BMI 32.88 kg/m²          Current Outpatient Medications   Medication Sig Dispense Refill    rosuvastatin 10 MG Oral Tab Take 1 tablet (10 mg total) by mouth nightly. 90 tablet 3    clonazePAM 0.5 MG Oral Tab Take 1 tablet (0.5 mg total) by mouth daily as needed for Anxiety.  30 tablet 0    loratadine 10 MG Oral Tab Take 1 tablet (10 mg total) by mouth as needed for Allergies. 90 tablet 1    busPIRone HCl 30 MG Oral Tab Take 0.5 tablets (15 mg total) by mouth in the morning and 0.5 tablets (15 mg total) before bedtime. 90 tablet 0    losartan 100 MG Oral Tab Take 1 tablet (100 mg total) by mouth daily. 90 tablet 0    venlafaxine ER 37.5 MG Oral Capsule SR 24 Hr Take 1 capsule (37.5 mg total) by mouth daily. Add to effexor xr  150mg daily 90 capsule 3    venlafaxine  MG Oral Capsule SR 24 Hr Take 1 capsule (150 mg total) by mouth daily. 90 capsule 3    ticagrelor (BRILINTA) 60 MG Oral Tab TAKE ONE TABLET BY MOUTH ONE TIME DAILY 30 tablet 3    gabapentin 100 MG Oral Cap Take 200mg in am , 100mg in afternoon, and 300mg in pm 540 capsule 3    albuterol (VENTOLIN HFA) 108 (90 Base) MCG/ACT Inhalation Aero Soln Inhale 2 puffs into the lungs every 4 (four) hours as needed for Wheezing. 56 g 1    OPHTHALMIC IRRIGATION SOLUTION OP Apply to eye. Sustain drops      hydroCHLOROthiazide 12.5 MG Oral Tab Take 1 tablet (12.5 mg total) by mouth daily. 90 tablet 3    Nystatin 891189 UNIT/GM External Powder Apply 1 Application topically 2 (two) times daily. (Patient taking differently: Apply 1 Application topically 2 (two) times daily as needed.) 60 g 0    Cholecalciferol (VITAMIN D) 50 MCG (2000 UT) Oral Tab Take 1 tablet by mouth daily.      baclofen 10 MG Oral Tab Take 1-2 TABLETS BY MOUTH (10-20mg) at night as needed 180 tablet 1    Melatonin 5 MG Oral Tab Take 1 tablet (5 mg total) by mouth nightly.      Menthol-Methyl Salicylate (THERA-GESIC) 0.5-15 % External Cream Apply 1 Application topically 3 (three) times daily as needed.      Multiple Vitamins-Minerals (PRESERVISION AREDS 2) Oral Cap Take 1 capsule by mouth daily.      aspirin 81 MG Oral Tab EC Take 1 tablet (81 mg total) by mouth daily. 7 tablet 0    Acetaminophen  MG Oral Tab CR Take 1 tablet (650 mg total) by mouth every 8  (eight) hours as needed for Pain.      Esomeprazole Magnesium (NEXIUM 24HR OR) Take 20 mg by mouth every evening.       Allergies:Allergies[1]   PHYSICAL EXAM:     Chief Complaint   Patient presents with    Growth     On chest for a few months changing in size and color       Medication Request     For migraines she was taking nurtec     Shortness Of Breath     When going up and down the stairs also when walking in stores       Physical Exam  Vitals and nursing note reviewed.   Constitutional:       Appearance: She is well-developed.   Eyes:      Pupils: Pupils are equal, round, and reactive to light.   Neck:      Thyroid: No thyromegaly.   Cardiovascular:      Rate and Rhythm: Normal rate and regular rhythm.      Heart sounds: No murmur heard.  Pulmonary:      Effort: Pulmonary effort is normal.      Breath sounds: Normal breath sounds. No wheezing.   Abdominal:      Palpations: There is no mass.      Tenderness: There is no abdominal tenderness. There is no right CVA tenderness or left CVA tenderness.   Musculoskeletal:      Cervical back: Normal range of motion and neck supple.      Right lower leg: No edema.      Left lower leg: No edema.   Skin:     General: Skin is warm and dry.      Findings: Lesion present. No rash.      Comments: Patient has a papular lesion on the right chest wall last year he is thinks it has gotten bigger and darker  Also has a few scaly lesions that are papular on her left shoulder   Neurological:      Mental Status: She is alert and oriented to person, place, and time.                        ASSESSMENT/PLAN:     Encounter Diagnoses   Name Primary?    Skin lesions Yes    History of migraine headaches     History of cerebral aneurysm     Multiple nevi     Dyspnea on exertion     Wheezing        1. Skin lesions    - DERM - INTERNAL    2. History of migraine headaches  Patient mentions seeing neurology.  She was prescribed Nurtec but states this is too expensive.  She states she tried to  call her neurologist and was told to try Tylenol.  Patient states Tylenol does not seem to help.  She is wondering what to do.  Recommended to follow-up with neurology and she would like a second opinion  - Neuro Referral - In Network    3. History of cerebral aneurysm    - Neuro Referral - In Network    4. Multiple nevi    - DERM - INTERNAL    5. Dyspnea on exertion  Recent worsening of symptoms.   ? Reactive airway disease.  Recommend continue with albuterol as needed  Recommend completing blood work as well as EKG and chest x-ray and also to follow-up with cardiology  - CBC With Differential With Platelet; Future  - Basic Metabolic Panel (8); Future  - XR CHEST PA + LAT CHEST (CPT=71046); Future  - EKG 12 Lead; Future      Orders Placed This Encounter   Procedures    CBC With Differential With Platelet    Basic Metabolic Panel (8)         The above note was creating using Dragon speech recognition technology. Please excuse any typos    Meds This Visit:  Requested Prescriptions     Pending Prescriptions Disp Refills    albuterol (VENTOLIN HFA) 108 (90 Base) MCG/ACT Inhalation Aero Soln 56 g 1     Sig: Inhale 2 puffs into the lungs every 4 (four) hours as needed for Wheezing.       Imaging & Referrals:  NEURO - INTERNAL  DERM - INTERNAL  XR CHEST PA + LAT CHEST (CPT=71046)       ID#[id number]       [1]   Allergies  Allergen Reactions    Blueberry ANAPHYLAXIS    Cephalexin HIVES and ITCHING    Latex ITCHING    Peas HIVES    Penicillin G ANAPHYLAXIS    Penicillins ANAPHYLAXIS    Meloxicam OTHER (SEE COMMENTS)     Stomach ulcer    Procainamide OTHER (SEE COMMENTS)     Throat swelling    Other ITCHING     Peaches,peas    Peach RASH    Tomato ITCHING     Oral itching

## 2024-11-11 NOTE — TELEPHONE ENCOUNTER
Pt called in is looking to do a transfer of care from dr Paulson to dr peguero. Advised by pcp to see dr peguero. Pls advise regarding willy. Pt is well aware dr peguero soonest availability is mid Jan 2025 and confirmed to be ok with this.

## 2024-11-12 ENCOUNTER — HOSPITAL ENCOUNTER (OUTPATIENT)
Dept: GENERAL RADIOLOGY | Facility: HOSPITAL | Age: 79
Discharge: HOME | End: 2024-11-12
Attending: FAMILY MEDICINE
Payer: MEDICARE

## 2024-11-12 DIAGNOSIS — I67.1 INTRACRANIAL ANEURYSM (HCC): ICD-10-CM

## 2024-11-12 DIAGNOSIS — R06.09 DYSPNEA ON EXERTION: ICD-10-CM

## 2024-11-12 PROCEDURE — 71046 X-RAY EXAM CHEST 2 VIEWS: CPT | Performed by: FAMILY MEDICINE

## 2024-11-12 RX ORDER — TICAGRELOR 60 MG/1
60 TABLET ORAL DAILY
Qty: 30 TABLET | Refills: 3 | Status: SHIPPED | OUTPATIENT
Start: 2024-11-12

## 2024-11-12 NOTE — TELEPHONE ENCOUNTER
Medication:   icagrelor (BRILINTA) 60 MG Oral Tab 30 tablet 3 7/24/2024 --   Sig:   TAKE ONE TABLET BY MOUTH ONE TIME DAILY          Date of last refill: 7.24.24 (#30/0)  [place]      Last office visit: 3.28.24  Due back to clinic per last office note:    Date next office visit scheduled:    Future Appointments   Date Time Provider Department Center   11/12/2024 11:00 AM [facility] XR RM1 [facility] DIAG RAD EM [facility]   11/14/2024  9:00 AM Lilia Rider PA-C [facility]   11/20/2024 12:00 PM Venessa Barrett LCPC [facility]   11/30/2024  1:00 PM [facility] EMILEE RM2 [facility] EM [facility]   12/3/2024 10:20 AM Shilpa Sheikh MD ECC[facility]ANM EC [facility]           Last OV note recommendation:       \" 1.  Destiney continues to do very well posttreatment.  The imaging is consistent with progressive occlusion of the aneurysm.     2.  Recommendations/plan: Continue baby aspirin and 60 mg of Brilinta daily.  Repeat MRA of the head without contrast in 1 year.  At that point, if there is further improvement, we would likely stop the Brilinta and continue the baby aspirin.  No follow-ups on file.   \"

## 2024-11-12 NOTE — TELEPHONE ENCOUNTER
Contacted pt no answer lvmtcb advised transfer of care from dr Paulson to dr peguero was okd for both providers to schedule npt appt with dr peguero

## 2024-11-14 ENCOUNTER — OFFICE VISIT (OUTPATIENT)
Dept: DERMATOLOGY CLINIC | Facility: CLINIC | Age: 79
End: 2024-11-14
Payer: MEDICARE

## 2024-11-14 DIAGNOSIS — L82.1 SEBORRHEIC KERATOSIS: Primary | ICD-10-CM

## 2024-11-14 PROCEDURE — 99213 OFFICE O/P EST LOW 20 MIN: CPT | Performed by: PHYSICIAN ASSISTANT

## 2024-11-14 PROCEDURE — 17110 DESTRUCTION B9 LES UP TO 14: CPT | Performed by: PHYSICIAN ASSISTANT

## 2024-11-14 NOTE — PROGRESS NOTES
HPI:    Patient ID: Destiney English is a 79 year old female.    Patient presents for growth on her chest, caused concern because some changed shape and color. Patient reports no pain or discomfort some burning when in contact with certain creams. Patient denies hx of skin cancer. Patient's mother had skin cancer with unknown type. No draining or tenderness noted. Hx of allergies to medications noted.         Review of Systems   Constitutional:  Negative for chills and fever.   Musculoskeletal:  Negative for arthralgias and myalgias.   Skin:  Positive for rash. Negative for color change and wound.            Current Outpatient Medications   Medication Sig Dispense Refill    ticagrelor (BRILINTA) 60 MG Oral Tab TAKE ONE TABLET BY MOUTH ONE TIME DAILY 30 tablet 3    albuterol (VENTOLIN HFA) 108 (90 Base) MCG/ACT Inhalation Aero Soln Inhale 2 puffs into the lungs every 4 (four) hours as needed for Wheezing. 56 g 1    rosuvastatin 10 MG Oral Tab Take 1 tablet (10 mg total) by mouth nightly. 90 tablet 3    clonazePAM 0.5 MG Oral Tab Take 1 tablet (0.5 mg total) by mouth daily as needed for Anxiety. 30 tablet 0    loratadine 10 MG Oral Tab Take 1 tablet (10 mg total) by mouth as needed for Allergies. 90 tablet 1    busPIRone HCl 30 MG Oral Tab Take 0.5 tablets (15 mg total) by mouth in the morning and 0.5 tablets (15 mg total) before bedtime. 90 tablet 0    losartan 100 MG Oral Tab Take 1 tablet (100 mg total) by mouth daily. 90 tablet 0    venlafaxine ER 37.5 MG Oral Capsule SR 24 Hr Take 1 capsule (37.5 mg total) by mouth daily. Add to effexor xr  150mg daily 90 capsule 3    venlafaxine  MG Oral Capsule SR 24 Hr Take 1 capsule (150 mg total) by mouth daily. 90 capsule 3    gabapentin 100 MG Oral Cap Take 200mg in am , 100mg in afternoon, and 300mg in pm 540 capsule 3    OPHTHALMIC IRRIGATION SOLUTION OP Apply to eye. Sustain drops      hydroCHLOROthiazide 12.5 MG Oral Tab Take 1 tablet (12.5 mg total) by mouth  daily. 90 tablet 3    Nystatin 890038 UNIT/GM External Powder Apply 1 Application topically 2 (two) times daily. (Patient taking differently: Apply 1 Application topically 2 (two) times daily as needed.) 60 g 0    Cholecalciferol (VITAMIN D) 50 MCG (2000 UT) Oral Tab Take 1 tablet by mouth daily.      baclofen 10 MG Oral Tab Take 1-2 TABLETS BY MOUTH (10-20mg) at night as needed 180 tablet 1    Melatonin 5 MG Oral Tab Take 1 tablet (5 mg total) by mouth nightly.      Menthol-Methyl Salicylate (THERA-GESIC) 0.5-15 % External Cream Apply 1 Application topically 3 (three) times daily as needed.      Multiple Vitamins-Minerals (PRESERVISION AREDS 2) Oral Cap Take 1 capsule by mouth daily.      aspirin 81 MG Oral Tab EC Take 1 tablet (81 mg total) by mouth daily. 7 tablet 0    Acetaminophen  MG Oral Tab CR Take 1 tablet (650 mg total) by mouth every 8 (eight) hours as needed for Pain.      Esomeprazole Magnesium (NEXIUM 24HR OR) Take 20 mg by mouth every evening.       Allergies:Allergies[1]   There were no vitals taken for this visit.  There is no height or weight on file to calculate BMI.  PHYSICAL EXAM:   Physical Exam  Constitutional:       General: She is not in acute distress.     Appearance: Normal appearance.   Skin:     General: Skin is warm and dry.      Findings: Rash present.      Comments: Sk noted on the chests. No draining or tenderness noted. Stuck on appearance noted.    Neurological:      Mental Status: She is alert and oriented to person, place, and time.                ASSESSMENT/PLAN:   1. Seborrheic keratosis  -After discussion with patient, advised the following:  - Cryosurgery of non-malignant lesion(s)  - Risks, benefits, alternatives and personnel required for cryosurgery reviewed with patient. Discussed the expected redness, as well as the risks of swelling, blistering, crusting, pigmentary changes, and permanent scarring. . Discussed that lesion may need additional treatments in future  or may recur. Pt verbalizes understanding and wishes to proceed.   - Cryosurgery performed with Liquid Nitrogen via cryostat spray gun to Seborrheic Keratosis. 2 lesion(s) treated.   - Patient tolerated well and wound care discussed.   -Pt was agreeable to plan and will comply with discussion above.           No orders of the defined types were placed in this encounter.      Meds This Visit:  Requested Prescriptions      No prescriptions requested or ordered in this encounter       Imaging & Referrals:  None         ID#[id number]       [1]   Allergies  Allergen Reactions    Blueberry ANAPHYLAXIS    Cephalexin HIVES and ITCHING    Latex ITCHING    Peas HIVES    Penicillin G ANAPHYLAXIS    Penicillins ANAPHYLAXIS    Meloxicam OTHER (SEE COMMENTS)     Stomach ulcer    Procainamide OTHER (SEE COMMENTS)     Throat swelling    Other ITCHING     Peaches,peas    Peach RASH    Tomato ITCHING     Oral itching

## 2024-11-18 ENCOUNTER — PATIENT MESSAGE (OUTPATIENT)
Dept: SURGERY | Facility: CLINIC | Age: 79
End: 2024-11-18

## 2024-11-18 NOTE — TELEPHONE ENCOUNTER
Noted that patient has messaged and is asking if Dr. Lyons can order her a migraine medication since she is seeking care of a new Neurology doctor and she will not be seeing them until January 2025.     Patient reports that Nurtec was prescribed by Dr. Wright, however the medication cost is $482.00.     Per Dr. Lyons and ROCK Wade. APRN at office visit on 3.28.24     \"1.  Destiney continues to do very well posttreatment.  The imaging is consistent with progressive occlusion of the aneurysm.     2.  Recommendations/plan: Continue baby aspirin and 60 mg of Brilinta daily.  Repeat MRA of the head without contrast in 1 year.  At that point, if there is further improvement, we would likely stop the Brilinta and continue the baby aspirin.  No follow-ups on file.\"     Routed to provider.

## 2024-11-18 NOTE — TELEPHONE ENCOUNTER
Headache/Migraine management is not our area of expertise.     She should seek advise from her neurologist. If she is in between, then I would advise refills from her PCP.     Please advise patient.     Thank you!

## 2024-11-28 DIAGNOSIS — F41.1 GENERALIZED ANXIETY DISORDER: ICD-10-CM

## 2024-11-30 ENCOUNTER — HOSPITAL ENCOUNTER (OUTPATIENT)
Dept: MAMMOGRAPHY | Facility: HOSPITAL | Age: 79
Discharge: HOME | End: 2024-11-30
Attending: FAMILY MEDICINE
Payer: MEDICARE

## 2024-11-30 DIAGNOSIS — Z12.31 ENCOUNTER FOR SCREENING MAMMOGRAM FOR BREAST CANCER: ICD-10-CM

## 2024-11-30 PROCEDURE — 77067 SCR MAMMO BI INCL CAD: CPT | Performed by: FAMILY MEDICINE

## 2024-11-30 PROCEDURE — 77063 BREAST TOMOSYNTHESIS BI: CPT | Performed by: FAMILY MEDICINE

## 2024-12-02 DIAGNOSIS — I10 ESSENTIAL HYPERTENSION: ICD-10-CM

## 2024-12-02 DIAGNOSIS — F41.1 GENERALIZED ANXIETY DISORDER: ICD-10-CM

## 2024-12-02 RX ORDER — BUSPIRONE HYDROCHLORIDE 30 MG/1
TABLET ORAL
Qty: 90 TABLET | Refills: 0 | Status: SHIPPED | OUTPATIENT
Start: 2024-12-02

## 2024-12-02 RX ORDER — BUSPIRONE HYDROCHLORIDE 30 MG/1
15 TABLET ORAL 2 TIMES DAILY
Qty: 90 TABLET | Refills: 0 | OUTPATIENT
Start: 2024-12-02

## 2024-12-02 RX ORDER — LOSARTAN POTASSIUM 100 MG/1
100 TABLET ORAL DAILY
Qty: 90 TABLET | Refills: 3 | Status: SHIPPED | OUTPATIENT
Start: 2024-12-02

## 2024-12-02 NOTE — TELEPHONE ENCOUNTER
would like a refill or the followimg prescriptions  busPIRone HCI 30 MG Tab 90 each 2-15 mg per day  Losartan Potassium 100 MG tab 1 per day 90 each     thank you  carlo etienne

## 2024-12-02 NOTE — TELEPHONE ENCOUNTER
Refill passed per [facility] protocol.  Requested Prescriptions   Pending Prescriptions Disp Refills    busPIRone HCl 30 MG Oral Tab 90 tablet 0     Sig: Take 0.5 tablets (15 mg total) by mouth in the morning and 0.5 tablets (15 mg total) before bedtime.       There is no refill protocol information for this order       losartan 100 MG Oral Tab 90 tablet 0     Sig: Take 1 tablet (100 mg total) by mouth daily.       Hypertension Medications Protocol Passed - 12/2/2024 12:20 PM        Passed - CMP or BMP in past 12 months        Passed - Last BP reading less than 140/90     BP Readings from Last 1 Encounters:   11/11/24 117/79               Passed - In person appointment or virtual visit in the past 12 mos or appointment in next 3 mos     Recent Outpatient Visits              2 weeks ago Seborrheic keratosis    [facility] Lilia Rider PA-C    Office Visit    3 weeks ago Skin lesions    [facility] Nik Rebolledo MD    Office Visit    2 months ago Encounter for annual health examination    [facility] Nik Rebolledo MD    Office Visit    4 months ago Primary osteoarthritis involving multiple joints    [facility] Shilpa Sheikh MD    Office Visit    7 months ago     [facility] Kacey Vazquez PT    Office Visit          Future Appointments         Provider Department Appt Notes    Tomorrow Shilpa Sheikh MD [facility] 4-6 month fu    In 1 month Lokesh Bacon DO [facility] transfer of care from dr Paulson to dr bacon was okd for both providers                    Passed - EGFRCR or GFRNAA > 50     GFR Evaluation  EGFRCR: 69 , resulted on 11/11/2024             Recent Outpatient Visits              2 weeks ago Seborrheic  keratosis    [facility] Lilia Rider PA-C    Office Visit    3 weeks ago Skin lesions    [facility] Nik Rebolledo MD    Office Visit    2 months ago Encounter for annual health examination    [facility] Nik Rebolledo MD    Office Visit    4 months ago Primary osteoarthritis involving multiple joints    [facility] Shilpa Sheikh MD    Office Visit    7 months ago     [facility] Kacey Vazquez PT    Office Visit          Future Appointments         Provider Department Appt Notes    Tomorrow Shilpa Sheikh MD [facility] 4-6 month fu    In 1 month Lokesh Bacon DO [facility] transfer of care from dr Paulson to dr bacon was okd for both providers

## 2024-12-02 NOTE — TELEPHONE ENCOUNTER
Please review; protocol failed/ has no protocol    Requested Prescriptions   Pending Prescriptions Disp Refills    BUSPIRONE HCL 30 MG Oral Tab [Pharmacy Med Name: Buspirone Hydrochloride 30 Mg Tab Unic] 90 tablet 0     Sig: Take 1/2 tablet (15 mg total) by mouth in the morning and 1/2 tablet (15 mg total) before bedtime       There is no refill protocol information for this order        Recent Outpatient Visits              2 weeks ago Seborrheic keratosis    [facility] Lilia Rider PA-C    Office Visit    3 weeks ago Skin lesions    [facility] Nik Rebolledo MD    Office Visit    2 months ago Encounter for annual health examination    [facility] Nik Rebolledo MD    Office Visit    4 months ago Primary osteoarthritis involving multiple joints    [facility] Shilpa Sheikh MD    Office Visit    7 months ago     [facility] Kacey Vazquez, PRINCESS    Office Visit          Future Appointments         Provider Department Appt Notes    Tomorrow Shilpa Sheikh MD [facility] 4-6 month fu    In 1 month Lokesh Bacon DO [facility] transfer of care from dr Paulson to dr bacon was okd for both providers            
no concerns

## 2024-12-03 ENCOUNTER — OFFICE VISIT (OUTPATIENT)
Dept: RHEUMATOLOGY | Facility: CLINIC | Age: 79
End: 2024-12-03
Payer: MEDICARE

## 2024-12-03 VITALS
HEIGHT: 61 IN | DIASTOLIC BLOOD PRESSURE: 72 MMHG | HEART RATE: 72 BPM | RESPIRATION RATE: 16 BRPM | SYSTOLIC BLOOD PRESSURE: 115 MMHG | BODY MASS INDEX: 32.57 KG/M2 | WEIGHT: 172.5 LBS

## 2024-12-03 DIAGNOSIS — M65.341 TRIGGER RING FINGER OF RIGHT HAND: ICD-10-CM

## 2024-12-03 DIAGNOSIS — M15.0 PRIMARY OSTEOARTHRITIS INVOLVING MULTIPLE JOINTS: Primary | ICD-10-CM

## 2024-12-03 DIAGNOSIS — M65.312 TRIGGER FINGER OF LEFT THUMB: ICD-10-CM

## 2024-12-03 PROCEDURE — 20550 NJX 1 TENDON SHEATH/LIGAMENT: CPT | Performed by: INTERNAL MEDICINE

## 2024-12-03 PROCEDURE — 99214 OFFICE O/P EST MOD 30 MIN: CPT | Performed by: INTERNAL MEDICINE

## 2024-12-03 RX ORDER — METHYLPREDNISOLONE ACETATE 80 MG/ML
20 INJECTION, SUSPENSION INTRA-ARTICULAR; INTRALESIONAL; INTRAMUSCULAR; SOFT TISSUE ONCE
Status: COMPLETED | OUTPATIENT
Start: 2024-12-03 | End: 2024-12-03
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
## 2024-12-03 NOTE — PROCEDURES
With  consent, I injected the patient's   left 1st thumb and right 4th ring trigger fingers each with 0.25ml lidocaine  1% and 0.25ml depo 80. It was under sterile technique using iodine and alcohol swabs and ethyl chloride was used as an anaesthetic spray. Pt.  tolerated it well.

## 2024-12-03 NOTE — PATIENT INSTRUCTIONS
1. Cont. Tylenol as needed   2. Cont. baclofen 10mg at night as needed.  for muscle cramps -  3. cont. gabapentin 200mg in am , 100mg in afternoon, and 300mg in pm   4. Rest left 1st thumb trigger finger and right 4th finger trigger finger x 3 days   5. Cont. Massages when appropriate  , stretches and koby chi  6. Return to clinic in 6 months

## 2024-12-05 RX ORDER — BUSPIRONE HYDROCHLORIDE 30 MG/1
15 TABLET ORAL 2 TIMES DAILY
Qty: 90 TABLET | Refills: 0 | OUTPATIENT
Start: 2024-12-05

## 2025-01-04 ENCOUNTER — OFFICE VISIT (OUTPATIENT)
Dept: FAMILY MEDICINE CLINIC | Facility: CLINIC | Age: 80
End: 2025-01-04
Payer: MEDICARE

## 2025-01-04 VITALS
HEIGHT: 61 IN | HEART RATE: 75 BPM | OXYGEN SATURATION: 93 % | DIASTOLIC BLOOD PRESSURE: 84 MMHG | SYSTOLIC BLOOD PRESSURE: 156 MMHG | BODY MASS INDEX: 32.66 KG/M2 | WEIGHT: 173 LBS | TEMPERATURE: 97 F | RESPIRATION RATE: 16 BRPM

## 2025-01-04 DIAGNOSIS — R05.1 ACUTE COUGH: Primary | ICD-10-CM

## 2025-01-04 PROCEDURE — 99213 OFFICE O/P EST LOW 20 MIN: CPT | Performed by: NURSE PRACTITIONER

## 2025-01-04 PROCEDURE — 87637 SARSCOV2&INF A&B&RSV AMP PRB: CPT | Performed by: NURSE PRACTITIONER

## 2025-01-04 RX ORDER — DOXYCYCLINE 100 MG/1
100 CAPSULE ORAL 2 TIMES DAILY
Qty: 14 CAPSULE | Refills: 0 | Status: SHIPPED | OUTPATIENT
Start: 2025-01-04 | End: 2025-01-11

## 2025-01-04 NOTE — PROGRESS NOTES
CHIEF COMPLAINT:     Chief Complaint   Patient presents with    Cough     2 weeks w/ cough, sore throat, chest congestion and body/headaches.          HPI:   Destiney English is a 79 year old female who presents for URI symptoms for 2 weeks. Reports headache, sore throat, nasal congestion, dry cough and body aches. Denies fever, N/V/D. Denies sob, wheezing or chest pain. Home treatments Deslym and Coricidin without relief. Reports exposure to sick family members. Unknown if covid/flu.     with similar symptoms.   Current Outpatient Medications   Medication Sig Dispense Refill    doxycycline 100 MG Oral Cap Take 1 capsule (100 mg total) by mouth 2 (two) times daily for 7 days. 14 capsule 0    busPIRone HCl 30 MG Oral Tab Take 1/2 tablet (15 mg total) by mouth in the morning and 1/2 tablet (15 mg total) before bedtime 90 tablet 0    losartan 100 MG Oral Tab Take 1 tablet (100 mg total) by mouth daily. 90 tablet 3    ticagrelor (BRILINTA) 60 MG Oral Tab TAKE ONE TABLET BY MOUTH ONE TIME DAILY 30 tablet 3    albuterol (VENTOLIN HFA) 108 (90 Base) MCG/ACT Inhalation Aero Soln Inhale 2 puffs into the lungs every 4 (four) hours as needed for Wheezing. 56 g 1    rosuvastatin 10 MG Oral Tab Take 1 tablet (10 mg total) by mouth nightly. 90 tablet 3    clonazePAM 0.5 MG Oral Tab Take 1 tablet (0.5 mg total) by mouth daily as needed for Anxiety. 30 tablet 0    loratadine 10 MG Oral Tab Take 1 tablet (10 mg total) by mouth as needed for Allergies. 90 tablet 1    venlafaxine ER 37.5 MG Oral Capsule SR 24 Hr Take 1 capsule (37.5 mg total) by mouth daily. Add to effexor xr  150mg daily 90 capsule 3    venlafaxine  MG Oral Capsule SR 24 Hr Take 1 capsule (150 mg total) by mouth daily. 90 capsule 3    gabapentin 100 MG Oral Cap Take 200mg in am , 100mg in afternoon, and 300mg in pm 540 capsule 3    OPHTHALMIC IRRIGATION SOLUTION OP Apply to eye. Sustain drops      hydroCHLOROthiazide 12.5 MG Oral Tab Take 1 tablet  (12.5 mg total) by mouth daily. 90 tablet 3    Nystatin 963120 UNIT/GM External Powder Apply 1 Application topically 2 (two) times daily. 60 g 0    Cholecalciferol (VITAMIN D) 50 MCG ( UT) Oral Tab Take 1 tablet by mouth daily.      baclofen 10 MG Oral Tab Take 1-2 TABLETS BY MOUTH (10-20mg) at night as needed 180 tablet 1    Melatonin 5 MG Oral Tab Take 1 tablet (5 mg total) by mouth nightly.      Menthol-Methyl Salicylate (THERA-GESIC) 0.5-15 % External Cream Apply 1 Application topically 3 (three) times daily as needed.      aspirin 81 MG Oral Tab EC Take 1 tablet (81 mg total) by mouth daily. 7 tablet 0    Acetaminophen  MG Oral Tab CR Take 1 tablet (650 mg total) by mouth every 8 (eight) hours as needed for Pain.      Esomeprazole Magnesium (NEXIUM 24HR OR) Take 20 mg by mouth every evening.      Multiple Vitamins-Minerals (PRESERVISION AREDS 2) Oral Cap Take 1 capsule by mouth daily.        Past Medical History:    Anxiety    Aphasia    Back problem    Scoliosis    BRCA negative    Breast cancer (HCC)    left breast mastectomy, lymph nodes    Clostridioides difficile infection    Esophageal reflux    Essential hypertension    Hearing impairment    kourtney tinnitus    High blood pressure    High cholesterol    History of stomach ulcers    Hyperlipidemia    IBS (irritable bowel syndrome)    diet controlled    Migraines    Osteoarthritis    Pneumonia due to organism    twice    PONV (postoperative nausea and vomiting)    after knee replacement/only time    Psoriatic arthritis (HCC)    Thyroid nodule    Thyroid nodule    Vertigo    Visual impairment    glasses      Social History:  Social History     Socioeconomic History    Marital status:    Tobacco Use    Smoking status: Former     Current packs/day: 0.00     Types: Cigarettes     Start date: 3/9/1988     Quit date: 3/9/1994     Years since quittin.8    Smokeless tobacco: Never   Vaping Use    Vaping status: Never Used   Substance and Sexual  Activity    Alcohol use: Not Currently     Alcohol/week: 1.0 standard drink of alcohol     Types: 1 Standard drinks or equivalent per week     Comment: Socially    Drug use: No   Other Topics Concern    Breast feeding No    Reaction to local anesthetic No    Pt has a pacemaker No    Pt has a defibrillator No     Social Drivers of Health     Financial Resource Strain: Low Risk  (2/22/2024)    Financial Resource Strain     Difficulty of Paying Living Expenses: Not hard at all     Med Affordability: No   Food Insecurity: No Food Insecurity (2/22/2024)    Food Insecurity     Food Insecurity: Never true   Transportation Needs: No Transportation Needs (2/22/2024)    Transportation Needs     Lack of Transportation: No   Physical Activity: Sufficiently Active (2/22/2024)    Exercise Vital Sign     Days of Exercise per Week: 7 days     Minutes of Exercise per Session: 30 min   Stress: No Stress Concern Present (2/22/2024)    Stress     Feeling of Stress : No   Social Connections: Socially Integrated (2/22/2024)    Social Connections     Frequency of Socialization with Friends and Family: 2   Housing Stability: Low Risk  (2/22/2024)    Housing Stability     Housing Instability: No        REVIEW OF SYSTEMS:   GENERAL: See HPI  SKIN: No rashes, or other skin lesions.   EYES: Denies blurred vision or double vision.  HENT: Denies ear pain or decreased hearing.  See HPI  CARDIOVASCULAR: Denies chest pain or palpitations  LUNGS: Per HPI.    GI: Denies N/V/C/D or abdominal pain.      EXAM:   /84   Pulse 75   Temp 97 °F (36.1 °C)   Resp 16   Ht 5' 1\" (1.549 m)   Wt 173 lb (78.5 kg)   SpO2 93%   BMI 32.69 kg/m²   GENERAL: well developed, well nourished,in no apparent distress  SKIN: no rashes, no suspicious lesions  EYES: Conjunctiva clear.  No scleral icterus.  HENT: Atraumatic, normocephalic.  TM's clear bilaterally.  Nostrils patent, nasal mucosa pink and non-inflamed.  no erythema of the throat.  NECK: supple,  non-tender.  LUNGS: Lungs clear throughout. Normal respiratory rate. Normal effort.  Dry cough. No wheezing.   CARDIO: RRR without murmur  LYMPH: No cervical or supraclavicular lymphadenopathy.   EXTREMITIES:  No clubbing, cyanosis, or edema.    ASSESSMENT AND PLAN:   Destiney English is a 79 year old female who presents with:     ASSESSMENT:  Encounter Diagnosis   Name Primary?    Acute cough Yes       PLAN:     Increase fluids, rest.  Reviewed meds and instructions as below with patient.  Risks, benefits, and side effects of medication explained and discussed.   The patient is asked to follow-up with PCP if no improvement in 3 days or sooner if sx worsen. Reviewed IC locations for worsening symptoms.     Requested Prescriptions     Signed Prescriptions Disp Refills    doxycycline 100 MG Oral Cap 14 capsule 0     Sig: Take 1 capsule (100 mg total) by mouth 2 (two) times daily for 7 days.       The patient indicates understanding of these issues and agrees to the plan.

## 2025-01-05 LAB
FLUAV + FLUBV RNA SPEC NAA+PROBE: NOT DETECTED
FLUAV + FLUBV RNA SPEC NAA+PROBE: NOT DETECTED
RSV RNA SPEC NAA+PROBE: NOT DETECTED
SARS-COV-2 RNA RESP QL NAA+PROBE: DETECTED

## 2025-01-23 ENCOUNTER — OFFICE VISIT (OUTPATIENT)
Dept: NEUROLOGY | Facility: CLINIC | Age: 80
End: 2025-01-23
Payer: MEDICARE

## 2025-01-23 VITALS
SYSTOLIC BLOOD PRESSURE: 121 MMHG | BODY MASS INDEX: 31.15 KG/M2 | HEIGHT: 61 IN | HEART RATE: 73 BPM | DIASTOLIC BLOOD PRESSURE: 81 MMHG | WEIGHT: 165 LBS

## 2025-01-23 DIAGNOSIS — G43.109 MIGRAINE WITH AURA AND WITHOUT STATUS MIGRAINOSUS, NOT INTRACTABLE: Primary | ICD-10-CM

## 2025-01-23 PROCEDURE — 99214 OFFICE O/P EST MOD 30 MIN: CPT | Performed by: OTHER

## 2025-01-23 RX ORDER — RIMEGEPANT SULFATE 75 MG/75MG
75 TABLET, ORALLY DISINTEGRATING ORAL AS NEEDED
Qty: 8 TABLET | Refills: 11 | Status: SHIPPED | OUTPATIENT
Start: 2025-01-23 | End: 2026-01-23

## 2025-01-23 RX ORDER — TOPIRAMATE 25 MG/1
25 TABLET, FILM COATED ORAL 2 TIMES DAILY
Qty: 180 TABLET | Refills: 0 | Status: SHIPPED | OUTPATIENT
Start: 2025-01-23 | End: 2025-04-23

## 2025-01-23 NOTE — PROGRESS NOTES
[facility]  [address], [address]  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    Preventive Treatment  Name Dose (if remembered) Results   Antidepressants Venlafaxine ER 75-225mg daily (SNRI)   No improvement in headache frequency or intensity   Antiseizure  gabapentin     Beta blockers      CCBs Verapamil    No improvement in headache frequency or intensity   CGRP pathway targeted treatments      Onabotulinumtoxin A      NMDA antagonist         Other          Acute Treatment Name Dose Results   Analgesics/NSAIDs      Ergot derivatives      Triptans sumatriptan  Contraindicated given her history of stroke   Other          She has at least 1 to 3 migraines a month.  She has tension type headaches as well. She has 20 crystal clear days  a month.  She avoids foods that can trigger her migraines.  +aura of zig/zag lines before her headache.  She has the classic migraine hypersensitivities.   Headaches are left sided, and will radiates to the back of her neck.  If she has a severe migraine she will vomit.    The nurtec relieves the pain of migraine. The cost is burdensome.    ROS: Pertinent positive and negatives per HPI.  All others were reviewed and negative.     Medications:  Current Outpatient Medications    Medication Instructions    Acetaminophen ER (TYLENOL) 650 mg, Every 8 hours PRN    albuterol (VENTOLIN HFA) 108 (90 Base) MCG/ACT Inhalation Aero Soln 2 puffs, Inhalation, Every 4 hours PRN    aspirin 81 mg, Oral, Daily    baclofen 10 MG Oral Tab Take 1-2 TABLETS BY MOUTH (10-20mg) at night as needed    Brilinta 60 mg, Oral, Daily    busPIRone HCl 30 MG Oral Tab Take 1/2 tablet (15 mg total) by mouth in the morning and 1/2 tablet (15 mg total) before bedtime    Cholecalciferol (VITAMIN D) 50 MCG (2000 UT) Oral Tab 1 tablet, Daily    clonazePAM (KLONOPIN) 0.5 mg, Oral, DAILY PRN    Esomeprazole Magnesium (NEXIUM 24HR OR) 20 mg, Every evening    gabapentin 100 MG Oral Cap Take 200mg in am , 100mg in afternoon, and 300mg in pm    hydroCHLOROthiazide 12.5 mg, Oral, Daily    loratadine (CLARITIN) 10 mg, Oral, As needed    losartan (COZAAR) 100 mg, Oral, Daily    Melatonin 5 mg, Nightly    Menthol-Methyl Salicylate (THERA-GESIC) 0.5-15 % External Cream 1 Application, 3 times daily PRN    Multiple Vitamins-Minerals (PRESERVISION AREDS 2) Oral Cap 1 capsule, Daily    Nystatin 889416 UNIT/GM External Powder 1 Application, Topical, 2 times daily    OPHTHALMIC IRRIGATION SOLUTION OP Apply to eye. Sustain drops    rosuvastatin (CRESTOR) 10 mg, Oral, Nightly    venlafaxine ER (EFFEXOR-XR) 150 mg, Oral, Daily    venlafaxine ER (EFFEXOR-XR) 37.5 mg, Oral, Daily, Add to effexor xr  150mg daily        Reviewed and assessed      Objective:  Last vitals and weight :  Body mass index is 31.18 kg/m².   There were no vitals filed for this visit.   Blood pressure 121/81, pulse 73, height 61\", weight 165 lb (74.8 kg).  /81   Pulse 73   Ht 61\"   Wt 165 lb (74.8 kg)   BMI 31.18 kg/m²   Exam:  - General: appears stated age and no distress       - Pulmonary: Normal excursion of the chest.  No signs of respiratory distress.  Neurologic Exam  - Mental Status: Alert and attentive. .  Speech is spontaneous, fluent, and prosodic.  Comprehension and repetition intact. Phrase length and rate are normal. No paraphasic errors, neologisms, anomia, acalculia, apraxia, anosognosia, or R/L confusion.   - Cranial Nerves: No gaze preference. Visual fields:normal  Pupils are 4mm briskly constricting to 3mm and equally round and reactive to light  in a well lit room. No rAPD. EOMI. No nystagmus. No ptosis. V1-V3 intact B/L to light touch.No pathological facial asymmetry. No flattening of the nasolabial fold. .  Hearing grossly intact.  Tongue midline. No atrophy or fasiculations of the tongue noted. Palate and uvula elevate symmetrically.  Shoulder shrug symmetric.     - Motor:  normal tone/bulk. No interosseous wasting. No flattening of hypothenar eminences.   Motor Strength    Pronator drift: No pronator drift   Arm Rolling: No orbiting.   Finger Taps: Finger taps are symmetric in rate and amplitude.    Rapid movements: symmetric. No fatiguing.         Foot Taps:    Asterixis: No asterixis noted.   Tremor:        - Sensory:   Light touch:  decreased on the right      - Cerebellum: No truncal ataxia. No titubations. No dysmetria, no dysdiadochokinesis. No rebound.        Most recent lab results:     No results found for this or any previous visit (from the past 36 hours).  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    She is on effexor. Cannot be on a triptan b/c of her prior stroke.  Considered propranolol for ppx but she is on albuterol. Will try topamax. If that fails could try botox.  Right now she has 20 crystal clear days a month. Continue nurtec for resuce.    If the topamax make her speech/aphasia worse she needs to let me know  at her follow up.    Next step: if headaches are worse try botox.  Nurtec costs $480.00 a month.        Plan   1. Migraine with aura and without status migrainosus, not intractable  - Rimegepant Sulfate (NURTEC) 75 MG Oral Tablet Dispersible; Take 75 mg by mouth as needed. Take one tablet at onset of migraine.  Maximum dose in 24 hours is 1 tablet (75mg).  Dispense: 8 tablet; Refill: 11                  This document is not intended to support charting by exception.  Sections left blank in a completed note should be presumed not to have been done.      Disclaimer:   This record was dictated using  Dragon software. There may be errors due to voice recognition problems that were not realized and corrected during the completion of the note.         Thank you for allowing me to participate in the care of your patient.    Lokesh Bacon,   1/23/2025

## 2025-02-12 ENCOUNTER — TELEPHONE (OUTPATIENT)
Dept: SURGERY | Facility: CLINIC | Age: 80
End: 2025-02-12

## 2025-02-12 DIAGNOSIS — I67.1 CEREBRAL ANEURYSM (HCC): Primary | ICD-10-CM

## 2025-02-12 NOTE — TELEPHONE ENCOUNTER
Patient reminder received regarding MRA Brain.    LOV 3/28/24  \"ASSESSMENT & PLAN     Destiney was seen today for follow - up.     Diagnoses and all orders for this visit:     Intracranial aneurysm (HCC)        1.  Destiney continues to do very well posttreatment.  The imaging is consistent with progressive occlusion of the aneurysm.     2.  Recommendations/plan: Continue baby aspirin and 60 mg of Brilinta daily.  Repeat MRA of the head without contrast in 1 year.  At that point, if there is further improvement, we would likely stop the Brilinta and continue the baby aspirin.  No follow-ups on file.\"    Routed to Provider.

## 2025-02-13 NOTE — TELEPHONE ENCOUNTER
Order has been placed for noncontrast MRA brain.     Please advise patient to obtain in March, and to schedule follow up with Dr. Lyons to discuss results following.     Thank you!

## 2025-02-13 NOTE — TELEPHONE ENCOUNTER
Message below noted.    Advised patient of message and to reach back out with any other questions or concerns.     Patient is active on MyChart.  message sent.

## 2025-02-18 ENCOUNTER — LAB ENCOUNTER (OUTPATIENT)
Dept: LAB | Age: 80
End: 2025-02-18
Attending: FAMILY MEDICINE
Payer: MEDICARE

## 2025-02-18 ENCOUNTER — OFFICE VISIT (OUTPATIENT)
Dept: FAMILY MEDICINE CLINIC | Facility: CLINIC | Age: 80
End: 2025-02-18
Payer: MEDICARE

## 2025-02-18 VITALS — TEMPERATURE: 98 F | HEIGHT: 61 IN | BODY MASS INDEX: 31.53 KG/M2 | WEIGHT: 167 LBS

## 2025-02-18 DIAGNOSIS — I10 ESSENTIAL HYPERTENSION: ICD-10-CM

## 2025-02-18 DIAGNOSIS — E78.5 HYPERLIPIDEMIA, UNSPECIFIED HYPERLIPIDEMIA TYPE: ICD-10-CM

## 2025-02-18 DIAGNOSIS — R42 DIZZINESS: ICD-10-CM

## 2025-02-18 DIAGNOSIS — F41.1 GENERALIZED ANXIETY DISORDER: ICD-10-CM

## 2025-02-18 DIAGNOSIS — R42 DIZZINESS: Primary | ICD-10-CM

## 2025-02-18 LAB
ALBUMIN SERPL-MCNC: 5 G/DL (ref 3.2–4.8)
ALBUMIN/GLOB SERPL: 2 {RATIO} (ref 1–2)
ALP LIVER SERPL-CCNC: 101 U/L
ALT SERPL-CCNC: 19 U/L
ANION GAP SERPL CALC-SCNC: 10 MMOL/L (ref 0–18)
AST SERPL-CCNC: 26 U/L (ref ?–34)
BASOPHILS # BLD AUTO: 0.06 X10(3) UL (ref 0–0.2)
BASOPHILS NFR BLD AUTO: 1 %
BILIRUB SERPL-MCNC: 0.4 MG/DL (ref 0.2–1.1)
BUN BLD-MCNC: 20 MG/DL (ref 9–23)
BUN/CREAT SERPL: 18.3 (ref 10–20)
CALCIUM BLD-MCNC: 9.5 MG/DL (ref 8.7–10.4)
CHLORIDE SERPL-SCNC: 101 MMOL/L (ref 98–112)
CHOLEST SERPL-MCNC: 168 MG/DL (ref ?–200)
CO2 SERPL-SCNC: 28 MMOL/L (ref 21–32)
CREAT BLD-MCNC: 1.09 MG/DL
DEPRECATED RDW RBC AUTO: 50.4 FL (ref 35.1–46.3)
EGFRCR SERPLBLD CKD-EPI 2021: 52 ML/MIN/1.73M2 (ref 60–?)
EOSINOPHIL # BLD AUTO: 0.1 X10(3) UL (ref 0–0.7)
EOSINOPHIL NFR BLD AUTO: 1.7 %
ERYTHROCYTE [DISTWIDTH] IN BLOOD BY AUTOMATED COUNT: 14.3 % (ref 11–15)
FASTING PATIENT LIPID ANSWER: YES
FASTING STATUS PATIENT QL REPORTED: YES
GLOBULIN PLAS-MCNC: 2.5 G/DL (ref 2–3.5)
GLUCOSE BLD-MCNC: 106 MG/DL (ref 70–99)
HCT VFR BLD AUTO: 38.7 %
HDLC SERPL-MCNC: 57 MG/DL (ref 40–59)
HGB BLD-MCNC: 12.7 G/DL
IMM GRANULOCYTES # BLD AUTO: 0.01 X10(3) UL (ref 0–1)
IMM GRANULOCYTES NFR BLD: 0.2 %
LDLC SERPL CALC-MCNC: 84 MG/DL (ref ?–100)
LYMPHOCYTES # BLD AUTO: 1.39 X10(3) UL (ref 1–4)
LYMPHOCYTES NFR BLD AUTO: 23.2 %
MCH RBC QN AUTO: 31.3 PG (ref 26–34)
MCHC RBC AUTO-ENTMCNC: 32.8 G/DL (ref 31–37)
MCV RBC AUTO: 95.3 FL
MONOCYTES # BLD AUTO: 0.48 X10(3) UL (ref 0.1–1)
MONOCYTES NFR BLD AUTO: 8 %
NEUTROPHILS # BLD AUTO: 3.95 X10 (3) UL (ref 1.5–7.7)
NEUTROPHILS # BLD AUTO: 3.95 X10(3) UL (ref 1.5–7.7)
NEUTROPHILS NFR BLD AUTO: 65.9 %
NONHDLC SERPL-MCNC: 111 MG/DL (ref ?–130)
OSMOLALITY SERPL CALC.SUM OF ELEC: 291 MOSM/KG (ref 275–295)
PLATELET # BLD AUTO: 286 10(3)UL (ref 150–450)
POTASSIUM SERPL-SCNC: 4.1 MMOL/L (ref 3.5–5.1)
PROT SERPL-MCNC: 7.5 G/DL (ref 5.7–8.2)
RBC # BLD AUTO: 4.06 X10(6)UL
SODIUM SERPL-SCNC: 139 MMOL/L (ref 136–145)
TRIGL SERPL-MCNC: 155 MG/DL (ref 30–149)
VLDLC SERPL CALC-MCNC: 25 MG/DL (ref 0–30)
WBC # BLD AUTO: 6 X10(3) UL (ref 4–11)

## 2025-02-18 PROCEDURE — 80061 LIPID PANEL: CPT

## 2025-02-18 PROCEDURE — 36415 COLL VENOUS BLD VENIPUNCTURE: CPT

## 2025-02-18 PROCEDURE — 85025 COMPLETE CBC W/AUTO DIFF WBC: CPT

## 2025-02-18 PROCEDURE — 80053 COMPREHEN METABOLIC PANEL: CPT

## 2025-02-18 PROCEDURE — 99214 OFFICE O/P EST MOD 30 MIN: CPT | Performed by: FAMILY MEDICINE

## 2025-02-18 RX ORDER — BUSPIRONE HYDROCHLORIDE 30 MG/1
TABLET ORAL
Qty: 90 TABLET | Refills: 3 | Status: SHIPPED | OUTPATIENT
Start: 2025-02-18

## 2025-02-18 NOTE — PROGRESS NOTES
HPI:    Patient ID: Destiney English is a 79 year old female.      Dizziness  Pertinent negatives include no abdominal pain, chest pain, chills, congestion, coughing, fever, headaches, nausea, numbness or vomiting.       Chief Complaint   Patient presents with    Dizziness     On and off possible vertigo states she might have fluid in her ears or if it could be her blood thinner       Wt Readings from Last 6 Encounters:   02/18/25 167 lb (75.8 kg)   01/23/25 165 lb (74.8 kg)   01/04/25 173 lb (78.5 kg)   12/03/24 172 lb 8 oz (78.2 kg)   11/11/24 174 lb (78.9 kg)   09/10/24 173 lb (78.5 kg)     BP Readings from Last 3 Encounters:   01/23/25 121/81   01/04/25 156/84   12/03/24 115/72     Patient has been getting dizzy [place] if she changes position , occasionally  No chest pain   No shortness of breath   Saw cardiology dec 2024  and everything fine per patient     Had covid infection in Jan 2025  No headaches.  Saw neurology- on nurtec and also given topamax for preventive medication and helping    Started topiramate in Jan 2025.  Feels may be affecting her speech a little  Neuro has talked to her about speech therapy    Review of Systems   Constitutional:  Negative for chills and fever.   HENT:  Negative for congestion and ear pain.    Respiratory:  Negative for cough and shortness of breath.    Cardiovascular:  Negative for chest pain and leg swelling.   Gastrointestinal:  Negative for abdominal pain, diarrhea, nausea and vomiting.   Neurological:  Positive for dizziness. Negative for light-headedness, numbness and headaches.       Temp 97.7 °F (36.5 °C) (Temporal)   Ht 5' 1\" (1.549 m)   Wt 167 lb (75.8 kg)   BMI 31.55 kg/m²          Current Outpatient Medications   Medication Sig Dispense Refill    busPIRone HCl 30 MG Oral Tab Take 1/2 tablet (15 mg total) by mouth in the morning and 1/2 tablet (15 mg total) before bedtime 90 tablet 3    topiramate 25 MG Oral Tab Take 1 tablet (25 mg total) by mouth 2 (two)  times daily. 180 tablet 0    Rimegepant Sulfate (NURTEC) 75 MG Oral Tablet Dispersible Take 75 mg by mouth as needed. Take one tablet at onset of migraine.  Maximum dose in 24 hours is 1 tablet (75mg). 8 tablet 11    losartan 100 MG Oral Tab Take 1 tablet (100 mg total) by mouth daily. 90 tablet 3    ticagrelor (BRILINTA) 60 MG Oral Tab TAKE ONE TABLET BY MOUTH ONE TIME DAILY 30 tablet 3    albuterol (VENTOLIN HFA) 108 (90 Base) MCG/ACT Inhalation Aero Soln Inhale 2 puffs into the lungs every 4 (four) hours as needed for Wheezing. 56 g 1    rosuvastatin 10 MG Oral Tab Take 1 tablet (10 mg total) by mouth nightly. 90 tablet 3    clonazePAM 0.5 MG Oral Tab Take 1 tablet (0.5 mg total) by mouth daily as needed for Anxiety. 30 tablet 0    loratadine 10 MG Oral Tab Take 1 tablet (10 mg total) by mouth as needed for Allergies. 90 tablet 1    venlafaxine ER 37.5 MG Oral Capsule SR 24 Hr Take 1 capsule (37.5 mg total) by mouth daily. Add to effexor xr  150mg daily 90 capsule 3    venlafaxine  MG Oral Capsule SR 24 Hr Take 1 capsule (150 mg total) by mouth daily. 90 capsule 3    gabapentin 100 MG Oral Cap Take 200mg in am , 100mg in afternoon, and 300mg in pm 540 capsule 3    OPHTHALMIC IRRIGATION SOLUTION OP Apply to eye. Sustain drops      hydroCHLOROthiazide 12.5 MG Oral Tab Take 1 tablet (12.5 mg total) by mouth daily. 90 tablet 3    Nystatin [id number] UNIT/GM External Powder Apply 1 Application topically 2 (two) times daily. 60 g 0    Cholecalciferol (VITAMIN D) 50 MCG (2000 UT) Oral Tab Take 1 tablet by mouth daily.      baclofen 10 MG Oral Tab Take 1-2 TABLETS BY MOUTH (10-20mg) at night as needed 180 tablet 1    Melatonin 5 MG Oral Tab Take 1 tablet (5 mg total) by mouth nightly.      Menthol-Methyl Salicylate (THERA-GESIC) 0.5-15 % External Cream Apply 1 Application topically 3 (three) times daily as needed.      Multiple Vitamins-Minerals (PRESERVISION AREDS 2) Oral Cap Take 1 capsule by mouth daily.       aspirin 81 MG Oral Tab EC Take 1 tablet (81 mg total) by mouth daily. 7 tablet 0    Acetaminophen  MG Oral Tab CR Take 1 tablet (650 mg total) by mouth every 8 (eight) hours as needed for Pain.      Esomeprazole Magnesium (NEXIUM 24HR OR) Take 20 mg by mouth every evening.       Allergies:Allergies[1]   PHYSICAL EXAM:     Chief Complaint   Patient presents with    Dizziness     On and off possible vertigo states she might have fluid in her ears or if it could be her blood thinner      Physical Exam  Vitals reviewed.   HENT:      Head: Normocephalic.      Right Ear: Tympanic membrane and ear canal normal.      Left Ear: Tympanic membrane and ear canal normal.      Nose: No congestion.   Cardiovascular:      Pulses: Normal pulses.      Heart sounds: Normal heart sounds. No murmur heard.  Pulmonary:      Effort: Pulmonary effort is normal.      Breath sounds: Normal breath sounds.   Abdominal:      Tenderness: There is no abdominal tenderness.   Musculoskeletal:      Cervical back: Normal range of motion and neck supple.      Right lower leg: No edema.      Left lower leg: No edema.   Lymphadenopathy:      Cervical: No cervical adenopathy.   Neurological:      Mental Status: She is alert and oriented to person, place, and time.   Psychiatric:         Mood and Affect: Mood normal.         Behavior: Behavior normal.         Thought Content: Thought content normal.         Judgment: Judgment normal.                ASSESSMENT/PLAN:     Encounter Diagnoses   Name Primary?    Dizziness Yes    Generalized anxiety disorder     Hyperlipidemia, unspecified hyperlipidemia type     Essential hypertension        1. Dizziness  Slight orthostasis  Push fluids  Also topamax can be contributing     - CBC With Differential With Platelet; Future    2. Generalized anxiety disorder  Continue present management    - busPIRone HCl 30 MG Oral Tab; Take 1/2 tablet (15 mg total) by mouth in the morning and 1/2 tablet (15 mg total) before  bedtime  Dispense: 90 tablet; Refill: 3    3. Hyperlipidemia, unspecified hyperlipidemia type  Stable condition  Reviewed medications  Continue current medication management   All questions answered to the best of my ability.    - Comp Metabolic Panel (14); Future  - Lipid Panel; Future    4. Essential hypertension  Push fluids  Continue present management   - Comp Metabolic Panel (14); Future      Orders Placed This Encounter   Procedures    Comp Metabolic Panel (14)    Lipid Panel    CBC With Differential With Platelet         The above note was creating using Dragon speech recognition technology. Please excuse any typos    Meds This Visit:  Requested Prescriptions     Signed Prescriptions Disp Refills    busPIRone HCl 30 MG Oral Tab 90 tablet 3     Sig: Take 1/2 tablet (15 mg total) by mouth in the morning and 1/2 tablet (15 mg total) before bedtime       Imaging & Referrals:  None       ID#[id number]       [1]   Allergies  Allergen Reactions    Blueberry ANAPHYLAXIS    Cephalexin HIVES and ITCHING    Latex ITCHING    Peas HIVES    Penicillin G ANAPHYLAXIS    Penicillins ANAPHYLAXIS    Meloxicam OTHER (SEE COMMENTS)     Stomach ulcer    Procainamide OTHER (SEE COMMENTS)     Throat swelling    Other ITCHING     Peaches,peas    Peach RASH    Tomato ITCHING     Oral itching

## 2025-02-22 DIAGNOSIS — N28.9 DECREASED RENAL FUNCTION: Primary | ICD-10-CM

## 2025-02-26 NOTE — PROGRESS NOTES
[facility]  [address], [address]  [facility] [address]  [phone]        Neurology Clinic Follow Up Note    Chief Complaint:  Migraine (LOV 1/23/25. Pt is here for 1 month follow up for medication management - topamax and nurtec. Pt reports topamax has been working great and has only had two migraines. She took the nurtec and migraines went away after hour. Went to PCP and kidney function was off. )      HPI:   Destiney English is a 79 year old woman w/ a pmhx of  8mm, L PCOMM aneurysm s/p coiling c/b multiple left embolic infarcts c/b right hemiparesis and aphasia, anxiety, scoliosis, hx of breast cancer, GERD, HTN, HLD, hx of gastric ulcers, IBS, and migraines w/ visual aura  who presents in follow up for her migraines with aura.    Preventive Treatment  Name Dose (if remembered) Results   Antidepressants Venlafaxine ER 75-225mg daily (SNRI)   No improvement in headache frequency or intensity   Antiseizure  gabapentin     Beta blockers      CCBs Verapamil    No improvement in headache frequency or intensity   CGRP pathway targeted treatments      Onabotulinumtoxin A      NMDA antagonist         Other          Acute Treatment Name Dose Results   Analgesics/NSAIDs      Ergot derivatives      Triptans sumatriptan  Contraindicated given her history of stroke   Other          She has at least  8 migraines a month.  She has tension type headaches as well.  .  She avoids foods that can trigger her migraines.  +aura of zig/zag lines before her headache.  She has the classic migraine hypersensitivities.   Headaches are left sided, and will radiates to the back of her neck.  If she has a severe migraine she will vomit.    The nurtec relieves the pain of migraine. The cost is burdensome.      02/27/25 Interval history/subjective  Here in follow up for migraines.  States she is doing better on topamax.     She reports her confusion was transiently worse after starting the topamax. States  it improved and attributed it to her strokes. She had 2 migraines since her last clinic visit. Reports they were intense.    She was dizzy; went to pcp. Sbp was 111 and 115. Her  BUN/Cr ratio ines.  She was encouraged to hydrate. She was taking more NSAIDS when she could not get the nurtec. She has backed off the NSAIDs.    Reports her speech is a little worse since starting topamax. It is  noticeable but tolerable..  She has tingling in her hands and feet since starting the medication.    She is doing koby-chi.  She uses the exercise bike 35 minutes  daily. On Sundays her  joins her in [employer]. She does \"exercises to help her circulation.\"     ROS: Pertinent positive and negatives per HPI.  All others were reviewed and negative.     Medications:  Current Outpatient Medications   Medication Instructions    Acetaminophen ER (TYLENOL) 650 mg, Every 8 hours PRN    albuterol (VENTOLIN HFA) 108 (90 Base) MCG/ACT Inhalation Aero Soln 2 puffs, Inhalation, Every 4 hours PRN    aspirin 81 mg, Oral, Daily    baclofen 10 MG Oral Tab Take 1-2 TABLETS BY MOUTH (10-20mg) at night as needed    Brilinta 60 mg, Oral, Daily    busPIRone HCl 30 MG Oral Tab Take 1/2 tablet (15 mg total) by mouth in the morning and 1/2 tablet (15 mg total) before bedtime    Cholecalciferol (VITAMIN D) 50 MCG (2000 UT) Oral Tab 1 tablet, Daily    clonazePAM (KLONOPIN) 0.5 mg, Oral, DAILY PRN    Esomeprazole Magnesium (NEXIUM 24HR OR) 20 mg, Every evening    gabapentin 100 MG Oral Cap Take 200mg in am , 100mg in afternoon, and 300mg in pm    hydroCHLOROthiazide 12.5 mg, Oral, Daily    loratadine (CLARITIN) 10 mg, Oral, As needed    losartan (COZAAR) 100 mg, Oral, Daily    Melatonin 5 mg, Nightly    Menthol-Methyl Salicylate (THERA-GESIC) 0.5-15 % External Cream 1 Application, 3 times daily PRN    Multiple Vitamins-Minerals (PRESERVISION AREDS 2) Oral Cap 1 capsule, Daily    Nystatin 848503 UNIT/GM External Powder 1 Application, Topical, 2 times  daily    OPHTHALMIC IRRIGATION SOLUTION OP Apply to eye. Sustain drops    rosuvastatin (CRESTOR) 10 mg, Oral, Nightly    venlafaxine ER (EFFEXOR-XR) 150 mg, Oral, Daily    venlafaxine ER (EFFEXOR-XR) 37.5 mg, Oral, Daily, Add to effexor xr  150mg daily        Reviewed and assessed      Objective:  Last vitals and weight :  There is no height or weight on file to calculate BMI.   There were no vitals filed for this visit.   There were no vitals taken for this visit.  There were no vitals taken for this visit.  Exam:  - General: appears stated age and no distress       - Pulmonary: Normal excursion of the chest.  No signs of respiratory distress.  Neurologic Exam  - Mental Status: Alert and attentive. . She had single semantic paraphasic error.   Speech is mostly spontaneous. Minimally impaired fluency. Normal prosodic. Comprehension and repetition intact. Phrase length and rate are  mostly normal. No paraphasic errors, neologisms, anomia, acalculia, apraxia, anosognosia, or R/L confusion.   - Cranial Nerves: No gaze preference. Visual fields:normal  Pupils are 4mm briskly constricting to 3mm and equally round and reactive to light  in a well lit room. No rAPD. EOMI. No nystagmus. No ptosis. V1-V3 intact B/L to light touch.No pathological facial asymmetry. No flattening of the nasolabial fold. .  Hearing grossly intact.  Tongue midline. No atrophy or fasiculations of the tongue noted. Palate and uvula elevate symmetrically.  Shoulder shrug symmetric.     - Motor:  normal tone/bulk. No interosseous wasting. No flattening of hypothenar eminences.   Motor Strength    Pronator drift: No pronator drift   Arm Rolling: No orbiting.   Finger Taps: Finger taps are symmetric in rate and amplitude.    Rapid movements: symmetric. No fatiguing.    - Sensory:   Light touch:  decreased on the right      - Cerebellum: No truncal ataxia. No titubations. No dysmetria, no dysdiadochokinesis. No rebound.        Most recent lab results:      No results found for this or any previous visit (from the past 36 hours).  Reviewed and assessed    Diagnostic studies:  Performed an independent visualization of  mri brain from 2/8.24 and mra from 3/18.24  Imaging revealed:   agree w/ read    Assessment   Destiney English is a 79 year old woman w/ a pmhx of  8mm, L PCOMM aneurysm s/p coiling c/b multiple left embolic infarcts c/b right hemiparesis and aphasia, anxiety, scoliosis, hx of breast cancer, GERD, HTN, HLD, hx of gastric ulcers, IBS, and migraines w/ visual aura  who presents in follow up for her chronic migraines with aura.    She is on effexor. Cannot be on a triptan b/c of her prior stroke.  Considered propranolol for ppx but she is on albuterol. She is better with Topamax but her speech is worse. At baseline she has aphasia and it is worse on the topamax. She also has paraesthesias in her hands and feet. Will try botox.     Next step: if headaches are worse try botox.  Nurtec costs $480.00 a month. The cost is burdensome.         Plan   1. Chronic migraine with aura without status migrainosus, not intractable  - SPECIALTY (OTHER) - INTERNAL                    This document is not intended to support charting by exception.  Sections left blank in a completed note should be presumed not to have been done.      Disclaimer:   This record was dictated using  Dragon software. There may be errors due to voice recognition problems that were not realized and corrected during the completion of the note.         Thank you for allowing me to participate in the care of your patient.    Lokesh Bacon DO  02/27/25

## 2025-02-27 ENCOUNTER — OFFICE VISIT (OUTPATIENT)
Dept: NEUROLOGY | Facility: CLINIC | Age: 80
End: 2025-02-27
Payer: MEDICARE

## 2025-02-27 ENCOUNTER — TELEPHONE (OUTPATIENT)
Dept: PHYSICAL MEDICINE AND REHAB | Facility: CLINIC | Age: 80
End: 2025-02-27

## 2025-02-27 ENCOUNTER — TELEPHONE (OUTPATIENT)
Dept: SURGERY | Facility: CLINIC | Age: 80
End: 2025-02-27

## 2025-02-27 DIAGNOSIS — J30.2 SEASONAL ALLERGIES: ICD-10-CM

## 2025-02-27 DIAGNOSIS — G43.E09 CHRONIC MIGRAINE WITH AURA WITHOUT STATUS MIGRAINOSUS, NOT INTRACTABLE: Primary | ICD-10-CM

## 2025-02-27 DIAGNOSIS — I67.1 INTRACRANIAL ANEURYSM (HCC): ICD-10-CM

## 2025-02-27 PROCEDURE — 99214 OFFICE O/P EST MOD 30 MIN: CPT | Performed by: OTHER

## 2025-02-27 RX ORDER — TICAGRELOR 60 MG/1
60 TABLET ORAL DAILY
Qty: 30 TABLET | Refills: 0 | Status: SHIPPED | OUTPATIENT
Start: 2025-02-27

## 2025-02-27 NOTE — TELEPHONE ENCOUNTER
Medication:   ticagrelor (BRILINTA) 60 MG Oral Tab 30 tablet 3 11/12/2024 --   Sig:   TAKE ONE TABLET BY MOUTH ONE TIME DAILY          Date of last refill: 11.12.2024 (#30/0)  [place]      Last office visit: 3.28.2024  Due back to clinic per last office note:    Date next office visit scheduled:  3.27.2025  Future Appointments   Date Time Provider Department Center   3/20/2025  2:00 PM Venessa Barrett [facility] [place]   3/21/2025  2:30 PM [facility] MRI RM1 (1.5T) [facility] MRI EM [facility]   3/27/2025  3:00 PM Jb Lyons MD ENINAPER2 [facility] [place]   6/3/2025 10:20 AM Shilpa Sheikh MD [facility]           Last OV note recommendation:       ASSESSMENT & PLAN      \" Destiney was seen today for follow - up.     Diagnoses and all orders for this visit:     Intracranial aneurysm (HCC)        1.  Destiney continues to do very well posttreatment.  The imaging is consistent with progressive occlusion of the aneurysm.     2.  Recommendations/plan: Continue baby aspirin and 60 mg of Brilinta daily.  Repeat MRA of the head without contrast in 1 year.  At that point, if there is further improvement, we would likely stop the Brilinta and continue the baby aspirin.  No follow-ups on file.  \"

## 2025-02-27 NOTE — PATIENT INSTRUCTIONS
Call [facility] [phone] . Direct extension [phone]. She is there 8 AM to 8 PM. They will work on setting you up on with a nurtec copay card.

## 2025-02-27 NOTE — TELEPHONE ENCOUNTER
Per CMS Guidelines -no authorization is required for Botox 200units. CPT codes: , 05283   Status: Authorization is not required based on medical necessity however is not a guarantee of payment and may be subject to review once claim is submitted.  Insurance: Medicare part B Member ID# [id number]   Medication: Botox 200 units  Number of visits Approved: 1  (PLEASE INFORM THE PATIENT TO CONTACT THE OFFICE IF THE INSURANCE CHANGES WITHIN THE YEAR AS HE/SHE WILL BE RESPONSIBLE TO UPDATE THE OFFICE IF INSURANCE CHANGES SO THAT PROCEDURE IS BILLED CORRECTLY) this will be 1 of 1  Dx: G4.E09  Last Botox done:   Next Botox due around: Now - new start  Authorization # [id number]/a  BUY[employer]BILL

## 2025-02-27 NOTE — TELEPHONE ENCOUNTER
Noted that med refill was sent:     Disp Refills Start End    BRILINTA 60 MG Oral Tab 30 tablet 0 2/27/2025 --    Sig - Route: TAKE ONE TABLET BY MOUTH ONE TIME DAILY - Oral    Sent to pharmacy as: Brilinta 60 MG Oral Tablet (ticagrelor)    E-Prescribing Status: Receipt confirmed by pharmacy (2/27/2025  9:55 AM CST)      Associated Diagnoses    Intracranial aneurysm (HCC)        Pharmacy    [id number] - [place], [place] - [address] [phone], [phone]     Notified patient of refill med order.

## 2025-03-03 RX ORDER — LORATADINE 10 MG/1
10 TABLET ORAL AS NEEDED
Qty: 90 TABLET | Refills: 1 | Status: SHIPPED | OUTPATIENT
Start: 2025-03-03

## 2025-03-03 NOTE — TELEPHONE ENCOUNTER
Refill passed per Clinic protocol.  Requested Prescriptions   Pending Prescriptions Disp Refills    ALLERGY RELIEF 10 MG Oral Tab [Pharmacy Med Name: Allergy Relief 10 Mg Tab Ohml] 90 tablet 0     Sig: Take 1 tablet (10 mg total) by mouth as needed for Allergies       Allergy Medication Protocol Passed - 3/3/2025 12:06 PM        Passed - In person appointment or virtual visit in the past 12 mos or appointment in next 3 mos     Recent Outpatient Visits              4 days ago Chronic migraine with aura without status migrainosus, not intractable    [facility] Lokesh Bacon DO    Office Visit    1 week ago Dizziness    [facility] Nik Rebolledo MD    Office Visit    1 month ago Migraine with aura and without status migrainosus, not intractable    [facility] Lokesh Bacon DO    Office Visit    1 month ago Acute cough    [facility] Sidney Mcnulty APRN    Office Visit    3 months ago Primary osteoarthritis involving multiple joints    [facility] Shilpa Sheikh MD    Office Visit          Future Appointments         Provider Department Appt Notes    In 2 weeks [facility] MRI RM1 (1.5T) [facility]     In 3 weeks Jb Lyons MD [facility] after MRA Appointment    In 2 months Lokesh Bacon DO [facility] 3 mos f/u    In 3 months Shilpa Sheihk MD [facility] 6 mo f/u                    Passed - Medication is active on med list

## 2025-03-21 ENCOUNTER — HOSPITAL ENCOUNTER (OUTPATIENT)
Dept: MRI IMAGING | Facility: HOSPITAL | Age: 80
Discharge: HOME | End: 2025-03-21
Payer: MEDICARE

## 2025-03-21 DIAGNOSIS — I67.1 CEREBRAL ANEURYSM (HCC): ICD-10-CM

## 2025-03-21 PROCEDURE — 70544 MR ANGIOGRAPHY HEAD W/O DYE: CPT

## 2025-03-27 DIAGNOSIS — I67.1 INTRACRANIAL ANEURYSM (HCC): ICD-10-CM

## 2025-03-27 RX ORDER — TICAGRELOR 60 MG/1
60 TABLET ORAL DAILY
Qty: 30 TABLET | Refills: 1 | Status: SHIPPED | OUTPATIENT
Start: 2025-03-27 | End: 2025-03-27

## 2025-04-11 ENCOUNTER — OFFICE VISIT (OUTPATIENT)
Dept: FAMILY MEDICINE CLINIC | Facility: CLINIC | Age: 80
End: 2025-04-11
Payer: MEDICARE

## 2025-04-11 VITALS
HEIGHT: 61 IN | RESPIRATION RATE: 18 BRPM | HEART RATE: 69 BPM | BODY MASS INDEX: 31.15 KG/M2 | WEIGHT: 165 LBS | OXYGEN SATURATION: 99 % | TEMPERATURE: 98 F | SYSTOLIC BLOOD PRESSURE: 122 MMHG | DIASTOLIC BLOOD PRESSURE: 64 MMHG

## 2025-04-11 DIAGNOSIS — J01.00 ACUTE NON-RECURRENT MAXILLARY SINUSITIS: Primary | ICD-10-CM

## 2025-04-11 PROCEDURE — 99213 OFFICE O/P EST LOW 20 MIN: CPT | Performed by: NURSE PRACTITIONER

## 2025-04-11 RX ORDER — FLUTICASONE PROPIONATE 50 MCG
2 SPRAY, SUSPENSION (ML) NASAL NIGHTLY
Qty: 1 EACH | Refills: 0 | Status: SHIPPED | OUTPATIENT
Start: 2025-04-11

## 2025-04-11 RX ORDER — DOXYCYCLINE HYCLATE 100 MG
100 TABLET ORAL 2 TIMES DAILY
Qty: 14 TABLET | Refills: 0 | Status: SHIPPED | OUTPATIENT
Start: 2025-04-11 | End: 2025-04-18

## 2025-04-11 NOTE — PROGRESS NOTES
CHIEF COMPLAINT:     Chief Complaint   Patient presents with    Sinus Problem     Sx 1.5 weeks - ST, PND, dry cough, sinus pressure, slight frontal headache, bilat ear pressure/clogged, R ear pain, runny nose, diarrhea (few episodes)  Denies chest congestion, fever, chills, body aches, nasal congestion, n/v, loss of appetite, SOB, rash  No Covid test was done at home  OTC Coricidin and Delsym       HPI:   Destiney English is a 80 year old female who presents for cold symptoms for   1.5   weeks. Symptoms have progressed into sinus congestion and been worsening since onset. Sinus congestion/pain is described as a pressure and is located mainly around the nose.  Has treated symptoms with coricidin, tylenol and delsym, last dose last night.  Patient also reports sinus headache, pnd cough, fullness in ears - worse on right,  sore throat worsening, bad taste in mouth .  Symptoms worse when bending forward.  Denies fever, dental pain, tinnitus, N/V.      Denies other ill contacts.  No testing done.      Current Medications[1]   Past Medical History[2]   Past Surgical History[3]   Family History[4]   Short Social Hx on File[5]      REVIEW OF SYSTEMS:   GENERAL:  normal appetite  SKIN: no rashes or abnormal skin lesions  HEENT: See HPI.    LUNGS: denies shortness of breath or wheezing, See HPI  CARDIOVASCULAR: denies chest pain or palpitations   GI: denies N/V/C or abdominal pain  NEURO: + sinus headaches.  No numbness or tingling in face.    EXAM:   /64   Pulse 69   Temp 97.5 °F (36.4 °C) (Tympanic)   Resp 18   Ht 5' 1\" (1.549 m)   Wt 165 lb (74.8 kg)   SpO2 99%   BMI 31.18 kg/m²     Physical Exam  Vitals reviewed.   Constitutional:       General: She is not in acute distress.     Appearance: Normal appearance. She is not ill-appearing.   HENT:      Head: Normocephalic and atraumatic.      Right Ear: Ear canal normal. A middle ear effusion is present. Tympanic membrane is scarred. Tympanic membrane is not  erythematous, retracted or bulging.      Left Ear: Ear canal normal. A middle ear effusion is present. Tympanic membrane is not erythematous, retracted or bulging.      Nose: Mucosal edema, congestion and rhinorrhea present. Rhinorrhea is clear and purulent.      Right Sinus: Maxillary sinus tenderness present. No frontal sinus tenderness.      Left Sinus: Maxillary sinus tenderness present. No frontal sinus tenderness.      Comments: + ethmoid tenderness.  Maxillary are worse.     Mouth/Throat:      Lips: Pink.      Mouth: Mucous membranes are moist.      Pharynx: Oropharynx is clear. Postnasal drip present.   Eyes:      General: Lids are normal.      Extraocular Movements: Extraocular movements intact.      Conjunctiva/sclera: Conjunctivae normal.   Cardiovascular:      Rate and Rhythm: Normal rate and regular rhythm.      Heart sounds: Normal heart sounds. No murmur heard.  Pulmonary:      Effort: Pulmonary effort is normal.      Breath sounds: Normal breath sounds and air entry. No decreased breath sounds, wheezing, rhonchi or rales.   Musculoskeletal:      Cervical back: Normal range of motion and neck supple.   Lymphadenopathy:      Cervical: No cervical adenopathy.   Skin:     General: Skin is warm and dry.      Findings: No rash.   Neurological:      General: No focal deficit present.      Mental Status: She is alert.   Psychiatric:         Speech: Speech normal.         Behavior: Behavior normal. Behavior is cooperative.           ASSESSMENT AND PLAN:     ASSESSMENT:  Encounter Diagnosis   Name Primary?    Acute non-recurrent maxillary sinusitis Yes       PLAN:   Comfort care as listed in Patient Instructions.  Medications as below.      Meds & Refills for this Visit:  Requested Prescriptions     Signed Prescriptions Disp Refills    Doxycycline Hyclate 100 MG Oral Tab 14 tablet 0     Sig: Take 1 tablet (100 mg total) by mouth 2 (two) times daily for 7 days.    fluticasone propionate 50 MCG/ACT Nasal  Suspension 1 each 0     Si sprays by Each Nare route nightly. Point up and out.       Risks, benefits, side effects of medication addressed and explained.  The patient is asked to f/u with PCP if sx's persist or worsen.  The patient indicates understanding of these issues and agrees to the plan.      Patient Instructions   SINUS/CONGESTION for people with HIGH BLOOD PRESSURE  Avoid decongestants and Afrin nasal spray and Ibuprofen, as these can raise blood pressure.  Discussed over the counter symptom medication options:   - Saline nasal spray several times per day.  --SINUS RINSE by NeilMed, or Neti Pot  -Guaifenesin (Mucinex) - Take as directed to thin out the mucus with fluids.   (max total 2400 mg of guaifenesin per day)  - May consider Claritin 10 mg daily for a few days to decrease sinus/postnasal drainage.  -OTC Nasacort or  Flonase Allergy 24HR (steroid nasal spray)  if needed for severe nasal congestion and post nasal drip.  OR  -Combination medication:  Coricidin HBP tailored to your symptoms    It is recommended to take probiotics while taking an antibiotic.    Examples include:  Yogurt - eat 4-8 oz twice daily.  Choose a product with the National Yogurt Association's seal such as Dannon, Yoplait, or Activia.   Capsule/granule forms such as Florastor, Florajen (refrigerated), or Culturelle.      Take the probiotic at least 3-4 hours after taking the antibiotic.  Continue the probiotic for 1-2 weeks after completing the antibiotic.           [1]   Current Outpatient Medications   Medication Sig Dispense Refill    loratadine (ALLERGY RELIEF) 10 MG Oral Tab Take 1 tablet (10 mg total) by mouth as needed for Allergies. 90 tablet 1    busPIRone HCl 30 MG Oral Tab Take 1/2 tablet (15 mg total) by mouth in the morning and 1/2 tablet (15 mg total) before bedtime 90 tablet 3    topiramate 25 MG Oral Tab Take 1 tablet (25 mg total) by mouth 2 (two) times daily. 180 tablet 0    Rimegepant Sulfate (NURTEC) 75 MG  Oral Tablet Dispersible Take 75 mg by mouth as needed. Take one tablet at onset of migraine.  Maximum dose in 24 hours is 1 tablet (75mg). 8 tablet 11    losartan 100 MG Oral Tab Take 1 tablet (100 mg total) by mouth daily. 90 tablet 3    albuterol (VENTOLIN HFA) 108 (90 Base) MCG/ACT Inhalation Aero Soln Inhale 2 puffs into the lungs every 4 (four) hours as needed for Wheezing. 56 g 1    rosuvastatin 10 MG Oral Tab Take 1 tablet (10 mg total) by mouth nightly. 90 tablet 3    clonazePAM 0.5 MG Oral Tab Take 1 tablet (0.5 mg total) by mouth daily as needed for Anxiety. 30 tablet 0    venlafaxine ER 37.5 MG Oral Capsule SR 24 Hr Take 1 capsule (37.5 mg total) by mouth daily. Add to effexor xr  150mg daily 90 capsule 3    venlafaxine  MG Oral Capsule SR 24 Hr Take 1 capsule (150 mg total) by mouth daily. 90 capsule 3    gabapentin 100 MG Oral Cap Take 200mg in am , 100mg in afternoon, and 300mg in pm 540 capsule 3    OPHTHALMIC IRRIGATION SOLUTION OP Apply to eye. Sustain drops      hydroCHLOROthiazide 12.5 MG Oral Tab Take 1 tablet (12.5 mg total) by mouth daily. 90 tablet 3    Nystatin 557126 UNIT/GM External Powder Apply 1 Application topically 2 (two) times daily. 60 g 0    baclofen 10 MG Oral Tab Take 1-2 TABLETS BY MOUTH (10-20mg) at night as needed 180 tablet 1    Melatonin 5 MG Oral Tab Take 1 tablet (5 mg total) by mouth nightly.      Menthol-Methyl Salicylate (THERA-GESIC) 0.5-15 % External Cream Apply 1 Application topically 3 (three) times daily as needed.      Multiple Vitamins-Minerals (PRESERVISION AREDS 2) Oral Cap Take 1 capsule by mouth daily.      aspirin 81 MG Oral Tab EC Take 1 tablet (81 mg total) by mouth daily. 7 tablet 0    Acetaminophen  MG Oral Tab CR Take 1 tablet (650 mg total) by mouth every 8 (eight) hours as needed for Pain.      Esomeprazole Magnesium (NEXIUM 24HR OR) Take 20 mg by mouth every evening.      Cholecalciferol (VITAMIN D) 50 MCG (2000 UT) Oral Tab Take 1 tablet  by mouth daily.     [2]   Past Medical History:   Anxiety    Aphasia    Back problem    Scoliosis    BRCA negative    Breast cancer (HCC)    left breast mastectomy, lymph nodes    Clostridioides difficile infection    Esophageal reflux    Essential hypertension    Hearing impairment    kourtney tinnitus    High blood pressure    High cholesterol    History of stomach ulcers    Hyperlipidemia    IBS (irritable bowel syndrome)    diet controlled    Migraines    Osteoarthritis    Pneumonia due to organism    twice    PONV (postoperative nausea and vomiting)    after knee replacement/only time    Psoriatic arthritis (HCC)    Thyroid nodule    Thyroid nodule    Vertigo    Visual impairment    glasses   [3]   Past Surgical History:  Procedure Laterality Date    Brain aneurysm repr, complx  3/27/2023         Breast reconstruc w tiss expandr Left     Colonoscopy  2016    Dilation/curettage,diagnostic      due to uterine polyp    Egd  2016    Ir angiogram cerebral carotid unilateral  3/27/2023         Mastectomy left Left     Total knee replacement  2019    left    Total knee replacement Right 2019   [4]   Family History  Problem Relation Age of Onset    Cancer Father         Lung cancer    Heart Disease Mother         congestive heart failure    Other (Other) Mother         COPD    Cancer Mother         Congestive heart failure               Congestive Heart Failure    Breast Cancer Self     Breast Cancer Sister 68            Breast Cancer Maternal Aunt             Breast Cancer Maternal Cousin Female         40s    Cancer Sister         breast cancer   [5]   Social History  Socioeconomic History    Marital status:    Tobacco Use    Smoking status: Former     Current packs/day: 0.00     Types: Cigarettes     Start date: 3/9/1988     Quit date: 3/9/1994     Years since quittin.1    Smokeless tobacco: Never   Vaping Use    Vaping status: Never Used   Substance and Sexual  Activity    Alcohol use: Not Currently     Alcohol/week: 1.0 standard drink of alcohol     Types: 1 Standard drinks or equivalent per week     Comment: Socially    Drug use: No   Other Topics Concern    Breast feeding No    Reaction to local anesthetic No    Pt has a pacemaker No    Pt has a defibrillator No     Social Drivers of Health     Food Insecurity: No Food Insecurity (2/22/2024)    Food Insecurity     Food Insecurity: Never true   Transportation Needs: No Transportation Needs (2/22/2024)    Transportation Needs     Lack of Transportation: No   Stress: No Stress Concern Present (2/22/2024)    Stress     Feeling of Stress : No   Housing Stability: Low Risk  (2/22/2024)    Housing Stability     Housing Instability: No

## 2025-04-11 NOTE — PATIENT INSTRUCTIONS
SINUS/CONGESTION for people with HIGH BLOOD PRESSURE  Avoid decongestants and Afrin nasal spray and Ibuprofen, as these can raise blood pressure.  Discussed over the counter symptom medication options:   - Saline nasal spray several times per day.  --SINUS RINSE by NeilMed, or Lisandra Pot  -Guaifenesin (Mucinex) - Take as directed to thin out the mucus with fluids.   (max total 2400 mg of guaifenesin per day)  - May consider Claritin 10 mg daily for a few days to decrease sinus/postnasal drainage.  -OTC Nasacort or  Flonase Allergy 24HR (steroid nasal spray)  if needed for severe nasal congestion and post nasal drip.  OR  -Combination medication:  Coricidin HBP tailored to your symptoms    It is recommended to take probiotics while taking an antibiotic.    Examples include:  Yogurt - eat 4-8 oz twice daily.  Choose a product with the National Yogurt Association's seal such as Dannon, Yoplait, or Activia.   Capsule/granule forms such as Florastor, Florajen (refrigerated), or Culturelle.      Take the probiotic at least 3-4 hours after taking the antibiotic.  Continue the probiotic for 1-2 weeks after completing the antibiotic.

## 2025-04-22 NOTE — TELEPHONE ENCOUNTER
Patient requesting refill     [id number] [place] [facility] [address]       Medication Detail    Medication Quantity Refills Start End   gabapentin 100 MG Oral Cap 540 capsule 3 6/3/2024 --   Sig:   Take 200mg in am , 100mg in afternoon, and 300mg in pm     Route:   (none)     Order #:   [id number]

## 2025-04-23 RX ORDER — VANCOMYCIN HYDROCHLORIDE 125 MG/1
125 CAPSULE ORAL DAILY
Qty: 5 CAPSULE | Refills: 0 | OUTPATIENT
Start: 2025-04-23 | End: 2025-04-28

## 2025-04-23 NOTE — TELEPHONE ENCOUNTER
Dr. Sheikh,    Refill request for Gabapentin    Last refill: 6/3/2024    LOV: 12/3/2024    RTC: 9/6/2025    Order is pended, please review and send if appropriate. Thank you.

## 2025-04-24 RX ORDER — HYDROCHLOROTHIAZIDE 12.5 MG/1
12.5 TABLET ORAL DAILY
Qty: 90 TABLET | Refills: 3 | Status: SHIPPED | OUTPATIENT
Start: 2025-04-24

## 2025-04-24 RX ORDER — GABAPENTIN 100 MG/1
CAPSULE ORAL
Qty: 540 CAPSULE | Refills: 11 | Status: SHIPPED | OUTPATIENT
Start: 2025-04-24

## 2025-04-24 NOTE — TELEPHONE ENCOUNTER
Refill passed per [facility] protocol.    Requested Prescriptions   Pending Prescriptions Disp Refills    HYDROCHLOROTHIAZIDE 12.5 MG Oral Tab [Pharmacy Med Name: Hydrochlorothiazide 12.5 Mg Tab Acta] 90 tablet 0     Sig: Take 1 tablet (12.5 mg total) by mouth daily.       Hypertension Medications Protocol Passed - 4/24/2025  3:00 PM        Passed - CMP or BMP in past 12 months        Passed - Last BP reading less than 140/90     BP Readings from Last 1 Encounters:   04/11/25 122/64               Passed - In person appointment or virtual visit in the past 12 mos or appointment in next 3 mos     Recent Outpatient Visits              1 week ago Acute non-recurrent maxillary sinusitis    [facility], Walk[facility]In [facility], [facility] Ivelisse Gregory APRN    Office Visit    4 weeks ago     [facility] Jb Lyons MD    Office Visit    1 month ago Chronic migraine with aura without status migrainosus, not intractable    [facility] Lokesh Bacon DO    Office Visit    2 months ago Dizziness    [facility] Nik Rebolledo MD    Office Visit    3 months ago Migraine with aura and without status migrainosus, not intractable    [facility] Lokesh Bacon DO    Office Visit          Future Appointments         Provider Department Appt Notes    In 3 weeks Lokesh Bacon DO [facility] 3 mos f/u  mychart message sent    In 2 months Lokesh Bacon DO [facility] current medication i am on    In 4 months Sachi Rider MD [facility] Pre pt of dr.palaniswami fernando up                    Passed - EGFRCR or GFRNAA > 50     GFR Evaluation  EGFRCR: 52 , resulted on 2/18/2025          Passed -  Medication is active on med list           Future Appointments         Provider Department Appt Notes    In 3 weeks Lokesh Bacon DO [facility] 3 mos f/u  Cyanhart message sent    In 2 months Lokesh Bacon DO [facility] current medication i am on    In 4 months Sachi Rider MD [facility] Pre pt of dr.palaniswami fernando up          Recent Outpatient Visits              1 week ago Acute non-recurrent maxillary sinusitis    [facility], Walk-In Clinic, [facility] Ivelisse Gregory APRN    Office Visit    4 weeks ago     [facility], [facility] Jb Lyons MD    Office Visit    1 month ago Chronic migraine with aura without status migrainosus, not intractable    [facility] Lokesh Bacon DO    Office Visit    2 months ago Dizziness    [facility] Nik Rebolledo MD    Office Visit    3 months ago Migraine with aura and without status migrainosus, not intractable    [facility] Lokesh Bacon DO    Office Visit

## 2025-05-16 ENCOUNTER — OFFICE VISIT (OUTPATIENT)
Dept: NEUROLOGY | Facility: CLINIC | Age: 80
End: 2025-05-16
Payer: MEDICARE

## 2025-05-16 DIAGNOSIS — G43.E09 CHRONIC MIGRAINE WITH AURA WITHOUT STATUS MIGRAINOSUS, NOT INTRACTABLE: Primary | ICD-10-CM

## 2025-05-16 PROCEDURE — 64615 CHEMODENERV MUSC MIGRAINE: CPT | Performed by: OTHER

## 2025-05-16 NOTE — ADDENDUM NOTE
Addended by: EMELIA WEI on: 5/16/2025 12:39 PM     Modules accepted: Orders     amrit all pertinent systems normal

## 2025-05-16 NOTE — PROGRESS NOTES
Paperwork noting that patient may bear financial responsibility for procedure(s) performed in clinic today signed prior to proceeding with procedure(s).    Furthermore, patient notified that they should contact their insurer to verify that your procedure/test has been approved and is a COVERED benefit.  Although the [facility] staff does its due diligence, the insurance carrier gives the disclaimer that \"Although the procedure is authorized, this does not guarantee payment.\"    Ultimately the patient is responsible for payment.    Botox is:  [x] Buy and Bill  [] Patient Supplied      BOTOX NEW START    [x] I have discussed with patient that if their insurance changes they must contact the office right away with that information so that a new prior authorization can be completed.  Patient verbalized understanding that Botox cannot be performed without a current prior authorization in place with correct insurance.

## 2025-06-16 ENCOUNTER — TELEPHONE (OUTPATIENT)
Dept: PHYSICAL MEDICINE AND REHAB | Facility: CLINIC | Age: 80
End: 2025-06-16

## 2025-06-16 NOTE — TELEPHONE ENCOUNTER
Per CMS Guidelines -no authorization is required for Botox 200units. CPT codes: , 88491     Insurance: Medicare part B Member ID# [id number]   Medication: Botox 200 units  Number of visits Approved: 1  (PLEASE INFORM THE PATIENT TO CONTACT THE OFFICE IF THE INSURANCE CHANGES WITHIN THE YEAR AS HE/SHE WILL BE RESPONSIBLE TO UPDATE THE OFFICE IF INSURANCE CHANGES SO THAT PROCEDURE IS BILLED CORRECTLY) this will be 1 of 1  Dx: G4.E09  Last Botox done: 5/16/25  Next Botox due around: 8/15/25- Scheduled  Authorization # n/[id number]  BUY&BILL  Authorization is not required based on medical necessity, however, is not a guarantee of payment and may be subject to review once claim is submitted.     PLEASE INFORM THE PATIENT TO CONTACT THE OFFICE IF THE INSURANCE CHANGES AS HE/SHE IS RESPONSIBLE TO UPDATE THE OFFICE IF INSURANCE CHANGES SO THAT PROCEDURE IS BILLED CORRECTLY.

## 2025-06-18 ENCOUNTER — HOSPITAL ENCOUNTER (OUTPATIENT)
Dept: GENERAL RADIOLOGY | Facility: HOSPITAL | Age: 80
Discharge: HOME | End: 2025-06-18
Attending: NURSE PRACTITIONER
Payer: MEDICARE

## 2025-06-18 ENCOUNTER — OFFICE VISIT (OUTPATIENT)
Dept: FAMILY MEDICINE CLINIC | Facility: CLINIC | Age: 80
End: 2025-06-18
Payer: MEDICARE

## 2025-06-18 VITALS
WEIGHT: 151 LBS | HEART RATE: 84 BPM | SYSTOLIC BLOOD PRESSURE: 155 MMHG | RESPIRATION RATE: 18 BRPM | OXYGEN SATURATION: 100 % | HEIGHT: 61 IN | DIASTOLIC BLOOD PRESSURE: 78 MMHG | TEMPERATURE: 97 F | BODY MASS INDEX: 28.51 KG/M2

## 2025-06-18 DIAGNOSIS — J44.1 COPD EXACERBATION (HCC): Primary | ICD-10-CM

## 2025-06-18 DIAGNOSIS — R05.1 ACUTE COUGH: ICD-10-CM

## 2025-06-18 DIAGNOSIS — R09.89 RHONCHI: ICD-10-CM

## 2025-06-18 DIAGNOSIS — R09.89 CHEST CONGESTION: ICD-10-CM

## 2025-06-18 PROBLEM — R00.2 PALPITATION: Status: ACTIVE | Noted: 2024-11-27

## 2025-06-18 PROBLEM — R06.02 SOB (SHORTNESS OF BREATH): Status: ACTIVE | Noted: 2024-11-27

## 2025-06-18 PROCEDURE — 71046 X-RAY EXAM CHEST 2 VIEWS: CPT | Performed by: NURSE PRACTITIONER

## 2025-06-18 PROCEDURE — 99214 OFFICE O/P EST MOD 30 MIN: CPT | Performed by: NURSE PRACTITIONER

## 2025-06-18 RX ORDER — AZITHROMYCIN 250 MG/1
TABLET, FILM COATED ORAL
Qty: 6 TABLET | Refills: 0 | Status: SHIPPED | OUTPATIENT
Start: 2025-06-18 | End: 2025-06-23

## 2025-06-18 RX ORDER — PREDNISONE 20 MG/1
40 TABLET ORAL DAILY
Qty: 10 TABLET | Refills: 0 | Status: SHIPPED | OUTPATIENT
Start: 2025-06-18 | End: 2025-06-23

## 2025-06-18 NOTE — PROGRESS NOTES
Subjective:   Patient ID: Destiney English is a 80 year old female.    Patient is a 80 year old female who presents today with complaints of a productive cough, chest congestion, slight wheezing, ear popping, dizziness, nasal congestion and intermittent nausea x 2 weeks. Waking up at night in coughing fits. Denies SOB, chest pain, sinus pressure, sore throat, fever, body aches, chills, vomiting, diarrhea, abdominal pain or back pain. Tolerating PO well at home. Attempted treatment prior to arrival = Coricidin HBP and Albuterol as needed, mainly at night. + hx COPD and RAD.         History/Other:   Review of Systems   Constitutional:  Negative for appetite change, chills and fever.   HENT:  Positive for congestion and ear pain (+ popping). Negative for sinus pressure and sore throat.    Respiratory:  Positive for cough and wheezing. Negative for shortness of breath.    Cardiovascular:  Negative for chest pain.   Gastrointestinal:  Positive for nausea. Negative for abdominal pain, diarrhea and vomiting.   Musculoskeletal:  Negative for back pain and myalgias.   Neurological:  Positive for dizziness.     Current Medications[1]  Allergies:Allergies[2]    /78 (BP Location: Left arm, Patient Position: Sitting, Cuff Size: adult)   Pulse 84   Temp 97.4 °F (36.3 °C) (Oral)   Resp 18   Ht 5' 1\" (1.549 m)   Wt 151 lb (68.5 kg)   SpO2 100%   BMI 28.53 kg/m²       Objective:   Physical Exam  Vitals reviewed.   Constitutional:       General: She is awake. She is not in acute distress.     Appearance: Normal appearance. She is well-developed and well-groomed. She is not ill-appearing, toxic-appearing or diaphoretic.   HENT:      Head: Normocephalic and atraumatic.      Right Ear: Ear canal and external ear normal. A middle ear effusion is present.      Left Ear: Ear canal and external ear normal. A middle ear effusion is present.      Nose: Nose normal.      Mouth/Throat:      Lips: Pink.      Mouth: Mucous  membranes are moist. No oral lesions.      Pharynx: Oropharynx is clear. Uvula midline.   Cardiovascular:      Rate and Rhythm: Normal rate and regular rhythm.   Pulmonary:      Effort: Pulmonary effort is normal. No respiratory distress.      Breath sounds: Examination of the right-upper field reveals rhonchi. Examination of the right-middle field reveals rhonchi. Rhonchi present. No decreased breath sounds, wheezing or rales.      Comments: Rhonchi clears with coughing  Lymphadenopathy:      Head:      Right side of head: No tonsillar adenopathy.      Left side of head: No tonsillar adenopathy.      Cervical: No cervical adenopathy.   Skin:     General: Skin is warm and dry.   Neurological:      Mental Status: She is alert and oriented to person, place, and time.   Psychiatric:         Behavior: Behavior is cooperative.         Assessment & Plan:   1. Acute cough    2. Chest congestion    3. Rhonchi        No orders of the defined types were placed in this encounter.      Meds This Visit:  Requested Prescriptions      No prescriptions requested or ordered in this encounter       Imaging & Referrals:  XR CHEST PA + LAT CHEST (CPT=71046)    Reassuring physical exam findings. Vitals WNL.  CXR ordered - will notify of results and further recommendations.  Patient v/u and is comfortable with this plan.    X-ray read as normal.    4:57 pm: spoke with patient regarding CXR result. Due to HPI, duration of symptoms and clinical exam findings - will treat today for COPD exacerbation. Took Doxycycline in April and it upset her stomach and made her vomit. Will do Azithromycin today. Short course of oral steroids. To F/U PCP in 5-7 days. Patient v/u and is comfortable with this plan.    Patient Instructions   Take Azithromycin as prescribed  Take Prednisone as prescribed with breakfast  Tylenol as needed  Mucinex DM OTC for cough/congestion or can continue Coricidin HBP  Follow up with PCP in 5-7 days for re-evaluation  Go to  the ER if you develop: trouble breathing, chest pain, uncontrolled pain/fevers, worsening symptoms         [1]   Current Outpatient Medications   Medication Sig Dispense Refill    gabapentin 100 MG Oral Cap Take 200mg in am , 100mg in afternoon, and 300mg in pm 540 capsule 11    Acetaminophen  MG Oral Tab CR Take 1 tablet (650 mg total) by mouth every 8 (eight) hours as needed for Pain.      hydroCHLOROthiazide 12.5 MG Oral Tab Take 1 tablet (12.5 mg total) by mouth daily. 90 tablet 3    fluticasone propionate 50 MCG/ACT Nasal Suspension 2 sprays by Each Nare route nightly. Point up and out. 1 each 0    loratadine (ALLERGY RELIEF) 10 MG Oral Tab Take 1 tablet (10 mg total) by mouth as needed for Allergies. 90 tablet 1    busPIRone HCl 30 MG Oral Tab Take 1/2 tablet (15 mg total) by mouth in the morning and 1/2 tablet (15 mg total) before bedtime 90 tablet 3    Rimegepant Sulfate (NURTEC) 75 MG Oral Tablet Dispersible Take 75 mg by mouth as needed. Take one tablet at onset of migraine.  Maximum dose in 24 hours is 1 tablet (75mg). (Patient not taking: Reported on 6/18/2025) 8 tablet 11    losartan 100 MG Oral Tab Take 1 tablet (100 mg total) by mouth daily. 90 tablet 3    albuterol (VENTOLIN HFA) 108 (90 Base) MCG/ACT Inhalation Aero Soln Inhale 2 puffs into the lungs every 4 (four) hours as needed for Wheezing. 56 g 1    rosuvastatin 10 MG Oral Tab Take 1 tablet (10 mg total) by mouth nightly. 90 tablet 3    clonazePAM 0.5 MG Oral Tab Take 1 tablet (0.5 mg total) by mouth daily as needed for Anxiety. 30 tablet 0    venlafaxine ER 37.5 MG Oral Capsule SR 24 Hr Take 1 capsule (37.5 mg total) by mouth daily. Add to effexor xr  150mg daily 90 capsule 3    venlafaxine  MG Oral Capsule SR 24 Hr Take 1 capsule (150 mg total) by mouth daily. 90 capsule 3    OPHTHALMIC IRRIGATION SOLUTION OP Apply to eye. Sustain drops      Nystatin 148150 UNIT/GM External Powder Apply 1 Application topically 2 (two) times daily.  60 g 0    Cholecalciferol (VITAMIN D) 50 MCG (2000 UT) Oral Tab Take 1 tablet by mouth daily.      baclofen 10 MG Oral Tab Take 1-2 TABLETS BY MOUTH (10-20mg) at night as needed 180 tablet 1    Melatonin 5 MG Oral Tab Take 1 tablet (5 mg total) by mouth nightly.      Menthol-Methyl Salicylate (THERA-GESIC) 0.5-15 % External Cream Apply 1 Application topically 3 (three) times daily as needed.      Multiple Vitamins-Minerals (PRESERVISION AREDS 2) Oral Cap Take 1 capsule by mouth daily.      aspirin 81 MG Oral Tab EC Take 1 tablet (81 mg total) by mouth daily. 7 tablet 0    Esomeprazole Magnesium (NEXIUM 24HR OR) Take 20 mg by mouth every evening.     [2]   Allergies  Allergen Reactions    Blueberry ANAPHYLAXIS    Cephalexin HIVES and ITCHING    Latex ITCHING    Peas HIVES    Penicillin G ANAPHYLAXIS    Penicillins ANAPHYLAXIS    Meloxicam OTHER (SEE COMMENTS)     Stomach ulcer    Procainamide OTHER (SEE COMMENTS)     Throat swelling    Other ITCHING     Peaches,peas    Peach RASH    Tomato ITCHING     Oral itching

## 2025-06-18 NOTE — PATIENT INSTRUCTIONS
Take Azithromycin as prescribed  Take Prednisone as prescribed with breakfast  Tylenol as needed  Mucinex DM OTC for cough/congestion or can continue Coricidin HBP  Follow up with PCP in 5-7 days for re-evaluation  Go to the ER if you develop: trouble breathing, chest pain, uncontrolled pain/fevers, worsening symptoms

## 2025-06-21 DIAGNOSIS — F41.0 PANIC ATTACKS: ICD-10-CM

## 2025-06-21 DIAGNOSIS — F41.1 GENERALIZED ANXIETY DISORDER: ICD-10-CM

## 2025-06-24 RX ORDER — CLONAZEPAM 0.5 MG/1
0.5 TABLET ORAL DAILY PRN
Qty: 30 TABLET | Refills: 0 | Status: SHIPPED | OUTPATIENT
Start: 2025-06-24

## 2025-06-26 ENCOUNTER — OFFICE VISIT (OUTPATIENT)
Dept: NEUROLOGY | Facility: CLINIC | Age: 80
End: 2025-06-26
Payer: MEDICARE

## 2025-06-26 VITALS — HEIGHT: 61 IN | BODY MASS INDEX: 28.89 KG/M2 | WEIGHT: 153 LBS

## 2025-06-26 DIAGNOSIS — G43.E09 CHRONIC MIGRAINE WITH AURA WITHOUT STATUS MIGRAINOSUS, NOT INTRACTABLE: Primary | ICD-10-CM

## 2025-06-26 PROCEDURE — 99214 OFFICE O/P EST MOD 30 MIN: CPT | Performed by: OTHER

## 2025-06-26 NOTE — PROGRESS NOTES
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    Preventive Treatment  Name Dose (if remembered) Results   Antidepressants Venlafaxine ER 75-225mg daily (SNRI)   No improvement in headache frequency or intensity   Antiseizure  gabapentin     Beta blockers      CCBs Verapamil    No improvement in headache frequency or intensity   CGRP pathway targeted treatments      Onabotulinumtoxin A      NMDA antagonist         Other          Acute Treatment Name Dose Results   Analgesics/NSAIDs      Ergot derivatives      Triptans sumatriptan  Contraindicated given her history of stroke   Other          She has at least  8 migraines a month.  She has tension type headaches as well.  .  She avoids foods that can trigger her migraines.  +aura of zig/zag lines before her headache.  She has the classic migraine hypersensitivities.   Headaches are left sided, and will radiates to the back of her neck.  If she has a severe migraine she will vomit.    The nurtec relieves the pain of migraine. The cost is burdensome.    06/26/25 Interval history/subjective  Here in follow up for migraines. Doing much better after her first round of botox.  She is going to have her next injection on 8/15/2025.       Headache/Migraine Frequency:  About how many days per month are you completely headache-/migraine-free (no headache at all)?   After her botox she is now  headache/migraine free. Has not had a headache since starting botox.  On average, how many hours per day do your headaches/migraines last?  At least 4 hours prior to botox. No headaches since starting botox.  Over the past 3 months, how has your headache/migraine frequency changed?  Getting them more often  ++Getting them less often  No change      Headache/migraine symptoms:  What symptoms do you normally have with your headaches/migraines?  ++Moderate-severe pain  ++Nausea  ++Vomiting  ++Sensitivity to light  ++Sensitivity to sound  Pain on one side or in specific areas    On average how many days per month do you have one or more of these headache/migraine symptoms?  0-4 since starting botox.  5-9  10-14  15+    On average, how painful are your headaches/migraines?  Not very painful  Somewhat painful  Painful  Very painful (prior to botox).      How headache/migraine affects your daily life?  How many days last month did you miss work or school due to headaches/migraines?  Not since starting botox.  How many days last month did you cancel plans due to headaches/migraines?  None since starting botox.  How many times last year did you go to the ER because of headaches/migraines?  zero since starting botox.    History of Present Illness  Destiney English is a 80 year old female who presents for follow-up of chronic migraines  .    She has experienced significant improvement in her chronic migraines since starting Botox treatment. She has been headache-free since the first session and is scheduled for her next session on August 15th. Prior to Botox, her migraines lasted at least four hours and often caused her to wake up with a sore head the next morning. During migraines, she experienced sensitivity to light, noise, and certain smells, such as cleaning fluids, which exacerbated her symptoms.    She is currently taking aspirin and venlafaxine 150 mg, which she notes helps with her sweating. She previously used  Nurtec for her migraines but discontinued it due to cost.    No headaches or migraines have occurred since her last Botox treatment.        02/27/25 Interval history/subjective  Here in follow up for migraines.  States she is doing better on topamax.     She reports her confusion was transiently worse after starting the topamax. States it improved and attributed it to her strokes. She had 2 migraines since her last clinic visit. Reports they were intense.    She was dizzy; went to pcp. Sbp was 111 and 115. Her  BUN/Cr ratio ines.  She was encouraged to hydrate. She was taking more NSAIDS when she could not get the nurtec. She has backed off the NSAIDs.    Reports her speech is a little worse since starting topamax. It is  noticeable but tolerable..  She has tingling in her hands and feet since starting the medication.    She is doing koby-chi.  She uses the exercise bike 35 minutes  daily. On Sundays her  joins her in [email or web address]. She does \"exercises to help her circulation.\"     ROS: Pertinent positive and negatives per HPI.  All others were reviewed and negative.     Medications:     Current Outpatient Medications   Medication Instructions    Acetaminophen ER (TYLENOL) 650 mg, Every 8 hours PRN    albuterol (VENTOLIN HFA) 108 (90 Base) MCG/ACT Inhalation Aero Soln 2 puffs, Inhalation, Every 4 hours PRN    aspirin 81 mg, Oral, Daily    baclofen 10 MG Oral Tab Take 1-2 TABLETS BY MOUTH (10-20mg) at night as needed    busPIRone HCl 30 MG Oral Tab Take 1/2 tablet (15 mg total) by mouth in the morning and 1/2 tablet (15 mg total) before bedtime    Cholecalciferol (VITAMIN D) 50 MCG (2000 UT) Oral Tab 1 tablet, Daily    clonazePAM (KLONOPIN) 0.5 mg, Oral, DAILY PRN    Esomeprazole Magnesium (NEXIUM 24HR OR) 20 mg, Every evening    fluticasone propionate 50 MCG/ACT Nasal Suspension 2 sprays, Each Nare, Nightly, Point up and out.    gabapentin 100 MG Oral Cap Take 200mg in am , 100mg in afternoon, and 300mg in pm     hydroCHLOROthiazide 12.5 mg, Oral, Daily    loratadine (ALLERGY RELIEF) 10 mg, Oral, As needed    losartan (COZAAR) 100 mg, Oral, Daily    Melatonin 5 mg, Nightly    Menthol-Methyl Salicylate (THERA-GESIC) 0.5-15 % External Cream 1 Application, 3 times daily PRN    Multiple Vitamins-Minerals (PRESERVISION AREDS 2) Oral Cap 1 capsule, Daily    Nurtec 75 mg, Oral, As needed, Take one tablet at onset of migraine.  Maximum dose in 24 hours is 1 tablet (75mg).    Nystatin 586311 UNIT/GM External Powder 1 Application, Topical, 2 times daily    OPHTHALMIC IRRIGATION SOLUTION OP Apply to eye. Sustain drops    rosuvastatin (CRESTOR) 10 mg, Oral, Nightly    venlafaxine ER (EFFEXOR-XR) 150 mg, Oral, Daily    venlafaxine ER (EFFEXOR-XR) 37.5 mg, Oral, Daily, Add to effexor xr  150mg daily           Reviewed and assessed      Objective:  Last vitals and weight :  Body mass index is 28.91 kg/m².   There were no vitals filed for this visit.   Height 61\", weight 153 lb (69.4 kg).  Ht 61\"   Wt 153 lb (69.4 kg)   BMI 28.91 kg/m²   Exam:  - General: appears stated age and no distress       - Pulmonary: Normal excursion of the chest.  No signs of respiratory distress.  Neurologic Exam  - Mental Status: Alert and attentive. . She had single semantic paraphasic error.   Speech is mostly spontaneous. Minimally impaired fluency. Normal prosodic. Comprehension and repetition intact. Phrase length and rate are  mostly normal. No paraphasic errors, neologisms, anomia, acalculia, apraxia, anosognosia, or R/L confusion.   - Cranial Nerves: No gaze preference. Visual fields:normal  Pupils are 4mm briskly constricting to 3mm and equally round and reactive to light  in a well lit room. No rAPD. EOMI. No nystagmus. No ptosis. V1-V3 intact B/L to light touch.No pathological facial asymmetry. No flattening of the nasolabial fold. .  Hearing grossly intact.  Tongue midline. No atrophy or fasiculations of the tongue noted. Palate and uvula elevate  symmetrically.  Shoulder shrug symmetric.     - Motor:  normal tone/bulk. No interosseous wasting. No flattening of hypothenar eminences.   Motor Strength    Pronator drift: No pronator drift   Arm Rolling: No orbiting.   Finger Taps: Finger taps are symmetric in rate and amplitude.    Rapid movements: symmetric. No fatiguing.  Tremor: minimal intention tremor.   - Sensory:   Light touch:  decreased on the right      - Cerebellum: No truncal ataxia. No titubations. No dysmetria, no dysdiadochokinesis. No rebound.        Most recent lab results:     No results found for this or any previous visit (from the past 36 hours).  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    She is on effexor. Cannot be on a triptan b/c of her prior stroke.  Considered propranolol for ppx but she is on albuterol. She is better with Topamax but her speech is worse. At baseline she has aphasia and it is worse on the topamax. She also has paraesthesias in her hands and feet.   She is doing much better w/ botox. Plan is continue botox.  She is not using nurtec b/c it was too expensive.       Assessment & Plan  Migraine with aura  Migraine with aura is well-controlled with Botox treatment. She reports being headache-free since the last Botox session. Previously, migraines were severe, lasting at least four hours, and were accompanied by photophobia, phonophobia, and osmophobia. Nurtec was discontinued due to cost. Anticipated worsening of headaches two weeks before the next  Botox session as the effect may wear off.  - Administer next Botox session on August 15.  - Monitor for worsening headaches in early August as Botox effect may wear off.            Plan   1. Chronic migraine with aura without status migrainosus, not intractable  - SPECIALTY (OTHER) - INTERNAL                      This document is not intended to support charting by exception.  Sections left blank in a completed note should be presumed not to have been done.      Disclaimer:   This record was dictated using  Dragon software. There may be errors due to voice recognition problems that were not realized and corrected during the completion of the note.         Thank you for allowing me to participate in the care of your patient.    Lokesh Bacon DO   06/26/25

## 2025-07-30 DIAGNOSIS — F41.1 GENERALIZED ANXIETY DISORDER: ICD-10-CM

## 2025-07-30 DIAGNOSIS — R23.2 HOT FLASHES: ICD-10-CM

## 2025-08-01 RX ORDER — VENLAFAXINE HYDROCHLORIDE 150 MG/1
150 CAPSULE, EXTENDED RELEASE ORAL DAILY
Qty: 90 CAPSULE | Refills: 3 | Status: SHIPPED | OUTPATIENT
Start: 2025-08-01

## 2025-08-05 ENCOUNTER — OFFICE VISIT (OUTPATIENT)
Age: 80
End: 2025-08-05

## 2025-08-05 ENCOUNTER — HOSPITAL ENCOUNTER (OUTPATIENT)
Dept: GENERAL RADIOLOGY | Facility: HOSPITAL | Age: 80
Discharge: HOME | End: 2025-08-05
Attending: INTERNAL MEDICINE

## 2025-08-05 ENCOUNTER — LAB ENCOUNTER (OUTPATIENT)
Dept: LAB | Facility: HOSPITAL | Age: 80
End: 2025-08-05
Attending: INTERNAL MEDICINE

## 2025-08-05 VITALS
DIASTOLIC BLOOD PRESSURE: 68 MMHG | HEART RATE: 71 BPM | BODY MASS INDEX: 30.4 KG/M2 | WEIGHT: 161 LBS | SYSTOLIC BLOOD PRESSURE: 129 MMHG | HEIGHT: 61 IN

## 2025-08-05 DIAGNOSIS — M25.511 CHRONIC PAIN OF BOTH SHOULDERS: ICD-10-CM

## 2025-08-05 DIAGNOSIS — G89.29 CHRONIC PAIN OF BOTH SHOULDERS: ICD-10-CM

## 2025-08-05 DIAGNOSIS — M15.0 PRIMARY OSTEOARTHRITIS INVOLVING MULTIPLE JOINTS: Primary | ICD-10-CM

## 2025-08-05 DIAGNOSIS — M15.0 PRIMARY OSTEOARTHRITIS INVOLVING MULTIPLE JOINTS: ICD-10-CM

## 2025-08-05 DIAGNOSIS — M25.50 ARTHRALGIA, UNSPECIFIED JOINT: ICD-10-CM

## 2025-08-05 DIAGNOSIS — M41.9 SCOLIOSIS OF THORACOLUMBAR SPINE, UNSPECIFIED SCOLIOSIS TYPE: ICD-10-CM

## 2025-08-05 DIAGNOSIS — M25.511 RIGHT SHOULDER PAIN, UNSPECIFIED CHRONICITY: ICD-10-CM

## 2025-08-05 DIAGNOSIS — M25.512 LEFT SHOULDER PAIN, UNSPECIFIED CHRONICITY: ICD-10-CM

## 2025-08-05 DIAGNOSIS — M25.512 CHRONIC PAIN OF BOTH SHOULDERS: ICD-10-CM

## 2025-08-05 LAB
ALBUMIN SERPL-MCNC: 4.9 G/DL (ref 3.2–4.8)
ALBUMIN/GLOB SERPL: 1.9 (ref 1–2)
ALP LIVER SERPL-CCNC: 98 U/L (ref 55–142)
ALT SERPL-CCNC: 18 U/L (ref 10–49)
ANION GAP SERPL CALC-SCNC: 9 MMOL/L (ref 0–18)
AST SERPL-CCNC: 24 U/L (ref ?–34)
BASOPHILS # BLD AUTO: 0.05 X10(3) UL (ref 0–0.2)
BASOPHILS NFR BLD AUTO: 0.6 %
BILIRUB SERPL-MCNC: 0.4 MG/DL (ref 0.2–1.1)
BUN BLD-MCNC: 22 MG/DL (ref 9–23)
BUN/CREAT SERPL: 20.2 (ref 10–20)
CALCIUM BLD-MCNC: 9.4 MG/DL (ref 8.7–10.4)
CHLORIDE SERPL-SCNC: 98 MMOL/L (ref 98–112)
CO2 SERPL-SCNC: 27 MMOL/L (ref 21–32)
CREAT BLD-MCNC: 1.09 MG/DL (ref 0.55–1.02)
DEPRECATED RDW RBC AUTO: 49.9 FL (ref 35.1–46.3)
EGFRCR SERPLBLD CKD-EPI 2021: 52 ML/MIN/1.73M2 (ref 60–?)
EOSINOPHIL # BLD AUTO: 0.04 X10(3) UL (ref 0–0.7)
EOSINOPHIL NFR BLD AUTO: 0.5 %
ERYTHROCYTE [DISTWIDTH] IN BLOOD BY AUTOMATED COUNT: 14 % (ref 11–15)
FASTING STATUS PATIENT QL REPORTED: NO
GLOBULIN PLAS-MCNC: 2.6 G/DL (ref 2–3.5)
GLUCOSE BLD-MCNC: 102 MG/DL (ref 70–99)
HCT VFR BLD AUTO: 37.4 % (ref 35–48)
HGB BLD-MCNC: 12.4 G/DL (ref 12–16)
IMM GRANULOCYTES # BLD AUTO: 0.03 X10(3) UL (ref 0–1)
IMM GRANULOCYTES NFR BLD: 0.4 %
LYMPHOCYTES # BLD AUTO: 1.61 X10(3) UL (ref 1–4)
LYMPHOCYTES NFR BLD AUTO: 20.9 %
MCH RBC QN AUTO: 32.1 PG (ref 26–34)
MCHC RBC AUTO-ENTMCNC: 33.2 G/DL (ref 31–37)
MCV RBC AUTO: 96.9 FL (ref 80–100)
MONOCYTES # BLD AUTO: 0.52 X10(3) UL (ref 0.1–1)
MONOCYTES NFR BLD AUTO: 6.7 %
NEUTROPHILS # BLD AUTO: 5.46 X10 (3) UL (ref 1.5–7.7)
NEUTROPHILS # BLD AUTO: 5.46 X10(3) UL (ref 1.5–7.7)
NEUTROPHILS NFR BLD AUTO: 70.9 %
OSMOLALITY SERPL CALC.SUM OF ELEC: 282 MOSM/KG (ref 275–295)
PLATELET # BLD AUTO: 297 10(3)UL (ref 150–450)
POTASSIUM SERPL-SCNC: 4.3 MMOL/L (ref 3.5–5.1)
PROT SERPL-MCNC: 7.5 G/DL (ref 5.7–8.2)
RBC # BLD AUTO: 3.86 X10(6)UL (ref 3.8–5.3)
SODIUM SERPL-SCNC: 134 MMOL/L (ref 136–145)
WBC # BLD AUTO: 7.7 X10(3) UL (ref 4–11)

## 2025-08-05 PROCEDURE — 99214 OFFICE O/P EST MOD 30 MIN: CPT | Performed by: INTERNAL MEDICINE

## 2025-08-05 PROCEDURE — 36415 COLL VENOUS BLD VENIPUNCTURE: CPT

## 2025-08-05 PROCEDURE — 73030 X-RAY EXAM OF SHOULDER: CPT | Performed by: INTERNAL MEDICINE

## 2025-08-05 PROCEDURE — 85025 COMPLETE CBC W/AUTO DIFF WBC: CPT

## 2025-08-05 PROCEDURE — 80053 COMPREHEN METABOLIC PANEL: CPT

## 2025-08-05 PROCEDURE — 20610 DRAIN/INJ JOINT/BURSA W/O US: CPT | Performed by: INTERNAL MEDICINE

## 2025-08-05 RX ORDER — GABAPENTIN 300 MG/1
300 CAPSULE ORAL 2 TIMES DAILY
Qty: 60 CAPSULE | Refills: 3 | Status: SHIPPED | OUTPATIENT
Start: 2025-08-05

## 2025-08-05 RX ORDER — BACLOFEN 10 MG/1
TABLET ORAL
Qty: 180 TABLET | Refills: 1 | Status: SHIPPED | OUTPATIENT
Start: 2025-08-05

## 2025-08-05 RX ORDER — TRIAMCINOLONE ACETONIDE 40 MG/ML
40 INJECTION, SUSPENSION INTRA-ARTICULAR; INTRAMUSCULAR ONCE
Status: COMPLETED | OUTPATIENT
Start: 2025-08-05 | End: 2025-08-05

## 2025-08-07 RX ORDER — VENLAFAXINE HYDROCHLORIDE 37.5 MG/1
37.5 CAPSULE, EXTENDED RELEASE ORAL DAILY
Qty: 90 CAPSULE | Refills: 3 | Status: SHIPPED | OUTPATIENT
Start: 2025-08-07

## 2025-08-13 ENCOUNTER — PATIENT MESSAGE (OUTPATIENT)
Dept: FAMILY MEDICINE CLINIC | Facility: CLINIC | Age: 80
End: 2025-08-13

## 2025-08-15 ENCOUNTER — OFFICE VISIT (OUTPATIENT)
Dept: NEUROLOGY | Facility: CLINIC | Age: 80
End: 2025-08-15

## 2025-08-15 ENCOUNTER — MED REC SCAN ONLY (OUTPATIENT)
Dept: NEUROLOGY | Facility: CLINIC | Age: 80
End: 2025-08-15

## 2025-08-15 DIAGNOSIS — G43.E09 CHRONIC MIGRAINE WITH AURA WITHOUT STATUS MIGRAINOSUS, NOT INTRACTABLE: Primary | ICD-10-CM

## 2025-08-15 PROCEDURE — 64615 CHEMODENERV MUSC MIGRAINE: CPT | Performed by: OTHER

## 2025-08-18 ENCOUNTER — TELEPHONE (OUTPATIENT)
Dept: FAMILY MEDICINE CLINIC | Facility: CLINIC | Age: 80
End: 2025-08-18

## (undated) DIAGNOSIS — R23.2 HOT FLASHES: ICD-10-CM

## (undated) DIAGNOSIS — Z86.19 HISTORY OF CLOSTRIDIOIDES DIFFICILE COLITIS: ICD-10-CM

## (undated) DIAGNOSIS — F41.1 GENERALIZED ANXIETY DISORDER: ICD-10-CM

## (undated) DIAGNOSIS — M15.9 PRIMARY OSTEOARTHRITIS INVOLVING MULTIPLE JOINTS: ICD-10-CM

## (undated) DIAGNOSIS — R51.9 LEFT-SIDED HEADACHE: ICD-10-CM

## (undated) DIAGNOSIS — I10 ESSENTIAL HYPERTENSION: Primary | ICD-10-CM

## (undated) DIAGNOSIS — E78.5 HYPERLIPIDEMIA, UNSPECIFIED HYPERLIPIDEMIA TYPE: ICD-10-CM

## (undated) DIAGNOSIS — Z12.31 BREAST CANCER SCREENING BY MAMMOGRAM: Primary | ICD-10-CM

## (undated) DIAGNOSIS — Z86.69 HISTORY OF MIGRAINE HEADACHES: ICD-10-CM

## (undated) DIAGNOSIS — M06.9 RHEUMATOID ARTHRITIS INVOLVING MULTIPLE SITES, UNSPECIFIED WHETHER RHEUMATOID FACTOR PRESENT (HCC): ICD-10-CM

## (undated) DEVICE — SOL  .9 1000ML BTL

## (undated) DEVICE — CHLORAPREP 26ML APPLICATOR

## (undated) DEVICE — ENCORE® LATEX ACCLAIM SIZE 8, STERILE LATEX POWDER-FREE SURGICAL GLOVE: Brand: ENCORE

## (undated) DEVICE — ZIMMER® STERILE DISPOSABLE TOURNIQUET CUFF WITH PLC, DUAL PORT, SINGLE BLADDER, 34 IN. (86 CM)

## (undated) DEVICE — ADH DRMBND PRINEO SKN CLOS TOP

## (undated) DEVICE — CONTAINER SPEC STR 4OZ GRY LID

## (undated) DEVICE — SUTURE MONOCRYL 2-0 SH

## (undated) DEVICE — 20 ML SYRINGE LUER-LOCK TIP: Brand: MONOJECT

## (undated) DEVICE — Device

## (undated) DEVICE — SOL  .9 1000ML BAG

## (undated) DEVICE — TRAY SKIN PREP PVP-1

## (undated) DEVICE — BOWL CEMENT MIX QUICK-VAC

## (undated) DEVICE — DRESSING SILVERLON ISLAND 13IN

## (undated) DEVICE — SUTURE PDS II 2-0 CP

## (undated) DEVICE — TOTAL KNEE: Brand: MEDLINE INDUSTRIES, INC.

## (undated) DEVICE — DERMABOND PRINEO

## (undated) DEVICE — BATTERY

## (undated) DEVICE — DERMABOND LIQUID ADHESIVE

## (undated) DEVICE — ZIMMER® STERILE DISPOSABLE TOURNIQUET CUFF WITH PLC, DUAL PORT, SINGLE BLADDER, 30 IN. (76 CM)

## (undated) DEVICE — BLADE SAW SAGITTAL 19.5

## (undated) DEVICE — 450 ML BOTTLE OF 0.05% CHLORHEXIDINE GLUCONATE IN 99.95% STERILE WATER FOR IRRIGATION, USP AND APPLICATOR.: Brand: IRRISEPT ANTIMICROBIAL WOUND LAVAGE

## (undated) DEVICE — T5 HOOD WITH PEEL AWAY FACE SHIELD

## (undated) DEVICE — GAUZE SPONGES,12 PLY: Brand: CURITY

## (undated) DEVICE — DUAL CUT SAGITTAL BLADE

## (undated) DEVICE — DRAPE SHEET LG

## (undated) DEVICE — SUTURE VLOC 90 3-0 9\" 2044

## (undated) DEVICE — 2T11 #2 PDO 36 X 36: Brand: 2T11 #2 PDO 36 X 36

## (undated) DEVICE — BIPOLAR SEALER 23-301-1 AQM MBS: Brand: AQUAMANTYS™

## (undated) DEVICE — NEEDLE HPO 18GA 1.5IN ECLPS

## (undated) DEVICE — DEVICE STRATAFIX PDS PLUS 3-0

## (undated) DEVICE — SOL  .9 3000ML

## (undated) DEVICE — TRUMATCH PERSONALIZED SOLUTIONS PIN GUIDE SET - CT KNEE FOR USE WITH: RIGHT: Brand: TRUMATCH

## (undated) NOTE — LETTER
Hospital Discharge Documentation  Please phone to schedule a hospital follow up appointment. No discharge summary available at this time, below is the most recent progress note  for your review .         From: [id number] [facility]  Phone: [phone] Neuro:  Normal reflexes, CN. Sensory/motor exams grossly normal deficit. MS: No joint effusions. No peripheral edema. Incision clean dry and intact  Skin: Skin is warm and dry. No rashes, erythema, diaphoresis. Psych:   Normal mood and affect.  Calm,

## (undated) NOTE — LETTER
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    She underwent treatment with a pipeline embolization device and a single intra aneurysmal coil.  Her postoperative course was complicated by some right-sided weakness and evidence for small embolic phenomena in the left cerebral hemisphere.  Fortunately, over the next 6 weeks, she made a complete recovery back to normal.    She had a 6-month follow-up angiogram which demonstrated near complete occlusion of the aneurysm.  At that point, we decreased the Brilinta to 60 mg daily with a daily baby aspirin as well.    Today she returns with no neurologic complaints or deficits.  She is here to discuss the results of her most recent imaging follow-up.    Review of Systems  Changes since LOV: N/A  Anticoagulants: Aspirin 81mg AND Brilinta 60mg   Statin: YES   Hand Dominance: right  General: no symptoms reported  Neuro: no symptoms reported.  She has occasional migraine headaches.  Head: no symptoms reported  Musculoskeletal: no symptoms reported  Cardiovascular: no symptoms reported  Gastrointestinal: no symptoms reported  Genitourinary: no symptoms reported  Respiratory: no symptoms reported  Eyes: no symptoms reported  Skin: no symptoms reported  Mouth & throat: no symptoms reported  Neck: no symptoms reported  Nose: no symptoms reported  Psychiatric: no symptoms reported    UPDATED MEDICAL/SURGICAL HISTORY     No new medical or surgical history.    Past Medical History:   Diagnosis Date    Anxiety     Aphasia     Back problem     Scoliosis    BRCA negative     Breast cancer (HCC) 1995     left breast mastectomy, lymph nodes    Clostridioides difficile infection     Esophageal reflux     Essential hypertension     Hearing impairment     kourtney tinnitus    High blood pressure     High cholesterol     History of stomach ulcers     Hyperlipidemia     IBS (irritable bowel syndrome)     diet controlled    Migraines     Osteoarthritis     Pneumonia due to organism     twice    PONV (postoperative nausea and vomiting)     after knee replacement/only time    Psoriatic arthritis (HCC)     Thyroid nodule     Thyroid nodule 05/25/2023    Vertigo     Visual impairment     glasses      Past Surgical History:   Procedure Laterality Date    BRAIN ANEURYSM REPR, COMPLX  3/27/2023         BREAST RECONSTRUC W TISS EXPANDR Left 1995    COLONOSCOPY  01/2016    DILATION/CURETTAGE,DIAGNOSTIC  1997    due to uterine polyp    EGD  01/2016    IR ANGIOGRAM CEREBRAL CAROTID UNILATERAL  3/27/2023         MASTECTOMY LEFT Left 1995    TOTAL KNEE REPLACEMENT  03/2019    left    TOTAL KNEE REPLACEMENT Right 08/19/2019      History   Drug Use No        Current Outpatient Medications   Medication Sig Dispense Refill    OPHTHALMIC IRRIGATION SOLUTION OP Apply to eye. Sustain drops      hydroCHLOROthiazide 12.5 MG Oral Tab Take 1 tablet (12.5 mg total) by mouth daily. 90 tablet 3    venlafaxine ER (EFFEXOR XR) 37.5 MG Oral Capsule SR 24 Hr Take 1 capsule (37.5 mg total) by mouth daily. Add to effexor xr  150mg daily 90 capsule 0    ticagrelor (BRILINTA) 60 MG Oral Tab TAKE ONE TABLET BY MOUTH ONE TIME DAILY 30 tablet 5    busPIRone HCl 30 MG Oral Tab Take 0.5 tablets (15 mg total) by mouth in the morning and 0.5 tablets (15 mg total) before bedtime. 90 tablet 3    losartan 100 MG Oral Tab Take 1 tablet (100 mg total) by mouth daily. 90 tablet 3    rosuvastatin 10 MG Oral Tab Take 1 tablet (10 mg total) by mouth nightly. 90 tablet 3    Nystatin 693422 UNIT/GM External Powder Apply 1 Application topically 2 (two) times daily. (Patient  taking differently: Apply 1 Application topically 2 (two) times daily as needed.) 60 g 0    albuterol (VENTOLIN HFA) 108 (90 Base) MCG/ACT Inhalation Aero Soln Inhale 2 puffs into the lungs every 4 (four) hours as needed for Wheezing. 18 g 0    venlafaxine  MG Oral Capsule SR 24 Hr Take 1 capsule (150 mg total) by mouth daily. 90 capsule 3    Cholecalciferol (VITAMIN D) 50 MCG (2000 UT) Oral Tab Take 1 tablet by mouth daily.      baclofen 10 MG Oral Tab Take 1-2 TABLETS BY MOUTH (10-20mg) at night as needed 180 tablet 1    Melatonin 5 MG Oral Tab Take 1 tablet (5 mg total) by mouth nightly.      Multiple Vitamins-Minerals (PRESERVISION AREDS 2) Oral Cap Take 1 capsule by mouth daily.      gabapentin 100 MG Oral Cap 1 capsule (100 mg total). 300 mg  capsule 3    aspirin 81 MG Oral Tab EC Take 1 tablet (81 mg total) by mouth daily. 7 tablet 0    loratadine 10 MG Oral Tab Take 1 tablet (10 mg total) by mouth daily. (Patient taking differently: Take 1 tablet (10 mg total) by mouth as needed for Allergies.) 90 tablet 1    Acetaminophen  MG Oral Tab CR Take 1 tablet (650 mg total) by mouth every 8 (eight) hours as needed for Pain.      Esomeprazole Magnesium (NEXIUM 24HR OR) Take 20 mg by mouth every evening.      Rimegepant Sulfate (NURTEC) 75 MG Oral Tablet Dispersible Take 75 mg by mouth as needed. Take one tablet at onset of migraine.  Maximum dose in 24 hours is 1 tablet (75mg). (Patient not taking: Reported on 2/14/2024) 8 tablet 0    Menthol-Methyl Salicylate (THERA-GESIC) 0.5-15 % External Cream Apply 1 Application topically 3 (three) times daily as needed. (Patient not taking: Reported on 3/28/2024)        Blueberry, Cephalexin, Latex, Peas, Penicillin g, Penicillins, Meloxicam, Procainamide, Other, Peach, and Tomato     Family History   Problem Relation Age of Onset    Cancer Father         lung    Heart Disease Mother         congestive heart failure    Other (Other) Mother         COPD     Breast Cancer Self     Breast Cancer Sister 68    Breast Cancer Maternal Aunt         40s    Breast Cancer Maternal Cousin Female         40s      History   Smoking Status    Former    Years: 6.00    Types: Cigarettes    Quit date: 3/9/1994   Smokeless Tobacco    Never      History   Alcohol Use Not Currently     Comment: Socially        PHYSICAL EXAM   /80 (BP Location: Right arm, Patient Position: Sitting, Cuff Size: adult)   Pulse 72   Ht 61\"   Wt 165 lb (74.8 kg)   BMI 31.18 kg/m²   General appearance: 79-year-old  woman, moderately overweight, alert and oriented x 3 in no acute distress.  Her speech is clear and fluent and her recent and remote memory are intact.  She was accompanied by her .    Physical Exam  Constitutional:       General: She is not in acute distress.     Appearance: Normal appearance. She is not ill-appearing.   HENT:      Head: Normocephalic and atraumatic.   Eyes:      Extraocular Movements: Extraocular movements intact.      Conjunctiva/sclera: Conjunctivae normal.      Pupils: Pupils are equal, round, and reactive to light.      Comments: Visual fields were grossly normal to confrontation.   Neck:      Vascular: No carotid bruit.   Cardiovascular:      Rate and Rhythm: Normal rate and regular rhythm.      Pulses: Normal pulses.      Heart sounds: Normal heart sounds.   Pulmonary:      Effort: Pulmonary effort is normal.      Breath sounds: Normal breath sounds.   Musculoskeletal:         General: No tenderness, deformity or signs of injury. Normal range of motion.      Cervical back: Normal range of motion and neck supple.   Skin:     General: Skin is warm and dry.      Findings: No erythema or rash.   Neurological:      General: No focal deficit present.      Mental Status: She is alert and oriented to person, place, and time. Mental status is at baseline.      Cranial Nerves: No cranial nerve deficit.      Sensory: No sensory deficit.      Motor: No weakness.       Coordination: Romberg sign negative. Coordination normal. Heel to Shin Test normal.      Gait: Gait normal.   Psychiatric:         Mood and Affect: Mood normal.         Behavior: Behavior normal.         Thought Content: Thought content normal.         Judgment: Judgment normal.          Review of Imaging & Labs:   MRI of the head without and with contrast dated March 18, 2024-official impression:  CONCLUSION:   1. Postprocedural changes of left posterior communicating artery aneurysm stent assisted coiling are again identified.  Small approximate 4 x 3 mm focus of flow related signal and enhancement at the coil mass has decreased since comparison MR   arteriography of the head from July, 2023 (previously 6 x 5 mm).  If further intervention is deferred, continued imaging surveillance is recommended.   2. Stable multiple intracranial vascular variants as detailed.     6-month cerebral angiogram dated September 21, 2023-official impression:  IMPRESSION:   There has been progressive occlusion of the previously treated left posterior communicating artery aneurysm. There is no evidence of in stent stenosis or thrombosis.       My review: I concur with the findings noted above.  The previous angiogram in September had demonstrated near complete occlusion of the aneurysm and the current MRA represents an improvement from the prior MRA.    ASSESSMENT & PLAN     Destiney was seen today for follow - up.    Diagnoses and all orders for this visit:    Intracranial aneurysm (HCC)      1.  Destiney continues to do very well posttreatment.  The imaging is consistent with progressive occlusion of the aneurysm.    2.  Recommendations/plan: Continue baby aspirin and 60 mg of Brilinta daily.  Repeat MRA of the head without contrast in 1 year.  At that point, if there is further improvement, we would likely stop the Brilinta and continue the baby aspirin.      Thanks for involving me in the care of this pleasant  patient.    Sincerely,      Jb Lyons M.D., FABIANA, [facility]  Neurointerventional Surgery  Professor, Departments of Radiology, Neurology & Neurosurgery  [facility]

## (undated) NOTE — MR AVS SNAPSHOT
[facility]  [address]  [phone]               Thank you for choosing us for your health care visit with Nathanael Granger MD.  We are glad to serve you and happy to provide you with this summary Take by oral route. Commonly known as:  MEDROL DOSEPACK           * Notice: This list has 4 medication(s) that are the same as other medications prescribed for you.  Read the directions carefully, and ask your doctor or other care provider to review them

## (undated) NOTE — LETTER
MAYRA. Notifier: Nestio. Patient Name: Destiney English Identification Number: [id number]                          Advance Beneficiary Notice of Noncoverage (ABN)   NOTE:  If Medicare doesn’t pay for D. item/service(s) below, you may have to pay.  Medicare does not pay for everything, even some care that you or your health care provider have good reason to think you need. We expect Medicare may not pay for the D. item/service(s) below.  D. Items or Services E. Reason Medicare May Not Pay: F. Estimated Cost   __ EKG ($87.00)  __ Pap smear ($101) __Pelvic/Breast ($147.00)  __ Ear Irrigation ($138)  __ Injection(s)  ___ Tdap ($181)       ___ Meningitis ($290)   __Prevnar ($555)  ___ Td ($66)              ___ Prevnar 20 ($549)  ___ Hep A ($152)     ___ Prolia ($1827)         __ Xiaflex ($            )   ___ Hep B ($150)     ___ Pneumovax ($287)                                         ___ Vaccine Administration ($65)   __ Medicare does not cover this service      __ Medicare may not pay for this   item/service for your condition     __ Medicare may not pay for this item/service as often as this        WHAT YOU NEED TO DO NOW:  Read this notice, so you can make an informed decision about your care.  Ask us any questions that you may have after you finish reading.  Choose an option below about whether to receive the D. item/service(s) listed above.  Note: If you choose Option 1 or 2, we may help you to use any other insurance that you might have, but Medicare cannot require us to do this.  G. OPTIONS: Check only one box.  We cannot choose a box for you.   OPTION 1. I want the D. item/service(s) listed above. You may ask to be paid now, but I also want Medicare billed for an official decision on payment, which is sent to me on a Medicare Summary Notice (MSN). I understand that if Medicare doesn’t pay, I am responsible for payment, but I can appeal to Medicare by following the directions on the MSN.  If Medicare does pay, you will refund any payments I made to you, less co-pays or deductibles.  OPTION 2. I want the D. item/service(s) listed above, but do not bill Medicare. You may ask to be paid now as I am responsible for payment. I cannot appeal if Medicare is not billed.  OPTION 3. I don't want the D. item/service(s) listed above. I understand with this choice I am not responsible for payment, and I cannot appeal to see if Medicare would pay.    H. Additional Information:    This notice gives our opinion, not an official Medicare decision. If you have other questions on this notice or Medicare billing, call 1-800-MEDICARE (1-654.780.6670/TTY: [phone]). Signing below means that you have received and understand this notice. You also receive a copy.  I. Signature: J. Date:       You have the right to get Medicare information in an accessible format, like large print, Braille, or audio. You also have the right to file a complaint if you feel you’ve been discriminated against. Visit Medicare.gov/about- us/aqkxbvqavazwn-pqwrtbdcubnrwllbb-wozcdn.  According to the Paperwork Reduction Act of 1995, no persons are required to respond to a collection of information unless it displays a valid OMB control number. The valid OMB control number for this information collection is 8839-6220. The time required to complete this information collection is estimated to average 7 minutes per response, including the time to review instructions, search existing data resources, gather the data needed, and complete and review the information collection. If you have comments concerning the accuracy of the time estimate or suggestions for improving this form, please write to: CMS, [facility] [employer]     [place], Attn: TEO Reports Clearance Officer, [place], [place] [address].  Form CMS-R-131 (Exp. 1/31/2026) Form Approved OMB No. 4711-8540

## (undated) NOTE — MR AVS SNAPSHOT
[facility]  [address]  [phone]               Thank you for choosing us for your health care visit with Fernando Swan MD.  We are glad to serve you and happy to provide you with this summary medications prescribed for you. Read the directions carefully, and ask your doctor or other care provider to review them with you.          Where to Get Your Medications      These medications were sent to [address] [place] [address] Court, [address] [place] [address]. 6

## (undated) NOTE — LETTER
AUTHORIZATION FOR SURGICAL OPERATION OR OTHER PROCEDURE    1. I hereby authorize Dr. Christ Paulson and the [facility] staff assigned to my case to perform the following operation and/or procedure at the [facility]:    _______________________________________________________________________________________________      ____________________________Botox 200 units________________________________________________________    2.  My physician has explained the nature and purpose of the operation or other procedure, possible alternative methods of treatment, the risks involved, and the possibility of complication to me.  I acknowledge that no guarantee has been made as to the result that may be obtained.  3.  I recognize that, during the course of this operation, or other procedure, unforseen conditions may necessitate additional or different procedure than those listed above.  I, therefore, further authorize and request that the above named physician, his/her physician assistants or designees perform such procedures as are, in his/her professional opinion, necessary and desirable.  4.  Any tissue or organs removed in the operation or other procedure may be disposed of by and at the discretion of the [facility] staff and [facility].  5.  I understand that in the event of a medical emergency, I will be transported by local paramedics to [facility] or other hospital emergency department.  6.  I certify that I have read and fully understand the above consent to operation and/or other procedure.    7.  I acknowledge that my physician has explained sedation/analgesia administration to me including the risks and benefits.  I consent to the administration of sedation/analgesia as may be necessary or desirable in the judgement of my physician.    Witness signature: ___________________________________________________ Date:  ______/______/_____                    Time:  ________ A.M.  P.M.        Destiney English  3/15/1945  [id number]       Patient signature:  ___________________________________________________               Statement of Physician  My signature below affirms that prior to the time of the procedure, I have explained to the patient and/or his/her guardian, the risks and benefits involved in the proposed treatment and any reasonable alternative to the proposed treatment.  I have also explained the risks and benefits involved in the refusal of the proposed treatment and have answered the patient's questions.                        Date:  ______/______/_______  Provider                      Signature:  __________________________________________________________       Time:  ___________ ACARRIE    P.MScotty

## (undated) NOTE — Clinical Note
Dr. Maureen Champion has accepted to enroll into the Chronic Care Management program. I will be reaching out to the patient in a monthly basis to help overcome barriers and answer any health related questions.

## (undated) NOTE — MR AVS SNAPSHOT
[place]  [address]  [phone]               Thank you for choosing us for your health care visit with Ira Mishra MD.  We are glad to serve you and happy to provide you with this summary Commonly known as:  FOLVITE           gabapentin 100 MG Caps   What changed:  Another medication with the same name was removed. Continue taking this medication, and follow the directions you see here. Commonly known as:  NEURONTIN           LORazepam 0. Eat plenty of low-fat dairy products High fat meats and dairy   Choose whole grain products Foods high in sodium   Water is best for hydration Fast food.    Eat at home when possible     Tips for increasing your physical activity – Adults who are physically

## (undated) NOTE — LETTER
Date: May 16, 2025      Patient Name: Destiney English      : 3/15/1945        Thank you for choosing [facility] as your health care provider. Your physician has deemed the following medical service(s) necessary. However, your insurance plan may not pay for all of your health care and costs and may deny payment for this service. The fact that your insurance plan does not pay for an item or service does not mean you should not receive it. The purpose of this form is to help you make an informed decision about whether or not you want to receive this service(s) that may not be paid for by your insurance plan.    CPT Code Description     Cost     _________ ______________________________  _____________      _________ ______________________________ _____________      _________ ______________________________ _____________      I understand that the above mentioned service(s) or supply may not be covered by my insurance company. I agree to be financially responsible for the cost of this service or supply in the event of my insurance denies payment as a non-covered benefit.        ______________________________________________________________________  Signature of Patient or Patient's Representative  Relationship  Date    ______________________________________________________________________  Signature of Witness to signing of form   Printed Name

## (undated) NOTE — LETTER
November 22, 2021    [address]  [address]      Dear Dickson Aguilar: It was a pleasure speaking with you over the phone recently.  I wanted to send you my contact information for you to utilize when you have a question

## (undated) NOTE — MR AVS SNAPSHOT
[facility]  [address]  [phone]               Thank you for choosing us for your health care visit with Juan Long.   We are glad to serve you and happy to provide you with this summary Educational Information     Your blood pressure indicates you may be at-risk for Hypertension. Please consider the following Lifestyle Modifications. Also, please return for a follow-up Blood Pressure Check in 1 year.      Lifestyle Modification Recommen

## (undated) NOTE — ED AVS SNAPSHOT
[facility] in [address] [facility]  [address] [facility]    Phone:  [phone]    Fax:  [address]    MRN: [id number]    Department:  [facility] in [address] [place] [address]   Date of Visit may not be covered by your plan. It is possible that the physician may not participate in your health insurance plan. This may result in a lower benefit level being available to you or other limited reimbursement.   The physician may seek payment directly If you have been prescribed any medication(s), please fill your prescription right away and begin taking the medication(s) as directed.   If you believe that any of the medications or instructions on this list is different from what your Primary Care doctor harming yourself, contact [facility] at [phone]. - If you don’t have insurance, [employer] has partnered with Patient [address] to help you get signed up for insurance coverage.   Patient Flasher